# Patient Record
Sex: FEMALE | Race: WHITE | Employment: OTHER | URBAN - METROPOLITAN AREA
[De-identification: names, ages, dates, MRNs, and addresses within clinical notes are randomized per-mention and may not be internally consistent; named-entity substitution may affect disease eponyms.]

---

## 2016-06-23 PROCEDURE — 96372 THER/PROPH/DIAG INJ SC/IM: CPT

## 2017-01-10 ENCOUNTER — TELEPHONE (OUTPATIENT)
Dept: FAMILY MEDICINE | Facility: CLINIC | Age: 82
End: 2017-01-10

## 2017-01-12 ENCOUNTER — MEDICAL CORRESPONDENCE (OUTPATIENT)
Dept: HEALTH INFORMATION MANAGEMENT | Facility: CLINIC | Age: 82
End: 2017-01-12

## 2017-01-16 ENCOUNTER — MEDICAL CORRESPONDENCE (OUTPATIENT)
Dept: HEALTH INFORMATION MANAGEMENT | Facility: CLINIC | Age: 82
End: 2017-01-16

## 2017-02-09 ENCOUNTER — OFFICE VISIT (OUTPATIENT)
Dept: FAMILY MEDICINE | Facility: CLINIC | Age: 82
End: 2017-02-09
Payer: COMMERCIAL

## 2017-02-09 VITALS
HEART RATE: 92 BPM | DIASTOLIC BLOOD PRESSURE: 62 MMHG | BODY MASS INDEX: 30.81 KG/M2 | OXYGEN SATURATION: 96 % | WEIGHT: 150 LBS | TEMPERATURE: 98.1 F | SYSTOLIC BLOOD PRESSURE: 108 MMHG

## 2017-02-09 DIAGNOSIS — L85.8 CUTANEOUS HORN: Primary | ICD-10-CM

## 2017-02-09 PROCEDURE — 88305 TISSUE EXAM BY PATHOLOGIST: CPT | Performed by: FAMILY MEDICINE

## 2017-02-09 PROCEDURE — 11400 EXC TR-EXT B9+MARG 0.5 CM<: CPT | Performed by: FAMILY MEDICINE

## 2017-02-09 NOTE — MR AVS SNAPSHOT
After Visit Summary   2/9/2017    Lulu Carlton    MRN: [id number]           Patient Information     Date Of Birth          11/8/1921        Visit Information        Provider Department      2/9/2017 2:20 PM Todd Campos MD [facility]        Care Instructions    1. We removed the left thigh lesion.    2. Leave the current dressing on for two days.    3. After the two days just use a bandaide and change daily until healed.    4. Call if any redness.     5. i call you once get result.        Follow-ups after your visit        Your next 10 appointments already scheduled     Mar 09, 2017  9:00 AM   [facility]    [address] [place] [address] [phone]           Bring a current list of meds and any records pertaining to this visit.  For Physicals, please bring immunization records and any forms needing to be filled out.  Please arrive 10 minutes early to complete paperwork.              Who to contact     If you have questions or need follow up information about today's clinic visit or your schedule please contact [facility] directly at [phone].  Normal or non-critical lab and imaging results will be communicated to you by MyChart, letter or phone within 4 business days after the clinic has received the results. If you do not hear from us within 7 days, please contact the clinic through [employer] or phone. If you have a critical or abnormal lab result, we will notify you by phone as soon as possible.  Submit refill requests through [email or web address] or call your pharmacy and they will forward the refill request to us. Please allow 3 business days for your refill to be completed.          Additional Information About Your Visit        MyChart Information     [email or web address] lets you send messages to your doctor, view your test results, renew your  "prescriptions, schedule appointments and more. To sign up, go to [email or web address] . Click on \"Log in\" on the left side of the screen, which will take you to the Welcome page. Then click on \"Sign up Now\" on the right side of the page.     You will be asked to enter the access code listed below, as well as some personal information. Please follow the directions to create your username and password.     Your access code is: [email or web address]  Expires: 5/10/2017  2:35 PM     Your access code will  in 90 days. If you need help or a new code, please call your [place] clinic or [phone].        Care EveryWhere ID     This is your Care EveryWhere ID. This could be used by other organizations to access your [place] medical records  [id number]        Your Vitals Were     Pulse Temperature Pulse Oximetry             92 98.1  F (36.7  C) (Tympanic) 96%          Blood Pressure from Last 3 Encounters:   17 108/62   16 120/70   11/10/16 108/64    Weight from Last 3 Encounters:   17 150 lb (68.04 kg)   16 150 lb (68.04 kg)   11/10/16 150 lb 9.6 oz (68.312 kg)              Today, you had the following     No orders found for display       Primary Care Provider Office Phone # Fax #    Todd Villeda [phone]       [address] [address]        Thank you!     Thank you for choosing [facility]  for your care. Our goal is always to provide you with excellent care. Hearing back from our patients is one way we can continue to improve our services. Please take a few minutes to complete the written survey that you may receive in the mail after your visit with us. Thank you!             Your Updated Medication List - Protect others around you: Learn how to safely use, store and throw away your medicines at www.disposemymeds.org.          This list is accurate as of: 17  2:35 PM.  Always use your most recent med list.       "             Brand Name Dispense Instructions for use    Acetaminophen 325 MG Caps     100 capsule    Take 650 mg by mouth 3 times daily       B-12 1000 MCG Tbcr     30 tablet    Take 1,000 mcg by mouth daily       calcium-vitamin D 600-400 MG-UNIT per tablet    CALTRATE     Take 1 tablet by mouth every evening       clotrimazole 1 % cream    LOTRIMIN     Apply topically 2 times daily as needed       glipiZIDE 2.5 MG 24 hr tablet    glipiZIDE XL    30 tablet    Take 1 tablet (2.5 mg) by mouth daily (with breakfast)       guaiFENesin 600 MG 12 hr tablet    MUCINEX     Take 1,200 mg by mouth 2 times daily as needed for congestion       lisinopril 5 MG tablet    PRINIVIL/ZESTRIL    30 tablet    Take 1 tablet (5 mg) by mouth daily       metFORMIN 500 MG 24 hr tablet    GLUCOPHAGE-XR    60 tablet    Take 2 tablets (1,000 mg) by mouth daily (with dinner)       multivitamin Tabs tablet     30 each    Take 1 tablet by mouth daily       triamcinolone 0.1 % cream    KENALOG     Apply topically 2 times daily as needed for irritation       White Petrolatum ointment      Apply topically daily as needed for dry skin

## 2017-02-09 NOTE — PROGRESS NOTES
SUBJECTIVE:                                                    Lulu Carlton is a 89 year old female who presents to clinic today for the following health issues: she comes with a long standing lesion on her left thigh.        Sore on left upper thigh     Hypertension Follow-up      Outpatient blood pressures are being checked at home.  Results are good.    Low Salt Diet: low salt       Problem list and histories reviewed & adjusted, as indicated.  Additional history: as documented    Patient Active Problem List   Diagnosis     Sensorineural hearing loss, asymmetrical     Urinary incontinence     Osteoporosis     Vitamin D deficiency     Anemia, iron deficiency     Type 2 diabetes mellitus with hyperglycemia, without long-term current use of insulin (H)     Primary osteoarthritis of left knee     Hypertension goal BP (blood pressure) < 140/90     Past Surgical History   Procedure Laterality Date     Surgical history of -        thymus removal      Surgical history of -        cholecystectomy       Social History   Substance Use Topics     Smoking status: Never Smoker      Smokeless tobacco: Not on file     Alcohol Use: No     History reviewed. No pertinent family history.      Current Outpatient Prescriptions   Medication Sig Dispense Refill     Acetaminophen 325 MG CAPS Take 650 mg by mouth 3 times daily 100 capsule      multivitamin (OCUVITE) TABS tablet Take 1 tablet by mouth daily 30 each 0     metFORMIN (GLUCOPHAGE-XR) 500 MG 24 hr tablet Take 2 tablets (1,000 mg) by mouth daily (with dinner) 60 tablet 11     lisinopril (PRINIVIL,ZESTRIL) 5 MG tablet Take 1 tablet (5 mg) by mouth daily 30 tablet 11     Cyanocobalamin (B-12) 1000 MCG TBCR Take 1,000 mcg by mouth daily 30 tablet 11     glipiZIDE (GLIPIZIDE XL) 2.5 MG 24 hr tablet Take 1 tablet (2.5 mg) by mouth daily (with breakfast) 30 tablet 3     calcium-vitamin D (CALTRATE) 600-400 MG-UNIT per tablet Take 1 tablet by mouth every evening        triamcinolone (KENALOG) 0.1 % cream Apply topically 2 times daily as needed for irritation       clotrimazole (LOTRIMIN) 1 % cream Apply topically 2 times daily as needed       guaiFENesin (MUCINEX) 600 MG 12 hr tablet Take 1,200 mg by mouth 2 times daily as needed for congestion       White Petrolatum ointment Apply topically daily as needed for dry skin         ROS:  C: NEGATIVE for fever, chills, change in weight  CV: NEGATIVE for chest pain, palpitations or peripheral edema    OBJECTIVE:                                                    /62 mmHg  Pulse 92  Temp(Src) 98.1  F (36.7  C) (Tympanic)  Wt 150 lb (68.04 kg)  SpO2 96%  Body mass index is 30.81 kg/(m^2).  GENERAL: healthy, alert and no distress  MS: no gross musculoskeletal defects noted, no edema  SKIN WITH CUTANEOUS HORN ON LEFT THIGH    PROCEDURE  1 % ZYLOCAINE WITH EPI  THEN ROUTINE PREP AND THEN PUNCH BIOPSY TO REMOVE THE LESION   BLEEDING IS ABSENT.        ASSESSMENT/PLAN:                                                          ASSESSMENT/PLAN:      ICD-10-CM    1. Cutaneous horn L85.8        Patient Instructions   1. We removed the left thigh lesion.    2. Leave the current dressing on for two days.    3. After the two days just use a bandaide and change daily until healed.    4. Call if any redness.     5. i call you once get result.          There are no diagnoses linked to this encounter.        Todd Campos MD  [facility]

## 2017-02-09 NOTE — NURSING NOTE
"Chief Complaint   Patient presents with     Derm Problem     sore on upper left thigh       Initial /62 mmHg  Pulse 92  Temp(Src) 98.1  F (36.7  C) (Tympanic)  Wt 150 lb (68.04 kg)  SpO2 96% Estimated body mass index is 30.81 kg/(m^2) as calculated from the following:    Height as of 12/8/16: 4' 10.5\" (1.486 m).    Weight as of this encounter: 150 lb (68.04 kg).  Medication Reconciliation: complete    Health Maintenance that is potentially due pending provider review:  NONE    n/a      "

## 2017-02-09 NOTE — PATIENT INSTRUCTIONS
1. We removed the left thigh lesion.    2. Leave the current dressing on for two days.    3. After the two days just use a bandaide and change daily until healed.    4. Call if any redness.     5. i call you once get result.

## 2017-02-14 LAB — COPATH REPORT: NORMAL

## 2017-02-15 ENCOUNTER — TRANSFERRED RECORDS (OUTPATIENT)
Dept: HEALTH INFORMATION MANAGEMENT | Facility: CLINIC | Age: 82
End: 2017-02-15

## 2017-02-15 ENCOUNTER — ALLIED HEALTH/NURSE VISIT (OUTPATIENT)
Dept: FAMILY MEDICINE | Facility: CLINIC | Age: 82
End: 2017-02-15
Payer: COMMERCIAL

## 2017-02-15 DIAGNOSIS — R32 URINARY INCONTINENCE: Primary | ICD-10-CM

## 2017-02-15 PROCEDURE — 99207 ZZC NO CHARGE NURSE ONLY: CPT

## 2017-02-15 NOTE — NURSING NOTE
Rosa Maria from [facility] called and they will get a clean catch and fax Dr. Villeda the results as soon as she gets them

## 2017-02-15 NOTE — NURSING NOTE
I left another message for now at 1:09 pm Rosa Maria BASURTO at [facility] to return our call as Dr Villeda would like to get either a clean catch (if even possible) or straight cath UA specimen on patient before he would order any medication for her. Lisa Olmos RN

## 2017-02-15 NOTE — NURSING NOTE
Message left for Rosa Maria Granda LPN at [facility] to return call. I would like to ask what symptoms she may be observing and how patient is doing so I can consult Dr Villeda. Lisa Olmos RN

## 2017-02-15 NOTE — MR AVS SNAPSHOT
"              After Visit Summary   2/15/2017    Lulu Carlton    MRN: [id number]           Patient Information     Date Of Birth          11/8/1921        Visit Information        Provider Department      2/15/2017 9:45 AM FL NB RN [facility]        Today's Diagnoses     Urinary incontinence    -  1       Follow-ups after your visit        Your next 10 appointments already scheduled     Mar 09, 2017  9:00 AM CST   Office Visit with Todd Villeda MD   [facility] ([facility]    [address] [place] [id number]   [phone]           Bring a current list of meds and any records pertaining to this visit.  For Physicals, please bring immunization records and any forms needing to be filled out.  Please arrive 10 minutes early to complete paperwork.              Who to contact     If you have questions or need follow up information about today's clinic visit or your schedule please contact [facility] directly at [phone].  Normal or non-critical lab and imaging results will be communicated to you by MyChart, letter or phone within 4 business days after the clinic has received the results. If you do not hear from us within 7 days, please contact the clinic through [employer] or phone. If you have a critical or abnormal lab result, we will notify you by phone as soon as possible.  Submit refill requests through [employer] or call your pharmacy and they will forward the refill request to us. Please allow 3 business days for your refill to be completed.          Additional Information About Your Visit        Syn[employer] Information     [employer] lets you send messages to your doctor, view your test results, renew your prescriptions, schedule appointments and more. To sign up, go to [email or web address] . Click on \"Log in\" on the left side of the screen, which will take you to the Welcome page. Then click on \"Sign up Now\" on the " right side of the page.     You will be asked to enter the access code listed below, as well as some personal information. Please follow the directions to create your username and password.     Your access code is: [email or web address]  Expires: 5/10/2017  2:35 PM     Your access code will  in 90 days. If you need help or a new code, please call your [facility] or [phone].        Care EveryWhere ID     This is your Care EveryWhere ID. This could be used by other organizations to access your [place] medical records  [id number]         Blood Pressure from Last 3 Encounters:   17 108/62   16 120/70   11/10/16 108/64    Weight from Last 3 Encounters:   17 150 lb (68 kg)   16 150 lb (68 kg)   11/10/16 150 lb 9.6 oz (68.3 kg)              Today, you had the following     No orders found for display       Primary Care Provider Office Phone # Fax #    Todd iVlleda [phone]       [facility] [address] [address]        Thank you!     Thank you for choosing [facility]  for your care. Our goal is always to provide you with excellent care. Hearing back from our patients is one way we can continue to improve our services. Please take a few minutes to complete the written survey that you may receive in the mail after your visit with us. Thank you!             Your Updated Medication List - Protect others around you: Learn how to safely use, store and throw away your medicines at www.disposemymeds.org.          This list is accurate as of: 2/15/17  4:23 PM.  Always use your most recent med list.                   Brand Name Dispense Instructions for use    Acetaminophen 325 MG Caps     100 capsule    Take 650 mg by mouth 3 times daily       B-12 1000 MCG Tbcr     30 tablet    Take 1,000 mcg by mouth daily       calcium-vitamin D 600-400 MG-UNIT per tablet    CALTRATE     Take 1 tablet by mouth every evening       clotrimazole  1 % cream    LOTRIMIN     Apply topically 2 times daily as needed       glipiZIDE 2.5 MG 24 hr tablet    glipiZIDE XL    30 tablet    Take 1 tablet (2.5 mg) by mouth daily (with breakfast)       guaiFENesin 600 MG 12 hr tablet    MUCINEX     Take 1,200 mg by mouth 2 times daily as needed for congestion       lisinopril 5 MG tablet    PRINIVIL/ZESTRIL    30 tablet    Take 1 tablet (5 mg) by mouth daily       metFORMIN 500 MG 24 hr tablet    GLUCOPHAGE-XR    60 tablet    Take 2 tablets (1,000 mg) by mouth daily (with dinner)       multivitamin Tabs tablet     30 each    Take 1 tablet by mouth daily       triamcinolone 0.1 % cream    KENALOG     Apply topically 2 times daily as needed for irritation       White Petrolatum ointment      Apply topically daily as needed for dry skin

## 2017-03-02 ENCOUNTER — TELEPHONE (OUTPATIENT)
Dept: FAMILY MEDICINE | Facility: CLINIC | Age: 82
End: 2017-03-02

## 2017-03-03 ENCOUNTER — MEDICAL CORRESPONDENCE (OUTPATIENT)
Dept: HEALTH INFORMATION MANAGEMENT | Facility: CLINIC | Age: 82
End: 2017-03-03

## 2017-03-09 ENCOUNTER — OFFICE VISIT (OUTPATIENT)
Dept: FAMILY MEDICINE | Facility: CLINIC | Age: 82
End: 2017-03-09
Payer: COMMERCIAL

## 2017-03-09 VITALS
HEART RATE: 80 BPM | WEIGHT: 149.2 LBS | SYSTOLIC BLOOD PRESSURE: 100 MMHG | DIASTOLIC BLOOD PRESSURE: 60 MMHG | BODY MASS INDEX: 30.08 KG/M2 | TEMPERATURE: 98 F | HEIGHT: 59 IN

## 2017-03-09 DIAGNOSIS — E11.65 TYPE 2 DIABETES MELLITUS WITH HYPERGLYCEMIA, WITHOUT LONG-TERM CURRENT USE OF INSULIN (H): Primary | ICD-10-CM

## 2017-03-09 DIAGNOSIS — R11.0 NAUSEA: ICD-10-CM

## 2017-03-09 DIAGNOSIS — I10 HYPERTENSION GOAL BP (BLOOD PRESSURE) < 140/90: ICD-10-CM

## 2017-03-09 LAB — HBA1C MFR BLD: 6.1 % (ref 4.3–6)

## 2017-03-09 PROCEDURE — 36415 COLL VENOUS BLD VENIPUNCTURE: CPT | Performed by: FAMILY MEDICINE

## 2017-03-09 PROCEDURE — 99214 OFFICE O/P EST MOD 30 MIN: CPT | Performed by: FAMILY MEDICINE

## 2017-03-09 PROCEDURE — 99207 C FOOT EXAM  NO CHARGE: CPT | Performed by: FAMILY MEDICINE

## 2017-03-09 PROCEDURE — 83036 HEMOGLOBIN GLYCOSYLATED A1C: CPT | Performed by: FAMILY MEDICINE

## 2017-03-09 RX ORDER — GLIPIZIDE 5 MG/1
5 TABLET, FILM COATED, EXTENDED RELEASE ORAL DAILY
Qty: 30 TABLET | Refills: 11 | Status: SHIPPED | OUTPATIENT
Start: 2017-03-09 | End: 2017-06-15

## 2017-03-09 NOTE — PROGRESS NOTES
Please call daughter with result.  diabetes mellitus very well controlled.  It has improved a lot with the metformin.      PLAN: No new changes in treatment recommended.  We will see how she does off the metformin and with the increased dose of glipizide.

## 2017-03-09 NOTE — NURSING NOTE
"Chief Complaint   Patient presents with     Diabetes     Hypertension       Initial /60 (BP Location: Right arm, Patient Position: Chair, Cuff Size: Adult Large)  Pulse 80  Temp 98  F (36.7  C) (Tympanic)  Ht 4' 10.5\" (1.486 m)  Wt 149 lb 3.2 oz (67.7 kg)  BMI 30.65 kg/m2 Estimated body mass index is 30.65 kg/(m^2) as calculated from the following:    Height as of this encounter: 4' 10.5\" (1.486 m).    Weight as of this encounter: 149 lb 3.2 oz (67.7 kg).  Medication Reconciliation: complete        "

## 2017-03-09 NOTE — MR AVS SNAPSHOT
After Visit Summary   3/9/2017    Lulu Carlotn    MRN: [id number]           Patient Information     Date Of Birth          11/8/1921        Visit Information        Provider Department      3/9/2017 9:00 AM Todd Villeda MD [facility]        Today's Diagnoses     Type 2 diabetes mellitus with hyperglycemia, without long-term current use of insulin (H)    -  1    Nausea        Hypertension goal BP (blood pressure) < 140/90          Care Instructions    ASSESSMENT/PLAN:                                                        (E11.65) Type 2 diabetes mellitus with hyperglycemia, without long-term current use of insulin (H)  (primary encounter diagnosis)  Comment: diarrhea may be from metformin.   Plan: FOOT EXAM, glipiZIDE (GLIPIZIDE XL) 5 MG 24 hr         tablet, Hemoglobin A1c        Stop the metformin.   Take higher dose of glipizide instead.  Change to glipizide 5mg once daily.  Hgb A1C in another 3 months.    (R11.0) Nausea  Comment: seems worse since stopping the omeprazole   Plan: omeprazole (PRILOSEC) 20 MG CR capsule        REstart the omeprazole at 20mg daily.     (I10) Hypertension goal BP (blood pressure) < 140/90  Comment: doing OK  Plan: No change in current treatment plan.         Follow-ups after your visit        Who to contact     If you have questions or need follow up information about today's clinic visit or your schedule please contact [facility] directly at [phone].  Normal or non-critical lab and imaging results will be communicated to you by MyChart, letter or phone within 4 business days after the clinic has received the results. If you do not hear from us within 7 days, please contact the clinic through MyChart or phone. If you have a critical or abnormal lab result, we will notify you by phone as soon as possible.  Submit refill requests through [employer] or call your pharmacy and they will forward the refill request to us.  "Please allow 3 business days for your refill to be completed.          Additional Information About Your Visit        MyChart Information     [employer] interactive [employer]hart lets you send messages to your doctor, view your test results, renew your prescriptions, schedule appointments and more. To sign up, go to [email or web address] . Click on \"Log in\" on the left side of the screen, which will take you to the Welcome page. Then click on \"Sign up Now\" on the right side of the page.     You will be asked to enter the access code listed below, as well as some personal information. Please follow the directions to create your username and password.     Your access code is: [email or web address]  Expires: 5/10/2017  2:35 PM     Your access code will  in 90 days. If you need help or a new code, please call your [place] clinic or [phone].        Care EveryWhere ID     This is your [facility] EveryWhere ID. This could be used by other organizations to access your [place] medical records  [id number]        Your Vitals Were     Pulse Temperature Height BMI (Body Mass Index)          80 98  F (36.7  C) (Tympanic) 4' 10.5\" (1.486 m) 30.65 kg/m2         Blood Pressure from Last 3 Encounters:   17 100/60   17 108/62   16 120/70    Weight from Last 3 Encounters:   17 149 lb 3.2 oz (67.7 kg)   17 150 lb (68 kg)   16 150 lb (68 kg)              We Performed the Following     FOOT EXAM     Hemoglobin A1c          Today's Medication Changes          These changes are accurate as of: 3/9/17 10:05 AM.  If you have any questions, ask your nurse or doctor.               Start taking these medicines.        Dose/Directions    omeprazole 20 MG CR capsule   Commonly known as:  priLOSEC   Used for:  Nausea   Started by:  Todd Villeda MD        Dose:  20 mg   Take 1 capsule (20 mg) by mouth daily   Quantity:  30 capsule   Refills:  11         These medicines have changed or have updated prescriptions.        Dose/Directions "    glipiZIDE 5 MG 24 hr tablet   Commonly known as:  glipiZIDE XL   This may have changed:    - medication strength  - how much to take  - when to take this   Used for:  Type 2 diabetes mellitus with hyperglycemia, without long-term current use of insulin (H)   Changed by:  Todd Villeda MD        Dose:  5 mg   Take 1 tablet (5 mg) by mouth daily   Quantity:  30 tablet   Refills:  11         Stop taking these medicines if you haven't already. Please contact your care team if you have questions.     metFORMIN 500 MG 24 hr tablet   Commonly known as:  GLUCOPHAGE-XR   Stopped by:  Todd Villeda MD                Where to get your medicines      Some of these will need a paper prescription and others can be bought over the counter.  Ask your nurse if you have questions.     Bring a paper prescription for each of these medications     glipiZIDE 5 MG 24 hr tablet    omeprazole 20 MG CR capsule                Primary Care Provider Office Phone # Fax #    Todd Villeda [phone]       [address] [address]        Thank you!     Thank you for choosing [facility]  for your care. Our goal is always to provide you with excellent care. Hearing back from our patients is one way we can continue to improve our services. Please take a few minutes to complete the written survey that you may receive in the mail after your visit with us. Thank you!             Your Updated Medication List - Protect others around you: Learn how to safely use, store and throw away your medicines at www.disposemymeds.org.          This list is accurate as of: 3/9/17 10:05 AM.  Always use your most recent med list.                   Brand Name Dispense Instructions for use    Acetaminophen 325 MG Caps     100 capsule    Take 650 mg by mouth 3 times daily       B-12 1000 MCG Tbcr     30 tablet    Take 1,000 mcg by mouth daily       calcium-vitamin D 600-400 MG-UNIT  per tablet    CALTRATE     Take 1 tablet by mouth every evening       clotrimazole 1 % cream    LOTRIMIN     Apply topically 2 times daily as needed       glipiZIDE 5 MG 24 hr tablet    glipiZIDE XL    30 tablet    Take 1 tablet (5 mg) by mouth daily       guaiFENesin 600 MG 12 hr tablet    MUCINEX     Take 1,200 mg by mouth 2 times daily as needed for congestion       lisinopril 5 MG tablet    PRINIVIL/ZESTRIL    30 tablet    Take 1 tablet (5 mg) by mouth daily       multivitamin Tabs tablet     30 each    Take 1 tablet by mouth daily       omeprazole 20 MG CR capsule    priLOSEC    30 capsule    Take 1 capsule (20 mg) by mouth daily       triamcinolone 0.1 % cream    KENALOG     Apply topically 2 times daily as needed for irritation       White Petrolatum ointment      Apply topically daily as needed for dry skin

## 2017-03-09 NOTE — PROGRESS NOTES
"  SUBJECTIVE:                                                    Lulu Carlton is a 89 year old female who presents to clinic today for the following health issues:  Chief Complaint   Patient presents with     Diabetes     Hypertension      He has diarrhea about once a week.  Will have a couple watery stools.  Triggering factors:?  Stools \"never hard\".  Some diarrhea for a long time.  Might be worse since starting metformin.   She has a prn medication.  Staff will not give it to her unless they see the stool so she never gets it.    Can get nausea in the AM.   May be worse since stopping omeprazole.     Diabetes Follow-up      Patient is checking blood sugars: rarely.  Results range from 120s to 261    Taking metformin 500mg daily and glipizide 2.5mg daily.     Diabetic concerns: nausea in mornings, foggy today     Symptoms of hypoglycemia (low blood sugar): not sure     Paresthesias (numbness or burning in feet) or sores: No     Date of last diabetic eye exam: up to date     Lab Results   Component Value Date    A1C 8.5 10/31/2016        Hypertension Follow-up      Outpatient blood pressures are being checked at home.  Results are varies.normal to high range with anxiety    Low Salt Diet: not monitoring salt   BP usually in the 110-120/ 50-60      Amount of exercise or physical activity: None    Problems taking medications regularly: No    Medication side effects: possible, loose stools, nausea in am  Diet: regular (no restrictions)    Last clinic visit:11/10/2016 for diabetes mellitus   Medication or other changes at last visit:no  Weight since last visit?  Down 1#  Wt Readings from Last 5 Encounters:   03/09/17 149 lb 3.2 oz (67.7 kg)   02/09/17 150 lb (68 kg)   12/08/16 150 lb (68 kg)   11/10/16 150 lb 9.6 oz (68.3 kg)   10/31/16 152 lb 9.6 oz (69.2 kg)      History   Smoking Status     Never Smoker   Smokeless Tobacco     Not on file     Past medical history reviewed and updated    Patient Active Problem " List    Diagnosis Date Noted     Hypertension goal BP (blood pressure) < 140/90 12/12/2016     Priority: Medium     Urinary incontinence 10/31/2016     Priority: Medium     Osteoporosis 10/31/2016     Priority: Medium     Vitamin D deficiency 10/31/2016     Priority: Medium     Anemia, iron deficiency 10/31/2016     Priority: Medium     Type 2 diabetes mellitus with hyperglycemia, without long-term current use of insulin (H) 10/31/2016     Priority: Medium     Primary osteoarthritis of left knee 10/31/2016     Priority: Medium     Sensorineural hearing loss, asymmetrical 10/17/2007     Priority: Medium     Current Outpatient Prescriptions   Medication Sig Dispense Refill     Acetaminophen 325 MG CAPS Take 650 mg by mouth 3 times daily 100 capsule      multivitamin (OCUVITE) TABS tablet Take 1 tablet by mouth daily 30 each 0     metFORMIN (GLUCOPHAGE-XR) 500 MG 24 hr tablet Take 2 tablets (1,000 mg) by mouth daily (with dinner) 60 tablet 11     lisinopril (PRINIVIL,ZESTRIL) 5 MG tablet Take 1 tablet (5 mg) by mouth daily 30 tablet 11     Cyanocobalamin (B-12) 1000 MCG TBCR Take 1,000 mcg by mouth daily 30 tablet 11     glipiZIDE (GLIPIZIDE XL) 2.5 MG 24 hr tablet Take 1 tablet (2.5 mg) by mouth daily (with breakfast) 30 tablet 3     calcium-vitamin D (CALTRATE) 600-400 MG-UNIT per tablet Take 1 tablet by mouth every evening       triamcinolone (KENALOG) 0.1 % cream Apply topically 2 times daily as needed for irritation       clotrimazole (LOTRIMIN) 1 % cream Apply topically 2 times daily as needed       guaiFENesin (MUCINEX) 600 MG 12 hr tablet Take 1,200 mg by mouth 2 times daily as needed for congestion       White Petrolatum ointment Apply topically daily as needed for dry skin       ROS:General:  Resp: No coughing, wheezing or shortness of breath  CV: No chest pains or palpitations  GI: POSITIVE for:, diarrhea, see above   : POSITIVE for:, incontinence  Musculoskeletal: POSITIVE  for:, pain-aches in knees.  "  Psychiatric: No problems with anxiety, depression or mental health  Does activities at [employer].  In memory care.   NO orthostatic dizziness.    Has not been falling.     OBJECTIVE:Blood pressure 100/60, pulse 80, temperature 98  F (36.7  C), temperature source Tympanic, height 4' 10.5\" (1.486 m), weight 149 lb 3.2 oz (67.7 kg). BMI=Body mass index is 30.65 kg/(m^2).  GENERAL APPEARANCE ADULT: Alert, no acute distress  NECK: No adenopathy,masses or thyromegaly  MS: extremities normal, no peripheral edema   Foot exam:not done    ASSESSMENT/PLAN:                                                        (E11.65) Type 2 diabetes mellitus with hyperglycemia, without long-term current use of insulin (H)  (primary encounter diagnosis)  Comment: diarrhea may be from metformin.     Lab Results   Component Value Date    A1C 6.1 03/09/2017        Plan: FOOT EXAM, glipiZIDE (GLIPIZIDE XL) 5 MG 24 hr         tablet, Hemoglobin A1c        Stop the metformin.   Take higher dose of glipizide instead.  Change to glipizide 5mg once daily.  Hgb A1C in another 3 months.    (R11.0) Nausea  Comment: seems worse since stopping the omeprazole   Plan: omeprazole (PRILOSEC) 20 MG CR capsule        REstart the omeprazole at 20mg daily.     (I10) Hypertension goal BP (blood pressure) < 140/90  Comment: doing OK  Plan: No change in current treatment plan.        Diabetes Type II, A1c Controlled, non-insulin dependent   Associated with the following complications:none  "

## 2017-05-01 ENCOUNTER — HOSPITAL ENCOUNTER (EMERGENCY)
Facility: CLINIC | Age: 82
Discharge: HOME | End: 2017-05-01
Attending: EMERGENCY MEDICINE | Admitting: EMERGENCY MEDICINE
Payer: MEDICARE

## 2017-05-01 ENCOUNTER — APPOINTMENT (OUTPATIENT)
Dept: MRI IMAGING | Facility: CLINIC | Age: 82
End: 2017-05-01
Attending: EMERGENCY MEDICINE
Payer: MEDICARE

## 2017-05-01 ENCOUNTER — APPOINTMENT (OUTPATIENT)
Dept: GENERAL RADIOLOGY | Facility: CLINIC | Age: 82
End: 2017-05-01
Attending: EMERGENCY MEDICINE
Payer: MEDICARE

## 2017-05-01 VITALS
DIASTOLIC BLOOD PRESSURE: 75 MMHG | RESPIRATION RATE: 16 BRPM | HEART RATE: 75 BPM | OXYGEN SATURATION: 92 % | SYSTOLIC BLOOD PRESSURE: 153 MMHG | TEMPERATURE: 97.4 F

## 2017-05-01 DIAGNOSIS — G44.219 EPISODIC TENSION-TYPE HEADACHE, NOT INTRACTABLE: ICD-10-CM

## 2017-05-01 DIAGNOSIS — M62.838 TRAPEZIUS MUSCLE SPASM: ICD-10-CM

## 2017-05-01 DIAGNOSIS — K52.9 CHRONIC DIARRHEA: ICD-10-CM

## 2017-05-01 LAB
ALBUMIN SERPL-MCNC: 3.5 G/DL (ref 3.4–5)
ALBUMIN UR-MCNC: NEGATIVE MG/DL
ALP SERPL-CCNC: 82 U/L (ref 40–150)
ALT SERPL W P-5'-P-CCNC: 20 U/L (ref 0–50)
ANION GAP SERPL CALCULATED.3IONS-SCNC: 11 MMOL/L (ref 3–14)
APPEARANCE UR: CLEAR
AST SERPL W P-5'-P-CCNC: 12 U/L (ref 0–45)
BASOPHILS # BLD AUTO: 0.1 10E9/L (ref 0–0.2)
BASOPHILS NFR BLD AUTO: 1 %
BILIRUB SERPL-MCNC: 0.4 MG/DL (ref 0.2–1.3)
BILIRUB UR QL STRIP: NEGATIVE
BUN SERPL-MCNC: 18 MG/DL (ref 7–30)
CALCIUM SERPL-MCNC: 9.2 MG/DL (ref 8.5–10.1)
CHLORIDE SERPL-SCNC: 108 MMOL/L (ref 94–109)
CO2 SERPL-SCNC: 25 MMOL/L (ref 20–32)
COLOR UR AUTO: YELLOW
CREAT SERPL-MCNC: 0.69 MG/DL (ref 0.52–1.04)
CRP SERPL-MCNC: <2.9 MG/L (ref 0–8)
DIFFERENTIAL METHOD BLD: NORMAL
EOSINOPHIL # BLD AUTO: 0.1 10E9/L (ref 0–0.7)
EOSINOPHIL NFR BLD AUTO: 2.7 %
ERYTHROCYTE [DISTWIDTH] IN BLOOD BY AUTOMATED COUNT: 12.6 % (ref 10–15)
ERYTHROCYTE [SEDIMENTATION RATE] IN BLOOD BY WESTERGREN METHOD: 9 MM/H (ref 0–30)
GFR SERPL CREATININE-BSD FRML MDRD: 79 ML/MIN/1.7M2
GLUCOSE SERPL-MCNC: 123 MG/DL (ref 70–99)
GLUCOSE UR STRIP-MCNC: NEGATIVE MG/DL
HCT VFR BLD AUTO: 41 % (ref 35–47)
HGB BLD-MCNC: 13.3 G/DL (ref 11.7–15.7)
HGB UR QL STRIP: NEGATIVE
HYALINE CASTS #/AREA URNS LPF: 1 /LPF (ref 0–2)
IMM GRANULOCYTES # BLD: 0 10E9/L (ref 0–0.4)
IMM GRANULOCYTES NFR BLD: 0.2 %
INR PPP: 1.02 (ref 0.86–1.14)
KETONES UR STRIP-MCNC: NEGATIVE MG/DL
LEUKOCYTE ESTERASE UR QL STRIP: ABNORMAL
LYMPHOCYTES # BLD AUTO: 1.2 10E9/L (ref 0.8–5.3)
LYMPHOCYTES NFR BLD AUTO: 23.1 %
MCH RBC QN AUTO: 29 PG (ref 26.5–33)
MCHC RBC AUTO-ENTMCNC: 32.4 G/DL (ref 31.5–36.5)
MCV RBC AUTO: 90 FL (ref 78–100)
MONOCYTES # BLD AUTO: 0.3 10E9/L (ref 0–1.3)
MONOCYTES NFR BLD AUTO: 6.1 %
MUCOUS THREADS #/AREA URNS LPF: PRESENT /LPF
NEUTROPHILS # BLD AUTO: 3.5 10E9/L (ref 1.6–8.3)
NEUTROPHILS NFR BLD AUTO: 66.9 %
NITRATE UR QL: NEGATIVE
PH UR STRIP: 5.5 PH (ref 5–7)
PLATELET # BLD AUTO: 183 10E9/L (ref 150–450)
POTASSIUM SERPL-SCNC: 4.2 MMOL/L (ref 3.4–5.3)
PROT SERPL-MCNC: 6.7 G/DL (ref 6.8–8.8)
RBC # BLD AUTO: 4.58 10E12/L (ref 3.8–5.2)
RBC #/AREA URNS AUTO: 1 /HPF (ref 0–2)
SODIUM SERPL-SCNC: 144 MMOL/L (ref 133–144)
SP GR UR STRIP: 1.01 (ref 1–1.03)
SQUAMOUS #/AREA URNS AUTO: <1 /HPF (ref 0–1)
TROPONIN I SERPL-MCNC: NORMAL UG/L (ref 0–0.04)
TSH SERPL DL<=0.005 MIU/L-ACNC: 1.13 MU/L (ref 0.4–4)
URN SPEC COLLECT METH UR: ABNORMAL
UROBILINOGEN UR STRIP-MCNC: NORMAL MG/DL (ref 0–2)
WBC # BLD AUTO: 5.2 10E9/L (ref 4–11)
WBC #/AREA URNS AUTO: 6 /HPF (ref 0–2)

## 2017-05-01 PROCEDURE — 86140 C-REACTIVE PROTEIN: CPT | Performed by: EMERGENCY MEDICINE

## 2017-05-01 PROCEDURE — 87086 URINE CULTURE/COLONY COUNT: CPT | Performed by: EMERGENCY MEDICINE

## 2017-05-01 PROCEDURE — 85025 COMPLETE CBC W/AUTO DIFF WBC: CPT | Performed by: EMERGENCY MEDICINE

## 2017-05-01 PROCEDURE — A9585 GADOBUTROL INJECTION: HCPCS | Performed by: EMERGENCY MEDICINE

## 2017-05-01 PROCEDURE — 71020 XR CHEST 2 VW: CPT

## 2017-05-01 PROCEDURE — 93010 ELECTROCARDIOGRAM REPORT: CPT | Performed by: EMERGENCY MEDICINE

## 2017-05-01 PROCEDURE — 85610 PROTHROMBIN TIME: CPT | Performed by: EMERGENCY MEDICINE

## 2017-05-01 PROCEDURE — 80053 COMPREHEN METABOLIC PANEL: CPT | Performed by: EMERGENCY MEDICINE

## 2017-05-01 PROCEDURE — 81001 URINALYSIS AUTO W/SCOPE: CPT | Performed by: EMERGENCY MEDICINE

## 2017-05-01 PROCEDURE — 99285 EMERGENCY DEPT VISIT HI MDM: CPT | Mod: 25 | Performed by: EMERGENCY MEDICINE

## 2017-05-01 PROCEDURE — 96361 HYDRATE IV INFUSION ADD-ON: CPT

## 2017-05-01 PROCEDURE — 25000128 H RX IP 250 OP 636: Performed by: EMERGENCY MEDICINE

## 2017-05-01 PROCEDURE — 93005 ELECTROCARDIOGRAM TRACING: CPT

## 2017-05-01 PROCEDURE — 70553 MRI BRAIN STEM W/O & W/DYE: CPT

## 2017-05-01 PROCEDURE — 84484 ASSAY OF TROPONIN QUANT: CPT | Performed by: EMERGENCY MEDICINE

## 2017-05-01 PROCEDURE — 72141 MRI NECK SPINE W/O DYE: CPT

## 2017-05-01 PROCEDURE — 99285 EMERGENCY DEPT VISIT HI MDM: CPT | Mod: 25

## 2017-05-01 PROCEDURE — 25500064 ZZH RX 255 OP 636: Performed by: EMERGENCY MEDICINE

## 2017-05-01 PROCEDURE — 85652 RBC SED RATE AUTOMATED: CPT | Performed by: EMERGENCY MEDICINE

## 2017-05-01 PROCEDURE — 96374 THER/PROPH/DIAG INJ IV PUSH: CPT | Mod: 59

## 2017-05-01 PROCEDURE — 84443 ASSAY THYROID STIM HORMONE: CPT | Performed by: EMERGENCY MEDICINE

## 2017-05-01 RX ORDER — CYCLOBENZAPRINE HCL 10 MG
5 TABLET ORAL 3 TIMES DAILY PRN
Qty: 20 TABLET | Refills: 0 | Status: SHIPPED | OUTPATIENT
Start: 2017-05-01 | End: 2017-05-03 | Stop reason: ALTCHOICE

## 2017-05-01 RX ORDER — SODIUM CHLORIDE 9 MG/ML
1000 INJECTION, SOLUTION INTRAVENOUS CONTINUOUS
Status: DISCONTINUED | OUTPATIENT
Start: 2017-05-01 | End: 2017-05-01 | Stop reason: HOSPADM

## 2017-05-01 RX ORDER — LORAZEPAM 2 MG/ML
0.5 INJECTION INTRAMUSCULAR ONCE
Status: COMPLETED | OUTPATIENT
Start: 2017-05-01 | End: 2017-05-01

## 2017-05-01 RX ORDER — GADOBUTROL 604.72 MG/ML
10 INJECTION INTRAVENOUS ONCE
Status: COMPLETED | OUTPATIENT
Start: 2017-05-01 | End: 2017-05-01

## 2017-05-01 RX ADMIN — LORAZEPAM: 2 INJECTION, SOLUTION INTRAMUSCULAR; INTRAVENOUS at 11:00

## 2017-05-01 RX ADMIN — GADOBUTROL 7 ML: 604.72 INJECTION INTRAVENOUS at 12:21

## 2017-05-01 RX ADMIN — SODIUM CHLORIDE 1000 ML: 9 INJECTION, SOLUTION INTRAVENOUS at 11:00

## 2017-05-01 NOTE — ED AVS SNAPSHOT
[facility] Emergency Department    [address] [address]    Phone:  [phone]    Fax:  [phone]                                       Lulu Carlton   MRN: [id number]    Department:  [facility] Emergency Department   Date of Visit:  5/1/2017           Patient Information     Date Of Birth          11/8/1921        Your diagnoses for this visit were:     Episodic tension-type headache, not intractable     Trapezius muscle spasm     Chronic diarrhea        You were seen by Bello Patel MD.      Follow-up Information     Follow up with Todd Villeda MD.    Specialty:  Family Practice    Why:  As needed    Contact information:    [facility]  [address] [facility] [address]  [phone]        Discharge References/Attachments     HEADACHES, TENSION (ENGLISH)      24 Hour Appointment Hotline       To make an appointment at any [facility] clinic, call [phone] ([phone]). If you don't have a family doctor or clinic, we will help you find one. [facility] clinics are conveniently located to serve the needs of you and your family.             Review of your medicines      START taking        Dose / Directions Last dose taken    cyclobenzaprine 10 MG tablet   Commonly known as:  FLEXERIL   Dose:  5 mg   Quantity:  20 tablet        Take 0.5 tablets (5 mg) by mouth 3 times daily as needed for muscle spasms   Refills:  0          Our records show that you are taking the medicines listed below. If these are incorrect, please call your family doctor or clinic.        Dose / Directions Last dose taken    * TYLENOL PO   Dose:  650 mg        Take 650 mg by mouth every 4 hours as needed for mild pain or fever Do not exceed 4,000 mg of Acetaminophen from all sources in 24 hours   Refills:  0        * Acetaminophen 325 MG Caps   Dose:  650 mg   Quantity:  100 capsule        Take 650 mg by mouth 3 times daily AM, 1630, hs   Refills:  0        B-12 1000 MCG Tbcr    Dose:  1000 mcg   Quantity:  30 tablet        Take 1,000 mcg by mouth daily   Refills:  11        calcium-vitamin D 600-400 MG-UNIT per tablet   Commonly known as:  CALTRATE   Dose:  1 tablet        Take 1 tablet by mouth every evening   Refills:  0        glipiZIDE 5 MG 24 hr tablet   Commonly known as:  glipiZIDE XL   Dose:  5 mg   Quantity:  30 tablet        Take 1 tablet (5 mg) by mouth daily   Refills:  11        lisinopril 5 MG tablet   Commonly known as:  PRINIVIL/ZESTRIL   Dose:  5 mg   Quantity:  30 tablet        Take 1 tablet (5 mg) by mouth daily   Refills:  11        LOPERAMIDE HCL PO   Dose:  2 mg        Take 2 mg by mouth daily as needed   Refills:  0        MECLIZINE HCL PO   Dose:  12.5 mg        Take 12.5 mg by mouth 2 times daily as needed for dizziness   Refills:  0        multivitamin Tabs tablet   Dose:  1 tablet   Quantity:  30 each        Take 1 tablet by mouth daily   Refills:  0        omeprazole 20 MG CR capsule   Commonly known as:  priLOSEC   Dose:  20 mg   Quantity:  30 capsule        Take 1 capsule (20 mg) by mouth daily   Refills:  11        triamcinolone 0.1 % cream   Commonly known as:  KENALOG        Apply topically 2 times daily as needed for irritation   Refills:  0        * Notice:  This list has 2 medication(s) that are the same as other medications prescribed for you. Read the directions carefully, and ask your doctor or other care provider to review them with you.            Prescriptions were sent or printed at these locations (1 Prescription)                   Other Prescriptions                Printed at Department/Unit printer (1 of 1)         cyclobenzaprine (FLEXERIL) 10 MG tablet                Procedures and tests performed during your visit     CBC with platelets differential    CRP inflammation    Cervical spine MRI w/o contrast    Comprehensive metabolic panel    EKG 12-lead, tracing only    Erythrocyte sedimentation rate auto    INR    MR Brain w/o & w Contrast     TSH with free T4 reflex    Troponin I    UA reflex to Microscopic    XR Chest 2 Views      Orders Needing Specimen Collection     Ordered          05/01/17 1007  Clostridium difficile toxin B PCR - ROUTINE, [facility]: Routine, Needs to be Collected     Scheduled Task Status   05/01/17 1006 Collect Clostridium difficile toxin B PCR Open   Order Class:  PCU Collect                  Pending Results     No orders found from 4/29/2017 to 5/2/2017.            Pending Culture Results     No orders found from 4/29/2017 to 5/2/2017.            Test Results From Your Hospital Stay        5/1/2017 10:36 AM      Component Results     Component Value Ref Range & Units Status    WBC 5.2 4.0 - 11.0 10e9/L Final    RBC Count 4.58 3.8 - 5.2 10e12/L Final    Hemoglobin 13.3 11.7 - 15.7 g/dL Final    Hematocrit 41.0 35.0 - 47.0 % Final    MCV 90 78 - 100 fl Final    MCH 29.0 26.5 - 33.0 pg Final    MCHC 32.4 31.5 - 36.5 g/dL Final    RDW 12.6 10.0 - 15.0 % Final    Platelet Count 183 150 - 450 10e9/L Final    Diff Method Automated Method  Final    % Neutrophils 66.9 % Final    % Lymphocytes 23.1 % Final    % Monocytes 6.1 % Final    % Eosinophils 2.7 % Final    % Basophils 1.0 % Final    % Immature Granulocytes 0.2 % Final    Absolute Neutrophil 3.5 1.6 - 8.3 10e9/L Final    Absolute Lymphocytes 1.2 0.8 - 5.3 10e9/L Final    Absolute Monocytes 0.3 0.0 - 1.3 10e9/L Final    Absolute Eosinophils 0.1 0.0 - 0.7 10e9/L Final    Absolute Basophils 0.1 0.0 - 0.2 10e9/L Final    Abs Immature Granulocytes 0.0 0 - 0.4 10e9/L Final         5/1/2017 10:47 AM      Component Results     Component Value Ref Range & Units Status    INR 1.02 0.86 - 1.14 Final         5/1/2017 11:02 AM      Component Results     Component Value Ref Range & Units Status    Sodium 144 133 - 144 mmol/L Final    Potassium 4.2 3.4 - 5.3 mmol/L Final    Chloride 108 94 - 109 mmol/L Final    Carbon Dioxide 25 20 - 32 mmol/L Final    Anion Gap 11 3 - 14 mmol/L Final    Glucose  123 (H) 70 - 99 mg/dL Final    Urea Nitrogen 18 7 - 30 mg/dL Final    Creatinine 0.69 0.52 - 1.04 mg/dL Final    GFR Estimate 79 >60 mL/min/1.7m2 Final    Non  GFR Calc    GFR Estimate If Black >90   GFR Calc   >60 mL/min/1.7m2 Final    Calcium 9.2 8.5 - 10.1 mg/dL Final    Bilirubin Total 0.4 0.2 - 1.3 mg/dL Final    Albumin 3.5 3.4 - 5.0 g/dL Final    Protein Total 6.7 (L) 6.8 - 8.8 g/dL Final    Alkaline Phosphatase 82 40 - 150 U/L Final    ALT 20 0 - 50 U/L Final    AST 12 0 - 45 U/L Final         5/1/2017 11:01 AM      Component Results     Component Value Ref Range & Units Status    Troponin I ES  0.000 - 0.045 ug/L Final    <0.015  The 99th percentile for upper reference range is 0.045 ug/L.  Troponin values in   the range of 0.045 - 0.120 ug/L may be associated with risks of adverse   clinical events.           5/1/2017 11:07 AM      Component Results     Component Value Ref Range & Units Status    TSH 1.13 0.40 - 4.00 mU/L Final         5/1/2017 11:01 AM      Component Results     Component Value Ref Range & Units Status    CRP Inflammation <2.9 0.0 - 8.0 mg/L Final         5/1/2017 12:48 PM      Component Results     Component Value Ref Range & Units Status    Sed Rate 9 0 - 30 mm/h Final         5/1/2017  1:11 PM      Component Results     Component Value Ref Range & Units Status    Color Urine Yellow  Final    Appearance Urine Clear  Final    Glucose Urine Negative NEG mg/dL Final    Bilirubin Urine Negative NEG Final    Ketones Urine Negative NEG mg/dL Final    Specific Gravity Urine 1.013 1.003 - 1.035 Final    Blood Urine Negative NEG Final    pH Urine 5.5 5.0 - 7.0 pH Final    Protein Albumin Urine Negative NEG mg/dL Final    Urobilinogen mg/dL Normal 0.0 - 2.0 mg/dL Final    Nitrite Urine Negative NEG Final    Leukocyte Esterase Urine Moderate (A) NEG Final    Source Midstream Urine  Final    RBC Urine 1 0 - 2 /HPF Final    WBC Urine 6 (H) 0 - 2 /HPF Final    Squamous  Epithelial /HPF Urine <1 0 - 1 /HPF Final    Mucous Urine Present (A) NEG /LPF Final    Hyaline Casts 1 0 - 2 /LPF Final         5/1/2017 12:56 PM      Narrative     MR BRAIN W/O & W CONTRAST 5/1/2017 12:22 PM     HISTORY: Worsening daily headaches    TECHNIQUE: Multiplanar, multisequence MRI of the brain without and  with 7 mL Gadavist  IV contrast material.    COMPARISON: None.    FINDINGS:  There is generalized atrophy of the brain.  White matter  changes are present in the cerebral hemispheres that are consistent  with small vessel ischemic disease in this age patient. There is no  evidence of hemorrhage, mass, demyelination, acute infarct, or  anomaly. There are no gadolinium enhancing lesions. The facial  structures appear normal. The arteries at the base of the brain and  the dural venous sinuses appear patent.        Impression     IMPRESSION:   1. No acute pathology. No bleed, mass, or infarcts are seen.  2. Generalized atrophy of the brain.  White matter changes in the  cerebral hemispheres consistent with small vessel ischemic disease in  this age patient.    MARILYNN ALCANTARA MD         5/1/2017 12:56 PM      Narrative     MR CERVICAL SPINE W/O CONTRAST   5/1/2017 12:16 PM     HISTORY: Daily headaches/increased pain with rotational neck movement    TECHNIQUE:  Multiplanar multisequence MRI of the cervical spine  without gadolinium contrast.     COMPARISON:  None.    FINDINGS:  The spinal cord appears normal. The paraspinal soft tissues  appear normal.  The bone marrow has normal signal intensity.    C2-C3:  Minimal annular disc bulge. Central canal normal.    C3-C4:  Degeneration of the disc. Mild bulge. Central canal normal.  Moderate-severe degenerative change left facet joint. Mild left  foraminal stenosis due to an uncinate spur.    C4-C5:  Degeneration of the disc. Mild loss of disc space height. Mild  annular bulge. Central canal and neural foramen are patent. Moderate  degenerative change in the facet  "joints.    C5-C6:  Degeneration of the disks. Loss of disc space height. Small  central disc protrusion with an associated osteophyte. Central canal  still adequate. Neural foramen are adequate.    C6-C7:  Degeneration of the disc. Loss of disc space height. Mild  annular disc bulge. Central canal adequate. Mild degenerative change  in the facet joints.    C7-T1: Central canal and neural foramen are normal.        Impression     IMPRESSION:    1. Multilevel degenerative change including degenerative changes in  the facet joints. There is also a small central disc protrusion with  an associated osteophyte at C5-6 causing mild mass effect on the dural  sac but the central canal is still normal.  2. Cervical cord appears normal.    MARILYNN ALCANTARA MD         5/1/2017 12:02 PM      Narrative     XR CHEST 2 VW 5/1/2017 11:15 AM    HISTORY: Pain. Basilar rhonchi.    COMPARISON: 12/8/2016.        Impression     IMPRESSION: 2 views of the chest show no acute cardiopulmonary  disease. Underlying interstitial scarring is again seen as well as  some basilar atelectasis.     GERHARD SCHWAB MD                Thank you for choosing [facility]       Thank you for choosing [facility] for your care. Our goal is always to provide you with excellent care. Hearing back from our patients is one way we can continue to improve our services. Please take a few minutes to complete the written survey that you may receive in the mail after you visit with us. Thank you!        [employer] Information     [employer] lets you send messages to your doctor, view your test results, renew your prescriptions, schedule appointments and more. To sign up, go to [email or web address] . Click on \"Log in\" on the left side of the screen, which will take you to the Welcome page. Then click on \"Sign up Now\" on the right side of the page.     You will be asked to enter the access code listed below, as well as some personal information. Please follow the directions to create " your username and password.     Your access code is: [email or web address]  Expires: 5/10/2017  3:35 PM     Your access code will  in 90 days. If you need help or a new code, please call your [facility] or [phone].        Care EveryWhere ID     This is your Care EveryWhere ID. This could be used by other organizations to access your [facility] medical records  [id number]        After Visit Summary       This is your record. Keep this with you and show to your community pharmacist(s) and doctor(s) at your next visit.

## 2017-05-01 NOTE — ED NOTES
Pt d/c instructions reviewed and received. There are no unanswered questions at the time of discharge.

## 2017-05-01 NOTE — ED PROVIDER NOTES
History     Chief Complaint   Patient presents with     Headache     ha off and on x 2 mos - emesis this am     HPI  Lulu Carlton is a 89 year old female who presents with complaints of mild to moderate diffuse frontal headache that she's had intermittently for at least 2 months and probably longer according to her daughter.  Patient is currently in a memory care unit and when her daughter talked for today she described a frontal headache and told her that she had vomited earlier in the day.  Patient stated was bilious emesis without blood.  She's had chronic diarrhea with workup through [facility].  Daughter thinks she had C. diff previously.  Daughter states she had previous imaging and colonoscopy.  They did treat her with chemotherapy but she is unsure what they were treating.  Records show that she had a diffuse large B-cell lymphoma.  She also had symptoms consistent with myasthenia gravis but those resolved after she had her thymus removed.  She is currently on no meds or chemotherapy.  Patient did have a abdominal CT with IV contrast in 2011 showing no abnormal masses, signs of inflammation, or lymphadenopathy.  Patient has not been on antibiotics recently.  No exposure to infectious illness.  No recent travel.  She currently denies any change in her hearing, vision, or difficulty with speech/swallowing.  She does have some nasal congestion but is unsure if she has seasonal allergies.  She's had no bloody or colored discharge.  Patient does have hypertension and urinary incontinence.  She does state that she's had problems with urination for long-standing.  Denies history of kidney stone or pyelonephritis.  She's never been diagnosed with temporal arteritis.  She denies any focal motor or sensory problems.  She is able ambulate 2 blocks to [place] without difficulty.  She is not taking anything for the headache.  She did have an episode of dizziness in 2015 and had a head CT which showed atrophy of  the brain and white matter changes consistent with small vessel ischemic disease otherwise no acute findings.  Patient denies any recent falls or head injury.    I have reviewed the Medications, Allergies, Past Medical and Surgical History, and Social History in the Epic system.    Review of Systems all other systems reviewed and are negative.                          Past Medical History:   Diagnosis Date     Anemia, unspecified hosp. 4/3/07 [facility]      Central nervous system complication hosp. 4/3/07 [facility]     Depressive disorder, not elsewhere classified      Myasthenia gravis      Other malaise and fatigue hosp. 4/3/07 [facility]     intermittent episodes of slurred speech, headaches, some confusion w/general weakness.      Other urinary incontinence      Thoracic or lumbosacral neuritis or radiculitis, unspecified     lumbar radiculopathy involving right leg.      Unspecified essential hypertension      Patient Active Problem List   Diagnosis     Sensorineural hearing loss, asymmetrical     Urinary incontinence     Osteoporosis     Vitamin D deficiency     Anemia, iron deficiency     Type 2 diabetes mellitus with hyperglycemia, without long-term current use of insulin (H)     Primary osteoarthritis of left knee     Hypertension goal BP (blood pressure) < 140/90     Current Facility-Administered Medications   Medication     0.9% sodium chloride infusion     Current Outpatient Prescriptions   Medication     cyclobenzaprine (FLEXERIL) 10 MG tablet     omeprazole (PRILOSEC) 20 MG CR capsule     glipiZIDE (GLIPIZIDE XL) 5 MG 24 hr tablet     Acetaminophen 325 MG CAPS     multivitamin (OCUVITE) TABS tablet     lisinopril (PRINIVIL,ZESTRIL) 5 MG tablet     Cyanocobalamin (B-12) 1000 MCG TBCR     calcium-vitamin D (CALTRATE) 600-400 MG-UNIT per tablet     Acetaminophen (TYLENOL PO)     LOPERAMIDE HCL PO     MECLIZINE HCL PO     triamcinolone (KENALOG) 0.1 % cream        Allergies   Allergen Reactions      "Hydrocodone-Acetaminophen Other (See Comments)     Trazodone Other (See Comments)     \"She gets crazy dreams and then can't fall back asleep.\"     Metformin Rash     Social History     Social History     Marital status:      Spouse name: N/A     Number of children: N/A     Years of education: N/A     Occupational History     Not on file.     Social History Main Topics     Smoking status: Never Smoker     Smokeless tobacco: Not on file     Alcohol use No     Drug use: No     Sexual activity: Not on file     Other Topics Concern     Not on file     Social History Narrative     No family history on file.      Physical Exam   BP: (!) 141/104  Heart Rate: 82  Temp: 97.4  F (36.3  C)  Resp: 16  SpO2: 95 %  Physical Exam Gen. alert cooperative female in mild to moderate distress.  HEENT reveals the ears to be clear bilaterally.  Eyes show equal and reactive pupils.  Extraocular motions are intact.  Cannot visualize the pupils to to constriction.  No facial asymmetry.  Nasal passages are boggy but patent.  Orally mucosa is moist.  Uvula is midline.  Gait is intact.  Speech is clear.  On palpation over her right temporal region she has tenderness but no palpable lesion.  She has no TMJ tenderness.  Neck reveals tenderness of the trapezius and strap muscles.  With manipulation of the neck she has increased discomfort and headache but no radicular arm pain or paresthesias.  Lungs reveal basilar rhonchi.  Cardiac irregular but controlled rate.  No murmur.  Abdomen was obese, soft and benign.  Extremities reveal no pitting edema, calf or thigh tenderness, and Homans is negative.  Neurologically patient had intact cranial nerves except smoked was checked.  No focal motor or sensory weakness.  Patient is able ambulate without assistance.    ED Course     ED Course     Procedures             EKG Interpretation:      Interpreted by Bello Patel  Time reviewed: 11:15  Symptoms at time of EKG: Headache   Rhythm: normal sinus "   Rate: Normal  Axis: Normal  Ectopy: none  Conduction: 1st degree AV block  ST Segments/ T Waves: No ST-T wave changes  Q Waves: none  Comparison to prior: Unchanged from 9/21/15    Clinical Impression: normal EKG      Results for orders placed or performed during the hospital encounter of 05/01/17   MR Brain w/o & w Contrast    Narrative    MR BRAIN W/O & W CONTRAST 5/1/2017 12:22 PM     HISTORY: Worsening daily headaches    TECHNIQUE: Multiplanar, multisequence MRI of the brain without and  with 7 mL Gadavist  IV contrast material.    COMPARISON: None.    FINDINGS:  There is generalized atrophy of the brain.  White matter  changes are present in the cerebral hemispheres that are consistent  with small vessel ischemic disease in this age patient. There is no  evidence of hemorrhage, mass, demyelination, acute infarct, or  anomaly. There are no gadolinium enhancing lesions. The facial  structures appear normal. The arteries at the base of the brain and  the dural venous sinuses appear patent.      Impression    IMPRESSION:   1. No acute pathology. No bleed, mass, or infarcts are seen.  2. Generalized atrophy of the brain.  White matter changes in the  cerebral hemispheres consistent with small vessel ischemic disease in  this age patient.   Cervical spine MRI w/o contrast    Narrative    MR CERVICAL SPINE W/O CONTRAST   5/1/2017 12:16 PM     HISTORY: Daily headaches/increased pain with rotational neck movement    TECHNIQUE:  Multiplanar multisequence MRI of the cervical spine  without gadolinium contrast.     COMPARISON:  None.    FINDINGS:  The spinal cord appears normal. The paraspinal soft tissues  appear normal.  The bone marrow has normal signal intensity.    C2-C3:  Minimal annular disc bulge. Central canal normal.    C3-C4:  Degeneration of the disc. Mild bulge. Central canal normal.  Moderate-severe degenerative change left facet joint. Mild left  foraminal stenosis due to an uncinate spur.    C4-C5:   Degeneration of the disc. Mild loss of disc space height. Mild  annular bulge. Central canal and neural foramen are patent. Moderate  degenerative change in the facet joints.    C5-C6:  Degeneration of the disks. Loss of disc space height. Small  central disc protrusion with an associated osteophyte. Central canal  still adequate. Neural foramen are adequate.    C6-C7:  Degeneration of the disc. Loss of disc space height. Mild  annular disc bulge. Central canal adequate. Mild degenerative change  in the facet joints.    C7-T1: Central canal and neural foramen are normal.      Impression    IMPRESSION:    1. Multilevel degenerative change including degenerative changes in  the facet joints. There is also a small central disc protrusion with  an associated osteophyte at C5-6 causing mild mass effect on the dural  sac but the central canal is still normal.  2. Cervical cord appears normal.   XR Chest 2 Views    Narrative    XR CHEST 2 VW 5/1/2017 11:15 AM    HISTORY: Pain. Basilar rhonchi.    COMPARISON: 12/8/2016.      Impression    IMPRESSION: 2 views of the chest show no acute cardiopulmonary  disease. Underlying interstitial scarring is again seen as well as  some basilar atelectasis.     GERHARD SCHWAB MD               Critical Care time:  none               Labs Ordered and Resulted from Time of ED Arrival Up to the Time of Departure from the ED   COMPREHENSIVE METABOLIC PANEL - Abnormal; Notable for the following:        Result Value    Glucose 123 (*)     Protein Total 6.7 (*)     All other components within normal limits   URINE MACROSCOPIC WITH REFLEX TO MICRO - Abnormal; Notable for the following:     Leukocyte Esterase Urine Moderate (*)     WBC Urine 6 (*)     Mucous Urine Present (*)     All other components within normal limits   CBC WITH PLATELETS DIFFERENTIAL   INR   TROPONIN I   TSH WITH FREE T4 REFLEX   CRP INFLAMMATION   ERYTHROCYTE SEDIMENTATION RATE AUTO   CLOSTRIDIUM DIFFICILE TOXIN B   URINE  CULTURE AEROBIC BACTERIAL     IV was established and blood work was obtained.  Urinalysis is ordered.  Patient was given 0.5 mg of Ativan for muscle relaxation and anxiolytic prior to MRI.  Chest x-ray did show sternotomy wires but this is being since 1991 so she is okay to go to MRI.  Results for MRI showed no acute intracranial abnormality.  She did have some degenerative changes of her cervical spine described above.  Patient did have diminished headache with the Ativan.  Discussed results remaining blood work showing normal CRP and sed rate.  Doubt temporal arteritis.  Assessments & Plan (with Medical Decision Making)   Patient is a 89 year female presents with intermittent headache for 2+ months.  Associated nausea and bilious emesis today.  No bloody emesis.  Chronic diarrhea which is unchanged.  No fever or chills.  No injury or fall.  Denies hearing, vision, or changes in her speech.  No difficulty with swallowing.  Does have some seasonal allergies with nasal congestion.  No neck pain.  No focal neurologic symptoms.  Previous workup for dizziness including the head CT showing atrophy but no acute other changes.  She does have a history of lymphoma and was treated with chemotherapy.  Her headache is across her forehead and is moderate in intensity.  No history of migraines.  On initial presentation patient was cooperative and pleasant.  Her diastolic blood pressure was slightly elevated but without treatment that normalized.  HEENT was atraumatic and revealed no asymmetry.  She has tenderness over the right temporal region.  Her CRP and ESR were negative so doubt temporal arteritis.  Cranial nerve II through XII are intact except smell which was not checked.  She did have tenderness of the trapezius and strap muscles which did reproduce her frontal headache.  There is no stridor or bruits.  There is no radicular pain with manipulation of the neck.  She had normal cardiac and respiratory auscultation except  basilar crackles.  Chest x-rays obtained to assess and showed no acute abnormality.  With the chronicity of her headaches and neck pain and a MRI of her head and neck were obtained and are described above.  The head itself showed no acute abnormality.  She does have degenerative changes and neck which are described above.  Patient had improvement of her headache and neck pain with Ativan suspect there is a muscular component to her symptoms.  Should've benign abdominal exam and normal comprehensive metabolic panel, CBC and INR so doubt acute abdominal process causing her symptoms and vomiting.  She was unable to provide a stool sample despite history of chronic diarrhea.  She did have urinary frequency and her urine showed only 6 white cells and some leukocyte esterase.  Culture is pending.  In addition we did check a thyroid screen and her TSH was normal.  Troponin was normal.  EKG was unchanged from previous.  Doubt cardiac as a source of her nausea.  With a nonfocal neurologic exam doubt TIA or stroke.  At this point and feel her headaches are more tension related.  Doubt migraine.  We'll try low-dose Flexeril to see if that will benefit.  She can use ice or heat if helpful.  Follow up if persistent or new concerning symptoms such as arm weakness, paresthesias, persistent vomiting or fever.  I have reviewed the nursing notes.    I have reviewed the findings, diagnosis, plan and need for follow up with the patient.    New Prescriptions    CYCLOBENZAPRINE (FLEXERIL) 10 MG TABLET    Take 0.5 tablets (5 mg) by mouth 3 times daily as needed for muscle spasms       Final diagnoses:   Episodic tension-type headache, not intractable   Trapezius muscle spasm   Chronic diarrhea       5/1/2017   [facility] EMERGENCY DEPARTMENT     Bello Patel MD  05/01/17 [id number]       Bello Patel MD  05/01/17 [id number]

## 2017-05-03 ENCOUNTER — TELEPHONE (OUTPATIENT)
Dept: FAMILY MEDICINE | Facility: CLINIC | Age: 82
End: 2017-05-03

## 2017-05-03 LAB
BACTERIA SPEC CULT: NORMAL
MICRO REPORT STATUS: NORMAL
SPECIMEN SOURCE: NORMAL

## 2017-05-03 NOTE — TELEPHONE ENCOUNTER
Cyclobenzaprine is not covered (high risk).  Alternatives are: baclofen and zanaflex.  Please fax new rx if OK to change.

## 2017-05-03 NOTE — TELEPHONE ENCOUNTER
Please call.  There are potentisl side effects with medications for muscle relaxers.    Has she tried acetaminophen?  This might be a good place to start like 650mg q 4 hours prn.    I could add muscle relaxer like tizanidine if needed.  It can cause lower blood pressure, dizziness, weakness, dry mouth and drowsiness in up to 40% of people taking this medication.   VALERIE BENITES MD

## 2017-05-09 ENCOUNTER — APPOINTMENT (OUTPATIENT)
Dept: CARDIOLOGY | Facility: CLINIC | Age: 82
DRG: 469 | End: 2017-05-09
Attending: FAMILY MEDICINE
Payer: MEDICARE

## 2017-05-09 ENCOUNTER — APPOINTMENT (OUTPATIENT)
Dept: GENERAL RADIOLOGY | Facility: CLINIC | Age: 82
DRG: 469 | End: 2017-05-09
Attending: FAMILY MEDICINE
Payer: MEDICARE

## 2017-05-09 ENCOUNTER — MEDICAL CORRESPONDENCE (OUTPATIENT)
Dept: HEALTH INFORMATION MANAGEMENT | Facility: CLINIC | Age: 82
End: 2017-05-09

## 2017-05-09 ENCOUNTER — HOSPITAL ENCOUNTER (INPATIENT)
Facility: CLINIC | Age: 82
LOS: 4 days | Discharge: NURSING, REHAB OR LONG-TERM CARE | DRG: 469 | End: 2017-05-13
Attending: FAMILY MEDICINE | Admitting: FAMILY MEDICINE
Payer: MEDICARE

## 2017-05-09 ENCOUNTER — ANESTHESIA EVENT (OUTPATIENT)
Dept: SURGERY | Facility: CLINIC | Age: 82
DRG: 469 | End: 2017-05-09
Payer: MEDICARE
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
## 2017-05-09 DIAGNOSIS — S72.091A: ICD-10-CM

## 2017-05-09 DIAGNOSIS — I50.31 ACUTE DIASTOLIC CONGESTIVE HEART FAILURE (H): ICD-10-CM

## 2017-05-09 DIAGNOSIS — S72.001A HIP FRACTURE, RIGHT, CLOSED, INITIAL ENCOUNTER (H): ICD-10-CM

## 2017-05-09 DIAGNOSIS — S72.001A: Primary | ICD-10-CM

## 2017-05-09 PROBLEM — S72.009A HIP FRACTURE (H): Status: ACTIVE | Noted: 2017-05-09

## 2017-05-09 LAB
ALBUMIN SERPL-MCNC: 3.3 G/DL (ref 3.4–5)
ALP SERPL-CCNC: 93 U/L (ref 40–150)
ALT SERPL W P-5'-P-CCNC: 22 U/L (ref 0–50)
ANION GAP SERPL CALCULATED.3IONS-SCNC: 9 MMOL/L (ref 3–14)
AST SERPL W P-5'-P-CCNC: 18 U/L (ref 0–45)
BASOPHILS # BLD AUTO: 0 10E9/L (ref 0–0.2)
BASOPHILS NFR BLD AUTO: 0.2 %
BILIRUB SERPL-MCNC: 0.5 MG/DL (ref 0.2–1.3)
BUN SERPL-MCNC: 16 MG/DL (ref 7–30)
CALCIUM SERPL-MCNC: 9 MG/DL (ref 8.5–10.1)
CHLORIDE SERPL-SCNC: 104 MMOL/L (ref 94–109)
CO2 SERPL-SCNC: 26 MMOL/L (ref 20–32)
CREAT SERPL-MCNC: 0.75 MG/DL (ref 0.52–1.04)
DIFFERENTIAL METHOD BLD: ABNORMAL
EOSINOPHIL # BLD AUTO: 0.2 10E9/L (ref 0–0.7)
EOSINOPHIL NFR BLD AUTO: 1 %
ERYTHROCYTE [DISTWIDTH] IN BLOOD BY AUTOMATED COUNT: 12.5 % (ref 10–15)
GFR SERPL CREATININE-BSD FRML MDRD: 72 ML/MIN/1.7M2
GLUCOSE BLDC GLUCOMTR-MCNC: 129 MG/DL (ref 70–99)
GLUCOSE BLDC GLUCOMTR-MCNC: 145 MG/DL (ref 70–99)
GLUCOSE SERPL-MCNC: 247 MG/DL (ref 70–99)
HCT VFR BLD AUTO: 39.5 % (ref 35–47)
HGB BLD-MCNC: 12.8 G/DL (ref 11.7–15.7)
IMM GRANULOCYTES # BLD: 0 10E9/L (ref 0–0.4)
IMM GRANULOCYTES NFR BLD: 0.2 %
LYMPHOCYTES # BLD AUTO: 0.8 10E9/L (ref 0.8–5.3)
LYMPHOCYTES NFR BLD AUTO: 5.5 %
MCH RBC QN AUTO: 28.9 PG (ref 26.5–33)
MCHC RBC AUTO-ENTMCNC: 32.4 G/DL (ref 31.5–36.5)
MCV RBC AUTO: 89 FL (ref 78–100)
MONOCYTES # BLD AUTO: 0.8 10E9/L (ref 0–1.3)
MONOCYTES NFR BLD AUTO: 5.4 %
NEUTROPHILS # BLD AUTO: 13.1 10E9/L (ref 1.6–8.3)
NEUTROPHILS NFR BLD AUTO: 87.7 %
PLATELET # BLD AUTO: 167 10E9/L (ref 150–450)
POTASSIUM SERPL-SCNC: 4.2 MMOL/L (ref 3.4–5.3)
PROT SERPL-MCNC: 6.5 G/DL (ref 6.8–8.8)
RBC # BLD AUTO: 4.43 10E12/L (ref 3.8–5.2)
SODIUM SERPL-SCNC: 139 MMOL/L (ref 133–144)
WBC # BLD AUTO: 15 10E9/L (ref 4–11)

## 2017-05-09 PROCEDURE — 12000007 ZZH R&B INTERMEDIATE

## 2017-05-09 PROCEDURE — 99221 1ST HOSP IP/OBS SF/LOW 40: CPT | Mod: AI | Performed by: FAMILY MEDICINE

## 2017-05-09 PROCEDURE — 99285 EMERGENCY DEPT VISIT HI MDM: CPT | Mod: 25

## 2017-05-09 PROCEDURE — 93010 ELECTROCARDIOGRAM REPORT: CPT | Mod: Z6 | Performed by: FAMILY MEDICINE

## 2017-05-09 PROCEDURE — A9270 NON-COVERED ITEM OR SERVICE: HCPCS | Mod: GY | Performed by: FAMILY MEDICINE

## 2017-05-09 PROCEDURE — 73560 X-RAY EXAM OF KNEE 1 OR 2: CPT | Mod: RT

## 2017-05-09 PROCEDURE — 25000128 H RX IP 250 OP 636: Performed by: FAMILY MEDICINE

## 2017-05-09 PROCEDURE — 25000132 ZZH RX MED GY IP 250 OP 250 PS 637: Mod: GY | Performed by: FAMILY MEDICINE

## 2017-05-09 PROCEDURE — 96361 HYDRATE IV INFUSION ADD-ON: CPT

## 2017-05-09 PROCEDURE — 86900 BLOOD TYPING SEROLOGIC ABO: CPT | Performed by: ORTHOPAEDIC SURGERY

## 2017-05-09 PROCEDURE — 96374 THER/PROPH/DIAG INJ IV PUSH: CPT

## 2017-05-09 PROCEDURE — 93005 ELECTROCARDIOGRAM TRACING: CPT

## 2017-05-09 PROCEDURE — 93306 TTE W/DOPPLER COMPLETE: CPT | Mod: 26 | Performed by: INTERNAL MEDICINE

## 2017-05-09 PROCEDURE — 72170 X-RAY EXAM OF PELVIS: CPT

## 2017-05-09 PROCEDURE — 86850 RBC ANTIBODY SCREEN: CPT | Performed by: ORTHOPAEDIC SURGERY

## 2017-05-09 PROCEDURE — 99285 EMERGENCY DEPT VISIT HI MDM: CPT | Mod: 25 | Performed by: FAMILY MEDICINE

## 2017-05-09 PROCEDURE — 00000146 ZZHCL STATISTIC GLUCOSE BY METER IP

## 2017-05-09 PROCEDURE — 86901 BLOOD TYPING SEROLOGIC RH(D): CPT | Performed by: ORTHOPAEDIC SURGERY

## 2017-05-09 PROCEDURE — 71010 XR CHEST 1 VW: CPT

## 2017-05-09 PROCEDURE — 25000131 ZZH RX MED GY IP 250 OP 636 PS 637: Mod: GY | Performed by: FAMILY MEDICINE

## 2017-05-09 PROCEDURE — 93306 TTE W/DOPPLER COMPLETE: CPT

## 2017-05-09 PROCEDURE — 25000125 ZZHC RX 250: Performed by: FAMILY MEDICINE

## 2017-05-09 PROCEDURE — 73552 X-RAY EXAM OF FEMUR 2/>: CPT | Mod: RT

## 2017-05-09 PROCEDURE — 85025 COMPLETE CBC W/AUTO DIFF WBC: CPT | Performed by: FAMILY MEDICINE

## 2017-05-09 PROCEDURE — 80053 COMPREHEN METABOLIC PANEL: CPT | Performed by: FAMILY MEDICINE

## 2017-05-09 RX ORDER — ACETAMINOPHEN 325 MG/1
650 TABLET ORAL EVERY 4 HOURS PRN
Status: DISCONTINUED | OUTPATIENT
Start: 2017-05-09 | End: 2017-05-09

## 2017-05-09 RX ORDER — OXYCODONE AND ACETAMINOPHEN 5; 325 MG/1; MG/1
1 TABLET ORAL ONCE
Status: COMPLETED | OUTPATIENT
Start: 2017-05-09 | End: 2017-05-09

## 2017-05-09 RX ORDER — DEXTROSE MONOHYDRATE 25 G/50ML
25-50 INJECTION, SOLUTION INTRAVENOUS
Status: DISCONTINUED | OUTPATIENT
Start: 2017-05-09 | End: 2017-05-13 | Stop reason: HOSPADM

## 2017-05-09 RX ORDER — NICOTINE POLACRILEX 4 MG
15-30 LOZENGE BUCCAL
Status: DISCONTINUED | OUTPATIENT
Start: 2017-05-09 | End: 2017-05-13 | Stop reason: HOSPADM

## 2017-05-09 RX ORDER — ONDANSETRON 2 MG/ML
4 INJECTION INTRAMUSCULAR; INTRAVENOUS EVERY 6 HOURS PRN
Status: DISCONTINUED | OUTPATIENT
Start: 2017-05-09 | End: 2017-05-10

## 2017-05-09 RX ORDER — FUROSEMIDE 10 MG/ML
20 INJECTION INTRAMUSCULAR; INTRAVENOUS ONCE
Status: COMPLETED | OUTPATIENT
Start: 2017-05-09 | End: 2017-05-09

## 2017-05-09 RX ORDER — NALOXONE HYDROCHLORIDE 0.4 MG/ML
.1-.4 INJECTION, SOLUTION INTRAMUSCULAR; INTRAVENOUS; SUBCUTANEOUS
Status: DISCONTINUED | OUTPATIENT
Start: 2017-05-09 | End: 2017-05-09

## 2017-05-09 RX ORDER — NALOXONE HYDROCHLORIDE 0.4 MG/ML
.1-.4 INJECTION, SOLUTION INTRAMUSCULAR; INTRAVENOUS; SUBCUTANEOUS
Status: DISCONTINUED | OUTPATIENT
Start: 2017-05-09 | End: 2017-05-10

## 2017-05-09 RX ORDER — GLUCOSAMINE HCL/CHONDROITIN SU 500-400 MG
CAPSULE ORAL PRN
COMMUNITY
End: 2017-10-11

## 2017-05-09 RX ORDER — HYDROMORPHONE HYDROCHLORIDE 1 MG/ML
.3-.5 INJECTION, SOLUTION INTRAMUSCULAR; INTRAVENOUS; SUBCUTANEOUS
Status: DISCONTINUED | OUTPATIENT
Start: 2017-05-09 | End: 2017-05-09 | Stop reason: DRUGHIGH

## 2017-05-09 RX ORDER — SODIUM CHLORIDE 9 MG/ML
1000 INJECTION, SOLUTION INTRAVENOUS CONTINUOUS
Status: DISCONTINUED | OUTPATIENT
Start: 2017-05-09 | End: 2017-05-09

## 2017-05-09 RX ORDER — ONDANSETRON 2 MG/ML
4 INJECTION INTRAMUSCULAR; INTRAVENOUS EVERY 6 HOURS PRN
Status: DISCONTINUED | OUTPATIENT
Start: 2017-05-09 | End: 2017-05-09

## 2017-05-09 RX ORDER — ACETAMINOPHEN 325 MG/1
650 TABLET ORAL EVERY 4 HOURS PRN
Status: DISCONTINUED | OUTPATIENT
Start: 2017-05-09 | End: 2017-05-10

## 2017-05-09 RX ORDER — ONDANSETRON 4 MG/1
4 TABLET, ORALLY DISINTEGRATING ORAL EVERY 6 HOURS PRN
Status: DISCONTINUED | OUTPATIENT
Start: 2017-05-09 | End: 2017-05-10

## 2017-05-09 RX ORDER — ONDANSETRON 4 MG/1
4 TABLET, ORALLY DISINTEGRATING ORAL EVERY 6 HOURS PRN
Status: DISCONTINUED | OUTPATIENT
Start: 2017-05-09 | End: 2017-05-09

## 2017-05-09 RX ORDER — HYDROMORPHONE HCL/0.9% NACL/PF 0.2MG/0.2
0.2 SYRINGE (ML) INTRAVENOUS
Status: DISCONTINUED | OUTPATIENT
Start: 2017-05-09 | End: 2017-05-13 | Stop reason: HOSPADM

## 2017-05-09 RX ADMIN — HYDROMORPHONE HYDROCHLORIDE 0.2 MG: 1 INJECTION, SOLUTION INTRAMUSCULAR; INTRAVENOUS; SUBCUTANEOUS at 21:04

## 2017-05-09 RX ADMIN — INSULIN ASPART 1 UNITS: 100 INJECTION, SOLUTION INTRAVENOUS; SUBCUTANEOUS at 23:11

## 2017-05-09 RX ADMIN — OXYCODONE HYDROCHLORIDE AND ACETAMINOPHEN 1 TABLET: 5; 325 TABLET ORAL at 11:41

## 2017-05-09 RX ADMIN — PANTOPRAZOLE SODIUM 40 MG: 40 INJECTION, POWDER, FOR SOLUTION INTRAVENOUS at 17:42

## 2017-05-09 RX ADMIN — SODIUM CHLORIDE 1000 ML: 9 INJECTION, SOLUTION INTRAVENOUS at 14:53

## 2017-05-09 RX ADMIN — SODIUM CHLORIDE 1000 ML: 9 INJECTION, SOLUTION INTRAVENOUS at 13:06

## 2017-05-09 RX ADMIN — FUROSEMIDE 20 MG: 10 INJECTION, SOLUTION INTRAVENOUS at 14:53

## 2017-05-09 ASSESSMENT — ACTIVITIES OF DAILY LIVING (ADL)
RETIRED_EATING: 0-->INDEPENDENT
FALL_HISTORY_WITHIN_LAST_SIX_MONTHS: YES
AMBULATION: 1-->ASSISTIVE EQUIPMENT
COGNITION: 1 - ATTENTION OR MEMORY DEFICITS
WHICH_OF_THE_ABOVE_FUNCTIONAL_RISKS_HAD_A_RECENT_ONSET_OR_CHANGE?: AMBULATION;TRANSFERRING;TOILETING;BATHING;DRESSING
BATHING: 2-->ASSISTIVE PERSON
TOILETING: 0-->INDEPENDENT
TRANSFERRING: 1-->ASSISTIVE EQUIPMENT
SWALLOWING: 0-->SWALLOWS FOODS/LIQUIDS WITHOUT DIFFICULTY
RETIRED_COMMUNICATION: 2-->DIFFICULTY UNDERSTANDING (NOT RELATED TO LANGUAGE BARRIER)
DRESS: 0-->INDEPENDENT
NUMBER_OF_TIMES_PATIENT_HAS_FALLEN_WITHIN_LAST_SIX_MONTHS: 2

## 2017-05-09 NOTE — CONSULTS
[facility] Consultation    Consultation - [facility]  Level of consult: Consult, follow and place orders    Lulu Carlton,  1921, MRN [id number]     Admitting Dx: Right hip fracture, femoral neck     PCP: Todd Villeda, [phone]     Code status:  No Order     Extended Emergency Contact Information  Primary Emergency Contact: Benigno Shane  Address: [address] [place] [address]  Home Phone: [phone]  Mobile Phone: [phone]  Relation: Son  Secondary Emergency Contact: Keya Kong   [facility]  Home Phone: [phone]  Mobile Phone: [phone]  Relation: Daughter     Assessment:  Right femoral neck fracture.    Plan:  Right bipolar hip replacement. To be performed 5/10/17 at [facility].    Mild hypoxia, CXR suggests mild pulmonary edema, to be diuresed tonight. Mild pyuria, culture pending. DM type 2     Chief Complaint  Right hip pain after fall early this AM.     HPI  We have been requested by Dr Martin to evaluate Lulu Carlton who is a 89 year old year old female for right hip fracture.     History is obtained from the patient and the patient's daughter.     Past Medical History  Past Medical History:   Diagnosis Date     Anemia, unspecified hosp. 4/3/07 [facility]      Central nervous system complication hosp. 4/3/07 [facility]     Depressive disorder, not elsewhere classified      Myasthenia gravis      Other malaise and fatigue hosp. 4/3/07 [facility]     intermittent episodes of slurred speech, headaches, some confusion w/general weakness.      Other urinary incontinence      Thoracic or lumbosacral neuritis or radiculitis, unspecified     lumbar radiculopathy involving right leg.      Unspecified essential hypertension        Surgical History  Past Surgical History:   Procedure Laterality Date     SURGICAL HISTORY OF -       thymus removal      SURGICAL HISTORY OF -        cholecystectomy        Social History  Social History     Social History     Marital status:      Spouse name: N/A     Number of children: N/A     Years of education: N/A     Occupational History     Not on file.     Social History Main Topics     Smoking status: Never Smoker     Smokeless tobacco: Not on file     Alcohol use No     Drug use: No     Sexual activity: Not on file     Other Topics Concern     Not on file     Social History Narrative       Family History  No family history on file.     Allergies:  Hydrocodone-acetaminophen; Trazodone; and Metformin      Current Medications:  Current Facility-Administered Medications   Medication     0.9% sodium chloride infusion     Current Outpatient Prescriptions   Medication Sig     Acetaminophen (TYLENOL PO) Take 650 mg by mouth every 4 hours as needed for mild pain or fever Do not exceed 4,000 mg of Acetaminophen from all sources in 24 hours     omeprazole (PRILOSEC) 20 MG CR capsule Take 1 capsule (20 mg) by mouth daily     glipiZIDE (GLIPIZIDE XL) 5 MG 24 hr tablet Take 1 tablet (5 mg) by mouth daily     multivitamin (OCUVITE) TABS tablet Take 1 tablet by mouth daily     lisinopril (PRINIVIL,ZESTRIL) 5 MG tablet Take 1 tablet (5 mg) by mouth daily     Cyanocobalamin (B-12) 1000 MCG TBCR Take 1,000 mcg by mouth daily     calcium-vitamin D (CALTRATE) 600-400 MG-UNIT per tablet Take 1 tablet by mouth every evening     Glucose Blood (BLOOD GLUCOSE TEST STRIPS) STRP by In Vitro route as needed     CYCLOBENZAPRINE HCL PO Take 5 mg by mouth 3 times daily as needed for muscle spasms     LOPERAMIDE HCL PO Take 2 mg by mouth daily as needed     MECLIZINE HCL PO Take 12.5 mg by mouth 2 times daily as needed for dizziness     triamcinolone (KENALOG) 0.1 % cream Apply topically 2 times daily as needed for irritation       Review of Systems:  The Review of Systems is negative other than noted in the HPI    Physical Exam:Patient laying comfortably in bed. Moderate pain  to movements of right hip and leg. Neurovascularly intact to bilateral lower extremities. Mild pleasant dementia. Removes pulse ox sensor as she fidgets. Tender to palpation about right hip. Pain with attempted movements.  Temp:  [97.7  F (36.5  C)] 97.7  F (36.5  C)  Heart Rate:  [91] 91  Resp:  [18] 18  BP: (130-138)/(62-76) 138/69  SpO2:  [92 %-94 %] 92 %         Pertinent Labs  Lab Results: personally reviewed.  Lab Results   Component Value Date    WBC 15.0 (H) 05/09/2017    HGB 12.8 05/09/2017    HCT 39.5 05/09/2017    MCV 89 05/09/2017     05/09/2017     No results for input(s): INR in the last 168 hours.    Pertinent Radiology  Radiology Results: images and radiology report reviewed  Recent Results (from the past 24 hour(s))   XR Femur Right 2 Views    Narrative    XR PELVIS 1/2 VW, XR FEMUR RT 2 VW  5/9/2017 12:13 PM    HISTORY:  pain    COMPARISON:  None.      Impression    IMPRESSION:  Diffuse osteopenia. Right femoral neck shaft with  cephalad displacement of the shaft relative to the head. Degenerative  changes of the left hip with cephalad joint space narrowing.  Degenerative changes of the lumbar spine.     MOSES CUELLAR MD   Knee XR, 2 view, right    Narrative    XR KNEE RT 1 /2 VW  5/9/2017 12:13 PM    HISTORY:  Fall, pain    COMPARISON:  None.      Impression    IMPRESSION:  Frontal view somewhat limited by positioning. The tibial  plateau is not optimally assessed but appears grossly normal.  Otherwise negative. No fractures identified.     MOSES CUELLAR MD   XR Pelvis 1/2 Views    Narrative    XR PELVIS 1/2 VW, XR FEMUR RT 2 VW  5/9/2017 12:13 PM    HISTORY:  pain    COMPARISON:  None.      Impression    IMPRESSION:  Diffuse osteopenia. Right femoral neck shaft with  cephalad displacement of the shaft relative to the head. Degenerative  changes of the left hip with cephalad joint space narrowing.  Degenerative changes of the lumbar spine.     MOSES CUELLAR MD   XR Chest 1 View     Narrative    XR CHEST 1 VW 5/9/2017 1:30 PM    COMPARISON: 5/1/2017    HISTORY: Preoperative evaluation      Impression    IMPRESSION: Mixed interstitial and airspace opacities over both lungs,  most likely representing mild pulmonary edema. No pneumothorax on  either side.    FRANCE THOMAS       Attestation:  I have reviewed today's vital signs, notes, medications, labs and imaging.  Amount of time performed on this consult: 30 minutes.  Amount of time spent providing critical care service today: 15 minutes.  Care coordination / counseling time: 15 minutes  Face-to-face time: 15 minutes  Total time: 45 minutes     Lucas Bonilla PA-C

## 2017-05-09 NOTE — ED NOTES
Per dtr, she was getting out of bed this am at 0100 to go for breakfast and fell, per NH they are not sure how long she was really on the floor. It sounds like she went to breakfast by w/c and she continued to c/o of pain in her rt leg. Pt is very [place] and NO hearing aids are with her.

## 2017-05-09 NOTE — ED PROVIDER NOTES
History     Chief Complaint   Patient presents with     Hip Pain     rt hip pain, fell yesterday,      HPI  Lulu Carlton is a 89 year old female, past medical history significant for sensory neural hearing loss, osteoporosis, vitamin D deficiency, anemia, type II diabetes, osteoarthritis, hypertension, presents to the emergency department from [facility] in [place] with concerns of a fall yesterday which was apparently unwitnessed and complains of pain in her right thigh area.  History is obtained from the patient who has very little recall of what happened yesterday she thinks she was walking and fell.  She does not appreciate pain or injury elsewhere but noticed both yesterday and today pain and difficulties with weightbearing on the right side.  She is able to ambulate by her account.  He specifically denies head pain, neck pain, chest pain, abdominal pain, or back pain.  She denied trauma or injury to the upper extremities.    Active Ambulatory Problems     Diagnosis Date Noted     Sensorineural hearing loss, asymmetrical 10/17/2007     Urinary incontinence 10/31/2016     Osteoporosis 10/31/2016     Vitamin D deficiency 10/31/2016     Anemia, iron deficiency 10/31/2016     Type 2 diabetes mellitus with hyperglycemia, without long-term current use of insulin (H) 10/31/2016     Primary osteoarthritis of left knee 10/31/2016     Hypertension goal BP (blood pressure) < 140/90 12/12/2016     Resolved Ambulatory Problems     Diagnosis Date Noted     No Resolved Ambulatory Problems     Past Medical History:   Diagnosis Date     Anemia, unspecified hosp. 4/3/07 [place]      Central nervous system complication hosp. 4/3/07 [place]     Depressive disorder, not elsewhere classified      Myasthenia gravis      Other malaise and fatigue hosp. 4/3/07 [place]      Other urinary incontinence      Thoracic or lumbosacral neuritis or radiculitis, unspecified      Unspecified essential hypertension   "    Past Surgical History:   Procedure Laterality Date     SURGICAL HISTORY OF -       thymus removal      SURGICAL HISTORY OF -       cholecystectomy     Social History     Social History     Marital status:      Spouse name: N/A     Number of children: N/A     Years of education: N/A     Occupational History     Not on file.     Social History Main Topics     Smoking status: Never Smoker     Smokeless tobacco: Not on file     Alcohol use No     Drug use: No     Sexual activity: Not on file     Other Topics Concern     Not on file     Social History Narrative     No family history on file.  Current Facility-Administered Medications   Medication     0.9% sodium chloride BOLUS    Followed by     0.9% sodium chloride infusion     Current Outpatient Prescriptions   Medication     Acetaminophen (TYLENOL PO)     omeprazole (PRILOSEC) 20 MG CR capsule     glipiZIDE (GLIPIZIDE XL) 5 MG 24 hr tablet     multivitamin (OCUVITE) TABS tablet     lisinopril (PRINIVIL,ZESTRIL) 5 MG tablet     Cyanocobalamin (B-12) 1000 MCG TBCR     calcium-vitamin D (CALTRATE) 600-400 MG-UNIT per tablet     Glucose Blood (BLOOD GLUCOSE TEST STRIPS) STRP     CYCLOBENZAPRINE HCL PO     LOPERAMIDE HCL PO     MECLIZINE HCL PO     triamcinolone (KENALOG) 0.1 % cream        Allergies   Allergen Reactions     Hydrocodone-Acetaminophen Other (See Comments)     Trazodone Other (See Comments)     \"She gets crazy dreams and then can't fall back asleep.\"     Metformin Rash       I have reviewed the Medications, Allergies, Past Medical and Surgical History, and Social History in the Epic system.    Review of Systems   All other systems reviewed and are negative.      Physical Exam   BP: 130/69  Heart Rate: 91  Temp: 97.7  F (36.5  C)  Resp: 18  Weight: 68 kg (150 lb)  SpO2: 94 %  Physical Exam   Constitutional: She appears well-developed and well-nourished.   Pleasantly demented   HENT:   Head: Normocephalic and atraumatic.   Right Ear: External ear " normal.   Left Ear: External ear normal.   Nose: Nose normal.   Mouth/Throat: Oropharynx is clear and moist.   Eyes: Conjunctivae and EOM are normal. Pupils are equal, round, and reactive to light.   Neck: Normal range of motion. Neck supple.   Cardiovascular: Normal rate, regular rhythm, normal heart sounds and intact distal pulses.    Pulmonary/Chest: Effort normal and breath sounds normal.   Abdominal: Soft. Bowel sounds are normal.   Musculoskeletal:        Legs:  Minimal discomfort with internal/external rotation at the right hip.  There is no limb length discrepancy.  Neurovascular is intact in the bilateral lower extremities.   Nursing note and vitals reviewed.      ED Course     ED Course     Procedures           Results for orders placed or performed during the hospital encounter of 05/09/17   XR Femur Right 2 Views    Narrative    XR PELVIS 1/2 VW, XR FEMUR RT 2 VW  5/9/2017 12:13 PM    HISTORY:  pain    COMPARISON:  None.      Impression    IMPRESSION:  Diffuse osteopenia. Right femoral neck shaft with  cephalad displacement of the shaft relative to the head. Degenerative  changes of the left hip with cephalad joint space narrowing.  Degenerative changes of the lumbar spine.     MOSES CUELLAR MD   Knee XR, 2 view, right    Narrative    XR KNEE RT 1 /2 VW  5/9/2017 12:13 PM    HISTORY:  Fall, pain    COMPARISON:  None.      Impression    IMPRESSION:  Frontal view somewhat limited by positioning. The tibial  plateau is not optimally assessed but appears grossly normal.  Otherwise negative. No fractures identified.     MOSES CUELLAR MD   XR Pelvis 1/2 Views    Narrative    XR PELVIS 1/2 VW, XR FEMUR RT 2 VW  5/9/2017 12:13 PM    HISTORY:  pain    COMPARISON:  None.      Impression    IMPRESSION:  Diffuse osteopenia. Right femoral neck shaft with  cephalad displacement of the shaft relative to the head. Degenerative  changes of the left hip with cephalad joint space narrowing.  Degenerative changes of the  lumbar spine.     MOSES CUELLAR MD     12:52 PM  X-ray findings reviewed with the patient and her daughter and I recommended admission to the hospitalist service with consultation for operative management with orthopedics.  12:52 PM  Patient was discussed with Dr. Riky Greenfield for the orthopedic service who agreed to see the patient in hospital.  A consult was placed in the emergency Department orders.  Consult was also placed in the inpatient orders.  The patient was subsequently discussed with Dr. Mo Martin for the hospitalist service and transitional orders will be placed by myself in the emergency department.         EKG Interpretation:      Interpreted by Shaji Sandhu  Time reviewed:12:57   Symptoms at time of EKG: none   Rhythm: normal sinus   Rate: normal  Axis: NORMAL  Ectopy: none  Conduction: First-degree AV block  ST Segments/ T Waves: No ST-T wave changes  Q Waves: none  Comparison to prior: Unchanged from 9/21/15    Clinical Impression: Normal sinus rhythm with first-degree AV block unchanged from previous EKG.        Critical Care time:  none               Labs Ordered and Resulted from Time of ED Arrival Up to the Time of Departure from the ED   CBC WITH PLATELETS DIFFERENTIAL - Abnormal; Notable for the following:        Result Value    WBC 15.0 (*)     Absolute Neutrophil 13.1 (*)     All other components within normal limits   COMPREHENSIVE METABOLIC PANEL - Abnormal; Notable for the following:     Glucose 247 (*)     Albumin 3.3 (*)     Protein Total 6.5 (*)     All other components within normal limits       Assessments & Plan (with Medical Decision Making)   89-year-old female past medical history reviewed above, unwitnessed fall yesterday at her nursing home with injury to the right leg and thigh area by her description.  No other traumatized areas on HPI, review of systems or on physical exam.  The patient's physical exam finds tenderness and discomfort in the mid to distal one  third femur area and for that reason imaging was obtained of the knee femur hip and pelvis.  There is a impacted femoral neck fracture present and no other acute bony abnormalities are identified.  The patient was reviewed with the hospitalist and the orthopedic service on call and transitional orders placed by myself in the emergency department for this admission.  I met with the patient as well as her daughter and discussed all of the diagnostic studies performed and answered the questions to satisfaction.      Disclaimer: This note consists of symbols derived from keyboarding, dictation and/or voice recognition software. As a result, there may be errors in the script that have gone undetected. Please consider this when interpreting information found in this chart.      I have reviewed the nursing notes.    I have reviewed the findings, diagnosis, plan and need for follow up with the patient.    New Prescriptions    No medications on file       Final diagnoses:   Hip fracture, right, closed, initial encounter (H)       5/9/2017   [facility] EMERGENCY DEPARTMENT     Shaji Sandhu MD  05/09/17 [id number]

## 2017-05-09 NOTE — ED NOTES
Pt is pleasantly confused, is aware she fell yesterday and is having rt hip pain with no deformity with CMS in tact, she can't tell me when, where or time she fell. She states she was up and walked to breakfast this am but is still having pain in the rt hip following the fall. She denies any other injury, CP,SOB. Pt states she is eating and drinking normal and usually ambulates with walker well. LS:  diminished

## 2017-05-09 NOTE — ED NOTES
Pt remains, slightly confused, her baseline per family. She denies CP/SOB and states hip pain is tolerable. Is requesting to us BSC, have to remind her she has a chahal cath in place. Family at bedside. Monitor

## 2017-05-09 NOTE — ED NOTES
Pt remains presently confused, dtr [place] here now at bedside. Did apply 02 at 1L/NC due to ox dropping 88-89%, this could be due to being flat or pain meds given. Monitor

## 2017-05-09 NOTE — IP AVS SNAPSHOT
` `     [facility] SURGICAL: [phone]            Medication Administration Report for Lulu Carlton as of 05/13/17 1224   Legend:    Given Hold Not Given Due Canceled Entry Other Actions    Time Time (Time) Time  Time-Action       Inactive    Active    Linked        Medications 05/07/17 05/08/17 05/09/17 05/10/17 05/11/17 05/12/17 05/13/17    acetaminophen (TYLENOL) tablet 650 mg  Dose: 650 mg Freq: EVERY 4 HOURS PRN Route: PO  PRN Reason: mild pain  Start: 05/10/17 1836   Admin Instructions: Maximum acetaminophen dose from all sources = 75 mg/kg/day not to exceed 4 grams/day.        1843 (650 mg)-Given        0227 (650 mg)-Given       0837 (650 mg)-Given       1310 (650 mg)-Given       1840 (650 mg)-Given        0627 (650 mg)-Given       1149 (650 mg)-Given        0456 (650 mg)-Given       1129 (650 mg)-Given          Or  acetaminophen (TYLENOL) Suppository 650 mg  Dose: 650 mg Freq: EVERY 4 HOURS PRN Route: RE  PRN Reason: mild pain  Start: 05/10/17 1836   Admin Instructions: Maximum acetaminophen dose from all sources = 75 mg/kg/day not to exceed 4 grams/day.                                                                              diphenhydrAMINE (BENADRYL) solution 12.5 mg  Dose: 12.5 mg Freq: EVERY 6 HOURS PRN Route: PO  PRN Reason: itching  Start: 05/10/17 1441   Admin Instructions: Caution to be used when administering multiple CNS depressing meds within a short time frame.              Or  diphenhydrAMINE (BENADRYL) injection 12.5 mg  Dose: 12.5 mg Freq: EVERY 6 HOURS PRN Route: IV  PRN Reason: itching  PRN Comment: Only give if patient unable to take PO.  Start: 05/10/17 1441   Admin Instructions: Caution to be used when administering multiple CNS depressing meds within a short time frame.               enoxaparin (LOVENOX) injection 40 mg  Dose: 40 mg Freq: EVERY 24 HOURS Route: SC  Start: 05/11/17 1130   Admin Instructions: Check to make sure start date/time is 12-24 hours post  op unless documented complication, AND no sooner than 22 hours post op if spinal anesthesia used.   Continue until discharge to home. HOLD if platelet count falls below 50% of baseline or less than 100,000/ L and notify provider.         1154 (40 mg)-Given        1152 (40 mg)-Given        1128 (40 mg)-Given           glucose 40 % gel 15-30 g  Dose: 15-30 g Freq: EVERY 15 MIN PRN Route: PO  PRN Reason: low blood sugar  Start: 05/09/17 1633   Admin Instructions: Give 15 g for BG 51 to 69 mg/dL IF patient is conscious and able to swallow. Give 30 g for BG less than or equal to 50 mg/dL IF patient is conscious and able to swallow. Do NOT give glucose gel via enteral tube.  IF patient has enteral tube: give apple juice 120 mL (4 oz or 15 g of CHO) via enteral tube for BG 51 to 69 mg/dL.  Give apple juice 240 mL (8 oz or 30 g of CHO) via enteral tube for BG less than or equal to 50 mg/dL.        1042-Auto Hold       1436-Unhold             Or  dextrose 50 % injection 25-50 mL  Dose: 25-50 mL Freq: EVERY 15 MIN PRN Route: IV  PRN Reason: low blood sugar  Start: 05/09/17 1633   Admin Instructions: Use if have IV access, BG less than 70 mg/dL and meet dose criteria below:  Dose if conscious and alert (or disorientated) and NPO = 25 mL  Dose if unconscious / not alert = 50 mL  Vesicant.        1042-Auto Hold       1436-Unhold             Or  glucagon injection 1 mg  Dose: 1 mg Freq: EVERY 15 MIN PRN Route: SC  PRN Reason: low blood sugar  PRN Comment: May repeat x 1 only  Start: 05/09/17 1633   Admin Instructions: May give SQ or IM. IF BG less than or equal to 50 mg/dL and no IV access.  ONLY use glucagon IF patient has NO IV access AND is UNABLE to swallow.        1042-Auto Hold       1436-Unhold              HYDROmorphone (DILAUDID) injection 0.2 mg  Dose: 0.2 mg Freq: EVERY 2 HOURS PRN Route: IV  PRN Reason: severe pain  Start: 05/09/17 1633   Admin Instructions: Hold while on PCA.       2104 (0.2 mg)-Given        0222  (0.2 mg)-Given       1042-Auto Hold       1436-Unhold              insulin aspart (NovoLOG) inj (RAPID ACTING)  Dose: 1-5 Units Freq: AT BEDTIME Route: SC  Start: 05/10/17 2245   Admin Instructions: MEDIUM INSULIN RESISTANCE DOSING    Do Not give Bedtime Correction Insulin if BG less than  200.   For  - 249 give 1 units.   For  - 299 give 2 units.   For  - 349 give 3 units.   For  -399 give 4 units.   For BG greater than or equal to 400 give 5 units.  Notify provider if glucose greater than or equal to 350 mg/dL after administration of correction dose.  If given at mealtime, must be administered 5 min before meal or immediately after.                (2142)-Not Given [C]        (2219)-Not Given        [ ] 2200           insulin aspart (NovoLOG) inj (RAPID ACTING)  Dose: 1-7 Units Freq: 3 TIMES DAILY BEFORE MEALS Route: SC  Start: 05/11/17 0730   Admin Instructions: Correction Scale - MEDIUM INSULIN RESISTANCE DOSING     Do Not give Correction Insulin if Pre-Meal BG less than 140.   For Pre-Meal  - 189 give 1 unit.   For Pre-Meal  - 239 give 2 units.   For Pre-Meal  - 289 give 3 units.   For Pre-Meal  - 339 give 4 units.   For Pre-Meal - 399 give 5 units.   For Pre-Meal -449 give 6 units  For Pre-Meal BG greater than or equal to 450 give 7 units.   To be given with prandial insulin, and based on pre-meal blood glucose.    Notify provider if glucose greater than or equal to 350 mg/dL after administration of correction dose.  If given at mealtime, must be administered 5 min before meal or immediately after.         0837 (1 Units)-Given       1154 (2 Units)-Given       1639 (1 Units)-Given        (0805)-Not Given [C]       1151 (2 Units)-Given [C]       1702 (1 Units)-Given        0841 (1 Units)-Given [C]       1148 (2 Units)-Given [C]       [ ] 1630           lidocaine (LMX4) kit  Freq: EVERY 1 HOUR PRN Route: Top  PRN Reason: pain  PRN Comment: with VAD  "insertion or accessing implanted port.  Start: 05/10/17 1441   Admin Instructions: Do NOT give if patient has a history of allergy to any local anesthetic or any \"mike\" product.   Apply 30 minutes prior to VAD insertion or port access.  MAX Dose:  2.5 g (  of 5 g tube)               lidocaine 1 % 1 mL  Dose: 1 mL Freq: EVERY 1 HOUR PRN Route: OTHER  PRN Comment: mild pain with VAD insertion or accessing implanted port  Start: 05/10/17 1441   Admin Instructions: Do NOT give if patient has a history of allergy to any local anesthetic or any \"mike\" product. MAX dose 1 mL subcutaneous OR intradermal in divided doses.               lisinopril (PRINIVIL/ZESTRIL) tablet 5 mg  Dose: 5 mg Freq: DAILY Route: PO  Start: 05/11/17 1245        1310 (5 mg)-Given        0826 (5 mg)-Given        0841 (5 mg)-Given           metoclopramide (REGLAN) tablet 5 mg  Dose: 5 mg Freq: EVERY 6 HOURS PRN Route: PO  PRN Reasons: nausea,vomiting  Start: 05/10/17 1441   Admin Instructions: This is Step 3 of nausea and vomiting management.  Give if nausea not resolved 15 minutes after giving prochlorperazine (COMPAZINE).  If nausea not resolved in 15-30 minutes, Notify provider.              Or  metoclopramide (REGLAN) injection 5 mg  Dose: 5 mg Freq: EVERY 6 HOURS PRN Route: IV  PRN Reasons: nausea,vomiting  Start: 05/10/17 1441   Admin Instructions: This is Step 3 of nausea and vomiting management.  Give if nausea not resolved 15 minutes after giving prochlorperazine (COMPAZINE).  If nausea not resolved in 15-30 minutes, Notify provider.  Irritant.               naloxone (NARCAN) injection 0.1-0.4 mg  Dose: 0.1-0.4 mg Freq: EVERY 2 MIN PRN Route: IV  PRN Reason: opioid reversal  Start: 05/10/17 1441   Admin Instructions: For respiratory rate LESS than or EQUAL to 8.  Partial reversal dose:  0.1 mg titrated q 2 minutes for Analgesia Side Effects Monitoring Sedation Level of 3 (frequently drowsy, arousable, drifts to sleep during " conversation).Full reversal dose:  0.4 mg bolus for Analgesia Side Effects Monitoring Sedation Level of 4 (somnolent, minimal or no response to stimulation).               omeprazole (priLOSEC) CR capsule 20 mg  Dose: 20 mg Freq: DAILY Route: PO  Start: 05/11/17 1245        1310 (20 mg)-Given        0826 (20 mg)-Given        0841 (20 mg)-Given           ondansetron (ZOFRAN-ODT) ODT tab 4 mg  Dose: 4 mg Freq: EVERY 6 HOURS PRN Route: PO  PRN Reason: nausea  Start: 05/10/17 1441   Admin Instructions: This is Step 1 of nausea and vomiting management.  If nausea not resolved in 15 minutes, go to Step 2 prochlorperazine (COMPAZINE). Do not push through foil backing. Peel back foil and gently remove. Place on tongue immediately. Administration with liquid unnecessary              Or  ondansetron (ZOFRAN) injection 4 mg  Dose: 4 mg Freq: EVERY 6 HOURS PRN Route: IV  PRN Reasons: nausea,vomiting  Start: 05/10/17 1441   Admin Instructions: This is Step 1 of nausea and vomiting management.  If nausea not resolved in 15 minutes, go to Step 2 prochlorperazine (COMPAZINE).  Irritant.               oxyCODONE (ROXICODONE) IR tablet 5 mg  Dose: 5 mg Freq: EVERY 3 HOURS PRN Route: PO  PRN Reason: moderate to severe pain  Start: 05/10/17 1441   Admin Instructions: IF CrCl is UNKNOWN start at lowest end of dosing range.  Hold while on PCA or with regular IV opioid dosing.         2140 (5 mg)-Given        0627 (5 mg)-Given       1149 (5 mg)-Given        0456 (5 mg)-Given       1129 (5 mg)-Given           prochlorperazine (COMPAZINE) injection 5 mg  Dose: 5 mg Freq: EVERY 6 HOURS PRN Route: IV  PRN Reasons: nausea,vomiting  Start: 05/10/17 1441   Admin Instructions: This is Step 2 of nausea and vomiting management.   If nausea not resolved in 15 minutes, give metoclopramide (REGLAN) if ordered (step 3 of nausea and vomiting management)              Or  prochlorperazine (COMPAZINE) tablet 5 mg  Dose: 5 mg Freq: EVERY 6 HOURS PRN Route:  PO  PRN Reasons: nausea,vomiting  Start: 05/10/17 1441   Admin Instructions: This is Step 2 of nausea and vomiting management.   If nausea not resolved in 15 minutes, give metoclopramide (REGLAN) if ordered (step 3 of nausea and vomiting management)               senna-docusate (SENOKOT-S;PERICOLACE) 8.6-50 MG per tablet 1-2 tablet  Dose: 1-2 tablet Freq: 2 TIMES DAILY Route: PO  Start: 05/10/17 2000   Admin Instructions: Start with 1 tablet PO BID, If no bowel movement in 24 hours, increase to 2 tablets PO BID.  Hold for loose stools.         0227 (2 tablet)-Given              1155 (2 tablet)-Given       2138 (2 tablet)-Given        0827 (1 tablet)-Given       2002 (2 tablet)-Given        0841 (1 tablet)-Given       [ ] 2000           sodium chloride (PF) 0.9% PF flush 3 mL  Dose: 3 mL Freq: EVERY 1 HOUR PRN Route: IK  PRN Reason: line flush  PRN Comment: for peripheral IV flush post IV meds  Start: 05/10/17 1441              sodium chloride (PF) 0.9% PF flush 3 mL  Dose: 3 mL Freq: EVERY 8 HOURS Route: IK  Start: 05/10/17 0230       0223 (3 mL)-Given       0604 (3 mL)-Given       1042-Auto Hold       1400-Automatically Held       1436-Unhold       (2114)-Not Given               (1327)-Not Given       (2154)-Not Given        0827 (3 mL)-Given       (1659)-Not Given       (2219)-Not Given        (0722)-Not Given       [ ] 1400       [ ] 2200          Completed Medications  Medications 05/07/17 05/08/17 05/09/17 05/10/17 05/11/17 05/12/17 05/13/17         Dose: 1 g Freq: EVERY 8 HOURS Route: IV  Indications of Use: SURGICAL PROPHYLAXIS  Start: 05/10/17 1900   End: 05/11/17 0353   Admin Instructions: First post-op dose due 8 hours after intra-op dose, see eMAR.        1847 (1 g)-New Bag        0323 (1 g)-New Bag               Dose: 1 g Freq: ONCE Route: IV  Indications Comment: Temp 100.6  Start: 05/11/17 0600   End: 05/11/17 0626       1042-Auto Hold       1436-Unhold        0556 (1 g)-New Bag               Dose: 20  mg Freq: ONCE Route: PO  Start: 05/13/17 1200   End: 05/13/17 1207          1207 (20 mg)-Given          Discontinued Medications  Medications 05/07/17 05/08/17 05/09/17 05/10/17 05/11/17 05/12/17 05/13/17         Dose: 650 mg Freq: EVERY 4 HOURS PRN Route: RE  PRN Reason: mild pain  Start: 05/10/17 0318   End: 05/10/17 1442   Admin Instructions: Maximum acetaminophen dose from all sources = 75 mg/kg/day not to exceed 4 grams/day.        1042-Auto Hold       1436-Unhold       1442-Med Discontinued            Dose: 650 mg Freq: EVERY 4 HOURS PRN Route: PO  PRN Reason: other  PRN Comment: surgical pain  Start: 05/13/17 0000   End: 05/10/17 1838   Admin Instructions: May give first dose 4 hours after last scheduled dose of acetaminophen.  Maximum acetaminophen dose from all sources = 75 mg/kg/day not to exceed 4 grams/day.        1838-Med Discontinued            Dose: 650 mg Freq: EVERY 4 HOURS PRN Route: PO  PRN Reasons: mild pain,fever  Start: 05/09/17 1633   End: 05/10/17 1442   Admin Instructions: Maximum acetaminophen dose from all sources = 75 mg/kg/day not to exceed 4 grams/day.        1042-Auto Hold       1436-Unhold       1442-Med Discontinued            Dose: 1 g Freq: SEE ADMIN INSTRUCTIONS Route: IV  Indications of Use: SURGICAL PROPHYLAXIS  Start: 05/10/17 0749   End: 05/10/17 1237   Admin Instructions: Intra-Op Dose.  Give every 2 hours while patient in surgery, starting 2 hours after pre-op dose.  DO NOT GIVE intra-op dose if CrCl less than 10 mL/min (on dialysis).  If CrCL less than 50 mL/min, double the time interval between doses.        1237-Med Discontinued            Dose: 1 g Freq: EVERY 24 HOURS Route: IV  Indications Comment: Temp 100.6  Start: 05/12/17 0600   End: 05/12/17 1011         1011-Med Discontinued                Dose: 25-50 mcg Freq: EVERY 2 MIN PRN Route: IV  PRN Reason: other  PRN Comment: acute pain  Start: 05/10/17 1244   End: 05/10/17 1436   Admin Instructions: MAX cumulative dose  = 250 mcg.    Use Fentanyl initially, as a short acting agent for acute pain control.  If insufficient, or a longer acting agent is needed, begin Morphine or Hydromorphone if ordered.        1436-Med Discontinued            Dose: 0.2 mg Freq: EVERY 2 HOURS PRN Route: IV  PRN Reason: severe pain  PRN Comment: or if patient unable to take PO  Start: 05/10/17 1441   End: 05/11/17 1049   Admin Instructions: Hold while on PCA.         1049-Med Discontinued           Dose: 0.3-0.5 mg Freq: EVERY 5 MIN PRN Route: IV  PRN Reason: other  PRN Comment: acute pain.  May administer if Respiratory Rate is greater than 10  Start: 05/10/17 1244   End: 05/10/17 1436   Admin Instructions: If fentanyl is also ordered, use HYDROmorphone if pain control insufficient with fentanyl or a longer acting agent is needed.   Max cumulative dose = 2 mg        1436-Med Discontinued            Dose: 1-6 Units Freq: EVERY 4 HOURS Route: SC  Start: 05/09/17 1645   End: 05/10/17 2233   Admin Instructions: Correction Scale - MEDIUM INSULIN RESISTANCE DOSING     Do Not give Correction Insulin if BG less than 140.  For  - 189 give 1 unit.  For  - 239 give 2 units.  For  - 289 give 3 units.  For  - 339 give 4 units.  For  - 399 give 5 units.  For BG greater than or equal to 400 give 6 units.  Check blood glucose Q4H and administer based on blood glucose.  Notify provider if glucose greater than or equal to 350 mg/dL after administration of correction dose.  If given at mealtime, must be administered 5 min before meal or immediately after.       (1705)-Not Given [C]       2311 (1 Units)-Given        0528 (1 Units)-Given       (0939)-Not Given       1042-Auto Hold       1200-Automatically Held       1436-Unhold       1708 (1 Units)-Given       (2219)-Not Given [C]       2233-Med Discontinued            Rate: 100 mL/hr Freq: CONTINUOUS Route: IV  Last Dose: Stopped (05/10/17 1449)  Start: 05/10/17 1315   End: 05/10/17 1436        1303 ( )-New Bag       1436-Med Discontinued  1449-Stopped                Rate: 50 mL/hr Freq: CONTINUOUS Route: IV  Start: 05/10/17 1100   End: 05/10/17 1237       1057 ( )-New Bag       1214 ( )-Anesthesia Volume Adjustment       1237-Med Discontinued            Dose: 0.1-0.4 mg Freq: EVERY 2 MIN PRN Route: IV  PRN Reason: opioid reversal  Start: 05/09/17 1633   End: 05/10/17 2225   Admin Instructions: For respiratory rate LESS than or EQUAL to 8.  Partial reversal dose:  0.1 mg titrated q 2 minutes for Analgesia Side Effects Monitoring Sedation Level of 3 (frequently drowsy, arousable, drifts to sleep during conversation).Full reversal dose:  0.4 mg bolus for Analgesia Side Effects Monitoring Sedation Level of 4 (somnolent, minimal or no response to stimulation).        1042-Auto Hold       1436-Unhold       2225-Med Discontinued            Dose: 4 mg Freq: EVERY 30 MIN PRN Route: PO  PRN Reason: nausea  Start: 05/10/17 1244   End: 05/10/17 1436   Admin Instructions: MAX total dose = 8 mg, including OR dosing. If not resolved in 15 minutes, then go to step 2 (Prochlorperazine if ordered).        1436-Med Discontinued         Or    Dose: 4 mg Freq: EVERY 30 MIN PRN Route: IV  PRN Reason: nausea  Start: 05/10/17 1244   End: 05/10/17 1436   Admin Instructions: MAX total dose = 8 mg, including OR dosing. If not resolved in 15 minutes, then go to step 2 (Prochlorperazine if ordered).  Irritant.        1436-Med Discontinued            Dose: 4 mg Freq: EVERY 6 HOURS PRN Route: PO  PRN Reason: nausea  Start: 05/09/17 1633   End: 05/10/17 2225   Admin Instructions: This is Step 1 of nausea and vomiting management.  If nausea not resolved in 15 minutes, go to Step 2 prochlorperazine (COMPAZINE). Do not push through foil backing. Peel back foil and gently remove. Place on tongue immediately. Administration with liquid unnecessary        1042-Auto Hold              1436-Unhold       2225-Med Discontinued         Or    Dose:  4 mg Freq: EVERY 6 HOURS PRN Route: IV  PRN Reasons: nausea,vomiting  Start: 05/09/17 1633   End: 05/10/17 2225   Admin Instructions: This is Step 1 of nausea and vomiting management.  If nausea not resolved in 15 minutes, go to Step 2 prochlorperazine (COMPAZINE).  Irritant.        1042-Auto Hold       1410 (4 mg)-Given       1436-Unhold       2225-Med Discontinued            Dose: 40 mg Freq: EVERY 24 HOURS Route: IV  Start: 05/09/17 1645   End: 05/11/17 1240   Admin Instructions: Reconstitute vial with 10mLs Saline and administer IV Push  Irritant.       1742 (40 mg)-Given        1042-Auto Hold       1436-Unhold       1704 (40 mg)-Given        1240-Med Discontinued           Dose: 100 mcg Freq: EVERY 10 MIN PRN Route: IV  PRN Comment: SBP<90  Start: 05/10/17 1309   End: 05/10/17 1436   Admin Instructions: Vesicant.        1302 (100 mcg)-Given       1436-Med Discontinued            Dose: 5 mg Freq: EVERY 6 HOURS PRN Route: IV  PRN Reasons: nausea,vomiting  Start: 05/10/17 1244   End: 05/10/17 1436   Admin Instructions: This is Step 2 of the nausea and vomiting protocol.   If nausea not resolved in 15 minutes, give Metoclopramide if ordered (step 3 of nausea and vomiting protocol)          1436-Med Discontinued            Dose: 3 mL Freq: EVERY 8 HOURS Route: IK  Start: 05/10/17 1445   End: 05/11/17 1450   Admin Instructions: And Q1H PRN, to lock peripheral IV dormant line.        (1652)-Not Given       (2227)-Not Given                      1450-Med Discontinued      Medications 05/07/17 05/08/17 05/09/17 05/10/17 05/11/17 05/12/17 05/13/17

## 2017-05-09 NOTE — IP AVS SNAPSHOT
` ` Patient Information     Patient Name Sex     Lulu Carlton ([id number]) Female 1921       Room Bed    2401 2401-      Patient Demographics     Address Phone    [address] [facility] [address] [phone] (Home)  [phone] (Mobile)      Patient Ethnicity & Race     Ethnic Group Patient Race     White      Emergency Contact(s)     Name Relation Home Work Mobile    Benigno Shane [phone]    Keya Kong [phone]      Documents on File        Status Date Received Description       Documents for the Patient    Privacy Notice - [facility]  03     Insurance Card  10/17/07 BCBS & MEDICARE    Face Sheet  10/17/07     Affiliate Privacy placeholder   phase3    Consent for Services - Hospital/Clinic Received () 06/03/15     Consent for EHR Access Received 06/03/15     External Medication Information Consent Accepted 06/03/15     Patient ID Received 17 [place] ID [email or web address]    [facility] Specified Other       Privacy Notice - [facility] Received 06/03/15     Insurance Card Received () 06/03/15 [email or web address]    Insurance Card Received ()  back of cards    Consent for Services/Privacy Notice - Hospital/Clinic Received 10/31/16     Consent to Communicate Received 10/31/16     Consent to Communicate  16 AUTHORIZATION TO DISCUSS PHI    Business/Insurance/Care Coordination/Health Form - Patient  16 AUTHORIZATION FOR ADMINISTRATION OF MEDICATION 2016    Insurance Card Received 17 FRONT ONLY - BCBS PLATINUM    Insurance Card  (Deleted)         Documents for the Encounter    CMS IM for Patient Signature Received 05/10/17     ECG   ECG Report      Admission Information     Attending Provider Admitting Provider Admission Type Admission Date/Time    Danisha Reaves MD Hartleben, Paul Dean, MD Emergency 17  1103    Discharge Date Hospital Service Auth/Cert Status Service Area     Hospitalist  Incomplete [facility] SERVICES    Unit Room/Bed Admission Status       [facility] 2401/2401-01 Admission (Confirmed)       Admission     Complaint    Displaced fracture of right femoral neck (H), Hip fracture (H), Fracture of femoral neck, right (H)      Hospital Account     Name Acct ID Class Status Primary Coverage    Lulu Carlton [id number] Inpatient Open MEDICARE - MEDICARE FOR HB SUPPLEMENT            Guarantor Account (for Hospital Account #[id number])     Name Relation to Pt Service Area Active? Acct Type    Lulu Carlton  FCS Yes Personal/Family    Address Phone          [address] [address] [phone](H)              Coverage Information (for Hospital Account #[id number])     1. MEDICARE/MEDICARE FOR HB SUPPLEMENT     F/O Payor/Plan Precert #    MEDICARE/MEDICARE FOR HB SUPPLEMENT     Subscriber Subscriber #    Lulu Carlton [id number]    Address Phone    [employer]  [address]  [place] [address] [phone]          2. BCBS/BCBS PLATINUM BLUE     F/O Payor/Plan Precert #    BCBS/BCBS PLATINUM BLUE     Subscriber Subscriber Lulu Fry [id number]    Address Phone    [address] [address]  [place] [address] [phone]

## 2017-05-09 NOTE — IP AVS SNAPSHOT
"          [facility]: [phone]                                              INTERAGENCY TRANSFER FORM - LAB / IMAGING / EKG / EMG RESULTS   2017                    Hospital Admission Date: 2017  EL BARRIENTOS   : 1921  Sex: Female        Attending Provider: Danisha Reaves MD     Allergies:  Hydrocodone-acetaminophen, Trazodone, Metformin    Infection:  None   Service:  HOSPITALIST    Ht:  1.486 m (4' 10.5\")   Wt:  70.2 kg (154 lb 12.2 oz)   Admission Wt:  68 kg (150 lb)    BMI:  31.79 kg/m 2   BSA:  1.7 m 2            Patient PCP Information     Provider PCP Type    Todd Villeda MD General         Lab Results - 3 Days      Glucose by meter [[id number]] (Abnormal)  Resulted: 17 1100, Result status: Final result    Ordering provider: Riky Greenfield MD  17 1051 Resulting lab: POINT OF CARE TEST, GLUCOSE    Specimen Information    Type Source Collected On     17 1051          Components       Value Reference Range Flag Lab   Glucose 236 70 - 99 mg/dL H 170            Basic metabolic panel [[id number]] (Abnormal)  Resulted: 17 0809, Result status: Final result    Ordering provider: Mo Martin MD  17 0000 Resulting lab: [facility]    Specimen Information    Type Source Collected On   Blood  17 0635          Components       Value Reference Range Flag Lab   Sodium 138 133 - 144 mmol/L  59   Potassium 4.3 3.4 - 5.3 mmol/L  59   Chloride 100 94 - 109 mmol/L  59   Carbon Dioxide 30 20 - 32 mmol/L  59   Anion Gap 8 3 - 14 mmol/L  59   Glucose 163 70 - 99 mg/dL H 59   Urea Nitrogen 24 7 - 30 mg/dL  59   Creatinine 0.76 0.52 - 1.04 mg/dL  59   GFR Estimate 71 >60 mL/min/1.7m2  59   Comment:  Non  GFR Calc   GFR Estimate If Black 86 >60 mL/min/1.7m2  59   Comment:  African American GFR Calc   Calcium 8.6 8.5 - 10.1 mg/dL  59            CBC with platelets [[id number]] (Abnormal)  " Resulted: 05/13/17 0733, Result status: Final result    Ordering provider: Mo Martin MD  05/13/17 0000 Resulting lab: [facility]    Specimen Information    Type Source Collected On   Blood  05/13/17 0635          Components       Value Reference Range Flag Lab   WBC 8.1 4.0 - 11.0 10e9/L  59   RBC Count 3.52 3.8 - 5.2 10e12/L L 59   Hemoglobin 10.1 11.7 - 15.7 g/dL L 59   Hematocrit 31.5 35.0 - 47.0 % L 59   MCV 90 78 - 100 fl  59   MCH 28.7 26.5 - 33.0 pg  59   MCHC 32.1 31.5 - 36.5 g/dL  59   RDW 11.9 10.0 - 15.0 %  59   Platelet Count 160 150 - 450 10e9/L  59            Glucose by meter [[id number]] (Abnormal)  Resulted: 05/13/17 0646, Result status: Final result    Ordering provider: Riky Greenfield MD  05/13/17 0639 Resulting lab: POINT OF CARE TEST, GLUCOSE    Specimen Information    Type Source Collected On     05/13/17 0639          Components       Value Reference Range Flag Lab   Glucose 174 70 - 99 mg/dL H 170            Glucose by meter [[id number]] (Abnormal)  Resulted: 05/13/17 0150, Result status: Final result    Ordering provider: Riky Greenfield MD  05/13/17 0136 Resulting lab: POINT OF CARE TEST, GLUCOSE    Specimen Information    Type Source Collected On     05/13/17 0136          Components       Value Reference Range Flag Lab   Glucose 152 70 - 99 mg/dL H 170            Glucose by meter [[id number]] (Abnormal)  Resulted: 05/12/17 2216, Result status: Final result    Ordering provider: Riky Greenfield MD  05/12/17 2209 Resulting lab: POINT OF CARE TEST, GLUCOSE    Specimen Information    Type Source Collected On     05/12/17 2209          Components       Value Reference Range Flag Lab   Glucose 198 70 - 99 mg/dL H 170            Glucose by meter [[id number]] (Abnormal)  Resulted: 05/12/17 1701, Result status: Final result    Ordering provider: Riky Greenfield MD  05/12/17 1658 Resulting lab: POINT OF CARE TEST, GLUCOSE    Specimen Information     Type Source Collected On     05/12/17 1654          Components       Value Reference Range Flag Lab   Glucose 151 70 - 99 mg/dL H 170            Glucose by meter [[id number]] (Abnormal)  Resulted: 05/12/17 1626, Result status: Final result    Ordering provider: Riky Greenfield MD  05/12/17 1544 Resulting lab: POINT OF CARE TEST, GLUCOSE    Specimen Information    Type Source Collected On     05/12/17 1544          Components       Value Reference Range Flag Lab   Glucose 185 70 - 99 mg/dL H 170            Glucose by meter [[id number]] (Abnormal)  Resulted: 05/12/17 1146, Result status: Final result    Ordering provider: Riky Greenfield MD  05/12/17 1054 Resulting lab: POINT OF CARE TEST, GLUCOSE    Specimen Information    Type Source Collected On     05/12/17 1054          Components       Value Reference Range Flag Lab   Glucose 195 70 - 99 mg/dL H 170            Urine Culture Aerobic Bacterial [[id number]] (Abnormal)  Resulted: 05/12/17 0910, Result status: Preliminary result    Ordering provider: Mo Martin MD  05/10/17 0515 Resulting lab: [facility]    Specimen Information    Type Source Collected On     05/10/17 0515          Components       Value Reference Range Flag Lab   Specimen Description Catheterized Urine   59   Culture Micro --  A 59   Result:         <10,000 colonies/mL Staphylococcus species  Reincubate     Micro Report Status Pending   59   Result:              Blood culture [[id number]]  Resulted: 05/12/17 0757, Result status: Preliminary result    Ordering provider: Bi Cohn MD  05/10/17 0321 Resulting lab: [facility]    Specimen Information    Type Source Collected On   Blood  05/10/17 0410          Components       Value Reference Range Flag Lab   Specimen Description Blood   59   Special Requests Left Arm   59   Culture Micro No growth after 2 days   59   Micro Report Status Pending   59            Blood culture [[id number]]   Resulted: 05/12/17 0757, Result status: Preliminary result    Ordering provider: Bi Cohn MD  05/10/17 0321 Resulting lab: [facility]    Specimen Information    Type Source Collected On   Blood  05/10/17 0405          Components       Value Reference Range Flag Lab   Specimen Description Blood   59   Special Requests Left Arm   59   Culture Micro No growth after 2 days   59   Micro Report Status Pending   59            Basic metabolic panel [[id number]] (Abnormal)  Resulted: 05/12/17 0711, Result status: Final result    Ordering provider: Mo Martin MD  05/12/17 0000 Resulting lab: [facility]    Specimen Information    Type Source Collected On   Blood  05/12/17 0633          Components       Value Reference Range Flag Lab   Sodium 136 133 - 144 mmol/L  59   Potassium 4.1 3.4 - 5.3 mmol/L  59   Chloride 101 94 - 109 mmol/L  59   Carbon Dioxide 27 20 - 32 mmol/L  59   Anion Gap 8 3 - 14 mmol/L  59   Glucose 135 70 - 99 mg/dL H 59   Urea Nitrogen 21 7 - 30 mg/dL  59   Creatinine 0.79 0.52 - 1.04 mg/dL  59   GFR Estimate 68 >60 mL/min/1.7m2  59   Comment:  Non  GFR Calc   GFR Estimate If Black 82 >60 mL/min/1.7m2  59   Comment:  African American GFR Calc   Calcium 8.5 8.5 - 10.1 mg/dL  59            CBC with platelets [[id number]] (Abnormal)  Resulted: 05/12/17 0651, Result status: Final result    Ordering provider: Mo Martin MD  05/12/17 0000 Resulting lab: [facility]    Specimen Information    Type Source Collected On   Blood  05/12/17 0633          Components       Value Reference Range Flag Lab   WBC 10.5 4.0 - 11.0 10e9/L  59   RBC Count 3.84 3.8 - 5.2 10e12/L  59   Hemoglobin 11.0 11.7 - 15.7 g/dL L 59   Hematocrit 34.4 35.0 - 47.0 % L 59   MCV 90 78 - 100 fl  59   MCH 28.6 26.5 - 33.0 pg  59   MCHC 32.0 31.5 - 36.5 g/dL  59   RDW 12.0 10.0 - 15.0 %  59   Platelet Count 136 150 - 450 10e9/L L 59             Glucose by meter [[id number]] (Abnormal)  Resulted: 05/12/17 0641, Result status: Final result    Ordering provider: Riky Greenfield MD  05/12/17 0626 Resulting lab: POINT OF CARE TEST, GLUCOSE    Specimen Information    Type Source Collected On     05/12/17 0626          Components       Value Reference Range Flag Lab   Glucose 135 70 - 99 mg/dL H 170   Comment:  /RN Notified            Glucose by meter [[id number]] (Abnormal)  Resulted: 05/12/17 0226, Result status: Final result    Ordering provider: Riky Greenfield MD  05/12/17 0218 Resulting lab: POINT OF CARE TEST, GLUCOSE    Specimen Information    Type Source Collected On     05/12/17 0218          Components       Value Reference Range Flag Lab   Glucose 164 70 - 99 mg/dL H 170   Comment:  /RN Notified            Glucose by meter [[id number]] (Abnormal)  Resulted: 05/11/17 2140, Result status: Final result    Ordering provider: Riky Greenfield MD  05/11/17 2132 Resulting lab: POINT OF CARE TEST, GLUCOSE    Specimen Information    Type Source Collected On     05/11/17 2132          Components       Value Reference Range Flag Lab   Glucose 192 70 - 99 mg/dL H 170            Glucose by meter [[id number]] (Abnormal)  Resulted: 05/11/17 1636, Result status: Final result    Ordering provider: Riky Greenfield MD  05/11/17 1631 Resulting lab: POINT OF CARE TEST, GLUCOSE    Specimen Information    Type Source Collected On     05/11/17 1631          Components       Value Reference Range Flag Lab   Glucose 155 70 - 99 mg/dL H 170            Glucose by meter [[id number]] (Abnormal)  Resulted: 05/11/17 1146, Result status: Final result    Ordering provider: Riky Greenfield MD  05/11/17 1136 Resulting lab: POINT OF CARE TEST, GLUCOSE    Specimen Information    Type Source Collected On     05/11/17 1136          Components       Value Reference Range Flag Lab   Glucose 197 70 - 99 mg/dL H 170            Glucose by meter [[id number]]  (Abnormal)  Resulted: 05/11/17 0817, Result status: Final result    Ordering provider: Riky Greenfield MD  05/11/17 0754 Resulting lab: POINT OF CARE TEST, GLUCOSE    Specimen Information    Type Source Collected On     05/11/17 0754          Components       Value Reference Range Flag Lab   Glucose 148 70 - 99 mg/dL H 170            Basic metabolic panel [[id number]] (Abnormal)  Resulted: 05/11/17 0652, Result status: Final result    Ordering provider: Mo Martin MD  05/11/17 0000 Resulting lab: [facility]    Specimen Information    Type Source Collected On   Blood  05/11/17 0630          Components       Value Reference Range Flag Lab   Sodium 140 133 - 144 mmol/L  59   Potassium 3.7 3.4 - 5.3 mmol/L  59   Chloride 105 94 - 109 mmol/L  59   Carbon Dioxide 28 20 - 32 mmol/L  59   Anion Gap 7 3 - 14 mmol/L  59   Glucose 160 70 - 99 mg/dL H 59   Urea Nitrogen 19 7 - 30 mg/dL  59   Creatinine 0.76 0.52 - 1.04 mg/dL  59   GFR Estimate 71 >60 mL/min/1.7m2  59   Comment:  Non  GFR Calc   GFR Estimate If Black 86 >60 mL/min/1.7m2  59   Comment:  African American GFR Calc   Calcium 8.4 8.5 - 10.1 mg/dL L 59            CBC with platelets [[id number]] (Abnormal)  Resulted: 05/11/17 0637, Result status: Final result    Ordering provider: Mo Martin MD  05/11/17 0000 Resulting lab: [facility]    Specimen Information    Type Source Collected On   Blood  05/11/17 0630          Components       Value Reference Range Flag Lab   WBC 10.4 4.0 - 11.0 10e9/L  59   RBC Count 3.67 3.8 - 5.2 10e12/L L 59   Hemoglobin 10.5 11.7 - 15.7 g/dL L 59   Hematocrit 33.3 35.0 - 47.0 % L 59   MCV 91 78 - 100 fl  59   MCH 28.6 26.5 - 33.0 pg  59   MCHC 31.5 31.5 - 36.5 g/dL  59   RDW 12.3 10.0 - 15.0 %  59   Platelet Count 113 150 - 450 10e9/L L 59            Glucose by meter [[id number]] (Abnormal)  Resulted: 05/11/17 0216, Result status: Final result    Ordering  provider: Riky Greenfield MD  05/11/17 0206 Resulting lab: POINT OF CARE TEST, GLUCOSE    Specimen Information    Type Source Collected On     05/11/17 0206          Components       Value Reference Range Flag Lab   Glucose 151 70 - 99 mg/dL H 170            Glucose by meter [[id number]] (Abnormal)  Resulted: 05/10/17 2146, Result status: Final result    Ordering provider: Riky Greenfield MD  05/10/17 2121 Resulting lab: POINT OF CARE TEST, GLUCOSE    Specimen Information    Type Source Collected On     05/10/17 2121          Components       Value Reference Range Flag Lab   Glucose 228 70 - 99 mg/dL H 170            Glucose by meter [[id number]] (Abnormal)  Resulted: 05/10/17 1611, Result status: Final result    Ordering provider: Riky Greenfield MD  05/10/17 1607 Resulting lab: POINT OF CARE TEST, GLUCOSE    Specimen Information    Type Source Collected On     05/10/17 1607          Components       Value Reference Range Flag Lab   Glucose 149 70 - 99 mg/dL H 170            Glucose by meter [[id number]] (Abnormal)  Resulted: 05/10/17 1411, Result status: Final result    Ordering provider: Mo Martin MD  05/10/17 1406 Resulting lab: POINT OF CARE TEST, GLUCOSE    Specimen Information    Type Source Collected On     05/10/17 1406          Components       Value Reference Range Flag Lab   Glucose 154 70 - 99 mg/dL H 170            Procalcitonin [[id number]]  Resulted: 05/10/17 1226, Result status: Final result    Ordering provider: Mo Martin MD  05/10/17 0807 Resulting lab: [facility]    Specimen Information    Type Source Collected On   Blood  05/10/17 0830          Components       Value Reference Range Flag Lab   Procalcitonin 0.12 ng/ml  51   Comment:         0.05-0.24 ng/ml Low risk of systemic bacterial infection. Local bacterial   infection possible.  Recommendation: Assess other clinical features of   infection. Discourage antibiotics  unless strong clinical suspicion for   serious   infection.              Glucose by meter [[id number]] (Abnormal)  Resulted: 05/10/17 0836, Result status: Final result    Ordering provider: Mo Martin MD  05/10/17 0832 Resulting lab: POINT OF CARE TEST, GLUCOSE    Specimen Information    Type Source Collected On     05/10/17 0832          Components       Value Reference Range Flag Lab   Glucose 133 70 - 99 mg/dL H 170            UA with Microscopic reflex to Culture [[id number]] (Abnormal)  Resulted: 05/10/17 0828, Result status: Final result    Ordering provider: Bi Cohn MD  05/10/17 0321 Resulting lab: [facility]    Specimen Information    Type Source Collected On   Urine Urine catheter 05/10/17 0515          Components       Value Reference Range Flag Lab   Color Urine Yellow   59   Appearance Urine Clear   59   Glucose Urine Negative NEG mg/dL  59   Bilirubin Urine Negative NEG  59   Ketones Urine 10 NEG mg/dL A 59   Specific Gravity Urine 1.020 1.003 - 1.035  59   Blood Urine Negative NEG  59   pH Urine 5.5 5.0 - 7.0 pH  59   Protein Albumin Urine 30 NEG mg/dL A 59   Urobilinogen mg/dL Normal 0.0 - 2.0 mg/dL  59   Nitrite Urine Negative NEG  59   Leukocyte Esterase Urine Moderate NEG A 59   Source Catheterized Urine   59   WBC Urine 11 0 - 2 /HPF H 59   RBC Urine 12 0 - 2 /HPF H 59   Mucous Urine Present NEG /LPF A 59            Blood culture [[id number]]  Resulted: 05/10/17 0752, Result status: Final result    Ordering provider: Bi Cohn MD  05/10/17 0321 Resulting lab: [facility]    Specimen Information    Type Source Collected On   Blood  05/10/17 0330          Components       Value Reference Range Flag Lab   Specimen Description Blood   59   Culture Micro Canceled, Test credited Duplicate request   59   Micro Report Status FINAL 05/10/2017   59            Hemoglobin A1c [[id number]] (Abnormal)  Resulted: 05/10/17 0627, Result status: Final  result    Ordering provider: Mo Martin MD  05/10/17 0001 Resulting lab: [facility]    Specimen Information    Type Source Collected On   Blood  05/10/17 0405          Components       Value Reference Range Flag Lab   Hemoglobin A1C 6.3 4.3 - 6.0 % H 59            Basic metabolic panel [[id number]] (Abnormal)  Resulted: 05/10/17 0510, Result status: Final result    Ordering provider: Mo Martin MD  05/10/17 0001 Resulting lab: [facility]    Specimen Information    Type Source Collected On   Blood  05/10/17 0405          Components       Value Reference Range Flag Lab   Sodium 141 133 - 144 mmol/L  59   Potassium 3.7 3.4 - 5.3 mmol/L  59   Chloride 105 94 - 109 mmol/L  59   Carbon Dioxide 28 20 - 32 mmol/L  59   Anion Gap 8 3 - 14 mmol/L  59   Glucose 152 70 - 99 mg/dL H 59   Urea Nitrogen 14 7 - 30 mg/dL  59   Creatinine 0.73 0.52 - 1.04 mg/dL  59   GFR Estimate 74 >60 mL/min/1.7m2  59   Comment:  Non  GFR Calc   GFR Estimate If Black 90 >60 mL/min/1.7m2  59   Comment:  African American GFR Calc   Calcium 8.3 8.5 - 10.1 mg/dL L 59            Glucose by meter [[id number]] (Abnormal)  Resulted: 05/10/17 0431, Result status: Final result    Ordering provider: Mo Martin MD  05/10/17 0401 Resulting lab: POINT OF CARE TEST, GLUCOSE    Specimen Information    Type Source Collected On     05/10/17 0401          Components       Value Reference Range Flag Lab   Glucose 146 70 - 99 mg/dL H 170            CBC with platelets [[id number]] (Abnormal)  Resulted: 05/10/17 0418, Result status: Final result    Ordering provider: Mo Martin MD  05/10/17 0001 Resulting lab: [facility]    Specimen Information    Type Source Collected On   Blood  05/10/17 0405          Components       Value Reference Range Flag Lab   WBC 10.6 4.0 - 11.0 10e9/L  59   RBC Count 4.36 3.8 - 5.2 10e12/L  59   Hemoglobin 12.5 11.7 - 15.7 g/dL  59    Hematocrit 38.9 35.0 - 47.0 %  59   MCV 89 78 - 100 fl  59   MCH 28.7 26.5 - 33.0 pg  59   MCHC 32.1 31.5 - 36.5 g/dL  59   RDW 12.5 10.0 - 15.0 %  59   Platelet Count 142 150 - 450 10e9/L L 59            Testing Performed By     Lab - Abbreviation Name Director Address Valid Date Range    51 - Unknown [facility] [place] Unknown [address] [facility] [address] 12/31/14 1010 - Present    59 - Unknown [facility] Unknown [address] [facility] [address] 12/31/14 1006 - Present    170 - Unknown POINT OF CARE TEST, GLUCOSE Unknown Unknown 10/31/11 1114 - Present            Unresulted Labs (24h ago through future)    Start       Ordered    05/13/17 0600  Platelet count  (enoxaparin (LOVENOX) (Weight  kg with CrCl greater than 30 mL/min is prechecked))  EVERY THREE DAYS,   Routine     Comments:  Repeat every 3 days while on VTE prophylaxis. If no result is listed, this lab has not been done the past 365 days. LATEST LAB RESULT: Platelet Count (10e9/L)       Date                     Value                 05/10/2017               142 (L)          ----------      05/10/17 1442    05/12/17 0600  CBC with platelets  DAILY,   Routine     Comments:  Last Lab Result: Hemoglobin (g/dL)       Date                     Value                 05/11/2017               10.5 (L)         ----------    05/11/17 1248    05/12/17 0600  Basic metabolic panel  DAILY,   Routine      05/11/17 1248    05/10/17 0600  Basic metabolic panel  DAILY,   Routine      05/09/17 1633    05/10/17 0600  CBC with platelets  DAILY,   Routine     Comments:  Last Lab Result: Hemoglobin (g/dL)       Date                     Value                 05/09/2017               12.8             ----------    05/09/17 1633    Unscheduled  Hemoglobin  CONDITIONAL X 2,   Routine     Comments:  Release on POD 1 and POD 2 if the morning Hgb is less than 8.0    05/10/17 1442         Imaging Results - 3 Days      XR  Pelvis Port 1/2 Views [[id number]]  Resulted: 17 1523, Result status: Final result    Ordering provider: Riky Greenfield MD  17 0656 Resulted by: Logan Stearns MD    Performed: 17 0804 - 17 0830 Resulting lab: RADIOLOGY RESULTS    Narrative:       XR PELVIS PORT 1/2 VW 2017 8:30 AM    HISTORY: Postop hip surgery.    COMPARISON: 2017    FINDINGS: Right hip arthroplasty hardware appears well seated without  radiographic evidence of complication. Moderate degenerative change at  the contralateral left hip.      Impression:       IMPRESSION: Intact right hip arthroplasty.    LOGAN STEARNS MD      Testing Performed By     Lab - Abbreviation Name Director Address Valid Date Range    104 - Rad Rslts RADIOLOGY RESULTS Unknown Unknown 05 1553 - Present               ECG/EMG Results      Echocardiogram Complete [[id number]]  Resulted: 17 1414, Result status: Edited Result - FINAL    Ordering provider: Osvaldo Grant MD  17 1346 Resulted by: Ada Stephenson MD    Performed: 17 1408 - 17 1434 Resulting lab: RADIOLOGY RESULTS    Narrative:       [id number]  [facility]  [id number]  [id number]^[email or web address]^[email or web address]           [facility]  Echocardiography Laboratory  [address] [place] [address]        Name: EL BARRIENTOS  MRN: [id number]  : 1921  Study Date: 2017 02:14 PM  Age: 89 yrs  Gender: Female  Patient Location: [facility]  Reason For Study: Dyspnea  Ordering Physician: OSVALDO GRANT  Referring Physician: Todd Villeda  Performed By: Sandie Ochoa RDCS     BSA: 1.6 m2  Height: 59 in  Weight: 150 lb  HR: 76  BP: 138/69 mmHg  _____________________________________________________________________________  __        Procedure  Complete Portable Echo Adult.  _____________________________________________________________________________  __        Interpretation Summary     The left ventricle is normal in size. There is  mild concentric left  ventricular hypertrophy. Left ventricular systolic function is normal. The  visual ejection fraction is estimated at 55-60%. Grade I or early diastolic  dysfunction. Paradoxical septum motion is noted.  The right ventricle is normal size. The right ventricular systolic function is  normal.  There is mild to moderate (1-2+) tricuspid regurgitation. The right  ventricular systolic pressure is approximated at 48.3 mmHg plus the right  atrial pressure. Right ventricular systolic pressure is elevated, consistent  with moderate to severe pulmonary hypertension.  There is moderate trileaflet aortic sclerosis. No aortic regurgitation is  present. Mild valvular aortic stenosis. The peak AoV pressure gradient is 26.5  mmHg. The mean AoV pressure gradient is 14.2 mmHg.  No pericardial effusion.  No previous study for comparison.  _____________________________________________________________________________  __        Left Ventricle  The left ventricle is normal in size. There is mild concentric left  ventricular hypertrophy. Left ventricular systolic function is normal. The  visual ejection fraction is estimated at 55-60%. Grade I or early diastolic  dysfunction. Paradoxical septum motion is noted.     Right Ventricle  The right ventricle is normal size. The right ventricular systolic function is  normal.     Atria  Normal left atrial size. Right atrial size is normal. There is no color  Doppler evidence of an atrial shunt.     Mitral Valve  The mitral valve leaflets are mildly thickened. There is mild mitral annular  calcification. There is trace mitral regurgitation.        Tricuspid Valve  There is mild to moderate (1-2+) tricuspid regurgitation. The right  ventricular systolic pressure is approximated at 48.3 mmHg plus the right  atrial pressure. Right ventricular systolic pressure is elevated, consistent  with moderate to severe pulmonary hypertension.     Aortic Valve  There is moderate trileaflet  aortic sclerosis. No aortic regurgitation is  present. Mild valvular aortic stenosis. The peak AoV pressure gradient is 26.5  mmHg. The mean AoV pressure gradient is 14.2 mmHg. The calculated aortic valve  are is 1.4 cm^2.     Pulmonic Valve  There is trace pulmonic valvular regurgitation. There is no pulmonic valvular  stenosis.     Vessels  The aortic root is normal size. Normal size ascending aorta. The IVC is normal  in size and reactivity with respiration, suggesting normal central venous  pressure.     Pericardium  There is no pericardial effusion.        Rhythm  The rhythm was normal sinus.  _____________________________________________________________________________  __  MMode/2D Measurements & Calculations  IVSd: 1.3 cm     LVIDd: 4.7 cm  LVIDs: 3.1 cm  LVPWd: 1.2 cm  FS: 33.5 %  EDV(Teich): 101.0 ml  ESV(Teich): 38.1 ml  LV mass(C)d: 216.2 grams  LV mass(C)dI: 132.5 grams/m2  Ao root diam: 3.2 cm  LA dimension: 3.6 cm  asc Aorta Diam: 3.1 cm  LA/Ao: 1.1  LVOT diam: 2.2 cm  LVOT area: 3.8 cm2        Doppler Measurements & Calculations  MV E max rogelio: 107.6 cm/sec  MV A max rogelio: 117.9 cm/sec  MV E/A: 0.91  MV dec time: 0.20 sec  Ao V2 max: 257.4 cm/sec  Ao max P.5 mmHg  Ao V2 mean: 179.2 cm/sec  Ao mean P.2 mmHg  Ao V2 VTI: 48.6 cm  PATRICIA(I,D): 1.4 cm2  PATRICIA(V,D): 1.3 cm2  LV V1 max PG: 3.0 mmHg  LV V1 max: 87.0 cm/sec  LV V1 VTI: 18.4 cm  SV(LVOT): 69.8 ml  TR max rogelio: 347.5 cm/sec  TR max P.3 mmHg  PATRICIA Index (cm2/m2): 0.88  Lateral E/e': 13.1     Medial E/e': 18.8           _____________________________________________________________________________  __           Report approved by: Atilio Blankenship 2017 03:02 PM       1    Type Source Collected On     17 [id number]          View Image (below)              Encounter-Level Documents:     There are no encounter-level documents.      Order-Level Documents:     There are no order-level documents.

## 2017-05-09 NOTE — ED NOTES
Pt remains presently confuses, family at bedside. Denies CP/SOB, states pain is tolerable. Warm blanket given

## 2017-05-09 NOTE — IP AVS SNAPSHOT
` `     [facility] SURGICAL: [phone]                 INTERAGENCY TRANSFER FORM - NOTES (H&P, Discharge Summary, Consults, Procedures, Therapies)   2017                    Hospital Admission Date: 2017  EL BARRIENTOS   : 1921  Sex: Female        Patient PCP Information     Provider PCP Type    Todd Villeda MD General         History & Physicals      H&P signed by Mo Martin MD at 2017  3:05 PM      Author:  Mo Martin MD Service:  Hospitalist Author Type:  Physician    Filed:  2017  3:05 PM Date of Service:  2017  1:47 PM Note Created:  2017  3:03 PM    Status:  Signed :  Mo Martin MD (Physician)         HISTORY AND PHYSICAL      CHIEF COMPLAINT:  Right leg pain since a fall at 1:30 a.m. today.      HISTORY OF PRESENT ILLNESS:  This patient apparently was getting up at 1:30 a.m. today thinking that she was going to go for breakfast.  She cannot give us details, but she fell.  Her daughter who feels pretty certain that she essentially tripped.  She was on the floor and was unable to get up.  It is unclear how long she laid there, but eventually staff from her memory unit got her up and actually took her to breakfast this morning, but she continued to have pain in the hip and the right upper leg, so she was brought to the emergency room.      There is no history of any head or chest injury.  She offers no complaints of this.  There is no definitive history of syncope.  She has severe dementia.      She also has hearing loss, osteoporosis, vitamin D deficiency, anemia, type 2 diabetes, osteoarthritis and hypertension.  She used to carry a diagnosis of myasthenia gravis, but she is not on any medication for this and her daughter questions whether that diagnosis was accurate.      I did talk to the daughter extensively about resuscitation and she is DNR/DNI and the daughter also would not want her intubated after surgery.       PAST MEDICAL HISTORY:   1.  Hearing loss.   2.  Urinary incontinence.   3.  Osteoporosis.   4.  Vitamin D deficiency.   5.  Diabetes mellitus type 2.   6.  Hypertension.   7.  Previous diagnosis of myasthenia gravis; however, this is questioned.   8.  Depression.   9.  Fatty liver.   10.  Peripheral neuropathy.   11.  Thrombocytopenia.   12.  Elevated liver function tests.   13.  Diffuse large B-cell lymphoma as listed in her [facility] records.       PAST SURGICAL HISTORY:  Includes cholecystectomy and thymus gland removal.      FAMILY HISTORY:  Father  at age 80, he had a stroke.  Mother  of breast cancer.  Sister  of old age.  Other sister  of breast cancer.      HABITS:  She never smoked.  No alcohol use.      SOCIAL HISTORY:  Her daughter is with her today.  She lives in memory care at [facility].      ALLERGIES:  Hydrocodone, reaction not listed.  Trazodone, she gets crazy dreams and then cannot fall back asleep.  Metformin, rash.      MEDICATIONS:   1.  Flexeril 5 mg t.i.d. p.r.n.   2.  Tylenol 650 q.4 h. p.r.n.   3.  Loperamide 2 mg p.r.n.   4.  Meclizine 12.5 mg 2 times daily as needed for dizziness.   5.  Omeprazole 20 mg daily.   6.  Glipizide XL 5 mg daily.   7.  Ocuvite 1 daily.   8.  Lisinopril 5 mg.   9.  Cyanocobalamin 1000 mcg daily.   10.  Caltrate 1 daily.   11.  Kenalog cream b.i.d. p.r.n.      REVIEW OF SYSTEMS:  She denies fever, chills, systemic symptoms, visual symptoms, shortness of breath, chest pain, abdominal pain, change in bowel habits, urinary tract symptoms, skin symptoms, new edema.  She has pain in right hip and right upper leg.      PHYSICAL EXAMINATION:   GENERAL:  She is alert.     VITAL SIGNS:  Satting at 87-88% when I am in the room.  She was not distressed.  She has 2 liters of oxygen on.  Temperature is 97.7, heart rate 91, blood pressure 130/69, respiratory rate 18, sat 92%.   HEENT:  Ears negative.  Oral negative.   NECK:  Supple.   LUNGS:  A few bibasilar  dependent crackles.   COR:  S1, S2 is distant, without click, rub or murmur.   ABDOMEN:  Obese, soft, benign, nontender, without guarding, rebound, rigidity or mass.   EXTREMITIES:  CMS is normal in the feet.  There is no external rotation of the right leg.  She has some pain with manipulation of the right hip.   NEUROLOGIC:  She is disoriented to time and place and current events.  Cranial nerves, motor, reflexes grossly normal.      LABORATORY DATA:  Chest x-ray suggests some mild pulmonary edema.  EKG shows an inferior scar, first degree AV block, otherwise is in normal sinus rhythm.  Comprehensive metabolic panel showed a glucose of 247, albumin 3.3, total protein 6.5.  White count was 15,000, hemoglobin 12.8, platelet count 267,000.  UA 6 white cells per high-power field.  Urine culture pending.      ASSESSMENT:   1.  Acute right femoral neck fracture.   2.  Mild hypoxia with chest x-ray that suggests some pulmonary edema in a patient with no history of heart failure.   3.  Mild pyuria -- urine culture pending.   4.  History of hearing loss.   5.  Diabetes mellitus type 2.   6.  History of hypertension.      PLAN:  I think we will give her fairly minimal low dose IV fluids.  Probably will give her some IV Lasix.  We will obtain an echo and I am going to need to talk to ortho about the timing of surgery.  I am going to keep her n.p.o. for now.  She is on insulin order set.  We will hold her glyburide and hold her lisinopril.  I will give her some Protonix to take the place of her PPI agent.  She will be on sequential compression devices for prophylaxis.[JE1.1]     3:04 PM[JE1.2] case discussed with Dr Greenfield. Will try to improve the patients resp status with some diuresis.  Surgery would be tentatively tomorrow at about noon.        OSVALDO GRANT MD             D: 05/09/2017 13:47   T: 05/09/2017 15:02   MT: [id number]      Name:     EL BARRIENTOS   MRN:      [id number]        Account:      [id number]    :      1921           Admitted:     [id number]      Document: [id number][JE1.1]         Revision History        User Key Date/Time User Provider Type Action    > JE1.1 2017  3:05 PM Mo Martin MD Physician Sign     JE1.2 2017  3:04 PM Mo Martin MD Physician      [N/A] 2017  3:03 PM Mo Martin MD Physician Edit                     Discharge Summaries      Discharge Summaries by Lucas Reddy MD at 2017  9:19 AM     Author:  Lucas Reddy MD Service:  Orthopedics Author Type:  Physician    Filed:  2017  9:21 AM Date of Service:  2017  9:19 AM Note Created:  2017  9:18 AM    Status:  Signed :  Lucas Reddy MD (Physician)         [facility] Discharge Summary    EL BARRIENTOS [id number]   Age: 89 year old   1921       Date of Admission:  2017  Date of Discharge::[TP1.1]  2017[TP1.2]  Admitting Physician:  Riky Greenfield   Discharge Physician:  Lucas Reddy            Admission Diagnoses:   Hip fracture, right, closed, initial encounter (H) [S72.001A]          Discharge Diagnosis:   Hip fracture, right, closed, initial encounter (H) [S72.001A]          Procedures:   Procedure(s): Total hip arthoplasty (Right)                Medications Prior to Admission:     Prescriptions Prior to Admission   Medication Sig Dispense Refill Last Dose     Acetaminophen (TYLENOL PO) Take 650 mg by mouth every 4 hours as needed for mild pain or fever Do not exceed 4,000 mg of Acetaminophen from all sources in 24 hours   2017 at 0130     omeprazole (PRILOSEC) 20 MG CR capsule Take 1 capsule (20 mg) by mouth daily 30 capsule 11 2017 at 0600     glipiZIDE (GLIPIZIDE XL) 5 MG 24 hr tablet Take 1 tablet (5 mg) by mouth daily 30 tablet 11 2017 at 0730     multivitamin (OCUVITE) TABS tablet Take 1 tablet by mouth daily 30 each 0 2017 at am     lisinopril (PRINIVIL,ZESTRIL) 5 MG tablet Take 1 tablet (5 mg) by  mouth daily 30 tablet 11 5/9/2017 at am     Cyanocobalamin (B-12) 1000 MCG TBCR Take 1,000 mcg by mouth daily 30 tablet 11 5/9/2017 at s,     calcium-vitamin D (CALTRATE) 600-400 MG-UNIT per tablet Take 1 tablet by mouth every evening   5/8/2017 at hs     Glucose Blood (BLOOD GLUCOSE TEST STRIPS) STRP by In Vitro route as needed   Unknown at Unknown time     CYCLOBENZAPRINE HCL PO Take 5 mg by mouth 3 times daily as needed for muscle spasms   none yet at Unknown time     LOPERAMIDE HCL PO Take 2 mg by mouth daily as needed   Unknown at Unknown time     MECLIZINE HCL PO Take 12.5 mg by mouth 2 times daily as needed for dizziness   Unknown at Unknown time     triamcinolone (KENALOG) 0.1 % cream Apply topically 2 times daily as needed for irritation   Unknown at Unknown time             Discharge Medications:     Current Discharge Medication List      CONTINUE these medications which have NOT CHANGED    Details   Acetaminophen (TYLENOL PO) Take 650 mg by mouth every 4 hours as needed for mild pain or fever Do not exceed 4,000 mg of Acetaminophen from all sources in 24 hours      omeprazole (PRILOSEC) 20 MG CR capsule Take 1 capsule (20 mg) by mouth daily  Qty: 30 capsule, Refills: 11    Associated Diagnoses: Nausea      glipiZIDE (GLIPIZIDE XL) 5 MG 24 hr tablet Take 1 tablet (5 mg) by mouth daily  Qty: 30 tablet, Refills: 11    Associated Diagnoses: Type 2 diabetes mellitus with hyperglycemia, without long-term current use of insulin (H)      multivitamin (OCUVITE) TABS tablet Take 1 tablet by mouth daily  Qty: 30 each, Refills: 0      lisinopril (PRINIVIL,ZESTRIL) 5 MG tablet Take 1 tablet (5 mg) by mouth daily  Qty: 30 tablet, Refills: 11    Associated Diagnoses: Essential hypertension with goal blood pressure less than 140/90      Cyanocobalamin (B-12) 1000 MCG TBCR Take 1,000 mcg by mouth daily  Qty: 30 tablet, Refills: 11    Associated Diagnoses: Vitamin B 12 deficiency      calcium-vitamin D (CALTRATE) 600-400  MG-UNIT per tablet Take 1 tablet by mouth every evening      Glucose Blood (BLOOD GLUCOSE TEST STRIPS) STRP by In Vitro route as needed      CYCLOBENZAPRINE HCL PO Take 5 mg by mouth 3 times daily as needed for muscle spasms      LOPERAMIDE HCL PO Take 2 mg by mouth daily as needed      MECLIZINE HCL PO Take 12.5 mg by mouth 2 times daily as needed for dizziness      triamcinolone (KENALOG) 0.1 % cream Apply topically 2 times daily as needed for irritation                    Hospital Course:   The patient tolerated the procedure well and was taken to postop recovery in stable condition.  Please refer to the full operative note for complete details.  Post-operative films show components in excellent position.  Patient had adequate pain control and was prescribed physical therapy.  The patient was given 24 hrs of perioperative antibiotics.  The was followed by the hospitalist during this hospital visit to manage his medical problems.   The patient was discharged on home medications as outlined in medication reconciliation list outlined below.  The patient has instructions that if he has increased pain, fever, erythema, swelling or drainage to immediately call.          Discharge Instructions and Follow-Up:   Discharge to: TCU  Activity: as tolerated. No driving until cleared by ortho  Weight bearing status: weight bearing as tolerated  Wound care: Daily dressing change. Keep clean and dry. May get incision wet in shower if no drainage is present. No soaking or scrubbing incision.  Follow up with Orthopedic Surgeon/Physician Assistant in 2 weeks. Call [phone] for appointment.  Bilateral APRIL hose for 2 weeks. May take off at night. Discuss with surgeon at first visit.  You have been started on the anticoagulation medication Lovenox in hospital but will change to  mg PO QDay at discharge.  Your length of therapy is for 6 weeks.           Discharge Disposition:   Discharged to rehabilitation facility       Attestation:  I have reviewed today's vital signs, notes, medications, labs and imaging.      Lucas Reddy[TP1.1]        Revision History        User Key Date/Time User Provider Type Action    > TP1.2 5/13/2017  9:21 AM Lucas Reddy MD Physician Sign     TP1.1 5/13/2017  9:18 AM Lucas Reddy MD Physician                      Consult Notes      Consults by Tania Marin LICSW at 5/10/2017  1:27 PM     Author:  Tania Marin LICSW Service:  (none) Author Type:      Filed:  5/10/2017  1:27 PM Date of Service:  5/10/2017  1:27 PM Note Created:  5/10/2017  1:26 PM    Status:  Signed :  Tania Marin LICSW ()     Consult Orders:    1. Care Transition RN/SW IP Consult [[id number]] ordered by Mo Martin MD at 05/10/17 0924                CARE TRANSITION SOCIAL WORK INITIAL ASSESSMENT:  Reason For Consult: discharge planning   Met with: Patient and Family.    DATA  Active Problems:    Hip fracture (H)    Displaced fracture of right femoral neck (H)       Primary Care Clinic Name:  ([facility])  Primary Care MD Name:  ([facility])  Contact information and PCP information verified: Yes      ASSESSMENT  Cognitive Status: alert.       Resources List: Transitional Care     Lives With: facility resident                    Insurance Concerns: No Insurance issues identified          This writer met with pt and pts two dtrs, introduced self and role. Pt currently lives at [facility] (phone: [phone] Fax: [phone]). Discussed with dtrs TCU on dc, they are in agreement. Patient was provided with Medicare certified nursing home list. Pts choices are as follows [facility] (Phone: [phone] Fax: [phone]) and [facility] (Phone: [phone]) Fax: ([phone]). First choice is [place] d/t location of family. Referrals pending, CTS to follow.     PLAN    TCU referrals pending      Tania Marin MSW,  [facility], [facility] [phone][AK1.1]       Revision History        User Key Date/Time User Provider Type Action    > AK1.1 5/10/2017  1:27 PM Tania Marin LICSW  Sign            Consults by Riky Greenfield MD at 2017  4:09 PM     Author:  Riky Greenfield MD Service:  Orthopedics Author Type:  Physician    Filed:  5/10/2017  6:44 AM Date of Service:  2017  4:09 PM Note Created:  2017  4:09 PM    Status:  Signed :  Riky Greenfield MD (Physician)     Consult Orders:    1. Orthopedics IP Consult: Patient to be seen: Routine - within 24 hours; Hip fracture; Consultant may enter orders: Yes [[id number]] ordered by Shaji Sandhu MD at 17 1343                [facility] Orthopaedics Consultation    Consultation - [facility] Orthopaedics  Level of consult: Consult, follow and place orders    Lulu Carlton,  1921, MRN [id number]     Admitting Dx: Right hip fracture, femoral neck     PCP: Todd Villeda, [phone]     Code status:  No Order     Extended Emergency Contact Information  Primary Emergency Contact: Benigno Shane  Address: [address] [place] [address]  Home Phone: [phone]  Mobile Phone: [phone]  Relation: Son  [facility]  Home Phone: [phone]  Mobile Phone: [phone]  Relation: Daughter     Assessment:  Right femoral neck fracture.    Plan:  Right bipolar hip replacement. To be performed [facility]/10/[facility] at [facility].    Mild hypoxia, CXR suggests mild pulmonary edema, to be diuresed tonight. Mild pyuria, culture pending. DM type 2     Chief Complaint  Right hip pain after fall early this AM.     HPI  We have been requested by [TP1.1] Veronica[TP1.2] to evaluate Lulu Carlton who is a 89 year old year old female for right hip fracture.     History is obtained from the patient and the patient's daughter.     Past  Medical History  Past Medical History:   Diagnosis Date     Anemia, unspecified hosp. 4/3/07 [place]      Central nervous system complication hosp. 4/3/07 [place]     Depressive disorder, not elsewhere classified      Myasthenia gravis      Other malaise and fatigue hosp. 4/3/07 [place]     intermittent episodes of slurred speech, headaches, some confusion w/general weakness.      Other urinary incontinence      Thoracic or lumbosacral neuritis or radiculitis, unspecified     lumbar radiculopathy involving right leg.      Unspecified essential hypertension        Surgical History  Past Surgical History:   Procedure Laterality Date     SURGICAL HISTORY OF -       thymus removal      SURGICAL HISTORY OF -       cholecystectomy        Social History  Social History     Social History     Marital status:      Spouse name: N/A     Number of children: N/A     Years of education: N/A     Occupational History     Not on file.     Social History Main Topics     Smoking status: Never Smoker     Smokeless tobacco: Not on file     Alcohol use No     Drug use: No     Sexual activity: Not on file     Other Topics Concern     Not on file     Social History Narrative       Family History  No family history on file.     Allergies:  Hydrocodone-acetaminophen; Trazodone; and Metformin      Current Medications:  Current Facility-Administered Medications   Medication     0.9% sodium chloride infusion     Current Outpatient Prescriptions   Medication Sig     Acetaminophen (TYLENOL PO) Take 650 mg by mouth every 4 hours as needed for mild pain or fever Do not exceed 4,000 mg of Acetaminophen from all sources in 24 hours     omeprazole (PRILOSEC) 20 MG CR capsule Take 1 capsule (20 mg) by mouth daily     glipiZIDE (GLIPIZIDE XL) 5 MG 24 hr tablet Take 1 tablet (5 mg) by mouth daily     multivitamin (OCUVITE) TABS tablet Take 1 tablet by mouth daily     lisinopril (PRINIVIL,ZESTRIL) 5 MG tablet Take 1 tablet (5 mg) by mouth  daily     Cyanocobalamin (B-12) 1000 MCG TBCR Take 1,000 mcg by mouth daily     calcium-vitamin D (CALTRATE) 600-400 MG-UNIT per tablet Take 1 tablet by mouth every evening     Glucose Blood (BLOOD GLUCOSE TEST STRIPS) STRP by In Vitro route as needed     CYCLOBENZAPRINE HCL PO Take 5 mg by mouth 3 times daily as needed for muscle spasms     LOPERAMIDE HCL PO Take 2 mg by mouth daily as needed     MECLIZINE HCL PO Take 12.5 mg by mouth 2 times daily as needed for dizziness     triamcinolone (KENALOG) 0.1 % cream Apply topically 2 times daily as needed for irritation       Review of Systems:  The Review of Systems is negative other than noted in the HPI    Physical Exam:Patient laying comfortably in bed. Moderate pain to movements of right hip and leg. Neurovascularly intact to bilateral lower extremities. Mild pleasant dementia. Removes pulse ox sensor as she fidgets. Tender to palpation about right hip. Pain with attempted movements.  Temp:  [97.7  F (36.5  C)] 97.7  F (36.5  C)  Heart Rate:  [91] 91  Resp:  [18] 18  BP: (130-138)/(62-76) 138/69  SpO2:  [92 %-94 %] 92 %         Pertinent Labs  Lab Results: personally reviewed.  Lab Results   Component Value Date    WBC 15.0 (H) 05/09/2017    HGB 12.8 05/09/2017    HCT 39.5 05/09/2017    MCV 89 05/09/2017     05/09/2017     No results for input(s): INR in the last 168 hours.    Pertinent Radiology  Radiology Results: images and radiology report reviewed  Recent Results (from the past 24 hour(s))   XR Femur Right 2 Views    Narrative    XR PELVIS 1/2 VW, XR FEMUR RT 2 VW  5/9/2017 12:13 PM    HISTORY:  pain    COMPARISON:  None.      Impression    IMPRESSION:  Diffuse osteopenia. Right femoral neck shaft with  cephalad displacement of the shaft relative to the head. Degenerative  changes of the left hip with cephalad joint space narrowing.  Degenerative changes of the lumbar spine.     MOSES CUELLAR MD   Knee XR, 2 view, right    Narrative    XR KNEE RT 1 /2  VW  5/9/2017 12:13 PM    HISTORY:  Fall, pain    COMPARISON:  None.      Impression    IMPRESSION:  Frontal view somewhat limited by positioning. The tibial  plateau is not optimally assessed but appears grossly normal.  Otherwise negative. No fractures identified.     MOSES CUELLAR MD   XR Pelvis 1/2 Views    Narrative    XR PELVIS 1/2 VW, XR FEMUR RT 2 VW  5/9/2017 12:13 PM    HISTORY:  pain    COMPARISON:  None.      Impression    IMPRESSION:  Diffuse osteopenia. Right femoral neck shaft with  cephalad displacement of the shaft relative to the head. Degenerative  changes of the left hip with cephalad joint space narrowing.  Degenerative changes of the lumbar spine.     MOSES CUELLAR MD   XR Chest 1 View    Narrative    XR CHEST 1 VW 5/9/2017 1:30 PM    COMPARISON: 5/1/2017    HISTORY: Preoperative evaluation      Impression    IMPRESSION: Mixed interstitial and airspace opacities over both lungs,  most likely representing mild pulmonary edema. No pneumothorax on  either side.    FRANCE THOMAS       Attestation:  I have reviewed today's vital signs, notes, medications, labs and imaging.  Amount of time performed on this consult: 30 minutes.  Amount of time spent providing critical care service today: 15 minutes.  Care coordination / counseling time: 15 minutes  Face-to-face time: 15 minutes  Total time: 45 minutes     Lucas Bonilla PA-C[TP1.1]     Revision History        User Key Date/Time User Provider Type Action    > [N/A] 5/10/2017  6:44 AM Riky Greenfield MD Physician Sign     TP1.2 5/9/2017  4:30 PM Lucas Bonilla PA-C Physician Assistant - C Sign     TP1.1 5/9/2017  4:24 PM Lucas Bonilla PA-C Physician Assistant - C Sign                     Progress Notes - Physician (Notes from 05/10/17 through 05/13/17)      Progress Notes by Danisha Reaves MD at 5/13/2017 11:37 AM     Author:  Danisha Reaves MD Service:  (none) Author Type:  Physician     Filed:  5/13/2017 12:21 PM Date of Service:  5/13/2017 11:37 AM Note Created:  5/13/2017 11:37 AM    Status:  Addendum :  Danisha Reaves MD (Physician)         [facility] Progress Note           Assessment and Plan:[OA1.1]   Acute right femoral neck fracture.  May 12, 2017 day 2 surgical repair[OA1.2]   May 13 ,2017 post op day 3[OA1.3]      Mild hypoxia with chest x-ray that suggests some pulmonary edema in a patient with no history of heart failure/echo with diastolic dysfunction and pulmonary htn-suspect acute exacerbation of diastolic heart failure.  May 10, 2017 diuresed 1405 cc urine, oxygen sat 97% two liters, looks comfortable  May 11, 2017 sat 99% two liters.  May 12, 2017 sat 93% one liter, 300 cc uo overnight-creat stable at 0.79, will observe the urine output and not hydrate due to the previous pulmonary edema  May 13,2017[OA1.2] . Patient seen at rounds, comfortable sitting on chair , color was good[OA1.3].[OA1.4]  Oxygen saturation on room air hovering around 90- 93 %, dips with activity .will need home oxygen[OA1.3].[OA1.4] Asked that she be given one dose of furosemide 20 mg oral .Lungs still a bit wet.[OA1.3]       Fever to 100.6-source unclear, ddx includes trauma, uti or pulmonary, blood cultures taken  May 11, 2017 fever to 100.7 overnight, urine culture neg at 20 hours (pyuria present)--continue rocephin for now  May 12, 2017 afebrile, UC with less than 10 k staph (neg), will stop rocephin  May 13, 2017[OA1.2] - No fevers for the last 24 hours , had a 99 temp at 0131 this morning . Wbc remain within normal limits. Hgb low but patient has chronic anemia[OA1.3]      History of hearing loss.       Diabetes mellitus type 2.   May 12, 2017 am glucose 135, holding glipizide, on ss[OA1.2]  May 13,2017 . Blood glucose this morning was 236, will resume home medication .[OA1.3]        History of hypertension.  May 11, 2017 restart lisinopril[OA1.2]   May 13, 2017 -  Stable Blood pressure readings.[OA1.3]       gerd  On iv protonix  May 11, 2017 convert to oral      Mild thrombocytopenia  May 12, 2017 platelets 142-113-136      Prophylaxis-SCD      Continued cares  Need to watch in hospital another day to watch general status , renal function , urine output and respiratory status[OA1.2]  May 13 ,2017 - General status improved, renal function stable Cr today is 0.76 . Urine output 360/2050 in the last 24 hours.  Patient discharged to TCU with oxygen and a couple of days of furosemide. Recommend rechecking BMP on Monday .[OA1.4]   [OA1.2]           Interval History:[OA1.1]   Continues to improve.  Vital signs generally better.  Eating and voiding well.  Tolerating medications without significant side effects.  No new concerns today.  Patient dong better today though oxygen still dips with activity. Will be discharged to TCU today on oxygen . Also gave patient a dose of furosemide today as lungs still a bit wet.[OA1.4]             Significant Problems:[OA1.1]   Active Problems:    Hip fracture (H)    Displaced fracture of right femoral neck (H)    Fracture of femoral neck, right (H)[OA1.5]             Review of Systems:[OA1.1]   The Review of Systems is negative other than noted in the HPI[OA1.4]            Medications:[OA1.1]       lisinopril  5 mg Oral Daily     omeprazole  20 mg Oral Daily     sodium chloride (PF)  3 mL Intracatheter Q8H     enoxaparin  40 mg Subcutaneous Q24H     senna-docusate  1-2 tablet Oral BID     insulin aspart  1-7 Units Subcutaneous TID AC     insulin aspart  1-5 Units Subcutaneous At Bedtime[OA1.5]             Physical Exam:   Vitals were reviewed[OA1.4]  Temp: 97.6  F (36.4  C) Temp src: Oral BP: 151/57 Pulse: 88 Heart Rate: 110 Resp: 18 SpO2: 93 % O2 Device: None (Room air) Oxygen Delivery: 2 LPM[OA1.5]  Blood pressure range:[OA1.4] Systolic (24hrs), Av , Min:140 , Max:175[OA1.5]   Blood pressure range:[OA1.4] Diastolic (24hrs), Av, Min:56,  Max:70  I/O this shift:  In: -   Out: 1050 [Urine:1050]    Intake/Output Summary (Last 24 hours) at 05/13/17 1208  Last data filed at 05/13/17 1109   Gross per 24 hour   Intake              360 ml   Output             2050 ml   Net            -1690 ml[OA1.5]     Constitutional:   awake, alert, cooperative, no apparent distress, and appears stated age     Lungs:   no increased work of breathing, mild air exchange, no retractions and crackles right base, right middle lobe and left base     Cardiovascular:   Normal apical impulse, regular rate and rhythm, normal S1 and S2, no S3 or S4, and[OA1.4] systolic[OA1.6] murmur noted     Musculoskeletal:   no lower extremity pitting edema present  RIGHT HIP:  redness absent  warmth absent  swelling absent  tenderness absent  incision site clean, dry and intact     Neurologic:   Awake, alert, oriented to name, place and time.          Skin:   no bruising or bleeding[OA1.4]             Data:[OA1.1]     Lab Results   Component Value Date    NTBNPI 159 09/21/2015     Lab Results   Component Value Date    WBC 8.1 05/13/2017    WBC 10.5 05/12/2017    WBC 10.4 05/11/2017    HGB 10.1 (L) 05/13/2017    HGB 11.0 (L) 05/12/2017    HGB 10.5 (L) 05/11/2017    HCT 31.5 (L) 05/13/2017    HCT 34.4 (L) 05/12/2017    HCT 33.3 (L) 05/11/2017    MCV 90 05/13/2017    MCV 90 05/12/2017    MCV 91 05/11/2017     05/13/2017     (L) 05/12/2017     (L) 05/11/2017     Lab Results   Component Value Date    CR 0.76 05/13/2017    CR 0.79 05/12/2017    CR 0.76 05/11/2017     Lab Results   Component Value Date     05/13/2017     05/12/2017     05/11/2017    POTASSIUM 4.3 05/13/2017    POTASSIUM 4.1 05/12/2017    POTASSIUM 3.7 05/11/2017    CHLORIDE 100 05/13/2017    CHLORIDE 101 05/12/2017    CHLORIDE 105 05/11/2017    CO2 30 05/13/2017    CO2 27 05/12/2017    CO2 28 05/11/2017     (H) 05/13/2017     (H) 05/12/2017     (H) 05/11/2017     Lab Results    Component Value Date    WBC 8.1 05/13/2017    WBC 10.5 05/12/2017    WBC 10.4 05/11/2017[OA1.7]          Attestation:[OA1.1]  I have reviewed today's vital signs, notes, medications, labs and imaging.  Amount of time performed on this daily note: 20 minutes.[OA1.4]    Danisha Reaves MD[OA1.1]     Revision History        User Key Date/Time User Provider Type Action    > OA1.6 5/13/2017 12:21 PM Danisha Reaves MD Physician Addend     OA1.7 5/13/2017 12:13 PM Danisha Reaves MD Physician Sign     OA1.5 5/13/2017 12:08 PM Danisha Reaves MD Physician      OA1.4 5/13/2017 12:07 PM Danisha Reaves MD Physician      OA1.3 5/13/2017 12:00 PM Danisha Reaves MD Physician      OA1.2 5/13/2017 11:44 AM Danisha Reaves MD Physician      OA1.1 5/13/2017 11:37 AM Danisha Reaves MD Physician             Progress Notes by Kelsey Cyr PT at 5/13/2017 11:58 AM     Author:  Kelsey Cyr PT Service:  (none) Author Type:  Physical Therapist    Filed:  5/13/2017 11:59 AM Date of Service:  5/13/2017 11:58 AM Note Created:  5/13/2017 11:58 AM    Status:  Signed :  Kelsey Cyr PT (Physical Therapist)         Physical Therapy Discharge Summary    Reason for therapy discharge:    Discharged to transitional care facility.  All goals and outcomes met, no further needs identified.    Progress towards therapy goal(s). See goals on Care Plan in [facility] electronic health record for goal details.  Goals met    Therapy recommendation(s):    Continued therapy is recommended.  Rationale/Recommendations:  PT to follow at TCU.[WM1.1]         Revision History        User Key Date/Time User Provider Type Action    > WM1.1 5/13/2017 11:59 AM Kelsey Cyr PT Physical Therapist Sign            Progress Notes by Tania Marin LICSW at 5/13/2017 10:10 AM     Author:  Tania Marin LICSW Service:  (none) Author Type:       Filed:  5/13/2017 10:10 AM Date of Service:  5/13/2017 10:10 AM Note Created:  5/13/2017 10:10 AM    Status:  Signed :  Tania Marin LICSW ()         Name: Lulu Carlton    MRN#: [id number]    Reason for Hospitalization: Hip fracture, right, closed, initial encounter (H) [S72.001A]    Discharge Date: 5/13/2017    Patient / Family response to discharge plan: Pt will dc today to [place] on [facility] (Phone: [phone] Fax: [phone]) at 1230.     Follow-Up Appt: Future Appointments  Date Time Provider Department Center   4/19/2017 2:00 PM Marcelle Galeas PT [facility]       Other Providers (Care Coordinator, County Services, PCA services etc): No    Discharge Disposition: transitional care unit    Tania Marin MSOANH, [facility] [phone][AK1.1]         Revision History        User Key Date/Time User Provider Type Action    > AK1.1 5/13/2017 10:10 AM Tania Marin LICSW  Sign            Progress Notes by Lucas Reddy MD at 5/13/2017  9:17 AM     Author:  Lucas Reddy MD Service:  Orthopedics Author Type:  Physician    Filed:  5/13/2017  9:18 AM Date of Service:  5/13/2017  9:17 AM Note Created:  5/13/2017  9:17 AM    Status:  Signed :  Lucas Reddy MD (Physician)         [facility] Orthopaedics Progress Note      Post-operative Day: 3 Days Post-Op    Procedure(s):  Open reduction internal fixation right hip bipolar gregg arthroplasty. - Wound Class: I-Clean      Subjective:    Pain: minimal  Chest pain, SOB:  No      Objective:  Blood pressure 151/57, pulse 88, temperature 97.6  F (36.4  C), temperature source Oral, resp. rate 18, weight 70.2 kg (154 lb 12.2 oz), SpO2 95 %.    Patient Vitals for the past 24 hrs:   BP Temp Temp src Pulse Heart Rate Resp SpO2 Weight   05/13/17 0733 151/57 97.6  F (36.4  C) Oral 88 - 18 95 % -   05/13/17 0457 - - - - - - - 70.2 kg (154 lb 12.2 oz)   05/13/17 0132 160/66 - - - - - 94 % -   05/13/17  0131 175/65 99  F (37.2  C) Oral - 110 18 (!) 85 % -   05/12/17 2014 144/56 100.6  F (38.1  C) Oral 88 - 18 99 % -   05/12/17 1550 140/70 99.1  F (37.3  C) Oral 89 - 18 93 % -   05/12/17 1120 115/48 97.6  F (36.4  C) Oral 79 - 18 92 % -       Wt Readings from Last 4 Encounters:   05/13/17 70.2 kg (154 lb 12.2 oz)   03/09/17 67.7 kg (149 lb 3.2 oz)   02/09/17 68 kg (150 lb)   12/08/16 68 kg (150 lb)         Motor function, sensation, and circulation intact   Yes  Wound status: incisions are clean dry and intact. Yes  Calf tenderness: Bilateral  No    Pertinent Labs   Lab Results: personally reviewed.     Recent Labs   Lab Test  05/13/17   0635  05/12/17   0633  05/11/17   0630   05/01/17   1015   09/21/15   1340   INR   --    --    --    --   1.02   --   1.07   HGB  10.1*  11.0*  10.5*   < >  13.3   < >  13.8   HCT  31.5*  34.4*  33.3*   < >  41.0   < >  42.3   MCV  90  90  91   < >  90   < >  86   PLT  160  136*  113*   < >  183   < >  185   NA  138  136  140   < >  144   < >  139   CRP   --    --    --    --   <2.9   --   <2.9    < > = values in this interval not displayed.       Plan: Anticoagulation protocol: Lovenox inpatient and then  mg daily at discharge  x 42  days            Pain medications:  oxycodone            Weight bearing status:  WBAT            Disposition:  TCU today             Continue cares and rehabilitation     Report completed by:  Lucas Reddy MD  Date: 5/13/2017  Time: 9:17 AM[TP1.1]     Revision History        User Key Date/Time User Provider Type Action    > TP1.1 5/13/2017  9:18 AM Lucas Reddy MD Physician Sign            Progress Notes by Wanda Minor at 5/12/2017  2:36 PM     Author:  Wanda Minor Service:  (none) Author Type:      Filed:  5/12/2017  2:38 PM Date of Service:  5/12/2017  2:36 PM Note Created:  5/12/2017  2:36 PM    Status:  Signed :  Wanda Minor ()         Patient accepted at [place] on [facility] (Phone:  [phone] Fax: [phone]).  Discussed transportation with patient's daughter, Keya.  She is aware of the private cost.  Transport arranged with [employer] for 1230 on Saturday.  Nel, at [facility], notified of discharge time.    PAS-RR    D: Per DHS regulation, SW completed and submitted PAS-RR to [place] Board on Aging Direct Connect via the Senior LinkAge Line.  PAS-RR confirmation # is : [id number]    P: Further questions may be directed to [facility] LinkAge Line at #[phone], option #4 for PAS-RR staff.    Wanda Minor [facility] [phone]/ [facility] [phone][JK1.1]         Revision History        User Key Date/Time User Provider Type Action    > JK1.1 5/12/2017  2:38 PM Wanda Minor  Sign            Progress Notes by Mo Martin MD at 5/12/2017 10:04 AM     Author:  Mo Martin MD Service:  Hospitalist Author Type:  Physician    Filed:  5/12/2017 10:22 AM Date of Service:  5/12/2017 10:04 AM Note Created:  5/12/2017 10:04 AM    Status:  Signed :  Mo Martin MD (Physician)         [facility] Hospitalist Service      Subjective:[JE1.1]  No difficulty breathing  No complaints[JE1.2]    Review of Systems:[JE1.1]  C: NEGATIVE for fever, chills, change in weight  I: NEGATIVE for worrisome rashes, moles or lesions  E: NEGATIVE for vision changes or irritation  E/M: NEGATIVE for ear, mouth and throat problems  R: NEGATIVE for significant cough or SOB  B: NEGATIVE for masses, tenderness or discharge  CV: NEGATIVE for chest pain, palpitations or peripheral edema  GI: NEGATIVE for nausea, abdominal pain, heartburn, or change in bowel habits  : NEGATIVE for frequency, dysuria, or hematuria  MUSCULOSKELETAL:some hip pain  N: NEGATIVE for weakness, dizziness or paresthesias  E: NEGATIVE for temperature intolerance, skin/hair changes  H: NEGATIVE for bleeding problems  P: NEGATIVE for changes in mood or affect    Physical Ex[JE1.2]am:  Vitals Were  Reviewed    Patient Vitals for the past 16 hrs:   BP Temp Temp src Pulse Heart Rate Resp SpO2 Weight   05/12/17 0741 124/50 98.8  F (37.1  C) Oral 79 - 18 93 % -   05/12/17 0605 - - - - - - - 70.8 kg (156 lb 1.4 oz)   05/12/17 0316 125/52 99.8  F (37.7  C) Oral - 96 18 95 % -   05/11/17 2354 - - - - - - 93 % -   05/11/17 2330 127/56 99.7  F (37.6  C) Oral - 90 18 (!) 79 % -   05/11/17 1854 115/51 98.9  F (37.2  C) Oral - 85 18 92 % -         Intake/Output Summary (Last 24 hours) at 05/12/17 1004  Last data filed at 05/12/17 0918   Gross per 24 hour   Intake             1455 ml   Output              525 ml   Net              930 ml[JE1.1]       GENERAL APPEARANCE: pleasant and alert  EYES: conjunctiva clear, eyes grossly normal  RESP: bibasilar crackles  CV: regular rate and rhythm, normal S1 S2, no S3 or S4 and no murmur, click or rub   ABDOMEN: soft, nontender, no HSM or masses and bowel sounds normal  MS: no clubbing, cyanosis; tr edema-incision is clean  SKIN: clear without significant rashes or lesions  NEURO: mildly confused, nonfocal    Lab:[JE1.2]  Recent Labs   Lab Test  05/12/17   0633  05/11/17   0630   NA  136  140   POTASSIUM  4.1  3.7   CHLORIDE  101  105   CO2  27  28   ANIONGAP  8  7   GLC  135*  160*   BUN  21  19   CR  0.79  0.76   SLIM  8.5  8.4*     CBC RESULTS:   Recent Labs   Lab Test  05/12/17   0633  05/11/17   0630   WBC  10.5  10.4   RBC  3.84  3.67*   HGB  11.0*  10.5*   HCT  34.4*  33.3*   PLT  136*  113*       Results for orders placed or performed during the hospital encounter of 05/09/17 (from the past 24 hour(s))   Glucose by meter   Result Value Ref Range    Glucose 197 (H) 70 - 99 mg/dL   Glucose by meter   Result Value Ref Range    Glucose 155 (H) 70 - 99 mg/dL   Glucose by meter   Result Value Ref Range    Glucose 192 (H) 70 - 99 mg/dL   Glucose by meter   Result Value Ref Range    Glucose 164 (H) 70 - 99 mg/dL   Glucose by meter   Result Value Ref Range    Glucose 135 (H) 70 - 99  mg/dL   Basic metabolic panel   Result Value Ref Range    Sodium 136 133 - 144 mmol/L    Potassium 4.1 3.4 - 5.3 mmol/L    Chloride 101 94 - 109 mmol/L    Carbon Dioxide 27 20 - 32 mmol/L    Anion Gap 8 3 - 14 mmol/L    Glucose 135 (H) 70 - 99 mg/dL    Urea Nitrogen 21 7 - 30 mg/dL    Creatinine 0.79 0.52 - 1.04 mg/dL    GFR Estimate 68 >60 mL/min/1.7m2    GFR Estimate If Black 82 >60 mL/min/1.7m2    Calcium 8.5 8.5 - 10.1 mg/dL   CBC with platelets   Result Value Ref Range    WBC 10.5 4.0 - 11.0 10e9/L    RBC Count 3.84 3.8 - 5.2 10e12/L    Hemoglobin 11.0 (L) 11.7 - 15.7 g/dL    Hematocrit 34.4 (L) 35.0 - 47.0 %    MCV 90 78 - 100 fl    MCH 28.6 26.5 - 33.0 pg    MCHC 32.0 31.5 - 36.5 g/dL    RDW 12.0 10.0 - 15.0 %    Platelet Count 136 (L) 150 - 450 10e9/L       Assessment and Plan:    Acute right femoral neck fracture.  May 12, 2017 day 2 surgical repair       Mild hypoxia with chest x-ray that suggests some pulmonary edema in a patient with no history of heart failure/echo with diastolic dysfunction and pulmonary htn-suspect acute exacerbation of diastolic heart failure.  May 10, 2017 diuresed 1405 cc urine, oxygen sat 97% two liters, looks comfortable  May 11, 2017 sat 99% two liters.[JE1.1]  May 12, 2017 sat 93% one liter[JE1.3], 300 cc uo overnight-creat stable at 0.79, will observe the urine output and not hydrate due to the previous pulmonary edema[JE1.4]      Fever to 100.6-source unclear, ddx includes trauma, uti or pulmonary, blood cultures taken  May 11, 2017 fever to 100.7 overnight, urine culture neg at 20 hours (pyuria present)--continue rocephin for now[JE1.1]  May 12, 2017[JE1.5] afebrile, UC with less than 10 k staph (neg), will stop rocephin[JE1.3]      History of hearing loss.       Diabetes mellitus type 2.   May 1[JE1.1]2[JE1.3], 2017 am glucose 1[JE1.1]35[JE1.3], holding glipizide, on ss      History of hypertension.  May 11, 2017 restart lisinopril        gerd  On iv protonix  May 11, 2017  convert to oral     Mild thrombocytopenia  May 1[JE1.1]2[JE1.3], 2017 platelets 142-113[JE1.1]-136[JE1.3]      Prophylaxis-SCD     Continued cares[JE1.1]  Need to watch in hospital another day to watch general status , renal function , urine output and respiratory status  Might be ok to go the the tcu tomorrow  Stop rocephin  Iv is out[JE1.4]  Watch for any need for further diuresis.[JE1.6]      [JE1.1]     Revision History        User Key Date/Time User Provider Type Action    > JE1.6 5/12/2017 10:22 AM Mo Martin MD Physician Sign     JE1.2 5/12/2017 10:20 AM Mo Martin MD Physician      JE1.4 5/12/2017 10:08 AM Mo Martin MD Physician      JE1.5 5/12/2017 10:06 AM Mo Martin MD Physician      JE1.3 5/12/2017 10:05 AM Mo Martin MD Physician      JE1.1 5/12/2017 10:04 AM Mo Martin MD Physician             Progress Notes by Riky Greenfield MD at 5/12/2017  7:38 AM     Author:  Riky Greenfield MD Service:  Orthopedics Author Type:  Physician    Filed:  5/12/2017  7:41 AM Date of Service:  5/12/2017  7:38 AM Note Created:  5/12/2017  7:38 AM    Status:  Signed :  Riky Greenfield MD (Physician)         [facility] Orthopaedics Progress Note      Post-operative Day: 2 Days Post-Op    S/p right hip bipolar hemiarthroplasty for displaced femoral neck fracture    Subjective:   Dementia status quo.  No reports of pain until attempting to move.  OOB yesterday, WBAT with SBA*2.  Expect TCU when medically ok.  Return to long term dementia care facility is desired by family.    Pain: minimal at rest, moderate with activity.  Chest pain, SOB:  No      Objective:  Blood pressure 125/52, pulse 76, temperature 99.8  F (37.7  C), temperature source Oral, resp. rate 18, weight 70.8 kg (156 lb 1.4 oz), SpO2 95 %.    Patient Vitals for the past 24 hrs:   BP Temp Temp src Pulse Heart Rate Resp SpO2 Weight   05/12/17 0605 - - - - - - - 70.8 kg  (156 lb 1.4 oz)   05/12/17 0316 125/52 99.8  F (37.7  C) Oral - 96 18 95 % -   05/11/17 2354 - - - - - - 93 % -   05/11/17 2330 127/56 99.7  F (37.6  C) Oral - 90 18 (!) 79 % -   05/11/17 1854 115/51 98.9  F (37.2  C) Oral - 85 18 92 % -   05/11/17 1532 118/56 97.9  F (36.6  C) Oral 76 - 18 94 % -   05/11/17 1305 120/45 - - - 87 - 96 % -       Wt Readings from Last 4 Encounters:   05/12/17 70.8 kg (156 lb 1.4 oz)   03/09/17 67.7 kg (149 lb 3.2 oz)   02/09/17 68 kg (150 lb)   12/08/16 68 kg (150 lb)         Motor function, sensation, and circulation intact   Yes  Wound status: incisions are clean dry and intact. Yes  Calf tenderness: Bilateral  No    Pertinent Labs   Lab Results: personally reviewed.     Recent Labs   Lab Test  05/12/17   0633  05/11/17   0630  05/10/17   0405   05/01/17   1015   09/21/15   1340   INR   --    --    --    --   1.02   --   1.07   HGB  11.0*  10.5*  12.5   < >  13.3   < >  13.8   HCT  34.4*  33.3*  38.9   < >  41.0   < >  42.3   MCV  90  91  89   < >  90   < >  86   PLT  136*  113*  142*   < >  183   < >  185   NA  136  140  141   < >  144   < >  139   CRP   --    --    --    --   <2.9   --   <2.9    < > = values in this interval not displayed.       Plan: Anticoagulation protocol: Lovenox inpatient and then  mg daily at discharge  x 42  days            Pain medications:  oxycodone            Weight bearing status:  WBAT            Disposition:  TCU when medically approved             Continue cares and rehabilitation     Report completed by:  Riky Greenfield MD  Date: 5/12/2017  Time: 7:38 AM[PH1.1]       Revision History        User Key Date/Time User Provider Type Action    > PH1.1 5/12/2017  7:41 AM Riky Greenfield MD Physician Sign            Progress Notes by Nel Mccall OT at 5/11/2017  3:40 PM     Author:  Nel Mccall OT Service:  (none) Author Type:  Occupational Therapist    Filed:  5/11/2017  3:41 PM Date of Service:  5/11/2017  3:40 PM Note  Created:  5/11/2017  3:40 PM    Status:  Addendum :  Nel Mccall, TODD (Occupational Therapist)            05/11/17 1500   Quick Adds   Type of Visit Initial Occupational Therapy Evaluation   Living Environment   Lives With facility resident  (MCU)   Home Accessibility no concerns   Living Environment Comment Family not present during OT evaluation. PLOF gathered from discussion with Physical therapy.    Functional Level Prior   Ambulation 1-->assistive equipment  (4WW)   Transferring 1-->assistive equipment   Toileting 0-->independent   Bathing 3-->assistive equipment and person   Dressing 0-->independent   Eating 0-->independent   Communication 0-->understands/communicates without difficulty   Swallowing 0-->swallows foods/liquids without difficulty   Cognition 1 - attention or memory deficits   Fall history within last six months yes   Number of times patient has fallen within last six months 2   General Information   Onset of Illness/Injury or Date of Surgery - Date 05/09/17   Referring Physician Lucas Bonilla PA-C   Patient/Family Goals Statement None stated   Additional Occupational Profile Info/Pertinent History of Current Problem Open reduction internal fixation right hip bipolar gregg arthroplasty. s/p fall   Precautions/Limitations fall precautions   Weight-Bearing Status - RLE weight-bearing as tolerated   Cognitive Status Examination   Orientation person  (unable to state place, knows she is here because she fell)   Level of Consciousness alert   Pain Assessment   Patient Currently in Pain Yes, see Vital Sign flowsheet  (Unable to rate)   Range of Motion (ROM)   ROM Comment B UE ROM: WNL   Strength   Strength Comments Not formally assessed, however observed to have generalized weakness.    Transfer Skills   Transfer Comments Min-Mod Ax2 for supine to sit and Min-Mod Ax2 for sit to stand. Min Ax2 using FWW to ambulate 3 steps to bedside chair.    Upper Body Dressing   Level of  "[place]: Dress Upper Body stand-by assist   Lower Body Dressing   Level of [place]: Dress Lower Body maximum assist (25% patients effort)   Physical Assist/Nonphysical Assist: Dress Lower Body 1 person assist   Instrumental Activities of Daily Living (IADL)   IADL Comments Memory care completes IADL tasks.    Activities of Daily Living Analysis   Impairments Contributing to Impaired Activities of Daily Living cognition impaired;pain;post surgical precautions;strength decreased   General Therapy Interventions   Planned Therapy Interventions ADL retraining   Clinical Impression   Criteria for Skilled Therapeutic Interventions Met yes, treatment indicated   OT Diagnosis decreased independence with ADLs and functional mobility   Influenced by the following impairments s/p ORIF R hip, increased pain   Assessment of Occupational Performance 1-3 Performance Deficits   Identified Performance Deficits dressing, toileting, showering   Clinical Decision Making (Complexity) Low complexity   Therapy Frequency daily   Predicted Duration of Therapy Intervention (days/wks) 2-3 days   Anticipated Equipment Needs at Discharge (TBD at TCU)   Anticipated Discharge Disposition Transitional Care Facility   Risks and Benefits of Treatment have been explained. Yes   Patient, Family & other staff in agreement with plan of care Yes   [facility] AM-PAC  \"6 Clicks\" Daily Activity Inpatient Short Form   1. Putting on and taking off regular lower body clothing? 2 - A Lot   2. Bathing (including washing, rinsing, drying)? 2 - A Lot   3. Toileting, which includes using toilet, bedpan or urinal? 2 - A Lot   4. Putting on and taking off regular upper body clothing? 3 - A Little   5. Taking care of personal grooming such as brushing teeth? 3 - A Little   6. Eating meals? 4 - None   Daily Activity Raw Score (Score out of 24.Lower scores equate to lower levels of function) 16   Total Evaluation Time   Total Evaluation Time " (Minutes)[LC1.1] 6[LC1.2]     Nel Mccall OTR/L[LC1.1]       Revision History        User Key Date/Time User Provider Type Action    > [N/A] 5/11/2017  3:41 PM Nel Mccall OT Occupational Therapist Addend     LC1.2 5/11/2017  3:41 PM Nel Mccall OT Occupational Therapist Sign     LC1.1 5/11/2017  3:40 PM Nel Mccall OT Occupational Therapist             Progress Notes by Mo Martin MD at 5/11/2017 12:34 PM     Author:  Mo Martin MD Service:  Hospitalist Author Type:  Physician    Filed:  5/11/2017 12:47 PM Date of Service:  5/11/2017 12:34 PM Note Created:  5/11/2017 12:34 PM    Status:  Signed :  Mo Martin MD (Physician)         [facility]      Subjective:[JE1.1]  No complaints[JE1.2]    Review of Systems:[JE1.1]  C: NEGATIVE for fever, chills, change in weight  I: NEGATIVE for worrisome rashes, moles or lesions  E: NEGATIVE for vision changes or irritation  E/M: NEGATIVE for ear, mouth and throat problems  R: NEGATIVE for significant cough or SOB  B: NEGATIVE for masses, tenderness or discharge  CV: NEGATIVE for chest pain, palpitations or peripheral edema  GI: NEGATIVE for nausea, abdominal pain, heartburn, or change in bowel habits  : NEGATIVE for frequency, dysuria, or hematuria  M: NEGATIVE for significant arthralgias or myalgia  N: NEGATIVE for weakness, dizziness or paresthesias  E: NEGATIVE for temperature intolerance, skin/hair changes  H: NEGATIVE for bleeding problems  P: NEGATIVE for changes in mood or affect    Physical Ex[JE1.2]am:  Vitals Were Reviewed    Patient Vitals for the past 16 hrs:   BP Temp Temp src Pulse Heart Rate Resp SpO2   05/11/17 0704 131/43 97.6  F (36.4  C) Oral - 79 20 99 %   05/11/17 0225 163/57 99.8  F (37.7  C) Oral 83 - 18 98 %   05/11/17 0145 - - - - - - 97 %   05/10/17 2231 132/51 99.3  F (37.4  C) Axillary - 80 23 97 %   05/10/17 2217 - - - - - - 97 %         Intake/Output Summary  (Last 24 hours) at 05/11/17 1234  Last data filed at 05/11/17 0705   Gross per 24 hour   Intake              900 ml   Output              575 ml   Net              325 ml[JE1.1]       GENERAL APPEARANCE: suprisingly alert, nad, up in chair, bright  EYES: conjunctiva clear, eyes grossly normal  RESP: lungs clear to auscultation - no rales, rhonchi or wheezes  CV: regular rate and rhythm, normal S1 S2, no S3 or S4 and no murmur, click or rub   ABDOMEN: soft, nontender, no HSM or masses and bowel sounds normal  MS: no clubbing, cyanosis; no edema  SKIN: clear without significant rashes or lesions  NEURO: Normal strength and tone, sensory exam grossly normal, mentation intact and speech normal    Lab:[JE1.2]  Recent Labs   Lab Test  05/11/17   0630  05/10/17   0405   NA  140  141   POTASSIUM  3.7  3.7   CHLORIDE  105  105   CO2  28  28   ANIONGAP  7  8   GLC  160*  152*   BUN  19  14   CR  0.76  0.73   SLIM  8.4*  8.3*     CBC RESULTS:   Recent Labs   Lab Test  05/11/17   0630  05/10/17   0405   WBC  10.4  10.6   RBC  3.67*  4.36   HGB  10.5*  12.5   HCT  33.3*  38.9   PLT  113*  142*       Results for orders placed or performed during the hospital encounter of 05/09/17 (from the past 24 hour(s))   Glucose by meter   Result Value Ref Range    Glucose 154 (H) 70 - 99 mg/dL   Glucose by meter   Result Value Ref Range    Glucose 149 (H) 70 - 99 mg/dL   Glucose by meter   Result Value Ref Range    Glucose 228 (H) 70 - 99 mg/dL   Glucose by meter   Result Value Ref Range    Glucose 151 (H) 70 - 99 mg/dL   Basic metabolic panel   Result Value Ref Range    Sodium 140 133 - 144 mmol/L    Potassium 3.7 3.4 - 5.3 mmol/L    Chloride 105 94 - 109 mmol/L    Carbon Dioxide 28 20 - 32 mmol/L    Anion Gap 7 3 - 14 mmol/L    Glucose 160 (H) 70 - 99 mg/dL    Urea Nitrogen 19 7 - 30 mg/dL    Creatinine 0.76 0.52 - 1.04 mg/dL    GFR Estimate 71 >60 mL/min/1.7m2    GFR Estimate If Black 86 >60 mL/min/1.7m2    Calcium 8.4 (L) 8.5 - 10.1 mg/dL    CBC with platelets   Result Value Ref Range    WBC 10.4 4.0 - 11.0 10e9/L    RBC Count 3.67 (L) 3.8 - 5.2 10e12/L    Hemoglobin 10.5 (L) 11.7 - 15.7 g/dL    Hematocrit 33.3 (L) 35.0 - 47.0 %    MCV 91 78 - 100 fl    MCH 28.6 26.5 - 33.0 pg    MCHC 31.5 31.5 - 36.5 g/dL    RDW 12.3 10.0 - 15.0 %    Platelet Count 113 (L) 150 - 450 10e9/L   Glucose by meter   Result Value Ref Range    Glucose 148 (H) 70 - 99 mg/dL   Glucose by meter   Result Value Ref Range    Glucose 197 (H) 70 - 99 mg/dL       Assessment and Plan:    Acute right femoral neck fracture.  May 11, 2017 day 1 surgical repair      Mild hypoxia with chest x-ray that suggests some pulmonary edema in a patient with no history of heart failure/echo with diastolic dysfunction and pulmonary htn-suspect acute exacerbation of diastolic heart failure.  May 10, 2017 diuresed 1405 cc urine, oxygen sat 97% two liters, looks comfortable[JE1.1]  May 11, 2017[JE1.3] sat 99% two liters.     Fever to 100.6-source unclear, ddx includes trauma, uti or pulmonary, blood cultures taken[JE1.1]  May 11, 2017[JE1.4] fever[JE1.3] to 100.7 overnight[JE1.4], urine culture neg at 20 hours (pyuria present)--continue rocephin for now[JE1.5]     History of hearing loss.      Diabetes mellitus type 2.[JE1.1]   May 11, 2017 am glucose 160[JE1.5], holding glipizide, on ss[JE1.6]     History of hypertension.[JE1.1]  May 11, 2017 restart lisinopril[JE1.6]       gerd  On iv protonix[JE1.1]  May 11, 2017 convert to oral[JE1.6]    Mild thrombocytopenia[JE1.5]  May 11, 2017[JE1.7] platelets 142-113[JE1.5]     Prophylaxis-SCD[JE1.1]    Continued cares  No more lasix  [place] out tomorrow  Restart lisinopril and ppi.[JE1.2]    [JE1.1]     Revision History        User Key Date/Time User Provider Type Action    > JE1.2 5/11/2017 12:47 PM Mo Martin MD Physician Sign     JE1.6 5/11/2017 12:39 PM Mo Martin MD Physician      JE1.7 5/11/2017 12:38 PM Mo Martin MD  Physician      JE1.5 5/11/2017 12:37 PM Mo Martin MD Physician      JE1.4 5/11/2017 12:36 PM Mo Martin MD Physician      JE1.3 5/11/2017 12:35 PM Mo Martin MD Physician      JE1.1 5/11/2017 12:34 PM Mo Martin MD Physician             Progress Notes by Katy Norwood PT at 5/11/2017 12:09 PM     Author:  Katy Norwood PT Service:  (none) Author Type:  Physical Therapist    Filed:  5/11/2017 12:15 PM Date of Service:  5/11/2017 12:09 PM Note Created:  5/11/2017 12:09 PM    Status:  Addendum :  Katy Norwood PT (Physical Therapist)            05/11/17 1000   Quick Adds   Type of Visit Initial PT Evaluation   Living Environment   Lives With facility resident   Functional Level Prior   Ambulation 1-->assistive equipment   Transferring 1-->assistive equipment   Toileting 0-->independent   Bathing 2-->assistive person   Dressing 0-->independent   Eating 0-->independent   Communication 0-->understands/communicates without difficulty   Swallowing 0-->swallows foods/liquids without difficulty   Cognition 1 - attention or memory deficits   Fall history within last six months yes   Number of times patient has fallen within last six months 2   Prior Functional Level Comment Pt resides at Memory care unit.  Per daughter- Pt ambulatory using a 4ww within  the structured memory care unit.  Unwitness  fall   General Information   Onset of Illness/Injury or Date of Surgery - Date 05/10/17   Referring Physician Jean Pierre   Patient/Family Goals Statement Pt unable to  state goal.    Pertinent History of Current Problem (include personal factors and/or comorbidities that impact the POC) Open reduction internal fixation right hip bipolar gregg arthroplasty. -   Precautions/Limitations fall precautions   General Observations Pt alert, supine in bed.  Pt unaware she  is in the hospital  and had surgery. Minimal pain w/ activity; resolves at rest   DAughter present    General Info Comments  2 LO2   Cognitive Status Examination   Orientation person   Level of Consciousness alert;lethargic/somnolent  (Occassionally closing her eyes)   Follows Commands and Answers Questions able to follow single-step instructions;75% of the time  (Able to follow one step cues and participate)   Personal Safety and Judgment impaired  (Not witnessed.  Deficts at baseline per daughter-)   Pain Assessment   Patient Currently in Pain Yes, see Vital Sign flowsheet   Range of Motion (ROM)   ROM Comment Limited Right hip/ knee AROM due to weakness, unable  to flex knee. track LE in bed due to weakness.  Pt  allowing approx 40 degrees of Right hip/ knee AAROM in supine.  R hip flexion to 90 degrees in sitting    MMT: Hip, Rehab Eval   Hip Flexion - Right Side (3-/5) fair minus, right   Hip ABduction - Right Side (2/5) poor, right   MMT: Knee, Rehab Eval   Knee Extension - Right Side (3-/5) fair minus, right   Bed Mobility   Bed Mobility Comments Pt required moderate assistance  of 2 with  Supine> sitting.    Assistance w/ iniital sitting balance; then able to  maintain static sitting w/ SBA and  UE support- able to scoot to the EOB w/ SBA.     Transfer Skills   Transfer Comments Pt transferred bed> chair w/ walker with minimal to moderate assistance of 2. WBA[CS1.1]T[CS1.2].   Weak, right  LE giving away during stance phase, but  Pt  able to take 4-5 steps w/ the transfer    Gait   Gait Comments Unable   Balance   Balance Comments fair/ good  for static  and dynamci sitting balance- see comments above.   Fair balance  In standing due to weakness   Sensory Examination   Sensory Perception no deficits were identified   Modality Interventions   Planned Modality Interventions Cryotherapy   General Therapy Interventions   Planned Therapy Interventions gait training;bed mobility training;ROM;strengthening;transfer training   Clinical Impression   Criteria for Skilled Therapeutic Intervention yes, treatment indicated   PT Diagnosis RIght  "hip hemiarthroplasty   Influenced by the following impairments Decreased strength Right LE.  Pain   Functional limitations due to impairments Altered mobility- Decreased independence w/ bed mobility, transfers. Unable to ambulate    Clinical Presentation Stable/Uncomplicated   Clinical Presentation Rationale clinical judgement   Clinical Decision Making (Complexity) Low complexity   Therapy Frequency` 2 times/day   Predicted Duration of Therapy Intervention (days/wks) 2 days   Anticipated Equipment Needs at Discharge (Pt owns RW and 4ww for home use.)   Anticipated Discharge Disposition Transitional Care Facility   Risk & Benefits of therapy have been explained Yes   Patient, Family & other staff in agreement with plan of care Yes   Clinical Impression Comments Hx of dementia but following cues and particiapting.    Indep. at baseline w/in a structured environment     [facility] AM-PAC  \"6 Clicks\" V.2 Basic Mobility Inpatient Short Form   1. Turning from your back to your side while in a flat bed without using bedrails? 2 - A Lot   2. Moving from lying on your back to sitting on the side of a flat bed without using bedrails? 2 - A Lot   3. Moving to and from a bed to a chair (including a wheelchair)? 2 - A Lot   4. Standing up from a chair using your arms (e.g., wheelchair, or bedside chair)? 2 - A Lot   5. To walk in hospital room? 1 - Total   6. Climbing 3-5 steps with a railing? 1 - Total   Basic Mobility Raw Score (Score out of 24.Lower scores equate to lower levels of function) 10[CS1.1]        Revision History        User Key Date/Time User Provider Type Action    > CS1.2 5/11/2017 12:15 PM Katy Norwood, PT Physical Therapist Addend     CS1.1 5/11/2017 12:13 PM Katy Norwood, PT Physical Therapist Sign            Progress Notes by Kirit Jean Baptiste at 5/11/2017 11:39 AM     Author:  Kirit Jean Baptiste Service:  Spiritual Health Author Type:      Filed:  5/11/2017 11:43 AM Date of Service:  5/11/2017 " "11:39 AM Note Created:  5/11/2017 11:39 AM    Status:  Signed :  Kirit Jean Baptiste ()         SPIRITUAL HEALTH SERVICES  SPIRITUAL ASSESSMENT Progress Note  [facility] - Med/Surg    Pt, Virginia, was awake for part of the visit.  Primary conversation was with daughter, Keya, who stated surgery had gone well yesterday.  She stated that they were hoping to discharge to [place] since it would be much closer for Keya to visit but that if Virginia had to return to where she had been, [facility], that would be fine also.  I provided prayer for a \"good day of healing and progress\" for pt.    Kirit Jean Baptiste M.A., [facility]  Staff   [facility]  Office: [phone]  Cell: [phone]  Pager [phone][MC1.1]       Revision History        User Key Date/Time User Provider Type Action    > MC1.1 5/11/2017 11:43 AM Kirit Jean Baptiste Sign            Progress Notes by Lucas Bonilla PA-C at 5/11/2017  9:06 AM     Author:  Lucas Bonilla PA-C Service:  Orthopedics Author Type:  Physician Assistant - C    Filed:  5/11/2017  9:17 AM Date of Service:  5/11/2017  9:06 AM Note Created:  5/11/2017  9:06 AM    Status:  Signed :  Lucas Bonilla PA-C (Physician Assistant - C)         [facility] Orthopaedics Progress Note      Post-operative Day: 1 Day Post-Op    Procedure(s):  Open reduction internal fixation right hip bipolar gregg arthroplasty. - Wound Class: I-Clean      Subjective: Patient comfortable, eating breakfast. States that she is feeling pretty good. Minimal pain while sedentary. Has not been out of bed yet.     Pain: minimal  Chest pain, SOB:  No      Objective: Patient comfortable siting up in bed. Neurovascularly intact to bilateral lower extremities. Abductor pillow in place. Dressing C,D,I. Minimally tender to palpation around surgical area. Hip movements elicit pain. A/P pelvis x ray obtained this AM demonstrates good positioning " of right Bipolar hemiarthroplasty.  Blood pressure 131/43, pulse 83, temperature 97.6  F (36.4  C), temperature source Oral, resp. rate 20, weight 68 kg (150 lb), SpO2 99 %.    Patient Vitals for the past 24 hrs:   BP Temp Temp src Pulse Heart Rate Resp SpO2   05/11/17 0704 131/43 97.6  F (36.4  C) Oral - 79 20 99 %   05/11/17 0225 163/57 99.8  F (37.7  C) Oral 83 - 18 98 %   05/11/17 0145 - - - - - - 97 %   05/10/17 2231 132/51 99.3  F (37.4  C) Axillary - 80 23 97 %   05/10/17 2217 - - - - - - 97 %   05/10/17 1900 117/53 100  F (37.8  C) Oral 107 - 18 94 %   05/10/17 1806 166/54 101.1  F (38.4  C) Oral 97 - 20 94 %   05/10/17 1711 168/73 100.7  F (38.2  C) Oral 86 - 20 94 %   05/10/17 1542 190/58 100.2  F (37.9  C) Oral - 81 24 98 %   05/10/17 1532 - - - - - - 98 %   05/10/17 1530 151/67 - - - 79 - 97 %   05/10/17 1500 131/50 - - - 70 - 94 %   05/10/17 1448 130/49 - - - 67 - 97 %   05/10/17 1442 134/41 98.1  F (36.7  C) Oral - 69 20 96 %   05/10/17 1425 119/69 98.9  F (37.2  C) Oral - 67 20 97 %   05/10/17 1400 (!) 111/97 - - - 69 20 96 %   05/10/17 1345 142/74 - - - 85 20 96 %   05/10/17 1330 137/77 98.2  F (36.8  C) Oral - 70 16 97 %   05/10/17 1325 (!) 133/112 - - - 38 - -   05/10/17 1320 (!) 179/96 - - - 84 16 99 %   05/10/17 1315 (!) 130/116 - - - 84 16 -   05/10/17 1310 96/71 - - - 76 16 100 %   05/10/17 1305 111/87 - - - 73 16 100 %   05/10/17 1300 (!) 68/50 - - - 78 16 -   05/10/17 1255 (!) 82/72 - - - 75 16 100 %   05/10/17 1250 96/52 - - - 68 14 100 %   05/10/17 1245 108/65 - - - 60 15 100 %   05/10/17 1240 (!) 67/47 - - - 54 18 100 %   05/10/17 1237 (!) 62/39 - - - 75 12 94 %   05/10/17 1236 (!) 66/39 98  F (36.7  C) Axillary - 60 (!) 4 90 %       Wt Readings from Last 4 Encounters:   05/09/17 68 kg (150 lb)   03/09/17 67.7 kg (149 lb 3.2 oz)   02/09/17 68 kg (150 lb)   12/08/16 68 kg (150 lb)         Motor function, sensation, and circulation intact   Yes  Wound status: incisions are clean dry and  intact. Yes  Calf tenderness: Bilateral  No    Pertinent Labs   Lab Results: personally reviewed.     Recent Labs   Lab Test  05/11/17   0630  05/10/17   0405  05/09/17   1300  05/01/17   1015   09/21/15   1340   INR   --    --    --   1.02   --   1.07   HGB  10.5*  12.5  12.8  13.3   < >  13.8   HCT  33.3*  38.9  39.5  41.0   < >  42.3   MCV  91  89  89  90   < >  86   PLT  113*  142*  167  183   < >  185   NA  140  141  139  144   < >  139   CRP   --    --    --   <2.9   --   <2.9    < > = values in this interval not displayed.       Plan: Anticoagulation protocol: Lovenox inpatient and then  mg daily at discharge  x 42  days            Pain medications:  Tylenol, with Percocet available.            Weight bearing status:  WBAT            Disposition:  Inpatient as UTI treated and until passes PT, which will start today. Then probable TCU until safe to return to assisted living.             Continue cares and rehabilitation     Report completed by:  Lucas Bonilla PA-C  Date: 5/11/2017  Time: 9:06 AM[TP1.1]     Revision History        User Key Date/Time User Provider Type Action    > TP1.1 5/11/2017  9:17 AM Lucas Bonilla PA-C Physician Assistant - C Sign            Progress Notes by Janki Ovalle PA-C at 5/10/2017 10:43 PM     Author:  Janki Ovalle PA-C Service:  Internal Medicine Author Type:  Physician Assistant - MANUEL    Filed:  5/10/2017 10:44 PM Date of Service:  5/10/2017 10:43 PM Note Created:  5/10/2017 10:43 PM    Status:  Signed :  Janki Ovalle PA-C (Physician Assistant - C)         [facility] Medicine   Care Update  Date of Service: 5/10/2017     I was called due to insulin order set.    Previously on medium SSI NPO.  Now eating and drinking.    Plan: transition to medium SSI insulin order set for those who are eating/drinking PO, blood glucose checks before meals, at bedtime, and 0200      Janki Ovalle  SHANTI[CH1.1]       Revision History        User Key Date/Time User Provider Type Action    > CH1.1 5/10/2017 10:44 PM Janki Ovalle PA-C Physician Assistant Callie JACKSON Sign            Progress Notes by Janki Ovalle PA-C at 5/10/2017  6:47 PM     Author:  Janki Ovalle PA-C Service:  Internal Medicine Author Type:  Physician Assistant - MANUEL    Filed:  5/10/2017  6:49 PM Date of Service:  5/10/2017  6:47 PM Note Created:  5/10/2017  6:47 PM    Status:  Signed :  Janki Ovalle PA-C (Physician Assistant - C)         [facility] Medicine   Care Update;  Date of Service: 5/10/2017     I was called due to fever.    Patient with known UTI and post-op from right hip fracture surgery.  Fever of 101.6.  Continuing to require 2L NC oxygen. Pleasantly demented.  VS are otherwise stable.    Suspect ongoing fever is multifactorial; UTI and post-operative body response given stress.  Patient is currently receiving both rocephin IV Q24 hours and was started on IV ancef Q8 hours.  Patient has yet to receive acetaminophen products of any kind.    Assessment: fever, likely multifactorial    Plan: continue to monitor closely  Administer acetaminophen, 650mg PO    Janki Ovalle PA-C[CH1.1]     Revision History        User Key Date/Time User Provider Type Action    > CH1.1 5/10/2017  6:49 PM Janki Ovalle PA-C Physician Assistant - MANUEL Sign            Progress Notes by Kirit Jean Baptiste at 5/10/2017 10:35 AM     Author:  Kirit Jean Baptiste Service:  Spiritual Health Author Type:      Filed:  5/10/2017 10:43 AM Date of Service:  5/10/2017 10:35 AM Note Created:  5/10/2017 10:35 AM    Status:  Signed :  Kirit Jean Baptiste ()         SPIRITUAL HEALTH SERVICES  SPIRITUAL ASSESSMENT Progress Note  [facility] [place] - Med/Surg    PRIMARY FOCUS:     Emotional/spiritual/[employer]ist distress    Support for coping    ILLNESS CIRCUMSTANCES:   Reviewed documentation. Reflective  "conversation shared with [place]'s daughter and MAGDI which integrated elements of illness and family narratives.     Context of Serious Illness/Symptom(s) - Hip fracture requiring surgery; pt going to surgery this morning    Resources for Support - Family; pt is also in Memory Care A/L in [facility]    DISTRESS:     Emotional/Existential/Relational Distress - Pt was resting comfortably; daughter stated \"she has her days and nights switched\"    Spiritual/Scientologist Distress - None    Social/Cultural/Economic Distress - None indicated    SPIRITUAL/Confucianism COPING:     Sikh/Tammi - DaughterKeya, stated \"Mom has been Presbyterian all her life and knows Joss as her Savior\"    Spiritual Practice(s) - History of being active in her Voodoo    Emotional/Existential/Relational Connections - Discussed with MAGDI her recent bout with cancer and how she is doing much better; she remembered visit from infusion clinic.  Connection with Laura was discussed and the importance of the parade at [place] time to family.    GOALS OF CARE:    Goals of Care - Surgery today with some concerns, per I/D rounds, related to pt's breathing and intubation needs    Meaning/Sense-Making - Family and tammi were lifted up as significant for pt.    PLAN: Daughter didn't want to wake pt and welcomed a follow up visit tomorrow after surgery.  I will provide.    Kirit Jean Baptiste M.A., [facility]  Staff   [facility]  Office: [phone]  Cell: [phone]  Pager [phone][MC1.1]       Revision History        User Key Date/Time User Provider Type Action    > MC1.1 5/10/2017 10:43 AM Kirit Jean Baptiste Sign            Progress Notes by Mo Martin MD at 5/10/2017  8:12 AM     Author:  Mo Martin MD Service:  Hospitalist Author Type:  Physician    Filed:  5/10/2017  9:04 AM Date of Service:  5/10/2017  8:12 AM Note Created:  5/10/2017  8:12 AM    Status:  Signed :  Mo Martin, " MD (Physician)         [facility]      Subjective:[JE1.1]  She doesn't offer specific complaints  Had temp overnight  Got rocephin overnight  UA still pending-recent UC showed contaminant[JE1.2]    Review of Systems:[JE1.1]  C: NEGATIVE for fever, chills, change in weight  I: NEGATIVE for worrisome rashes, moles or lesions  E: NEGATIVE for vision changes or irritation  E/M: NEGATIVE for ear, mouth and throat problems  R: NEGATIVE for significant cough or SOB  B: NEGATIVE for masses, tenderness or discharge  CV: NEGATIVE for chest pain, palpitations or peripheral edema  GI: NEGATIVE for nausea, abdominal pain, heartburn, or change in bowel habits  : NEGATIVE for frequency, dysuria, or hematuria  M: NEGATIVE for significant arthralgias or myalgia  N: NEGATIVE for weakness, dizziness or paresthesias  E: NEGATIVE for temperature intolerance, skin/hair changes  H: NEGATIVE for bleeding problems  P: NEGATIVE for changes in mood or affect    Physical Ex[JE1.2]am:  Vitals Were Reviewed    Patient Vitals for the past 16 hrs:   BP Temp Temp src Pulse Heart Rate Resp SpO2   05/10/17 0736 155/66 100.3  F (37.9  C) Oral 125 - 18 97 %   05/10/17 0350 149/57 100.1  F (37.8  C) Oral 81 - 18 95 %   05/10/17 0214 167/69 100.6  F (38.1  C) Oral 83 - 20 92 %   05/09/17 1910 161/69 98.9  F (37.2  C) Oral 88 - 18 95 %   05/09/17 1638 148/72 97.7  F (36.5  C) Oral - 88 18 96 %   05/09/17 1615 140/77 - - - - - 98 %         Intake/Output Summary (Last 24 hours) at 05/10/17 0812  Last data filed at 05/10/17 0600   Gross per 24 hour   Intake              110 ml   Output             1405 ml   Net            -1295 ml[JE1.1]       GENERAL APPEARANCE: healthy, alert and no distress--quite confused  EYES: conjunctiva clear, eyes grossly normal  RESP: some dependent crackles  CV: regular rate and rhythm, normal S1 S2, no S3 or S4 and no murmur, click or rub   ABDOMEN: soft, nontender, no HSM or masses and bowel sounds  normal  MS: no clubbing, cyanosis; tr edema  SKIN: clear without significant rashes or lesions    Lab:[JE1.2]  Recent Labs   Lab Test  05/10/17   0405  05/09/17   1300   NA  141  139   POTASSIUM  3.7  4.2   CHLORIDE  105  104   CO2  28  26   ANIONGAP  8  9   GLC  152*  247*   BUN  14  16   CR  0.73  0.75   SLIM  8.3*  9.0     CBC RESULTS:   Recent Labs   Lab Test  05/10/17   0405  05/09/17   1300   WBC  10.6  15.0*   RBC  4.36  4.43   HGB  12.5  12.8   HCT  38.9  39.5   PLT  142*  167       Results for orders placed or performed during the hospital encounter of 05/09/17 (from the past 24 hour(s))   XR Femur Right 2 Views    Narrative    XR PELVIS 1/2 VW, XR FEMUR RT 2 VW  5/9/2017 12:13 PM    HISTORY:  pain    COMPARISON:  None.      Impression    IMPRESSION:  Diffuse osteopenia. Right femoral neck shaft with  cephalad displacement of the shaft relative to the head. Degenerative  changes of the left hip with cephalad joint space narrowing.  Degenerative changes of the lumbar spine.     MOSES CUELLAR MD   Knee XR, 2 view, right    Narrative    XR KNEE RT 1 /2 VW  5/9/2017 12:13 PM    HISTORY:  Fall, pain    COMPARISON:  None.      Impression    IMPRESSION:  Frontal view somewhat limited by positioning. The tibial  plateau is not optimally assessed but appears grossly normal.  Otherwise negative. No fractures identified.     MOSES CUELLAR MD   XR Pelvis 1/2 Views    Narrative    XR PELVIS 1/2 VW, XR FEMUR RT 2 VW  5/9/2017 12:13 PM    HISTORY:  pain    COMPARISON:  None.      Impression    IMPRESSION:  Diffuse osteopenia. Right femoral neck shaft with  cephalad displacement of the shaft relative to the head. Degenerative  changes of the left hip with cephalad joint space narrowing.  Degenerative changes of the lumbar spine.     MOSES CUELLAR MD   CBC with platelets differential   Result Value Ref Range    WBC 15.0 (H) 4.0 - 11.0 10e9/L    RBC Count 4.43 3.8 - 5.2 10e12/L    Hemoglobin 12.8 11.7 - 15.7 g/dL     Hematocrit 39.5 35.0 - 47.0 %    MCV 89 78 - 100 fl    MCH 28.9 26.5 - 33.0 pg    MCHC 32.4 31.5 - 36.5 g/dL    RDW 12.5 10.0 - 15.0 %    Platelet Count 167 150 - 450 10e9/L    Diff Method Automated Method     % Neutrophils 87.7 %    % Lymphocytes 5.5 %    % Monocytes 5.4 %    % Eosinophils 1.0 %    % Basophils 0.2 %    % Immature Granulocytes 0.2 %    Absolute Neutrophil 13.1 (H) 1.6 - 8.3 10e9/L    Absolute Lymphocytes 0.8 0.8 - 5.3 10e9/L    Absolute Monocytes 0.8 0.0 - 1.3 10e9/L    Absolute Eosinophils 0.2 0.0 - 0.7 10e9/L    Absolute Basophils 0.0 0.0 - 0.2 10e9/L    Abs Immature Granulocytes 0.0 0 - 0.4 10e9/L   Comprehensive metabolic panel   Result Value Ref Range    Sodium 139 133 - 144 mmol/L    Potassium 4.2 3.4 - 5.3 mmol/L    Chloride 104 94 - 109 mmol/L    Carbon Dioxide 26 20 - 32 mmol/L    Anion Gap 9 3 - 14 mmol/L    Glucose 247 (H) 70 - 99 mg/dL    Urea Nitrogen 16 7 - 30 mg/dL    Creatinine 0.75 0.52 - 1.04 mg/dL    GFR Estimate 72 >60 mL/min/1.7m2    GFR Estimate If Black 87 >60 mL/min/1.7m2    Calcium 9.0 8.5 - 10.1 mg/dL    Bilirubin Total 0.5 0.2 - 1.3 mg/dL    Albumin 3.3 (L) 3.4 - 5.0 g/dL    Protein Total 6.5 (L) 6.8 - 8.8 g/dL    Alkaline Phosphatase 93 40 - 150 U/L    ALT 22 0 - 50 U/L    AST 18 0 - 45 U/L   ABO/Rh type and screen   Result Value Ref Range    ABO Pending     RH(D) Pending     Antibody Screen Pending     Test Valid Only At Pending     Specimen Expires Pending    XR Chest 1 View    Narrative    XR CHEST 1 VW 5/9/2017 1:30 PM    COMPARISON: 5/1/2017    HISTORY: Preoperative evaluation      Impression    IMPRESSION: Mixed interstitial and airspace opacities over both lungs,  most likely representing mild pulmonary edema. No pneumothorax on  either side.    FRANCE THOMAS   Orthopedics IP Consult: Patient to be seen: Routine - within 24 hours; Hip fracture; Consultant may enter orders: Yes    Narrative    Riky Greenfield MD     5/10/2017  6:44 AM  [facility]  Consultation    Consultation - [facility]  Level of consult: Consult, follow and place orders    Lulu Carlton,  1921, MRN [id number]     Admitting Dx: Right hip fracture, femoral neck     PCP: Todd Villeda, [phone]     Code status:  No Order     Extended Emergency Contact Information  Primary Emergency Contact: Benigno Shane  Address: [address] [place] [address]  Home Phone: [phone]  Mobile Phone: [phone]  Relation: Son  Secondary Emergency Contact: Keya Kong   [facility]  Home Phone: [phone]  Mobile Phone: [phone]  Relation: Daughter     Assessment:  Right femoral neck fracture.    Plan:  Right bipolar hip replacement. To be performed 5/10/17 at   [facility].    Mild hypoxia, CXR suggests mild pulmonary edema, to be diuresed   tonight. Mild pyuria, culture pending. DM type 2     Chief Complaint  Right hip pain after fall early this AM.     HPI  We have been requested by Dr Martin to evaluate Lulu Carlton who is a 89 year old year old female for right hip   fracture.     History is obtained from the patient and the patient's daughter.     Past Medical History  Past Medical History:   Diagnosis Date     Anemia, unspecified hosp. 4/3/07 [place]      Central nervous system complication hosp. 4/3/07 [place]     Depressive disorder, not elsewhere classified      Myasthenia gravis      Other malaise and fatigue hosp. 4/3/07 [place]     intermittent episodes of slurred speech, headaches, some   confusion w/general weakness.      Other urinary incontinence      Thoracic or lumbosacral neuritis or radiculitis, unspecified     lumbar radiculopathy involving right leg.      Unspecified essential hypertension        Surgical History  Past Surgical History:   Procedure Laterality Date     SURGICAL HISTORY OF -       thymus removal      SURGICAL HISTORY OF -       cholecystectomy         Social History  Social History     Social History     Marital status:      Spouse name: N/A     Number of children: N/A     Years of education: N/A     Occupational History     Not on file.     Social History Main Topics     Smoking status: Never Smoker     Smokeless tobacco: Not on file     Alcohol use No     Drug use: No     Sexual activity: Not on file     Other Topics Concern     Not on file     Social History Narrative       Family History  No family history on file.     Allergies:  Hydrocodone-acetaminophen; Trazodone; and Metformin      Current Medications:  Current Facility-Administered Medications   Medication     0.9% sodium chloride infusion     Current Outpatient Prescriptions   Medication Sig     Acetaminophen (TYLENOL PO) Take 650 mg by mouth every 4 hours   as needed for mild pain or fever Do not exceed 4,000 mg of   Acetaminophen from all sources in 24 hours     omeprazole (PRILOSEC) 20 MG CR capsule Take 1 capsule (20 mg)   by mouth daily     glipiZIDE (GLIPIZIDE XL) 5 MG 24 hr tablet Take 1 tablet (5 mg)   by mouth daily     multivitamin (OCUVITE) TABS tablet Take 1 tablet by mouth daily       lisinopril (PRINIVIL,ZESTRIL) 5 MG tablet Take 1 tablet (5 mg)   by mouth daily     Cyanocobalamin (B-12) 1000 MCG TBCR Take 1,000 mcg by mouth   daily     calcium-vitamin D (CALTRATE) 600-400 MG-UNIT per tablet Take 1   tablet by mouth every evening     Glucose Blood (BLOOD GLUCOSE TEST STRIPS) STRP by In Vitro   route as needed     CYCLOBENZAPRINE HCL PO Take 5 mg by mouth 3 times daily as   needed for muscle spasms     LOPERAMIDE HCL PO Take 2 mg by mouth daily as needed     MECLIZINE HCL PO Take 12.5 mg by mouth 2 times daily as needed   for dizziness     triamcinolone (KENALOG) 0.1 % cream Apply topically 2 times   daily as needed for irritation       Review of Systems:  The Review of Systems is negative other than noted in the HPI    Physical Exam:Patient laying comfortably in bed. Moderate  pain to   movements of right hip and leg. Neurovascularly intact to   bilateral lower extremities. Mild pleasant dementia. Removes   pulse ox sensor as she fidgets. Tender to palpation about right   hip. Pain with attempted movements.  Temp:  [97.7  F (36.5  C)] 97.7  F (36.5  C)  Heart Rate:  [91] 91  Resp:  [18] 18  BP: (130-138)/(62-76) 138/69  SpO2:  [92 %-94 %] 92 %         Pertinent Labs  Lab Results: personally reviewed.  Lab Results   Component Value Date    WBC 15.0 (H) 05/09/2017    HGB 12.8 05/09/2017    HCT 39.5 05/09/2017    MCV 89 05/09/2017     05/09/2017     No results for input(s): INR in the last 168 hours.    Pertinent Radiology  Radiology Results: images and radiology report reviewed  Recent Results (from the past 24 hour(s))   XR Femur Right 2 Views    Narrative    XR PELVIS 1/2 VW, XR FEMUR RT 2 VW  5/9/2017 12:13 PM    HISTORY:  pain    COMPARISON:  None.      Impression    IMPRESSION:  Diffuse osteopenia. Right femoral neck shaft with  cephalad displacement of the shaft relative to the head.   Degenerative  changes of the left hip with cephalad joint space narrowing.  Degenerative changes of the lumbar spine.     MOSES CUELLAR MD   Knee XR, 2 view, right    Narrative    XR KNEE RT 1 /2 VW  5/9/2017 12:13 PM    HISTORY:  Fall, pain    COMPARISON:  None.      Impression    IMPRESSION:  Frontal view somewhat limited by positioning. The   tibial  plateau is not optimally assessed but appears grossly normal.  Otherwise negative. No fractures identified.     MOSES CUELLAR MD   XR Pelvis 1/2 Views    Narrative    XR PELVIS 1/2 VW, XR FEMUR RT 2 VW  5/9/2017 12:13 PM    HISTORY:  pain    COMPARISON:  None.      Impression    IMPRESSION:  Diffuse osteopenia. Right femoral neck shaft with  cephalad displacement of the shaft relative to the head.   Degenerative  changes of the left hip with cephalad joint space narrowing.  Degenerative changes of the lumbar spine.     MOSES CUELLAR MD   XR  Chest 1 View    Narrative    XR CHEST 1 VW 2017 1:30 PM    COMPARISON: 2017    HISTORY: Preoperative evaluation      Impression    IMPRESSION: Mixed interstitial and airspace opacities over both   lungs,  most likely representing mild pulmonary edema. No pneumothorax on  either side.    FRANCE THOMAS       Attestation:  I have reviewed today's vital signs, notes, medications, labs and   imaging.  Amount of time performed on this consult: 30 minutes.  Amount of time spent providing critical care service today: 15   minutes.  Care coordination / counseling time: 15 minutes  Face-to-face time: 15 minutes  Total time: 45 minutes     Lucas Bonilla PA-C   Echocardiogram Complete    Narrative    [id number]  [id number]  [id number]  [id number]^[email or web address]^[email or web address]           [facility]  Echocardiography Laboratory  [address] [place] [address]        Name: EL BARRIENTOS  MRN: [id number]  : 1921  Study Date: 2017 02:14 PM  Age: 89 yrs  Gender: Female  Patient Location: [facility]  Reason For Study: Dyspnea  Ordering Physician: OSVALDO GRANT  Referring Physician: Todd Villeda  Performed By: Sandie Ochoa RDCS     BSA: 1.6 m2  Height: 59 in  Weight: 150 lb  HR: 76  BP: 138/69 mmHg  _____________________________________________________________________________  __        Procedure  Complete Portable Echo Adult.  _____________________________________________________________________________  __        Interpretation Summary     The left ventricle is normal in size. There is mild concentric left  ventricular hypertrophy. Left ventricular systolic function is normal. The  visual ejection fraction is estimated at 55-60%. Grade I or early diastolic  dysfunction. Paradoxical septum motion is noted.  The right ventricle is normal size. The right ventricular systolic function is  normal.  There is mild to moderate (1-2+) tricuspid regurgitation. The right  ventricular systolic pressure is  approximated at 48.3 mmHg plus the right  atrial pressure. Right ventricular systolic pressure is elevated, consistent  with moderate to severe pulmonary hypertension.  There is moderate trileaflet aortic sclerosis. No aortic regurgitation is  present. Mild valvular aortic stenosis. The peak AoV pressure gradient is 26.5  mmHg. The mean AoV pressure gradient is 14.2 mmHg.  No pericardial effusion.  No previous study for comparison.  _____________________________________________________________________________  __        Left Ventricle  The left ventricle is normal in size. There is mild concentric left  ventricular hypertrophy. Left ventricular systolic function is normal. The  visual ejection fraction is estimated at 55-60%. Grade I or early diastolic  dysfunction. Paradoxical septum motion is noted.     Right Ventricle  The right ventricle is normal size. The right ventricular systolic function is  normal.     Atria  Normal left atrial size. Right atrial size is normal. There is no color  Doppler evidence of an atrial shunt.     Mitral Valve  The mitral valve leaflets are mildly thickened. There is mild mitral annular  calcification. There is trace mitral regurgitation.        Tricuspid Valve  There is mild to moderate (1-2+) tricuspid regurgitation. The right  ventricular systolic pressure is approximated at 48.3 mmHg plus the right  atrial pressure. Right ventricular systolic pressure is elevated, consistent  with moderate to severe pulmonary hypertension.     Aortic Valve  There is moderate trileaflet aortic sclerosis. No aortic regurgitation is  present. Mild valvular aortic stenosis. The peak AoV pressure gradient is 26.5  mmHg. The mean AoV pressure gradient is 14.2 mmHg. The calculated aortic valve  are is 1.4 cm^2.     Pulmonic Valve  There is trace pulmonic valvular regurgitation. There is no pulmonic valvular  stenosis.     Vessels  The aortic root is normal size. Normal size ascending aorta. The IVC is  normal  in size and reactivity with respiration, suggesting normal central venous  pressure.     Pericardium  There is no pericardial effusion.        Rhythm  The rhythm was normal sinus.  _____________________________________________________________________________  __  MMode/2D Measurements & Calculations  IVSd: 1.3 cm     LVIDd: 4.7 cm  LVIDs: 3.1 cm  LVPWd: 1.2 cm  FS: 33.5 %  EDV(Teich): 101.0 ml  ESV(Teich): 38.1 ml  LV mass(C)d: 216.2 grams  LV mass(C)dI: 132.5 grams/m2  Ao root diam: 3.2 cm  LA dimension: 3.6 cm  asc Aorta Diam: 3.1 cm  LA/Ao: 1.1  LVOT diam: 2.2 cm  LVOT area: 3.8 cm2        Doppler Measurements & Calculations  MV E max rogelio: 107.6 cm/sec  MV A max rogelio: 117.9 cm/sec  MV E/A: 0.91  MV dec time: 0.20 sec  Ao V2 max: 257.4 cm/sec  Ao max P.5 mmHg  Ao V2 mean: 179.2 cm/sec  Ao mean P.2 mmHg  Ao V2 VTI: 48.6 cm  PATRICIA(I,D): 1.4 cm2  PATRICIA(V,D): 1.3 cm2  LV V1 max PG: 3.0 mmHg  LV V1 max: 87.0 cm/sec  LV V1 VTI: 18.4 cm  SV(LVOT): 69.8 ml  TR max rogelio: 347.5 cm/sec  TR max P.3 mmHg  PATRICIA Index (cm2/m2): 0.88  Lateral E/e': 13.1     Medial E/e': 18.8           _____________________________________________________________________________  __           Report approved by: Atilio Blankenship 2017 03:02 PM      Glucose by meter   Result Value Ref Range    Glucose 129 (H) 70 - 99 mg/dL   Glucose by meter   Result Value Ref Range    Glucose 145 (H) 70 - 99 mg/dL   Blood culture   Result Value Ref Range    Specimen Description Blood     Culture Micro Canceled, Test credited Duplicate request     Micro Report Status FINAL 05/10/2017    Glucose by meter   Result Value Ref Range    Glucose 146 (H) 70 - 99 mg/dL   Basic metabolic panel   Result Value Ref Range    Sodium 141 133 - 144 mmol/L    Potassium 3.7 3.4 - 5.3 mmol/L    Chloride 105 94 - 109 mmol/L    Carbon Dioxide 28 20 - 32 mmol/L    Anion Gap 8 3 - 14 mmol/L    Glucose 152 (H) 70 - 99 mg/dL    Urea Nitrogen 14 7 - 30 mg/dL    Creatinine  0.73 0.52 - 1.04 mg/dL    GFR Estimate 74 >60 mL/min/1.7m2    GFR Estimate If Black 90 >60 mL/min/1.7m2    Calcium 8.3 (L) 8.5 - 10.1 mg/dL   CBC with platelets   Result Value Ref Range    WBC 10.6 4.0 - 11.0 10e9/L    RBC Count 4.36 3.8 - 5.2 10e12/L    Hemoglobin 12.5 11.7 - 15.7 g/dL    Hematocrit 38.9 35.0 - 47.0 %    MCV 89 78 - 100 fl    MCH 28.7 26.5 - 33.0 pg    MCHC 32.1 31.5 - 36.5 g/dL    RDW 12.5 10.0 - 15.0 %    Platelet Count 142 (L) 150 - 450 10e9/L   Hemoglobin A1c   Result Value Ref Range    Hemoglobin A1C 6.3 (H) 4.3 - 6.0 %   UA with Microscopic reflex to Culture   Result Value Ref Range    Color Urine Yellow     Appearance Urine Clear     Glucose Urine Negative NEG mg/dL    Bilirubin Urine Negative NEG    Ketones Urine 10 (A) NEG mg/dL    Specific Gravity Urine 1.020 1.003 - 1.035    Blood Urine Negative NEG    pH Urine 5.5 5.0 - 7.0 pH    Protein Albumin Urine 30 (A) NEG mg/dL    Urobilinogen mg/dL Normal 0.0 - 2.0 mg/dL    Nitrite Urine Negative NEG    Leukocyte Esterase Urine Moderate (A) NEG    Source Catheterized Urine     WBC Urine Pending 0 - 2 /HPF    RBC Urine Pending 0 - 2 /HPF       Assessment and Plan:    Acute right femoral neck fracture.     Mild hypoxia with chest x-ray that suggests some pulmonary edema in a patient with no history of heart failure[JE1.1]/echo with diastolic dysfunction and pulmonary htn-suspect acute exacerbation of diastolic heart failure[JE1.3].[JE1.1]  May 10, 2017 diuresed 1405 cc urine, oxygen sat[JE1.4] 97% two liters[JE1.5], looks comfortable[JE1.2]    Fever to 100.6-source unclear, ddx includes trauma, uti or pulmonary, blood cultures taken[JE1.5]    History of hearing loss.     Diabetes mellitus type 2.     History of hypertension.[JE1.1]     gerd  On iv protonix    Prophylaxis-SCD[JE1.3]    [JE1.1]  At this point I don't think that waiting is going to improve her odds of surgery.  Daughter understands she is at high risk  Avoid a lot of fluids.  Do not  "intubate after procedure as per family.  If problems with oxygenation/resp status after surgery--use oxygen, bipap if necessary, but don't intubate[JE1.2]   [JE1.1]    9:03 AM  Mild pyuria  Will continue rocephin until urine culture is back[JE1.6]     [JE1.1]     Revision History        User Key Date/Time User Provider Type Action    > JE1.6 5/10/2017  9:04 AM Mo Martin MD Physician Sign     JE1.2 5/10/2017  8:27 AM Mo Martin MD Physician      JE1.3 5/10/2017  8:16 AM Mo Martin MD Physician      JE1.5 5/10/2017  8:14 AM Mo Martin MD Physician      JE1.4 5/10/2017  8:13 AM Mo Martin MD Physician      JE1.1 5/10/2017  8:12 AM Mo Martin MD Physician             Progress Notes by Dominga Gonzalez RN at 5/10/2017  5:30 AM     Author:  Dominga Gonzalez RN Service:  Skilled Nursing Author Type:  Registered Nurse    Filed:  5/10/2017  5:37 AM Date of Service:  5/10/2017  5:30 AM Note Created:  5/10/2017  5:30 AM    Status:  Signed :  Dominga Gonzalez RN (Registered Nurse)         Recheck of temp 100.1. Rocephin started after clamping [place] to get a clean sample, which was sent to lab.   Was medicated with Dilaudid 0.2mg overnight for rubbing of leg and reporting her leg was \"sore\".  Did not medicate with Tylenol overnight due to pt showing no signs of discomfort post Dilaudid and was being careful to not fully wake patient up over night as pt was able to return to sleep and sleep and intervals. Attempt to complete cares when pt was awake to not to disturb sleep.   Pt slept on and off. 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   When awake, pt is very busy. Found fussing with IV and IV tubing. Noted to be fussing to rearrange covers and gown several times. Attempted to get legs out of bed, explaining she had to get up. And speaking of her apartment.  Able to redirect behaviors but " due to forgetfulness had to be redirected often[SB1.1]     Revision History        User Key Date/Time User Provider Type Action    > SB1.1 5/10/2017  5:37 AM Dominga Gonzalez RN Registered Nurse Sign                  Procedure Notes     No notes of this type exist for this encounter.         Progress Notes - Therapies (Notes from 05/10/17 through 05/13/17)      Progress Notes by Kelsey Cyr, PT at 5/13/2017 11:58 AM     Author:  Kelsey Cyr PT Service:  (none) Author Type:  Physical Therapist    Filed:  5/13/2017 11:59 AM Date of Service:  5/13/2017 11:58 AM Note Created:  5/13/2017 11:58 AM    Status:  Signed :  Kelsey Cyr PT (Physical Therapist)         Physical Therapy Discharge Summary    Reason for therapy discharge:    Discharged to transitional care facility.  All goals and outcomes met, no further needs identified.    Progress towards therapy goal(s). See goals on Care Plan in Epic electronic health record for goal details.  Goals met    Therapy recommendation(s):    Continued therapy is recommended.  Rationale/Recommendations:  PT to follow at TCU.[WM1.1]         Revision History        User Key Date/Time User Provider Type Action    > WM1.1 5/13/2017 11:59 AM Kelsey Cyr, PT Physical Therapist Sign            Progress Notes by Nel Mccall OT at 5/11/2017  3:40 PM     Author:  Nel Mccall OT Service:  (none) Author Type:  Occupational Therapist    Filed:  5/11/2017  3:41 PM Date of Service:  5/11/2017  3:40 PM Note Created:  5/11/2017  3:40 PM    Status:  Addendum :  Nel Mccall OT (Occupational Therapist)            05/11/17 1500   Quick Adds   Type of Visit Initial Occupational Therapy Evaluation   Living Environment   Lives With facility resident  (MCU)   Home Accessibility no concerns   Living Environment Comment Family not present during OT evaluation. PLOF gathered from discussion with Physical therapy.    Functional Level Prior   Ambulation  1-->assistive equipment  (4WW)   Transferring 1-->assistive equipment   Toileting 0-->independent   Bathing 3-->assistive equipment and person   Dressing 0-->independent   Eating 0-->independent   Communication 0-->understands/communicates without difficulty   Swallowing 0-->swallows foods/liquids without difficulty   Cognition 1 - attention or memory deficits   Fall history within last six months yes   Number of times patient has fallen within last six months 2   General Information   Onset of Illness/Injury or Date of Surgery - Date 05/09/17   Referring Physician Lucas Bonilla PA-C   Patient/Family Goals Statement None stated   Additional Occupational Profile Info/Pertinent History of Current Problem Open reduction internal fixation right hip bipolar gregg arthroplasty. s/p fall   Precautions/Limitations fall precautions   Weight-Bearing Status - RLE weight-bearing as tolerated   Cognitive Status Examination   Orientation person  (unable to state place, knows she is here because she fell)   Level of Consciousness alert   Pain Assessment   Patient Currently in Pain Yes, see Vital Sign flowsheet  (Unable to rate)   Range of Motion (ROM)   ROM Comment B UE ROM: WNL   Strength   Strength Comments Not formally assessed, however observed to have generalized weakness.    Transfer Skills   Transfer Comments Min-Mod Ax2 for supine to sit and Min-Mod Ax2 for sit to stand. Min Ax2 using FWW to ambulate 3 steps to bedside chair.    Upper Body Dressing   Level of [place]: Dress Upper Body stand-by assist   Lower Body Dressing   Level of [place]: Dress Lower Body maximum assist (25% patients effort)   Physical Assist/Nonphysical Assist: Dress Lower Body 1 person assist   Instrumental Activities of Daily Living (IADL)   IADL Comments Memory care completes IADL tasks.    Activities of Daily Living Analysis   Impairments Contributing to Impaired Activities of Daily Living cognition impaired;pain;post surgical  "precautions;strength decreased   General Therapy Interventions   Planned Therapy Interventions ADL retraining   Clinical Impression   Criteria for Skilled Therapeutic Interventions Met yes, treatment indicated   OT Diagnosis decreased independence with ADLs and functional mobility   Influenced by the following impairments s/p ORIF R hip, increased pain   Assessment of Occupational Performance 1-3 Performance Deficits   Identified Performance Deficits dressing, toileting, showering   Clinical Decision Making (Complexity) Low complexity   Therapy Frequency daily   Predicted Duration of Therapy Intervention (days/wks) 2-3 days   Anticipated Equipment Needs at Discharge (TBD at TCU)   Anticipated Discharge Disposition Transitional Care Facility   Risks and Benefits of Treatment have been explained. Yes   Patient, Family & other staff in agreement with plan of care Yes   [facility] AM-PAC  \"6 Clicks\" Daily Activity Inpatient Short Form   1. Putting on and taking off regular lower body clothing? 2 - A Lot   2. Bathing (including washing, rinsing, drying)? 2 - A Lot   3. Toileting, which includes using toilet, bedpan or urinal? 2 - A Lot   4. Putting on and taking off regular upper body clothing? 3 - A Little   5. Taking care of personal grooming such as brushing teeth? 3 - A Little   6. Eating meals? 4 - None   Daily Activity Raw Score (Score out of 24.Lower scores equate to lower levels of function) 16   Total Evaluation Time   Total Evaluation Time (Minutes)[LC1.1] 6[LC1.2]     Nel Mccall OTR/L[LC1.1]       Revision History        User Key Date/Time User Provider Type Action    > [N/A] 5/11/2017  3:41 PM Nel Mccall OT Occupational Therapist Addend     LC1.2 5/11/2017  3:41 PM Nel Mccall OT Occupational Therapist Sign     LC1.1 5/11/2017  3:40 PM Nel Mccall, OT Occupational Therapist             Progress Notes by Katy oNrwood, PT at 5/11/2017 12:09 PM     Author:  Katy Norwood, PT " Service:  (none) Author Type:  Physical Therapist    Filed:  5/11/2017 12:15 PM Date of Service:  5/11/2017 12:09 PM Note Created:  5/11/2017 12:09 PM    Status:  Addendum :  Katy Norwood, PT (Physical Therapist)            05/11/17 1000   Quick Adds   Type of Visit Initial PT Evaluation   Living Environment   Lives With facility resident   Functional Level Prior   Ambulation 1-->assistive equipment   Transferring 1-->assistive equipment   Toileting 0-->independent   Bathing 2-->assistive person   Dressing 0-->independent   Eating 0-->independent   Communication 0-->understands/communicates without difficulty   Swallowing 0-->swallows foods/liquids without difficulty   Cognition 1 - attention or memory deficits   Fall history within last six months yes   Number of times patient has fallen within last six months 2   Prior Functional Level Comment Pt resides at Memory care unit.  Per daughter- Pt ambulatory using a 4ww within  the structured memory care unit.  Unwitness  fall   General Information   Onset of Illness/Injury or Date of Surgery - Date 05/10/17   Referring Physician Jean Pierre   Patient/Family Goals Statement Pt unable to  state goal.    Pertinent History of Current Problem (include personal factors and/or comorbidities that impact the POC) Open reduction internal fixation right hip bipolar gregg arthroplasty. -   Precautions/Limitations fall precautions   General Observations Pt alert, supine in bed.  Pt unaware she  is in the hospital  and had surgery. Minimal pain w/ activity; resolves at rest   DAughter present    General Info Comments 2 LO2   Cognitive Status Examination   Orientation person   Level of Consciousness alert;lethargic/somnolent  (Occassionally closing her eyes)   Follows Commands and Answers Questions able to follow single-step instructions;75% of the time  (Able to follow one step cues and participate)   Personal Safety and Judgment impaired  (Not witnessed.  Deficts at baseline  per daughter-)   Pain Assessment   Patient Currently in Pain Yes, see Vital Sign flowsheet   Range of Motion (ROM)   ROM Comment Limited Right hip/ knee AROM due to weakness, unable  to flex knee. track LE in bed due to weakness.  Pt  allowing approx 40 degrees of Right hip/ knee AAROM in supine.  R hip flexion to 90 degrees in sitting    MMT: Hip, Rehab Eval   Hip Flexion - Right Side (3-/5) fair minus, right   Hip ABduction - Right Side (2/5) poor, right   MMT: Knee, Rehab Eval   Knee Extension - Right Side (3-/5) fair minus, right   Bed Mobility   Bed Mobility Comments Pt required moderate assistance  of 2 with  Supine> sitting.    Assistance w/ iniital sitting balance; then able to  maintain static sitting w/ SBA and  UE support- able to scoot to the EOB w/ SBA.     Transfer Skills   Transfer Comments Pt transferred bed> chair w/ walker with minimal to moderate assistance of 2. WBA[CS1.1]T[CS1.2].   Weak, right  LE giving away during stance phase, but  Pt  able to take 4-5 steps w/ the transfer    Gait   Gait Comments Unable   Balance   Balance Comments fair/ good  for static  and dynamci sitting balance- see comments above.   Fair balance  In standing due to weakness   Sensory Examination   Sensory Perception no deficits were identified   Modality Interventions   Planned Modality Interventions Cryotherapy   General Therapy Interventions   Planned Therapy Interventions gait training;bed mobility training;ROM;strengthening;transfer training   Clinical Impression   Criteria for Skilled Therapeutic Intervention yes, treatment indicated   PT Diagnosis RIght hip hemiarthroplasty   Influenced by the following impairments Decreased strength Right LE.  Pain   Functional limitations due to impairments Altered mobility- Decreased independence w/ bed mobility, transfers. Unable to ambulate    Clinical Presentation Stable/Uncomplicated   Clinical Presentation Rationale clinical judgement   Clinical Decision Making  "(Complexity) Low complexity   Therapy Frequency` 2 times/day   Predicted Duration of Therapy Intervention (days/wks) 2 days   Anticipated Equipment Needs at Discharge (Pt owns RW and 4ww for home use.)   Anticipated Discharge Disposition Transitional Care Facility   Risk & Benefits of therapy have been explained Yes   Patient, Family & other staff in agreement with plan of care Yes   Clinical Impression Comments Hx of dementia but following cues and particiapting.    Indep. at baseline w/in a structured environment     [facility] AM-PAC  \"6 Clicks\" V.2 Basic Mobility Inpatient Short Form   1. Turning from your back to your side while in a flat bed without using bedrails? 2 - A Lot   2. Moving from lying on your back to sitting on the side of a flat bed without using bedrails? 2 - A Lot   3. Moving to and from a bed to a chair (including a wheelchair)? 2 - A Lot   4. Standing up from a chair using your arms (e.g., wheelchair, or bedside chair)? 2 - A Lot   5. To walk in hospital room? 1 - Total   6. Climbing 3-5 steps with a railing? 1 - Total   Basic Mobility Raw Score (Score out of 24.Lower scores equate to lower levels of function) 10[CS1.1]        Revision History        User Key Date/Time User Provider Type Action    > CS1.2 5/11/2017 12:15 PM Katy Norwood, PT Physical Therapist Addend     CS1.1 5/11/2017 12:13 PM Katy Norwood, PT Physical Therapist Sign            "

## 2017-05-09 NOTE — LETTER
Pt is discharging to [place] on [facility] (Phone: [phone] Fax: [phone])  Today. Tania VIVAR, [facility], [facility] [phone]

## 2017-05-09 NOTE — PROGRESS NOTES
Orders for NPO and every 4 hour BG.  Daughter concerned that patient is hungry/thirsty.  IV SL.  Page sent to Dr. Cohn to see if we can feed her until midnight, surgery tomorrow late morning.

## 2017-05-09 NOTE — PROGRESS NOTES
[facility] [place]    Patient admitted to room 2401 at approximately 1635 via cart from emergency room. Patient was accompanied by daughter.     Verbal SBAR report received from Loco prior to patient arrival.     Patient trasferred to bed via air waqas. Patient alert and oriented X 1. The patient is not having any pain-pain present with movememnt. 0-10 Pain Scale: 0. Admission vital signs: Blood pressure 140/77, temperature 97.7  F (36.5  C), temperature source Oral, resp. rate 18, weight 68 kg (150 lb), SpO2 98 %. Patient was oriented to plan of care, call light, bed controls, tv, telephone, bathroom and visiting hours.     The following safety risks were identified during admission: fall. Yellow risk band applied: YES.     [place]

## 2017-05-09 NOTE — IP AVS SNAPSHOT
[facility]    [address] [address]    Phone:  [phone]    Fax:  [phone]                                       After Visit Summary   5/9/2017    Lulu Carlton    MRN: [id number]           After Visit Summary Signature Page     I have received my discharge instructions, and my questions have been answered. I have discussed any challenges I see with this plan with the nurse or doctor.    ..........................................................................................................................................  Patient/Patient Representative Signature      ..........................................................................................................................................  Patient Representative Print Name and Relationship to Patient    ..................................................               ................................................  Date                                            Time    ..........................................................................................................................................  Reviewed by Signature/Title    ...................................................              ..............................................  Date                                                            Time

## 2017-05-09 NOTE — IP AVS SNAPSHOT
"    [facility] SURGICAL: [phone]                                              INTERAGENCY TRANSFER FORM - PHYSICIAN ORDERS   2017                    Hospital Admission Date: 2017  EL BARRIENTOS   : 1921  Sex: Female        Attending Provider: Danisha Reaves MD     Allergies:  Hydrocodone-acetaminophen, Trazodone, Metformin    Infection:  None   Service:  HOSPITALIST    Ht:  1.486 m (4' 10.5\")   Wt:  70.2 kg (154 lb 12.2 oz)   Admission Wt:  68 kg (150 lb)    BMI:  31.79 kg/m 2   BSA:  1.7 m 2            Patient PCP Information     Provider PCP Type    Todd Villeda MD General      ED Clinical Impression     Diagnosis Description Comment Added By Time Added    Hip fracture, right, closed, initial encounter (H) [S72.001A] Hip fracture, right, closed, initial encounter (H) [S72.001A]  Shaji Sandhu MD 2017 12:41 PM      Hospital Problems as of 2017              Priority Class Noted POA    Hip fracture (H) Medium  2017 Yes    Displaced fracture of right femoral neck (H) Medium  2017 Yes    Fracture of femoral neck, right (H) Medium  5/10/2017 Yes      Non-Hospital Problems as of 2017              Priority Class Noted    Sensorineural hearing loss, asymmetrical   10/17/2007    Urinary incontinence Medium  10/31/2016    Osteoporosis Medium  10/31/2016    Vitamin D deficiency Medium  10/31/2016    Anemia, iron deficiency Medium  10/31/2016    Type 2 diabetes mellitus with hyperglycemia, without long-term current use of insulin (H) Medium  10/31/2016    Primary osteoarthritis of left knee Medium  10/31/2016    Hypertension goal BP (blood pressure) < 140/90 Medium  2016      Code Status History     Date Active Date Inactive Code Status Order ID Comments User Context    2017 12:19 PM  DNR/DNI [id number]  Danisha Reaves MD Outpatient    2017  9:29 AM 2017 12:19 PM DNR/DNI [id number]  Lucas Reddy MD " Outpatient    5/9/2017  4:33 PM 5/13/2017  9:29 AM DNR/DNI [id number]  Mo Martin MD Inpatient         Medication Review      START taking        Dose / Directions Comments    aspirin  MG EC tablet        Dose:  325 mg   Take 1 tablet (325 mg) by mouth every 6 hours as needed for moderate pain   Quantity:  40 tablet   Refills:  0        furosemide 20 MG tablet   Commonly known as:  LASIX   Used for:  Acute diastolic congestive heart failure (H)        Dose:  20 mg   Take 1 tablet (20 mg) by mouth daily   Quantity:  5 tablet   Refills:  0        oxyCODONE 5 MG IR tablet   Commonly known as:  ROXICODONE        Dose:  5 mg   Take 1 tablet (5 mg) by mouth every 3 hours as needed for moderate to severe pain   Quantity:  40 tablet   Refills:  0          CONTINUE these medications which have NOT CHANGED        Dose / Directions Comments    B-12 1000 MCG Tbcr   Used for:  Vitamin B 12 deficiency        Dose:  1000 mcg   Take 1,000 mcg by mouth daily   Quantity:  30 tablet   Refills:  11        BLOOD GLUCOSE TEST STRIPS Strp        by In Vitro route as needed   Refills:  0        calcium-vitamin D 600-400 MG-UNIT per tablet   Commonly known as:  CALTRATE        Dose:  1 tablet   Take 1 tablet by mouth every evening   Refills:  0        CYCLOBENZAPRINE HCL PO        Dose:  5 mg   Take 5 mg by mouth 3 times daily as needed for muscle spasms   Refills:  0        glipiZIDE 5 MG 24 hr tablet   Commonly known as:  glipiZIDE XL   Used for:  Type 2 diabetes mellitus with hyperglycemia, without long-term current use of insulin (H)        Dose:  5 mg   Take 1 tablet (5 mg) by mouth daily   Quantity:  30 tablet   Refills:  11        lisinopril 5 MG tablet   Commonly known as:  PRINIVIL/ZESTRIL   Used for:  Essential hypertension with goal blood pressure less than 140/90        Dose:  5 mg   Take 1 tablet (5 mg) by mouth daily   Quantity:  30 tablet   Refills:  11        LOPERAMIDE HCL PO        Dose:  2 mg   Take 2 mg  by mouth daily as needed   Refills:  0        MECLIZINE HCL PO        Dose:  12.5 mg   Take 12.5 mg by mouth 2 times daily as needed for dizziness   Refills:  0        multivitamin Tabs tablet        Dose:  1 tablet   Take 1 tablet by mouth daily   Quantity:  30 each   Refills:  0        omeprazole 20 MG CR capsule   Commonly known as:  priLOSEC   Used for:  Nausea        Dose:  20 mg   Take 1 capsule (20 mg) by mouth daily   Quantity:  30 capsule   Refills:  11        triamcinolone 0.1 % cream   Commonly known as:  KENALOG        Apply topically 2 times daily as needed for irritation   Refills:  0        TYLENOL PO        Dose:  650 mg   Take 650 mg by mouth every 4 hours as needed for mild pain or fever Do not exceed 4,000 mg of Acetaminophen from all sources in 24 hours   Refills:  0                Summary of Visit     Reason for your hospital stay       Right femoral neck fracture and subsequent hip replacement       Reason for your hospital stay       Right femoral neck fracture             After Care     Activity - Up ad rosenda           Activity - Up with assistive device           Advance Diet as Tolerated       Follow this diet upon discharge: Regular       Advance Diet as Tolerated       Follow this diet upon discharge: Orders Placed This Encounter      Moderate Consistent CHO Diet      Advance Diet as Tolerated       Daily weights       Call Provider for weight gain of more than 2 pounds per day or 5 pounds per week.       Fall precautions           Fall precautions           General info for SNF       Length of Stay Estimate: Short Term Care: Estimated # of Days <30  Condition at Discharge: Improving  Level of care:skilled   Rehabilitation Potential: Excellent  Admission H&P remains valid and up-to-date: Yes  Recent Chemotherapy: N/A  Use Nursing Home Standing Orders: Yes       General info for SNF       Length of Stay Estimate: Short Term Care: Estimated # of Days <30  Condition at Discharge:  Improving  Level of care:skilled   Rehabilitation Potential: Excellent  Admission H&P remains valid and up-to-date: Yes  Recent Chemotherapy: N/A  Use Nursing Home Standing Orders: Yes       Glucose monitor nursing POCT       Before meals and at bedtime       Glucose monitor nursing POCT       Before meals and at bedtime       Hip precautions           Intake and output       Every shift       Mantoux instructions       Give two-step Mantoux (PPD) Per Facility Policy Yes       Mantoux instructions       Give two-step Mantoux (PPD) Per Facility Policy Yes       NO CPM           Weight bearing status       WBAT       Wound care (specify)       Site:   Right Hip  Instructions:  Daily dressing changes             Procedures     Oxygen - Nasal cannula       1-2 Lpm by nasal cannula prn to keep O2 sats 92% or greater.             Referrals     Occupational Therapy Adult Consult       Evaluate and treat as clinically indicated.    Reason:  ADL assistance       Occupational Therapy Adult Consult       Evaluate and treat as clinically indicated.    Reason:  Right femoral neck fracture       Physical Therapy Adult Consult       Evaluate and treat as clinically indicated.    Reason:  Gait assistance and hip strengthening and encourage posterior hip precautions       Physical Therapy Adult Consult       Evaluate and treat as clinically indicated.    Reason:  Right femoral neck fracture             Supplies     AntiEmbolism Stockings       Bilateral below knee length.On in the morning, off at night             Follow-Up Appointment Instructions     Future Labs/Procedures    Follow Up and recommended labs and tests     Comments:    Follow up with Dr. Greenfield in 2 weeks.  No follow up labs or test are needed.      Follow-Up Appointment Instructions     Follow Up and recommended labs and tests       Follow up with Dr. Greenfield in 2 weeks.  No follow up labs or test are needed.             Statement of Approval     Ordered           05/13/17 1221  I have reviewed and agree with all the recommendations and orders detailed in this document.  EFFECTIVE NOW     Approved and electronically signed by:  Danisha Reaves MD

## 2017-05-09 NOTE — H&P
HISTORY AND PHYSICAL      CHIEF COMPLAINT:  Right leg pain since a fall at 1:30 a.m. today.      HISTORY OF PRESENT ILLNESS:  This patient apparently was getting up at 1:30 a.m. today thinking that she was going to go for breakfast.  She cannot give us details, but she fell.  Her daughter who feels pretty certain that she essentially tripped.  She was on the floor and was unable to get up.  It is unclear how long she laid there, but eventually staff from her memory unit got her up and actually took her to breakfast this morning, but she continued to have pain in the hip and the right upper leg, so she was brought to the emergency room.      There is no history of any head or chest injury.  She offers no complaints of this.  There is no definitive history of syncope.  She has severe dementia.      She also has hearing loss, osteoporosis, vitamin D deficiency, anemia, type 2 diabetes, osteoarthritis and hypertension.  She used to carry a diagnosis of myasthenia gravis, but she is not on any medication for this and her daughter questions whether that diagnosis was accurate.      I did talk to the daughter extensively about resuscitation and she is DNR/DNI and the daughter also would not want her intubated after surgery.      PAST MEDICAL HISTORY:   1.  Hearing loss.   2.  Urinary incontinence.   3.  Osteoporosis.   4.  Vitamin D deficiency.   5.  Diabetes mellitus type 2.   6.  Hypertension.   7.  Previous diagnosis of myasthenia gravis; however, this is questioned.   8.  Depression.   9.  Fatty liver.   10.  Peripheral neuropathy.   11.  Thrombocytopenia.   12.  Elevated liver function tests.   13.  Diffuse large B-cell lymphoma as listed in her [facility] records.       PAST SURGICAL HISTORY:  Includes cholecystectomy and thymus gland removal.      FAMILY HISTORY:  Father  at age 80, he had a stroke.  Mother  of breast cancer.  Sister  of old age.  Other sister  of breast cancer.      HABITS:  She never  smoked.  No alcohol use.      SOCIAL HISTORY:  Her daughter is with her today.  She lives in memory care at [facility].      ALLERGIES:  Hydrocodone, reaction not listed.  Trazodone, she gets crazy dreams and then cannot fall back asleep.  Metformin, rash.      MEDICATIONS:   1.  Flexeril 5 mg t.i.d. p.r.n.   2.  Tylenol 650 q.4 h. p.r.n.   3.  Loperamide 2 mg p.r.n.   4.  Meclizine 12.5 mg 2 times daily as needed for dizziness.   5.  Omeprazole 20 mg daily.   6.  Glipizide XL 5 mg daily.   7.  Ocuvite 1 daily.   8.  Lisinopril 5 mg.   9.  Cyanocobalamin 1000 mcg daily.   10.  Caltrate 1 daily.   11.  Kenalog cream b.i.d. p.r.n.      REVIEW OF SYSTEMS:  She denies fever, chills, systemic symptoms, visual symptoms, shortness of breath, chest pain, abdominal pain, change in bowel habits, urinary tract symptoms, skin symptoms, new edema.  She has pain in right hip and right upper leg.      PHYSICAL EXAMINATION:   GENERAL:  She is alert.     VITAL SIGNS:  Satting at 87-88% when I am in the room.  She was not distressed.  She has 2 liters of oxygen on.  Temperature is 97.7, heart rate 91, blood pressure 130/69, respiratory rate 18, sat 92%.   HEENT:  Ears negative.  Oral negative.   NECK:  Supple.   LUNGS:  A few bibasilar dependent crackles.   COR:  S1, S2 is distant, without click, rub or murmur.   ABDOMEN:  Obese, soft, benign, nontender, without guarding, rebound, rigidity or mass.   EXTREMITIES:  CMS is normal in the feet.  There is no external rotation of the right leg.  She has some pain with manipulation of the right hip.   NEUROLOGIC:  She is disoriented to time and place and current events.  Cranial nerves, motor, reflexes grossly normal.      LABORATORY DATA:  Chest x-ray suggests some mild pulmonary edema.  EKG shows an inferior scar, first degree AV block, otherwise is in normal sinus rhythm.  Comprehensive metabolic panel showed a glucose of 247, albumin 3.3, total protein 6.5.  White count was 15,000,  hemoglobin 12.8, platelet count 267,000.  UA 6 white cells per high-power field.  Urine culture pending.      ASSESSMENT:   1.  Acute right femoral neck fracture.   2.  Mild hypoxia with chest x-ray that suggests some pulmonary edema in a patient with no history of heart failure.   3.  Mild pyuria -- urine culture pending.   4.  History of hearing loss.   5.  Diabetes mellitus type 2.   6.  History of hypertension.      PLAN:  I think we will give her fairly minimal low dose IV fluids.  Probably will give her some IV Lasix.  We will obtain an echo and I am going to need to talk to ortho about the timing of surgery.  I am going to keep her n.p.o. for now.  She is on insulin order set.  We will hold her glyburide and hold her lisinopril.  I will give her some Protonix to take the place of her PPI agent.  She will be on sequential compression devices for prophylaxis.     3:04 PM case discussed with Dr Greenfield. Will try to improve the patients resp status with some diuresis.  Surgery would be tentatively tomorrow at about noon.        OSVALDO GRANT MD             D: 2017 13:47   T: 2017 15:02   MT: [id number]      Name:     EL BARRIENTOS   MRN:      [id number]        Account:      [id number]   :      1921           Admitted:     [id number]      Document: [id number]

## 2017-05-09 NOTE — IP AVS SNAPSHOT
"` `           [facility] SURGICAL: [phone]                                              INTERAGENCY TRANSFER FORM - NURSING   2017                    Hospital Admission Date: 2017  EL BARRIENTOS   : 1921  Sex: Female        Attending Provider: Danisha Reaves MD     Allergies:  Hydrocodone-acetaminophen, Trazodone, Metformin    Infection:  None   Service:  HOSPITALIST    Ht:  1.486 m (4' 10.5\")   Wt:  70.2 kg (154 lb 12.2 oz)   Admission Wt:  68 kg (150 lb)    BMI:  31.79 kg/m 2   BSA:  1.7 m 2            Patient PCP Information     Provider PCP Type    Todd Villeda MD General      Current Code Status     Date Active Code Status Order ID Comments User Context       Prior      Code Status History     Date Active Date Inactive Code Status Order ID Comments User Context    2017 12:19 PM  DNR/DNI [id number]  Danisha Reaves MD Outpatient    2017  9:29 AM 2017 12:19 PM DNR/DNI [id number]  Lucas Reddy MD Outpatient    2017  4:33 PM 2017  9:29 AM DNR/DNI [id number]  Mo Martin MD Inpatient      Advance Directives        Does patient have a scanned Advance Directive/ACP document in EPIC?           No        Hospital Problems as of 2017              Priority Class Noted POA    Hip fracture (H) Medium  2017 Yes    Displaced fracture of right femoral neck (H) Medium  2017 Yes    Fracture of femoral neck, right (H) Medium  5/10/2017 Yes      Non-Hospital Problems as of 2017              Priority Class Noted    Sensorineural hearing loss, asymmetrical   10/17/2007    Urinary incontinence Medium  10/31/2016    Osteoporosis Medium  10/31/2016    Vitamin D deficiency Medium  10/31/2016    Anemia, iron deficiency Medium  10/31/2016    Type 2 diabetes mellitus with hyperglycemia, without long-term current use of insulin (H) Medium  10/31/2016    Primary osteoarthritis of left knee Medium  10/31/2016    " "Hypertension goal BP (blood pressure) < 140/90 Medium  12/12/2016      Immunizations     Name Date      Influenza (High Dose) 3 valent vaccine 09/05/14     Influenza (intradermal) 09/06/13     Influenza (intradermal) 09/14/12     Influenza (intradermal) 10/21/11     Influenza (intradermal) 09/29/10     Influenza (intradermal) 10/16/09     Influenza (intradermal) 11/02/07     Influenza Vaccine, 3 YRS +, IM (QUADRIVALENT W/PRESERVATIVES) 11/03/16     Pneumococcal (PCV 13) 10/31/16     Pneumococcal 23 valent 10/21/11     TDAP Vaccine (Adacel) 09/06/11          END      ASSESSMENT     Discharge Profile Flowsheet     EXPECTED DISCHARGE     SKIN      Expected Discharge Date  05/13/17 05/10/17 1326   Inspection  Full 05/13/17 0950    GASTROINTESTINAL (ADULT,PEDIATRIC,OB)     Procedural focused assessment (identify areas inspected)   -- (Right Hip and leg) 05/10/17 1141    GI WDL  WDL 05/13/17 0950   Skin WDL  ex 05/13/17 0950    Passing flatus  yes 05/13/17 0320   Skin Temperature  warm 05/13/17 0320    COMMUNICATION ASSESSMENT     Skin Moisture  dry 05/13/17 0320    Patient's communication style  spoken language (English or Bilingual) 05/09/17 1109   Skin Integrity  bruise(s);incision(s) 05/13/17 0950    FINAL RESOURCES     SAFETY      Resources List  Transitional Care 05/10/17 1326   Safety WDL  WDL 05/13/17 0950    PAS Number  [id number] 05/12/17 1436   Safety Factors  bed in low position;wheels locked;call light in reach;ID band on 05/12/17 1604                 Assessment WDL (Within Defined Limits) Definitions           Safety WDL     Effective: 09/28/15    Row Information: <b>WDL Definition:</b> Bed in low position, wheels locked; call light in reach; upper side rails up x 2; ID band on<br> <font color=\"gray\"><i>Item=AS safety wdl>>List=AS safety wdl>>Version=F14</i></font>      Skin WDL     Effective: 09/28/15    Row Information: <b>WDL Definition:</b> Warm; dry; intact; elastic; without discoloration; pressure " "points without redness<br> <font color=\"gray\"><i>Item=AS skin wdl>>List=AS skin wdl>>Version=F14</i></font>      Vitals     Vital Signs Flowsheet     VITAL SIGNS     Nonverbal Indicators Of Pain  rubbing 05/10/17 0216    Temp  97.6  F (36.4  C) 05/13/17 0734   Pain Management Interventions  cold applied 05/12/17 0002    Temp src  Oral 05/13/17 0734   Response to Interventions  Absence of nonverbal indicators of pain 05/10/17 0544    Resp  18 05/13/17 0734   CLINICALLY ALIGNED PAIN ASSESSMENT (CAPA) ([facility] ADULTS ONLY)      Pulse  88 05/13/17 0734   Comfort  comfortably manageable 05/13/17 1211    Heart Rate  110 05/13/17 0132   Change in Pain  getting better 05/13/17 1211    Pulse/Heart Rate Source  Monitor 05/13/17 0734   Pain Control  partially effective 05/13/17 1211    BP  151/57 05/13/17 0734   Functioning  can do most things, but pain gets in the way of some 05/13/17 1211    BP Location  Left arm 05/13/17 0734   Sleep  normal sleep 05/13/17 1211    Patient Position  Lying 05/10/17 1241   HEIGHT AND WEIGHT      OXYGEN THERAPY     Height Method  Stated 05/09/17 1112    SpO2  93 % 05/13/17 0944   Weight  70.2 kg (154 lb 12.2 oz) 05/13/17 0457    O2 Device  None (Room air) 05/13/17 0944   POSITIONING      Oxygen Delivery  2 LPM 05/13/17 0132   Body Position  independently positioning;supine 05/13/17 0734    RESPIRATORY MONITORING     Head of Bed (HOB)  HOB at 20 degrees 05/13/17 0734    Respiratory Monitoring (EtCO2)  35 mmHg 05/10/17 2234   Chair  Recline and up in chair 05/12/17 1707    Integrated Pulmonary Index (IPI)  10 05/10/17 2234   Positioning/Transfer Devices  pillows;in use 05/12/17 0346    PAIN/COMFORT     DAILY CARE      Patient Currently in Pain  yes 05/13/17 1134   Activity Type  ambulated in room;up to commode;up in chair 05/13/17 0950    Preferred Pain Scale  CAPA (Clinically Aligned Pain Assessment) ([facility] Adults Only) 05/13/17 1134   Activity Level of " Assistance  assistance, 1 person 05/13/17 0950    Patient's Stated Pain Goal  No pain 05/09/17 1112   Activity Assistive Device  gait belt;walker 05/13/17 0950    0-10 Pain Scale  5 05/09/17 1142   Additional Documentation  Activity Device Assistance (Row) 05/12/17 0002    rFLACC Pain Rating: Face  0-->no particular expression or smile 05/10/17 2317   POINT OF CARE TESTING      rFLACC Pain Rating - Legs  0-->normal position or relaxed 05/10/17 2317   Puncture Site  fingertip 05/10/17 0438    rFLACC Pain Rating: Activity  0-->lying quietly, normal position, moves easily 05/10/17 2317   Bedside Glucose (mg/dl )   146 mg/dl 05/10/17 0438    rFLACC Pain Rating - Cry  0-->no cry (awake or asleep) 05/10/17 2317   ECG      rFLACC Pain Rating - Consolability  0-->content, relaxed 05/10/17 2317   ECG Rhythm  Sinus rhythm 05/10/17 1349    rFLACC Score: Activity  0 05/10/17 2317   ANALGESIA SIDE EFFECTS MONITORING      Pain Location  Hip 05/10/17 2236   Side Effects Monitoring: Respiratory Quality  R 05/10/17 1637    Pain Orientation  Right 05/10/17 2236   Side Effects Monitoring: Respiratory Depth  N 05/10/17 1637    Pain Descriptors  Sore 05/10/17 2236   Side Effects Monitoring: Sedation Level  S 05/10/17 1637            Patient Lines/Drains/Airways Status    Active LINES/DRAINS/AIRWAYS     Name: Placement date: Placement time: Site: Days: Last dressing change:    Incision/Surgical Site 05/10/17 Right Hip 05/10/17   1245    2             Patient Lines/Drains/Airways Status    Active PICC/CVC     None            Intake/Output Detail Report     Date Intake     Output     Net    Shift P.O. I.V. IV Piggyback Total Urine Emesis/NG output Blood Total       Noc 05/11/17 2300 - 05/12/17 0659 300 495 -- 795 0 -- -- -- 795    Day 05/12/17 0700 - 05/12/17 1459 300 -- -- 300 475 -- -- 475 -175    Amna 05/12/17 1500 - 05/12/17 2259 360 -- -- 360 350 -- -- 350 10    Noc 05/12/17 2300 - 05/13/17 0659 -- -- -- -- 650 -- -- 650 -492     Day 05/13/17 0700 - 05/13/17 1459 -- -- -- -- 1050 -- -- 1050 -1050      Case Management/Discharge Planning     Case Management/Discharge Planning Flowsheet     REFERRAL INFORMATION     Expected Discharge Date  05/13/17 05/10/17 1326    Did the Initial Social Work Assessment result in a Social Work Case?  Yes 05/10/17 1326   FINAL NOTE      Admission Type  inpatient 05/10/17 1326   Final Note  D/C to [facility] via [facility] 05/12/17 1436    Arrived From  long-term care;nursing facility 05/10/17 1326   FINAL RESOURCES      Referral Source  nursing 05/10/17 1326   Resources List  Transitional Care 05/10/17 1326    Reason For Consult  discharge planning 05/10/17 1326   PAS Number  [id number] 05/12/17 1436    Primary Care Clinic Name  -- ([facility]) 05/10/17 1326   ABUSE RISK SCREEN      Primary Care MD Name  -- ([facility]) 05/10/17 1326   QUESTION TO PATIENT:  Has a member of your family or a partner(now or in the past) intimidated, hurt, manipulated, or controlled you in any way?  no 05/09/17 1113    LIVING ENVIRONMENT     QUESTION TO PATIENT: Do you feel safe going back to the place where you are living?  yes 05/09/17 1113    Lives With  facility resident ([facility]) 05/11/17 1508   OBSERVATION: Is there reason to believe there has been maltreatment of a vulnerable adult (ie. Physical/Sexual/Emotional abuse, self neglect, lack of adequate food, shelter, medical care, or financial exploitation)?  no 05/09/17 1113    Provides Primary Care For  no one, unable/limited ability to care for self 05/10/17 1326   (R) MENTAL HEALTH SUICIDE RISK      Primary Care Provided By  other (see comments) ([facility] staff) 05/10/17 1326   Are you depressed or being treated for depression?  No (pt. w/ dementia) 05/09/17 1902    COPING/STRESS     HOMICIDE RISK      Major Change/Loss/Stressor  denies 05/09/17 1903   Homicidal Ideation  no 05/09/17 1113    EXPECTED DISCHARGE

## 2017-05-09 NOTE — IP AVS SNAPSHOT
MRN:[id number]                      After Visit Summary   5/9/2017    Lulu Carlton    MRN: [id number]           Thank you!     Thank you for choosing [facility] for your care. Our goal is always to provide you with excellent care. Hearing back from our patients is one way we can continue to improve our services. Please take a few minutes to complete the written survey that you may receive in the mail after you visit with us. Thank you!        Patient Information     Date Of Birth          11/8/1921        Designated Caregiver       Most Recent Value    Caregiver    Will someone help with your care after discharge? no [from [facility]]      About your hospital stay     You were admitted on:  May 9, 2017 You last received care in the:  [facility] Surgical    You were discharged on:  May 13, 2017        Reason for your hospital stay       Right femoral neck fracture and subsequent hip replacement            Reason for your hospital stay       Right femoral neck fracture                  Who to Call     For medical emergencies, please call 911.  For non-urgent questions about your medical care, please call your primary care provider or clinic, [phone]  For questions related to your surgery, please call your surgery clinic        Attending Provider     Provider Specialty    Shaji Sandhu MD Emergency Medicine    [facility], Bi POTTER MD [facility], Riky Monsalve MD Orthopedics    [facility], Mo Beasley MD [facility], Danisha Vicente MD [facility]       Primary Care Provider Office Phone # Fax #    Todd Villeda [phone]       [place] [id number] [place] [address]        After Care Instructions     Activity - Up ad rosenda           Activity - Up with assistive device           Advance Diet as Tolerated       Follow this diet upon discharge: Regular            Advance Diet as Tolerated        Follow this diet upon discharge: Orders Placed This Encounter      Moderate Consistent CHO Diet      Advance Diet as Tolerated            Daily weights       Call Provider for weight gain of more than 2 pounds per day or 5 pounds per week.            Fall precautions           Fall precautions           General info for SNF       Length of Stay Estimate: Short Term Care: Estimated # of Days <30  Condition at Discharge: Improving  Level of care:skilled   Rehabilitation Potential: Excellent  Admission H&P remains valid and up-to-date: Yes  Recent Chemotherapy: N/A  Use Nursing Home Standing Orders: Yes            General info for SNF       Length of Stay Estimate: Short Term Care: Estimated # of Days <30  Condition at Discharge: Improving  Level of care:skilled   Rehabilitation Potential: Excellent  Admission H&P remains valid and up-to-date: Yes  Recent Chemotherapy: N/A  Use Nursing Home Standing Orders: Yes            Glucose monitor nursing POCT       Before meals and at bedtime            Glucose monitor nursing POCT       Before meals and at bedtime            Hip precautions           Intake and output       Every shift            Mantoux instructions       Give two-step Mantoux (PPD) Per Facility Policy Yes            Mantoux instructions       Give two-step Mantoux (PPD) Per Facility Policy Yes            NO CPM           Oxygen - Nasal cannula       1-2 Lpm by nasal cannula prn to keep O2 sats 92% or greater.            Weight bearing status       WBAT            Wound care (specify)       Site:   Right Hip  Instructions:  Daily dressing changes                  Follow-up Appointments     Follow Up and recommended labs and tests       Follow up with Dr. Greenfield in 2 weeks.  No follow up labs or test are needed.                  Additional Services     Occupational Therapy Adult Consult       Evaluate and treat as clinically indicated.    Reason:  ADL assistance            Occupational Therapy Adult  "Consult       Evaluate and treat as clinically indicated.    Reason:  Right femoral neck fracture            Physical Therapy Adult Consult       Evaluate and treat as clinically indicated.    Reason:  Gait assistance and hip strengthening and encourage posterior hip precautions            Physical Therapy Adult Consult       Evaluate and treat as clinically indicated.    Reason:  Right femoral neck fracture                  Future tests that were ordered for you     AntiEmbolism Stockings       Bilateral below knee length.On in the morning, off at night                  Pending Results     Date and Time Order Name Status Description    5/10/2017 0515 Urine Culture Aerobic Bacterial Preliminary     5/10/2017 0321 Blood culture Preliminary     5/10/2017 0321 Blood culture Preliminary             Statement of Approval     Ordered          05/13/17 1221  I have reviewed and agree with all the recommendations and orders detailed in this document.  EFFECTIVE NOW     Approved and electronically signed by:  Danisha Reaves MD             Admission Information     Date & Time Department Dept. Phone    5/9/2017 [facility] Surgical [phone]      Your Vitals Were     Blood Pressure Pulse Temperature Respirations Weight Pulse Oximetry    151/57 (BP Location: Left arm) 88 97.6  F (36.4  C) (Oral) 18 70.2 kg (154 lb 12.2 oz) 93%    BMI (Body Mass Index)                   31.79 kg/m2           MyChart Information     [email or web address] lets you send messages to your doctor, view your test results, renew your prescriptions, schedule appointments and more. To sign up, go to [email or web address] . Click on \"Log in\" on the left side of the screen, which will take you to the Welcome page. Then click on \"Sign up Now\" on the right side of the page.     You will be asked to enter the access code listed below, as well as some personal information. Please follow the directions to create your username and password.   "   Your access code is: [email or web address]  Expires: 8/10/2017  2:04 PM     Your access code will  in 90 days. If you need help or a new code, please call your [place] clinic or [phone].        Care EveryWhere ID     This is your Care EveryWhere ID. This could be used by other organizations to access your [place] medical records  [id number]           Review of your medicines      START taking        Dose / Directions    aspirin  MG EC tablet   Used for:  Hip fracture, right, closed, initial encounter (H)        Dose:  325 mg   Take 1 tablet (325 mg) by mouth daily   Quantity:  40 tablet   Refills:  0       furosemide 20 MG tablet   Commonly known as:  LASIX   Used for:  Acute diastolic congestive heart failure (H)        Dose:  20 mg   Take 1 tablet (20 mg) by mouth daily   Quantity:  5 tablet   Refills:  0       oxyCODONE 5 MG IR tablet   Commonly known as:  ROXICODONE   Used for:  Displaced fracture of right femoral neck, closed, initial encounter (H)        Dose:  5 mg   Take 1 tablet (5 mg) by mouth every 3 hours as needed for moderate to severe pain   Quantity:  40 tablet   Refills:  0         CONTINUE these medicines which have NOT CHANGED        Dose / Directions    B-12 1000 MCG Tbcr   Used for:  Vitamin B 12 deficiency        Dose:  1000 mcg   Take 1,000 mcg by mouth daily   Quantity:  30 tablet   Refills:  11       BLOOD GLUCOSE TEST STRIPS Strp        by In Vitro route as needed   Refills:  0       calcium-vitamin D 600-400 MG-UNIT per tablet   Commonly known as:  CALTRATE        Dose:  1 tablet   Take 1 tablet by mouth every evening   Refills:  0       CYCLOBENZAPRINE HCL PO        Dose:  5 mg   Take 5 mg by mouth 3 times daily as needed for muscle spasms   Refills:  0       glipiZIDE 5 MG 24 hr tablet   Commonly known as:  glipiZIDE XL   Used for:  Type 2 diabetes mellitus with hyperglycemia, without long-term current use of insulin (H)        Dose:  5 mg   Take 1 tablet (5 mg) by mouth  daily   Quantity:  30 tablet   Refills:  11       lisinopril 5 MG tablet   Commonly known as:  PRINIVIL/ZESTRIL   Used for:  Essential hypertension with goal blood pressure less than 140/90        Dose:  5 mg   Take 1 tablet (5 mg) by mouth daily   Quantity:  30 tablet   Refills:  11       LOPERAMIDE HCL PO        Dose:  2 mg   Take 2 mg by mouth daily as needed   Refills:  0       MECLIZINE HCL PO        Dose:  12.5 mg   Take 12.5 mg by mouth 2 times daily as needed for dizziness   Refills:  0       multivitamin Tabs tablet        Dose:  1 tablet   Take 1 tablet by mouth daily   Quantity:  30 each   Refills:  0       omeprazole 20 MG CR capsule   Commonly known as:  priLOSEC   Used for:  Nausea        Dose:  20 mg   Take 1 capsule (20 mg) by mouth daily   Quantity:  30 capsule   Refills:  11       triamcinolone 0.1 % cream   Commonly known as:  KENALOG        Apply topically 2 times daily as needed for irritation   Refills:  0       TYLENOL PO        Dose:  650 mg   Take 650 mg by mouth every 4 hours as needed for mild pain or fever Do not exceed 4,000 mg of Acetaminophen from all sources in 24 hours   Refills:  0            Where to get your medicines      These medications were sent to [facility] [place], [place] - [address]  [address] [address]     Phone:  [phone]     furosemide 20 MG tablet         Some of these will need a paper prescription and others can be bought over the counter. Ask your nurse if you have questions.     Bring a paper prescription for each of these medications     oxyCODONE 5 MG IR tablet       You don't need a prescription for these medications     aspirin  MG EC tablet                Protect others around you: Learn how to safely use, store and throw away your medicines at www.disposemymeds.org.             Medication List: This is a list of all your medications and when to take them. Check marks below indicate your daily home schedule.  Keep this list as a reference.      Medications           Morning Afternoon Evening Bedtime As Needed    aspirin  MG EC tablet   Take 1 tablet (325 mg) by mouth daily                                B-12 1000 MCG Tbcr   Take 1,000 mcg by mouth daily                                BLOOD GLUCOSE TEST STRIPS Strp   by In Vitro route as needed                                calcium-vitamin D 600-400 MG-UNIT per tablet   Commonly known as:  CALTRATE   Take 1 tablet by mouth every evening                                CYCLOBENZAPRINE HCL PO   Take 5 mg by mouth 3 times daily as needed for muscle spasms                                furosemide 20 MG tablet   Commonly known as:  LASIX   Take 1 tablet (20 mg) by mouth daily   Last time this was given:  20 mg on 5/13/2017 12:07 PM                                glipiZIDE 5 MG 24 hr tablet   Commonly known as:  glipiZIDE XL   Take 1 tablet (5 mg) by mouth daily                                lisinopril 5 MG tablet   Commonly known as:  PRINIVIL/ZESTRIL   Take 1 tablet (5 mg) by mouth daily   Last time this was given:  5 mg on 5/13/2017  8:41 AM                                LOPERAMIDE HCL PO   Take 2 mg by mouth daily as needed                                MECLIZINE HCL PO   Take 12.5 mg by mouth 2 times daily as needed for dizziness                                multivitamin Tabs tablet   Take 1 tablet by mouth daily                                omeprazole 20 MG CR capsule   Commonly known as:  priLOSEC   Take 1 capsule (20 mg) by mouth daily   Last time this was given:  20 mg on 5/13/2017  8:41 AM                                oxyCODONE 5 MG IR tablet   Commonly known as:  ROXICODONE   Take 1 tablet (5 mg) by mouth every 3 hours as needed for moderate to severe pain   Last time this was given:  5 mg on 5/13/2017 11:29 AM                                triamcinolone 0.1 % cream   Commonly known as:  KENALOG   Apply topically 2 times daily as needed for  irritation                                TYLENOL PO   Take 650 mg by mouth every 4 hours as needed for mild pain or fever Do not exceed 4,000 mg of Acetaminophen from all sources in 24 hours   Last time this was given:  650 mg on 5/13/2017 11:29 AM

## 2017-05-10 ENCOUNTER — ANESTHESIA (OUTPATIENT)
Dept: SURGERY | Facility: CLINIC | Age: 82
DRG: 469 | End: 2017-05-10
Payer: MEDICARE

## 2017-05-10 PROBLEM — S72.001A FRACTURE OF FEMORAL NECK, RIGHT (H): Status: ACTIVE | Noted: 2017-05-10

## 2017-05-10 LAB
ABO + RH BLD: NORMAL
ABO + RH BLD: NORMAL
ALBUMIN UR-MCNC: 30 MG/DL
ANION GAP SERPL CALCULATED.3IONS-SCNC: 8 MMOL/L (ref 3–14)
APPEARANCE UR: CLEAR
BACTERIA SPEC CULT: NORMAL
BILIRUB UR QL STRIP: NEGATIVE
BLD GP AB SCN SERPL QL: NORMAL
BLOOD BANK CMNT PATIENT-IMP: NORMAL
BUN SERPL-MCNC: 14 MG/DL (ref 7–30)
CALCIUM SERPL-MCNC: 8.3 MG/DL (ref 8.5–10.1)
CHLORIDE SERPL-SCNC: 105 MMOL/L (ref 94–109)
CO2 SERPL-SCNC: 28 MMOL/L (ref 20–32)
COLOR UR AUTO: YELLOW
CREAT SERPL-MCNC: 0.73 MG/DL (ref 0.52–1.04)
ERYTHROCYTE [DISTWIDTH] IN BLOOD BY AUTOMATED COUNT: 12.5 % (ref 10–15)
GFR SERPL CREATININE-BSD FRML MDRD: 74 ML/MIN/1.7M2
GLUCOSE BLDC GLUCOMTR-MCNC: 133 MG/DL (ref 70–99)
GLUCOSE BLDC GLUCOMTR-MCNC: 146 MG/DL (ref 70–99)
GLUCOSE BLDC GLUCOMTR-MCNC: 149 MG/DL (ref 70–99)
GLUCOSE BLDC GLUCOMTR-MCNC: 154 MG/DL (ref 70–99)
GLUCOSE BLDC GLUCOMTR-MCNC: 228 MG/DL (ref 70–99)
GLUCOSE SERPL-MCNC: 152 MG/DL (ref 70–99)
GLUCOSE UR STRIP-MCNC: NEGATIVE MG/DL
HBA1C MFR BLD: 6.3 % (ref 4.3–6)
HCT VFR BLD AUTO: 38.9 % (ref 35–47)
HGB BLD-MCNC: 12.5 G/DL (ref 11.7–15.7)
HGB UR QL STRIP: NEGATIVE
KETONES UR STRIP-MCNC: 10 MG/DL
LEUKOCYTE ESTERASE UR QL STRIP: ABNORMAL
MCH RBC QN AUTO: 28.7 PG (ref 26.5–33)
MCHC RBC AUTO-ENTMCNC: 32.1 G/DL (ref 31.5–36.5)
MCV RBC AUTO: 89 FL (ref 78–100)
MICRO REPORT STATUS: NORMAL
MUCOUS THREADS #/AREA URNS LPF: PRESENT /LPF
NITRATE UR QL: NEGATIVE
PH UR STRIP: 5.5 PH (ref 5–7)
PLATELET # BLD AUTO: 142 10E9/L (ref 150–450)
POTASSIUM SERPL-SCNC: 3.7 MMOL/L (ref 3.4–5.3)
PROCALCITONIN SERPL-MCNC: 0.12 NG/ML
RBC # BLD AUTO: 4.36 10E12/L (ref 3.8–5.2)
RBC #/AREA URNS AUTO: 12 /HPF (ref 0–2)
SODIUM SERPL-SCNC: 141 MMOL/L (ref 133–144)
SP GR UR STRIP: 1.02 (ref 1–1.03)
SPECIMEN EXP DATE BLD: NORMAL
SPECIMEN SOURCE: NORMAL
URN SPEC COLLECT METH UR: ABNORMAL
UROBILINOGEN UR STRIP-MCNC: NORMAL MG/DL (ref 0–2)
WBC # BLD AUTO: 10.6 10E9/L (ref 4–11)
WBC #/AREA URNS AUTO: 11 /HPF (ref 0–2)

## 2017-05-10 PROCEDURE — 37000008 ZZH ANESTHESIA TECHNICAL FEE, 1ST 30 MIN: Performed by: ORTHOPAEDIC SURGERY

## 2017-05-10 PROCEDURE — 00000146 ZZHCL STATISTIC GLUCOSE BY METER IP

## 2017-05-10 PROCEDURE — 71000016 ZZH RECOVERY PHASE 1 LEVEL 3 FIRST HR: Performed by: ORTHOPAEDIC SURGERY

## 2017-05-10 PROCEDURE — 36000093 ZZH SURGERY LEVEL 4 1ST 30 MIN: Performed by: ORTHOPAEDIC SURGERY

## 2017-05-10 PROCEDURE — 83036 HEMOGLOBIN GLYCOSYLATED A1C: CPT | Performed by: FAMILY MEDICINE

## 2017-05-10 PROCEDURE — 25000128 H RX IP 250 OP 636: Performed by: PHYSICIAN ASSISTANT

## 2017-05-10 PROCEDURE — 81001 URINALYSIS AUTO W/SCOPE: CPT | Performed by: FAMILY MEDICINE

## 2017-05-10 PROCEDURE — 25800025 ZZH RX 258: Performed by: ORTHOPAEDIC SURGERY

## 2017-05-10 PROCEDURE — 25000125 ZZHC RX 250: Performed by: NURSE ANESTHETIST, CERTIFIED REGISTERED

## 2017-05-10 PROCEDURE — 87086 URINE CULTURE/COLONY COUNT: CPT | Performed by: FAMILY MEDICINE

## 2017-05-10 PROCEDURE — 85027 COMPLETE CBC AUTOMATED: CPT | Performed by: FAMILY MEDICINE

## 2017-05-10 PROCEDURE — 87185 SC STD ENZYME DETCJ PER NZM: CPT | Performed by: FAMILY MEDICINE

## 2017-05-10 PROCEDURE — 36415 COLL VENOUS BLD VENIPUNCTURE: CPT | Performed by: FAMILY MEDICINE

## 2017-05-10 PROCEDURE — 0SR9019 REPLACEMENT OF RIGHT HIP JOINT WITH METAL SYNTHETIC SUBSTITUTE, CEMENTED, OPEN APPROACH: ICD-10-PCS | Performed by: ORTHOPAEDIC SURGERY

## 2017-05-10 PROCEDURE — 99233 SBSQ HOSP IP/OBS HIGH 50: CPT | Performed by: FAMILY MEDICINE

## 2017-05-10 PROCEDURE — 87186 SC STD MICRODIL/AGAR DIL: CPT | Performed by: FAMILY MEDICINE

## 2017-05-10 PROCEDURE — 84145 PROCALCITONIN (PCT): CPT | Performed by: FAMILY MEDICINE

## 2017-05-10 PROCEDURE — 25000128 H RX IP 250 OP 636: Performed by: FAMILY MEDICINE

## 2017-05-10 PROCEDURE — 87040 BLOOD CULTURE FOR BACTERIA: CPT | Performed by: FAMILY MEDICINE

## 2017-05-10 PROCEDURE — 36000063 ZZH SURGERY LEVEL 4 EA 15 ADDTL MIN: Performed by: ORTHOPAEDIC SURGERY

## 2017-05-10 PROCEDURE — 87088 URINE BACTERIA CULTURE: CPT | Performed by: FAMILY MEDICINE

## 2017-05-10 PROCEDURE — 25000132 ZZH RX MED GY IP 250 OP 250 PS 637: Mod: GY | Performed by: PHYSICIAN ASSISTANT

## 2017-05-10 PROCEDURE — 27810169 ZZH OR IMPLANT GENERAL: Performed by: ORTHOPAEDIC SURGERY

## 2017-05-10 PROCEDURE — 40000305 ZZH STATISTIC PRE PROC ASSESS I: Performed by: ORTHOPAEDIC SURGERY

## 2017-05-10 PROCEDURE — 25000125 ZZHC RX 250: Performed by: FAMILY MEDICINE

## 2017-05-10 PROCEDURE — 80048 BASIC METABOLIC PNL TOTAL CA: CPT | Performed by: FAMILY MEDICINE

## 2017-05-10 PROCEDURE — 71000017 ZZH RECOVERY PHASE 1 LEVEL 3 EA ADDTL HR: Performed by: ORTHOPAEDIC SURGERY

## 2017-05-10 PROCEDURE — 27210794 ZZH OR GENERAL SUPPLY STERILE: Performed by: ORTHOPAEDIC SURGERY

## 2017-05-10 PROCEDURE — 37000009 ZZH ANESTHESIA TECHNICAL FEE, EACH ADDTL 15 MIN: Performed by: ORTHOPAEDIC SURGERY

## 2017-05-10 PROCEDURE — C1776 JOINT DEVICE (IMPLANTABLE): HCPCS | Performed by: ORTHOPAEDIC SURGERY

## 2017-05-10 PROCEDURE — 25000128 H RX IP 250 OP 636: Performed by: NURSE ANESTHETIST, CERTIFIED REGISTERED

## 2017-05-10 PROCEDURE — A9270 NON-COVERED ITEM OR SERVICE: HCPCS | Mod: GY | Performed by: PHYSICIAN ASSISTANT

## 2017-05-10 PROCEDURE — 12000000 ZZH R&B MED SURG/OB

## 2017-05-10 DEVICE — IMPLANTABLE DEVICE: Type: IMPLANTABLE DEVICE | Site: HIP | Status: FUNCTIONAL

## 2017-05-10 DEVICE — IMP HEAD FEMORAL DEPUY 28MM +1.5MM 1365-11-000: Type: IMPLANTABLE DEVICE | Site: HIP | Status: FUNCTIONAL

## 2017-05-10 DEVICE — BONE CEMENT SIMPLEX FULL DOSE 6191-1-001: Type: IMPLANTABLE DEVICE | Site: HIP | Status: FUNCTIONAL

## 2017-05-10 RX ORDER — AMOXICILLIN 250 MG
1-2 CAPSULE ORAL 2 TIMES DAILY
Status: DISCONTINUED | OUTPATIENT
Start: 2017-05-10 | End: 2017-05-13 | Stop reason: HOSPADM

## 2017-05-10 RX ORDER — ONDANSETRON 4 MG/1
4 TABLET, ORALLY DISINTEGRATING ORAL EVERY 30 MIN PRN
Status: DISCONTINUED | OUTPATIENT
Start: 2017-05-10 | End: 2017-05-10 | Stop reason: HOSPADM

## 2017-05-10 RX ORDER — CEFTRIAXONE 1 G/1
1 INJECTION, POWDER, FOR SOLUTION INTRAMUSCULAR; INTRAVENOUS ONCE
Status: COMPLETED | OUTPATIENT
Start: 2017-05-11 | End: 2017-05-11

## 2017-05-10 RX ORDER — DIPHENHYDRAMINE HYDROCHLORIDE 50 MG/ML
12.5 INJECTION INTRAMUSCULAR; INTRAVENOUS EVERY 6 HOURS PRN
Status: DISCONTINUED | OUTPATIENT
Start: 2017-05-10 | End: 2017-05-13 | Stop reason: HOSPADM

## 2017-05-10 RX ORDER — FENTANYL CITRATE 50 UG/ML
25-50 INJECTION, SOLUTION INTRAMUSCULAR; INTRAVENOUS
Status: DISCONTINUED | OUTPATIENT
Start: 2017-05-10 | End: 2017-05-10 | Stop reason: HOSPADM

## 2017-05-10 RX ORDER — METOCLOPRAMIDE 5 MG/1
5 TABLET ORAL EVERY 6 HOURS PRN
Status: DISCONTINUED | OUTPATIENT
Start: 2017-05-10 | End: 2017-05-13 | Stop reason: HOSPADM

## 2017-05-10 RX ORDER — ONDANSETRON 4 MG/1
4 TABLET, ORALLY DISINTEGRATING ORAL EVERY 6 HOURS PRN
Status: DISCONTINUED | OUTPATIENT
Start: 2017-05-10 | End: 2017-05-13 | Stop reason: HOSPADM

## 2017-05-10 RX ORDER — PHENYLEPHRINE HCL IN 0.9% NACL 1 MG/10 ML
100 SYRINGE (ML) INTRAVENOUS EVERY 10 MIN PRN
Status: DISCONTINUED | OUTPATIENT
Start: 2017-05-10 | End: 2017-05-10 | Stop reason: HOSPADM

## 2017-05-10 RX ORDER — SODIUM CHLORIDE, SODIUM LACTATE, POTASSIUM CHLORIDE, CALCIUM CHLORIDE 600; 310; 30; 20 MG/100ML; MG/100ML; MG/100ML; MG/100ML
INJECTION, SOLUTION INTRAVENOUS CONTINUOUS
Status: DISCONTINUED | OUTPATIENT
Start: 2017-05-10 | End: 2017-05-10 | Stop reason: HOSPADM

## 2017-05-10 RX ORDER — OXYCODONE HYDROCHLORIDE 5 MG/1
5 TABLET ORAL
Status: DISCONTINUED | OUTPATIENT
Start: 2017-05-10 | End: 2017-05-13 | Stop reason: HOSPADM

## 2017-05-10 RX ORDER — DIPHENHYDRAMINE HCL 12.5MG/5ML
12.5 LIQUID (ML) ORAL EVERY 6 HOURS PRN
Status: DISCONTINUED | OUTPATIENT
Start: 2017-05-10 | End: 2017-05-13 | Stop reason: HOSPADM

## 2017-05-10 RX ORDER — HYDROMORPHONE HYDROCHLORIDE 1 MG/ML
.3-.5 INJECTION, SOLUTION INTRAMUSCULAR; INTRAVENOUS; SUBCUTANEOUS EVERY 5 MIN PRN
Status: DISCONTINUED | OUTPATIENT
Start: 2017-05-10 | End: 2017-05-10 | Stop reason: HOSPADM

## 2017-05-10 RX ORDER — METOCLOPRAMIDE HYDROCHLORIDE 5 MG/ML
5 INJECTION INTRAMUSCULAR; INTRAVENOUS EVERY 6 HOURS PRN
Status: DISCONTINUED | OUTPATIENT
Start: 2017-05-10 | End: 2017-05-13 | Stop reason: HOSPADM

## 2017-05-10 RX ORDER — ACETAMINOPHEN 325 MG/1
650 TABLET ORAL EVERY 4 HOURS PRN
Status: DISCONTINUED | OUTPATIENT
Start: 2017-05-13 | End: 2017-05-10

## 2017-05-10 RX ORDER — BUPIVACAINE HYDROCHLORIDE 7.5 MG/ML
INJECTION, SOLUTION INTRASPINAL PRN
Status: DISCONTINUED | OUTPATIENT
Start: 2017-05-10 | End: 2017-05-10

## 2017-05-10 RX ORDER — ONDANSETRON 2 MG/ML
4 INJECTION INTRAMUSCULAR; INTRAVENOUS EVERY 6 HOURS PRN
Status: DISCONTINUED | OUTPATIENT
Start: 2017-05-10 | End: 2017-05-13 | Stop reason: HOSPADM

## 2017-05-10 RX ORDER — CEFAZOLIN SODIUM 2 G/100ML
2 INJECTION, SOLUTION INTRAVENOUS
Status: COMPLETED | OUTPATIENT
Start: 2017-05-10 | End: 2017-05-10

## 2017-05-10 RX ORDER — NALOXONE HYDROCHLORIDE 0.4 MG/ML
.1-.4 INJECTION, SOLUTION INTRAMUSCULAR; INTRAVENOUS; SUBCUTANEOUS
Status: DISCONTINUED | OUTPATIENT
Start: 2017-05-10 | End: 2017-05-13 | Stop reason: HOSPADM

## 2017-05-10 RX ORDER — ACETAMINOPHEN 650 MG/1
650 SUPPOSITORY RECTAL EVERY 4 HOURS PRN
Status: DISCONTINUED | OUTPATIENT
Start: 2017-05-10 | End: 2017-05-10

## 2017-05-10 RX ORDER — PROPOFOL 10 MG/ML
INJECTION, EMULSION INTRAVENOUS CONTINUOUS PRN
Status: DISCONTINUED | OUTPATIENT
Start: 2017-05-10 | End: 2017-05-10

## 2017-05-10 RX ORDER — PROCHLORPERAZINE MALEATE 5 MG
5 TABLET ORAL EVERY 6 HOURS PRN
Status: DISCONTINUED | OUTPATIENT
Start: 2017-05-10 | End: 2017-05-13 | Stop reason: HOSPADM

## 2017-05-10 RX ORDER — EPHEDRINE SULFATE 50 MG/ML
10 INJECTION, SOLUTION INTRAVENOUS ONCE
Status: CANCELLED | OUTPATIENT
Start: 2017-05-10 | End: 2017-05-10

## 2017-05-10 RX ORDER — LIDOCAINE HYDROCHLORIDE 10 MG/ML
INJECTION, SOLUTION INFILTRATION; PERINEURAL PRN
Status: DISCONTINUED | OUTPATIENT
Start: 2017-05-10 | End: 2017-05-10

## 2017-05-10 RX ORDER — CEFTRIAXONE 1 G/1
1 INJECTION, POWDER, FOR SOLUTION INTRAMUSCULAR; INTRAVENOUS ONCE
Status: COMPLETED | OUTPATIENT
Start: 2017-05-10 | End: 2017-05-10

## 2017-05-10 RX ORDER — ACETAMINOPHEN 325 MG/1
650 TABLET ORAL EVERY 4 HOURS PRN
Status: DISCONTINUED | OUTPATIENT
Start: 2017-05-10 | End: 2017-05-13 | Stop reason: HOSPADM

## 2017-05-10 RX ORDER — LIDOCAINE 40 MG/G
CREAM TOPICAL
Status: DISCONTINUED | OUTPATIENT
Start: 2017-05-10 | End: 2017-05-13 | Stop reason: HOSPADM

## 2017-05-10 RX ORDER — ACETAMINOPHEN 650 MG/1
650 SUPPOSITORY RECTAL EVERY 4 HOURS PRN
Status: DISCONTINUED | OUTPATIENT
Start: 2017-05-10 | End: 2017-05-13 | Stop reason: HOSPADM

## 2017-05-10 RX ORDER — HYDROMORPHONE HCL/0.9% NACL/PF 0.2MG/0.2
0.2 SYRINGE (ML) INTRAVENOUS
Status: DISCONTINUED | OUTPATIENT
Start: 2017-05-10 | End: 2017-05-11

## 2017-05-10 RX ORDER — FENTANYL CITRATE 50 UG/ML
INJECTION, SOLUTION INTRAMUSCULAR; INTRAVENOUS PRN
Status: DISCONTINUED | OUTPATIENT
Start: 2017-05-10 | End: 2017-05-10

## 2017-05-10 RX ORDER — ONDANSETRON 2 MG/ML
4 INJECTION INTRAMUSCULAR; INTRAVENOUS EVERY 30 MIN PRN
Status: DISCONTINUED | OUTPATIENT
Start: 2017-05-10 | End: 2017-05-10 | Stop reason: HOSPADM

## 2017-05-10 RX ADMIN — Medication 100 MCG: at 13:02

## 2017-05-10 RX ADMIN — INSULIN ASPART 1 UNITS: 100 INJECTION, SOLUTION INTRAVENOUS; SUBCUTANEOUS at 17:08

## 2017-05-10 RX ADMIN — ACETAMINOPHEN 650 MG: 325 TABLET, FILM COATED ORAL at 18:43

## 2017-05-10 RX ADMIN — HYDROMORPHONE HYDROCHLORIDE 0.2 MG: 1 INJECTION, SOLUTION INTRAMUSCULAR; INTRAVENOUS; SUBCUTANEOUS at 02:22

## 2017-05-10 RX ADMIN — MIDAZOLAM HYDROCHLORIDE 2 MG: 1 INJECTION, SOLUTION INTRAMUSCULAR; INTRAVENOUS at 11:11

## 2017-05-10 RX ADMIN — PANTOPRAZOLE SODIUM 40 MG: 40 INJECTION, POWDER, FOR SOLUTION INTRAVENOUS at 17:04

## 2017-05-10 RX ADMIN — PHENYLEPHRINE HYDROCHLORIDE 200 MCG: 10 INJECTION, SOLUTION INTRAMUSCULAR; INTRAVENOUS; SUBCUTANEOUS at 11:48

## 2017-05-10 RX ADMIN — BUPIVACAINE HYDROCHLORIDE IN DEXTROSE 1.8 ML: 7.5 INJECTION, SOLUTION SUBARACHNOID at 11:18

## 2017-05-10 RX ADMIN — CEFAZOLIN SODIUM 1 G: 1 INJECTION, SOLUTION INTRAVENOUS at 18:47

## 2017-05-10 RX ADMIN — CEFAZOLIN SODIUM 2 G: 2 INJECTION, SOLUTION INTRAVENOUS at 11:08

## 2017-05-10 RX ADMIN — PHENYLEPHRINE HYDROCHLORIDE 100 MCG: 10 INJECTION, SOLUTION INTRAMUSCULAR; INTRAVENOUS; SUBCUTANEOUS at 11:58

## 2017-05-10 RX ADMIN — MIDAZOLAM HYDROCHLORIDE 1 MG: 1 INJECTION, SOLUTION INTRAMUSCULAR; INTRAVENOUS at 11:25

## 2017-05-10 RX ADMIN — PHENYLEPHRINE HYDROCHLORIDE 200 MCG: 10 INJECTION, SOLUTION INTRAMUSCULAR; INTRAVENOUS; SUBCUTANEOUS at 12:23

## 2017-05-10 RX ADMIN — PROPOFOL 25 MCG/KG/MIN: 10 INJECTION, EMULSION INTRAVENOUS at 11:26

## 2017-05-10 RX ADMIN — ONDANSETRON 4 MG: 2 INJECTION INTRAMUSCULAR; INTRAVENOUS at 14:10

## 2017-05-10 RX ADMIN — MIDAZOLAM HYDROCHLORIDE 2 MG: 1 INJECTION, SOLUTION INTRAMUSCULAR; INTRAVENOUS at 11:16

## 2017-05-10 RX ADMIN — FENTANYL CITRATE 50 MCG: 50 INJECTION, SOLUTION INTRAMUSCULAR; INTRAVENOUS at 11:11

## 2017-05-10 RX ADMIN — INSULIN ASPART 1 UNITS: 100 INJECTION, SOLUTION INTRAVENOUS; SUBCUTANEOUS at 05:28

## 2017-05-10 RX ADMIN — PHENYLEPHRINE HYDROCHLORIDE 200 MCG: 10 INJECTION, SOLUTION INTRAMUSCULAR; INTRAVENOUS; SUBCUTANEOUS at 12:07

## 2017-05-10 RX ADMIN — PHENYLEPHRINE HYDROCHLORIDE 200 MCG: 10 INJECTION, SOLUTION INTRAMUSCULAR; INTRAVENOUS; SUBCUTANEOUS at 12:04

## 2017-05-10 RX ADMIN — EPINEPHRINE 0.2 MG: 1 INJECTION, SOLUTION INTRAMUSCULAR; SUBCUTANEOUS at 11:18

## 2017-05-10 RX ADMIN — CEFTRIAXONE SODIUM 1 G: 1 INJECTION, POWDER, FOR SOLUTION INTRAMUSCULAR; INTRAVENOUS at 05:23

## 2017-05-10 RX ADMIN — PHENYLEPHRINE HYDROCHLORIDE 200 MCG: 10 INJECTION, SOLUTION INTRAMUSCULAR; INTRAVENOUS; SUBCUTANEOUS at 11:37

## 2017-05-10 RX ADMIN — FENTANYL CITRATE 50 MCG: 50 INJECTION, SOLUTION INTRAMUSCULAR; INTRAVENOUS at 11:25

## 2017-05-10 RX ADMIN — SODIUM CHLORIDE, POTASSIUM CHLORIDE, SODIUM LACTATE AND CALCIUM CHLORIDE: 600; 310; 30; 20 INJECTION, SOLUTION INTRAVENOUS at 13:03

## 2017-05-10 RX ADMIN — SODIUM CHLORIDE, POTASSIUM CHLORIDE, SODIUM LACTATE AND CALCIUM CHLORIDE: 600; 310; 30; 20 INJECTION, SOLUTION INTRAVENOUS at 10:57

## 2017-05-10 RX ADMIN — LIDOCAINE HYDROCHLORIDE 5 ML: 10 INJECTION, SOLUTION INFILTRATION; PERINEURAL at 11:18

## 2017-05-10 NOTE — PLAN OF CARE
TCO update  Patient is resting comfortably.   Daughter at bedside. All questions answered and surgical procedure discussed.  Patient has been NPO.  Plan is for surgery at 11:30 today with Dr. Greenfield. Patient should be ready for transport to PACU by 10:30.     Continue bedrest, NPO status.  [place] already in place

## 2017-05-10 NOTE — PLAN OF CARE
Problem: Goal Outcome Summary  Goal: Goal Outcome Summary  Outcome: No Change  [place] NSG TRANSPORT NOTE  Data:   Reason for Transport:  surgery     Lulu Carlton was transported to [facility] via bed at 1045.  Patient was accompanied by Nursing Assistant. Equipment used for transport: Oxygen  Nasal Cannula. Family was aware of reason for transport: yes. Patient has ring on. Unable to remove, taped.     Action:  Report: given to Lucia     Response:  Patient's condition when transferred off unit was stable.     Susie Sawyer

## 2017-05-10 NOTE — OR NURSING
Pt had sm emesis around 2pm, feeling better now after Zofran. No longer shivering. Will transfer back to her room.

## 2017-05-10 NOTE — PLAN OF CARE
Problem: Goal Outcome Summary  Goal: Goal Outcome Summary  Outcome: Improving  [place] NSG TRANSPORT NOTE  Data:   Reason for Transport:  Return from surgery     Lulu Carlton was transported from PACU via bed at 1435.  Patient was accompanied by Registered Nurse. Equipment used for transport: Oxygen  Nasal Cannula. Family was aware of reason for transport: yes     Action:  Report: received from Marilyn     Response:  Patient's condition upon return was stable.     Susie Sawyer

## 2017-05-10 NOTE — ANESTHESIA POSTPROCEDURE EVALUATION
Patient: Lulu Carlton    Procedure(s):  Open reduction internal fixation right hip bipolar gregg arthroplasty. - Wound Class: I-Clean    Diagnosis:Right hip fracture  Diagnosis Additional Information: No value filed.    Anesthesia Type:  Spinal    Note:  Anesthesia Post Evaluation    Patient location during evaluation: Bedside  Patient participation: Able to fully participate in evaluation  Level of consciousness: responsive to light touch  Pain management: adequate  Airway patency: patent  Cardiovascular status: acceptable, hemodynamically stable and blood pressure returned to baseline  Respiratory status: acceptable  Hydration status: acceptable  PONV: none     Anesthetic complications: None          Last vitals:  Vitals:    05/10/17 1345 05/10/17 1400 05/10/17 1425   BP: 142/74 (!) 111/97 119/69   Pulse:      Resp: 20 20 20   Temp:   37.2  C (98.9  F)   SpO2: 96% 96% 97%         Electronically Signed By: Toni Woodruff CRNA, APRN CRNA  May 10, 2017  2:35 PM

## 2017-05-10 NOTE — OR NURSING
Pt tolerating coffee and this seems to be helping her feeling of being cold, decrease in shivering.

## 2017-05-10 NOTE — PROGRESS NOTES
Pt remains confused but not trying to get OOB.  Cms good, chahal patent.  Pt took off decuring device for chahal twice.  It was replaced and covered with coban.  Since IV and stat loc have had coban, pt is not pulling at them.  It could be also due to the fact I removed the PCD's and she quieted down right away.  Pt has slept on and off this evening.  CRISTY Hallman RN

## 2017-05-10 NOTE — PROGRESS NOTES
"Recheck of temp 100.1. Rocephin started after clamping [place] to get a clean sample, which was sent to lab.   Was medicated with Dilaudid 0.2mg overnight for rubbing of leg and reporting her leg was \"sore\".  Did not medicate with Tylenol overnight due to pt showing no signs of discomfort post Dilaudid and was being careful to not fully wake patient up over night as pt was able to return to sleep and sleep and intervals. Attempt to complete cares when pt was awake to not to disturb sleep.   Pt slept on and off. 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   When awake, pt is very busy. Found fussing with IV and IV tubing. Noted to be fussing to rearrange covers and gown several times. Attempted to get legs out of bed, explaining she had to get up. And speaking of her apartment.  Able to redirect behaviors but due to forgetfulness had to be redirected often  "

## 2017-05-10 NOTE — PROGRESS NOTES
[facility] Hospitalist Service      Subjective:  She doesn't offer specific complaints  Had temp overnight  Got rocephin overnight  UA still pending-recent UC showed contaminant    Review of Systems:  C: NEGATIVE for fever, chills, change in weight  I: NEGATIVE for worrisome rashes, moles or lesions  E: NEGATIVE for vision changes or irritation  E/M: NEGATIVE for ear, mouth and throat problems  R: NEGATIVE for significant cough or SOB  B: NEGATIVE for masses, tenderness or discharge  CV: NEGATIVE for chest pain, palpitations or peripheral edema  GI: NEGATIVE for nausea, abdominal pain, heartburn, or change in bowel habits  : NEGATIVE for frequency, dysuria, or hematuria  M: NEGATIVE for significant arthralgias or myalgia  N: NEGATIVE for weakness, dizziness or paresthesias  E: NEGATIVE for temperature intolerance, skin/hair changes  H: NEGATIVE for bleeding problems  P: NEGATIVE for changes in mood or affect    Physical Exam:  Vitals Were Reviewed    Patient Vitals for the past 16 hrs:   BP Temp Temp src Pulse Heart Rate Resp SpO2   05/10/17 0736 155/66 100.3  F (37.9  C) Oral 125 - 18 97 %   05/10/17 0350 149/57 100.1  F (37.8  C) Oral 81 - 18 95 %   05/10/17 0214 167/69 100.6  F (38.1  C) Oral 83 - 20 92 %   05/09/17 1910 161/69 98.9  F (37.2  C) Oral 88 - 18 95 %   05/09/17 1638 148/72 97.7  F (36.5  C) Oral - 88 18 96 %   05/09/17 1615 140/77 - - - - - 98 %         Intake/Output Summary (Last 24 hours) at 05/10/17 0812  Last data filed at 05/10/17 0600   Gross per 24 hour   Intake              110 ml   Output             1405 ml   Net            -1295 ml       GENERAL APPEARANCE: healthy, alert and no distress--quite confused  EYES: conjunctiva clear, eyes grossly normal  RESP: some dependent crackles  CV: regular rate and rhythm, normal S1 S2, no S3 or S4 and no murmur, click or rub   ABDOMEN: soft, nontender, no HSM or masses and bowel sounds normal  MS: no clubbing, cyanosis; tr edema  SKIN: clear  without significant rashes or lesions    Lab:  Recent Labs   Lab Test  05/10/17   0405  05/09/17   1300   NA  141  139   POTASSIUM  3.7  4.2   CHLORIDE  105  104   CO2  28  26   ANIONGAP  8  9   GLC  152*  247*   BUN  14  16   CR  0.73  0.75   SLIM  8.3*  9.0     CBC RESULTS:   Recent Labs   Lab Test  05/10/17   0405  05/09/17   1300   WBC  10.6  15.0*   RBC  4.36  4.43   HGB  12.5  12.8   HCT  38.9  39.5   PLT  142*  167       Results for orders placed or performed during the hospital encounter of 05/09/17 (from the past 24 hour(s))   XR Femur Right 2 Views    Narrative    XR PELVIS 1/2 VW, XR FEMUR RT 2 VW  5/9/2017 12:13 PM    HISTORY:  pain    COMPARISON:  None.      Impression    IMPRESSION:  Diffuse osteopenia. Right femoral neck shaft with  cephalad displacement of the shaft relative to the head. Degenerative  changes of the left hip with cephalad joint space narrowing.  Degenerative changes of the lumbar spine.     MOSES CUELLAR MD   Knee XR, 2 view, right    Narrative    XR KNEE RT 1 /2 VW  5/9/2017 12:13 PM    HISTORY:  Fall, pain    COMPARISON:  None.      Impression    IMPRESSION:  Frontal view somewhat limited by positioning. The tibial  plateau is not optimally assessed but appears grossly normal.  Otherwise negative. No fractures identified.     MOSES CUELLAR MD   XR Pelvis 1/2 Views    Narrative    XR PELVIS 1/2 VW, XR FEMUR RT 2 VW  5/9/2017 12:13 PM    HISTORY:  pain    COMPARISON:  None.      Impression    IMPRESSION:  Diffuse osteopenia. Right femoral neck shaft with  cephalad displacement of the shaft relative to the head. Degenerative  changes of the left hip with cephalad joint space narrowing.  Degenerative changes of the lumbar spine.     MOSES CUELLAR MD   CBC with platelets differential   Result Value Ref Range    WBC 15.0 (H) 4.0 - 11.0 10e9/L    RBC Count 4.43 3.8 - 5.2 10e12/L    Hemoglobin 12.8 11.7 - 15.7 g/dL    Hematocrit 39.5 35.0 - 47.0 %    MCV 89 78 - 100 fl    MCH 28.9 26.5 -  33.0 pg    MCHC 32.4 31.5 - 36.5 g/dL    RDW 12.5 10.0 - 15.0 %    Platelet Count 167 150 - 450 10e9/L    Diff Method Automated Method     % Neutrophils 87.7 %    % Lymphocytes 5.5 %    % Monocytes 5.4 %    % Eosinophils 1.0 %    % Basophils 0.2 %    % Immature Granulocytes 0.2 %    Absolute Neutrophil 13.1 (H) 1.6 - 8.3 10e9/L    Absolute Lymphocytes 0.8 0.8 - 5.3 10e9/L    Absolute Monocytes 0.8 0.0 - 1.3 10e9/L    Absolute Eosinophils 0.2 0.0 - 0.7 10e9/L    Absolute Basophils 0.0 0.0 - 0.2 10e9/L    Abs Immature Granulocytes 0.0 0 - 0.4 10e9/L   Comprehensive metabolic panel   Result Value Ref Range    Sodium 139 133 - 144 mmol/L    Potassium 4.2 3.4 - 5.3 mmol/L    Chloride 104 94 - 109 mmol/L    Carbon Dioxide 26 20 - 32 mmol/L    Anion Gap 9 3 - 14 mmol/L    Glucose 247 (H) 70 - 99 mg/dL    Urea Nitrogen 16 7 - 30 mg/dL    Creatinine 0.75 0.52 - 1.04 mg/dL    GFR Estimate 72 >60 mL/min/1.7m2    GFR Estimate If Black 87 >60 mL/min/1.7m2    Calcium 9.0 8.5 - 10.1 mg/dL    Bilirubin Total 0.5 0.2 - 1.3 mg/dL    Albumin 3.3 (L) 3.4 - 5.0 g/dL    Protein Total 6.5 (L) 6.8 - 8.8 g/dL    Alkaline Phosphatase 93 40 - 150 U/L    ALT 22 0 - 50 U/L    AST 18 0 - 45 U/L   ABO/Rh type and screen   Result Value Ref Range    ABO Pending     RH(D) Pending     Antibody Screen Pending     Test Valid Only At Pending     Specimen Expires Pending    XR Chest 1 View    Narrative    XR CHEST 1 VW 5/9/2017 1:30 PM    COMPARISON: 5/1/2017    HISTORY: Preoperative evaluation      Impression    IMPRESSION: Mixed interstitial and airspace opacities over both lungs,  most likely representing mild pulmonary edema. No pneumothorax on  either side.    FRANCE THOMAS   Orthopedics IP Consult: Patient to be seen: Routine - within 24 hours; Hip fracture; Consultant may enter orders: Yes    Narrative    Riky Greenfield MD     5/10/2017  6:44 AM  [facility] Consultation    Consultation - [facility]  Level of  consult: Consult, follow and place orders    Lulu Carlton,  1921, MRN [id number]     Admitting Dx: Right hip fracture, femoral neck     PCP: Todd Villeda, [phone]     Code status:  No Order     Extended Emergency Contact Information  Primary Emergency Contact: Benigno Shane  Address: [address] [address] [facility]  Home Phone: [phone]  Mobile Phone: [phone]  Relation: Son  Secondary Emergency Contact: Keya Kong   [facility]  Home Phone: [phone]  Mobile Phone: [phone]  Relation: Daughter     Assessment:  Right femoral neck fracture.    Plan:  Right bipolar hip replacement. To be performed 5/10/17 at   [facility].    Mild hypoxia, CXR suggests mild pulmonary edema, to be diuresed   tonight. Mild pyuria, culture pending. DM type 2     Chief Complaint  Right hip pain after fall early this AM.     HPI  We have been requested by Dr Martin to evaluate Lulu Carlton who is a 89 year old year old female for right hip   fracture.     History is obtained from the patient and the patient's daughter.     Past Medical History  Past Medical History:   Diagnosis Date     Anemia, unspecified hosp. 4/3/07 [place]      Central nervous system complication hosp. 4/3/07 [place]     Depressive disorder, not elsewhere classified      Myasthenia gravis      Other malaise and fatigue hosp. 4/3/07 [place]     intermittent episodes of slurred speech, headaches, some   confusion w/general weakness.      Other urinary incontinence      Thoracic or lumbosacral neuritis or radiculitis, unspecified     lumbar radiculopathy involving right leg.      Unspecified essential hypertension        Surgical History  Past Surgical History:   Procedure Laterality Date     SURGICAL HISTORY OF -       thymus removal      SURGICAL HISTORY OF -       cholecystectomy        Social History  Social History     Social History     Marital status:       Spouse name: N/A     Number of children: N/A     Years of education: N/A     Occupational History     Not on file.     Social History Main Topics     Smoking status: Never Smoker     Smokeless tobacco: Not on file     Alcohol use No     Drug use: No     Sexual activity: Not on file     Other Topics Concern     Not on file     Social History Narrative       Family History  No family history on file.     Allergies:  Hydrocodone-acetaminophen; Trazodone; and Metformin      Current Medications:  Current Facility-Administered Medications   Medication     0.9% sodium chloride infusion     Current Outpatient Prescriptions   Medication Sig     Acetaminophen (TYLENOL PO) Take 650 mg by mouth every 4 hours   as needed for mild pain or fever Do not exceed 4,000 mg of   Acetaminophen from all sources in 24 hours     omeprazole (PRILOSEC) 20 MG CR capsule Take 1 capsule (20 mg)   by mouth daily     glipiZIDE (GLIPIZIDE XL) 5 MG 24 hr tablet Take 1 tablet (5 mg)   by mouth daily     multivitamin (OCUVITE) TABS tablet Take 1 tablet by mouth daily       lisinopril (PRINIVIL,ZESTRIL) 5 MG tablet Take 1 tablet (5 mg)   by mouth daily     Cyanocobalamin (B-12) 1000 MCG TBCR Take 1,000 mcg by mouth   daily     calcium-vitamin D (CALTRATE) 600-400 MG-UNIT per tablet Take 1   tablet by mouth every evening     Glucose Blood (BLOOD GLUCOSE TEST STRIPS) STRP by In Vitro   route as needed     CYCLOBENZAPRINE HCL PO Take 5 mg by mouth 3 times daily as   needed for muscle spasms     LOPERAMIDE HCL PO Take 2 mg by mouth daily as needed     MECLIZINE HCL PO Take 12.5 mg by mouth 2 times daily as needed   for dizziness     triamcinolone (KENALOG) 0.1 % cream Apply topically 2 times   daily as needed for irritation       Review of Systems:  The Review of Systems is negative other than noted in the HPI    Physical Exam:Patient laying comfortably in bed. Moderate pain to   movements of right hip and leg. Neurovascularly intact to    bilateral lower extremities. Mild pleasant dementia. Removes   pulse ox sensor as she fidgets. Tender to palpation about right   hip. Pain with attempted movements.  Temp:  [97.7  F (36.5  C)] 97.7  F (36.5  C)  Heart Rate:  [91] 91  Resp:  [18] 18  BP: (130-138)/(62-76) 138/69  SpO2:  [92 %-94 %] 92 %         Pertinent Labs  Lab Results: personally reviewed.  Lab Results   Component Value Date    WBC 15.0 (H) 05/09/2017    HGB 12.8 05/09/2017    HCT 39.5 05/09/2017    MCV 89 05/09/2017     05/09/2017     No results for input(s): INR in the last 168 hours.    Pertinent Radiology  Radiology Results: images and radiology report reviewed  Recent Results (from the past 24 hour(s))   XR Femur Right 2 Views    Narrative    XR PELVIS 1/2 VW, XR FEMUR RT 2 VW  5/9/2017 12:13 PM    HISTORY:  pain    COMPARISON:  None.      Impression    IMPRESSION:  Diffuse osteopenia. Right femoral neck shaft with  cephalad displacement of the shaft relative to the head.   Degenerative  changes of the left hip with cephalad joint space narrowing.  Degenerative changes of the lumbar spine.     MOSES CUELLAR MD   Knee XR, 2 view, right    Narrative    XR KNEE RT 1 /2 VW  5/9/2017 12:13 PM    HISTORY:  Fall, pain    COMPARISON:  None.      Impression    IMPRESSION:  Frontal view somewhat limited by positioning. The   tibial  plateau is not optimally assessed but appears grossly normal.  Otherwise negative. No fractures identified.     MOSES CUELLAR MD   XR Pelvis 1/2 Views    Narrative    XR PELVIS 1/2 VW, XR FEMUR RT 2 VW  5/9/2017 12:13 PM    HISTORY:  pain    COMPARISON:  None.      Impression    IMPRESSION:  Diffuse osteopenia. Right femoral neck shaft with  cephalad displacement of the shaft relative to the head.   Degenerative  changes of the left hip with cephalad joint space narrowing.  Degenerative changes of the lumbar spine.     MOSES CUELLAR MD   XR Chest 1 View    Narrative    XR CHEST 1 VW 5/9/2017 1:30  PM    COMPARISON: 2017    HISTORY: Preoperative evaluation      Impression    IMPRESSION: Mixed interstitial and airspace opacities over both   lungs,  most likely representing mild pulmonary edema. No pneumothorax on  either side.    FRANCE THOMAS       Attestation:  I have reviewed today's vital signs, notes, medications, labs and   imaging.  Amount of time performed on this consult: 30 minutes.  Amount of time spent providing critical care service today: 15   minutes.  Care coordination / counseling time: 15 minutes  Face-to-face time: 15 minutes  Total time: 45 minutes     Lucas Bonilla PA-C   Echocardiogram Complete    Narrative    [id number]  [id number]  [id number]  [id number]^[email or web address]           [facility]  Echocardiography Laboratory  [address] [place] [address]        Name: EL BARRIENTOS  MRN: [id number]  : 1921  Study Date: 2017 02:14 PM  Age: 89 yrs  Gender: Female  Patient Location: [facility]  Reason For Study: Dyspnea  Ordering Physician: OSVALDO GRANT  Referring Physician: Todd Villeda  Performed By: Sandie Ochoa RDCS     BSA: 1.6 m2  Height: 59 in  Weight: 150 lb  HR: 76  BP: 138/69 mmHg  _____________________________________________________________________________  __        Procedure  Complete Portable Echo Adult.  _____________________________________________________________________________  __        Interpretation Summary     The left ventricle is normal in size. There is mild concentric left  ventricular hypertrophy. Left ventricular systolic function is normal. The  visual ejection fraction is estimated at 55-60%. Grade I or early diastolic  dysfunction. Paradoxical septum motion is noted.  The right ventricle is normal size. The right ventricular systolic function is  normal.  There is mild to moderate (1-2+) tricuspid regurgitation. The right  ventricular systolic pressure is approximated at 48.3 mmHg plus the right  atrial pressure.  Right ventricular systolic pressure is elevated, consistent  with moderate to severe pulmonary hypertension.  There is moderate trileaflet aortic sclerosis. No aortic regurgitation is  present. Mild valvular aortic stenosis. The peak AoV pressure gradient is 26.5  mmHg. The mean AoV pressure gradient is 14.2 mmHg.  No pericardial effusion.  No previous study for comparison.  _____________________________________________________________________________  __        Left Ventricle  The left ventricle is normal in size. There is mild concentric left  ventricular hypertrophy. Left ventricular systolic function is normal. The  visual ejection fraction is estimated at 55-60%. Grade I or early diastolic  dysfunction. Paradoxical septum motion is noted.     Right Ventricle  The right ventricle is normal size. The right ventricular systolic function is  normal.     Atria  Normal left atrial size. Right atrial size is normal. There is no color  Doppler evidence of an atrial shunt.     Mitral Valve  The mitral valve leaflets are mildly thickened. There is mild mitral annular  calcification. There is trace mitral regurgitation.        Tricuspid Valve  There is mild to moderate (1-2+) tricuspid regurgitation. The right  ventricular systolic pressure is approximated at 48.3 mmHg plus the right  atrial pressure. Right ventricular systolic pressure is elevated, consistent  with moderate to severe pulmonary hypertension.     Aortic Valve  There is moderate trileaflet aortic sclerosis. No aortic regurgitation is  present. Mild valvular aortic stenosis. The peak AoV pressure gradient is 26.5  mmHg. The mean AoV pressure gradient is 14.2 mmHg. The calculated aortic valve  are is 1.4 cm^2.     Pulmonic Valve  There is trace pulmonic valvular regurgitation. There is no pulmonic valvular  stenosis.     Vessels  The aortic root is normal size. Normal size ascending aorta. The IVC is normal  in size and reactivity with respiration, suggesting  normal central venous  pressure.     Pericardium  There is no pericardial effusion.        Rhythm  The rhythm was normal sinus.  _____________________________________________________________________________  __  MMode/2D Measurements & Calculations  IVSd: 1.3 cm     LVIDd: 4.7 cm  LVIDs: 3.1 cm  LVPWd: 1.2 cm  FS: 33.5 %  EDV(Teich): 101.0 ml  ESV(Teich): 38.1 ml  LV mass(C)d: 216.2 grams  LV mass(C)dI: 132.5 grams/m2  Ao root diam: 3.2 cm  LA dimension: 3.6 cm  asc Aorta Diam: 3.1 cm  LA/Ao: 1.1  LVOT diam: 2.2 cm  LVOT area: 3.8 cm2        Doppler Measurements & Calculations  MV E max rogelio: 107.6 cm/sec  MV A max rogelio: 117.9 cm/sec  MV E/A: 0.91  MV dec time: 0.20 sec  Ao V2 max: 257.4 cm/sec  Ao max P.5 mmHg  Ao V2 mean: 179.2 cm/sec  Ao mean P.2 mmHg  Ao V2 VTI: 48.6 cm  PATRICIA(I,D): 1.4 cm2  PATRICIA(V,D): 1.3 cm2  LV V1 max PG: 3.0 mmHg  LV V1 max: 87.0 cm/sec  LV V1 VTI: 18.4 cm  SV(LVOT): 69.8 ml  TR max rogelio: 347.5 cm/sec  TR max P.3 mmHg  PATRICIA Index (cm2/m2): 0.88  Lateral E/e': 13.1     Medial E/e': 18.8           _____________________________________________________________________________  __           Report approved by: Atilio Blankenship 2017 03:02 PM      Glucose by meter   Result Value Ref Range    Glucose 129 (H) 70 - 99 mg/dL   Glucose by meter   Result Value Ref Range    Glucose 145 (H) 70 - 99 mg/dL   Blood culture   Result Value Ref Range    Specimen Description Blood     Culture Micro Canceled, Test credited Duplicate request     Micro Report Status FINAL 05/10/2017    Glucose by meter   Result Value Ref Range    Glucose 146 (H) 70 - 99 mg/dL   Basic metabolic panel   Result Value Ref Range    Sodium 141 133 - 144 mmol/L    Potassium 3.7 3.4 - 5.3 mmol/L    Chloride 105 94 - 109 mmol/L    Carbon Dioxide 28 20 - 32 mmol/L    Anion Gap 8 3 - 14 mmol/L    Glucose 152 (H) 70 - 99 mg/dL    Urea Nitrogen 14 7 - 30 mg/dL    Creatinine 0.73 0.52 - 1.04 mg/dL    GFR Estimate 74 >60 mL/min/1.7m2     GFR Estimate If Black 90 >60 mL/min/1.7m2    Calcium 8.3 (L) 8.5 - 10.1 mg/dL   CBC with platelets   Result Value Ref Range    WBC 10.6 4.0 - 11.0 10e9/L    RBC Count 4.36 3.8 - 5.2 10e12/L    Hemoglobin 12.5 11.7 - 15.7 g/dL    Hematocrit 38.9 35.0 - 47.0 %    MCV 89 78 - 100 fl    MCH 28.7 26.5 - 33.0 pg    MCHC 32.1 31.5 - 36.5 g/dL    RDW 12.5 10.0 - 15.0 %    Platelet Count 142 (L) 150 - 450 10e9/L   Hemoglobin A1c   Result Value Ref Range    Hemoglobin A1C 6.3 (H) 4.3 - 6.0 %   UA with Microscopic reflex to Culture   Result Value Ref Range    Color Urine Yellow     Appearance Urine Clear     Glucose Urine Negative NEG mg/dL    Bilirubin Urine Negative NEG    Ketones Urine 10 (A) NEG mg/dL    Specific Gravity Urine 1.020 1.003 - 1.035    Blood Urine Negative NEG    pH Urine 5.5 5.0 - 7.0 pH    Protein Albumin Urine 30 (A) NEG mg/dL    Urobilinogen mg/dL Normal 0.0 - 2.0 mg/dL    Nitrite Urine Negative NEG    Leukocyte Esterase Urine Moderate (A) NEG    Source Catheterized Urine     WBC Urine Pending 0 - 2 /HPF    RBC Urine Pending 0 - 2 /HPF       Assessment and Plan:    Acute right femoral neck fracture.     Mild hypoxia with chest x-ray that suggests some pulmonary edema in a patient with no history of heart failure/echo with diastolic dysfunction and pulmonary htn-suspect acute exacerbation of diastolic heart failure.  May 10, 2017 diuresed 1405 cc urine, oxygen sat 97% two liters, looks comfortable    Fever to 100.6-source unclear, ddx includes trauma, uti or pulmonary, blood cultures taken    History of hearing loss.     Diabetes mellitus type 2.     History of hypertension.     gerd  On iv protonix    Prophylaxis-SCD      At this point I don't think that waiting is going to improve her odds of surgery.  Daughter understands she is at high risk  Avoid a lot of fluids.  Do not intubate after procedure as per family.  If problems with oxygenation/resp status after surgery--use oxygen, bipap if necessary,  but don't intubate       9:03 AM  Mild pyuria  Will continue rocephin until urine culture is back

## 2017-05-10 NOTE — PROGRESS NOTES
[facility]    Hospital Medicine   Care Update;  Date of Service: 5/10/2017     I was called due to fever.    Patient with known UTI and post-op from right hip fracture surgery.  Fever of 101.6.  Continuing to require 2L NC oxygen. Pleasantly demented.  VS are otherwise stable.    Suspect ongoing fever is multifactorial; UTI and post-operative body response given stress.  Patient is currently receiving both rocephin IV Q24 hours and was started on IV ancef Q8 hours.  Patient has yet to receive acetaminophen products of any kind.    Assessment: fever, likely multifactorial    Plan: continue to monitor closely  Administer acetaminophen, 650mg PO    Janki Ovalle PA-C

## 2017-05-10 NOTE — CONSULTS
CARE TRANSITION SOCIAL WORK INITIAL ASSESSMENT:  Reason For Consult: discharge planning   Met with: Patient and Family.    DATA  Active Problems:    Hip fracture (H)    Displaced fracture of right femoral neck (H)       Primary Care Clinic Name:  ([facility])  Primary Care MD Name:  (Ronal)  Contact information and PCP information verified: Yes      ASSESSMENT  Cognitive Status: alert.       Resources List: Transitional Care     Lives With: facility resident                    Insurance Concerns: No Insurance issues identified          This writer met with pt and pts two dtrs, introduced self and role. Pt currently lives at [facility] (phone: [phone] Fax: [phone]). Discussed with dtrs TCU on dc, they are in agreement. Patient was provided with Medicare certified nursing home list. Pts choices are as follows [facility] (Phone: [phone] Fax: [phone]) and [facility] (Phone: [phone]) Fax: ([phone]). First choice is [place] d/t location of family. Referrals pending, CTS to follow.     PLAN    TCU referrals pending      Tania Marin MSW, LICSW, [phone]

## 2017-05-10 NOTE — ANESTHESIA PREPROCEDURE EVALUATION
Anesthesia Evaluation     . Pt has had prior anesthetic. Type: General, Regional and MAC    No history of anesthetic complications          ROS/MED HX    ENT/Pulmonary: Comment: Mild pulmonary edema, diuresed, on O2 2LPM, no resp distress, good echo      Neurologic: Comment: Hx myasthenia gravis, [place]      Cardiovascular: Comment: Old MI on EKG, denies CP    Mild AS    EF 55-60%    (+) hypertension--CAD, -past MI,-. : . . . :. . Previous cardiac testing Echodate:2017results:[facility]  Echocardiography Laboratory  [address] [place] [address]        Name: EL BARRIENTOS  MRN: [id number]  : 1921  Study Date: 2017 02:14 PM  Age: 89 yrs  Gender: Female  Patient Location: [facility]  Reason For Study: Dyspnea  Ordering Physician: OSVALDO GRANT  Referring Physician: Todd Villeda  Performed By: Sandie Ochoa RDCS     BSA: 1.6 m2  Height: 59 in  Weight: 150 lb  HR: 76  BP: 138/69 mmHg  _____________________________________________________________________________  __        Procedure  Complete Portable Echo Adult.  _____________________________________________________________________________  __        Interpretation Summary     The left ventricle is normal in size. There is mild concentric left  ventricular hypertrophy. Left ventricular systolic function is normal. The  visual ejection fraction is estimated at 55-60%. Grade I or early diastolic  dysfunction. Paradoxical septum motion is noted.  The right ventricle is normal size. The right ventricular systolic function is  normal.  There is mild to moderate (1-2+) tricuspid regurgitation. The right  ventricular systolic pressure is approximated at 48.3 mmHg plus the right  atrial pressure. Right ventricular systolic pressure is elevated, consistent  with moderate to severe pulmonary hypertension.  There is moderate trileaflet aortic sclerosis. No aortic regurgitation is  present. Mild valvular aortic stenosis. The peak AoV  pressure gradient is 26.5  mmHg. The mean AoV pressure gradient is 14.2 mmHg.  No pericardial effusion.  No previous study for comparison.  _____________________________________________________________________________  __        Left Ventricle  The left ventricle is normal in size. There is mild concentric left  ventricular hypertrophy. Left ventricular systolic function is normal. The  visual ejection fraction is estimated at 55-60%. Grade I or early diastolic  dysfunction. Paradoxical septum motion is noted.     Right Ventricle  The right ventricle is normal size. The right ventricular systolic function is  normal.     Atria  Normal left atrial size. Right atrial size is normal. There is no color  Doppler evidence of an atrial shunt.     Mitral Valve  The mitral valve leaflets are mildly thickened. There is mild mitral annular  calcification. There is trace mitral regurgitation.        Tricuspid Valve  There is mild to moderate (1-2+) tricuspid regurgitation. The right  ventricular systolic pressure is approximated at 48.3 mmHg plus the right  atrial pressure. Right ventricular systolic pressure is elevated, consistent  with moderate to severe pulmonary hypertension.     Aortic Valve  There is moderate trileaflet aortic sclerosis. No aortic regurgitation is  present. Mild valvular aortic stenosis. The peak AoV pressure gradient is 26.5  mmHg. The mean AoV pressure gradient is 14.2 mmHg. The calculated aortic valve  are is 1.4 cm^2.     Pulmonic Valve  There is trace pulmonic valvular regurgitation. There is no pulmonic valvular  stenosis.     Vessels  The aortic root is normal size. Normal size ascending aorta. The IVC is normal  in size and reactivity with respiration, suggesting normal central venous  pressure.     Pericardium  There is no pericardial effusion.        Rhythm  The rhythm was normal sinus.  _____________________________________________________________________________  __  MMode/2D Measurements &  Calculations  IVSd: 1.3 cm     LVIDd: 4.7 cm  LVIDs: 3.1 cm  LVPWd: 1.2 cm  FS: 33.5 %  EDV(Teich): 101.0 ml  ESV(Teich): 38.1 ml  LV mass(C)d: 216.2 grams  LV mass(C)dI: 132.5 grams/m2  Ao root diam: 3.2 cm  LA dimension: 3.6 cm  asc Aorta Diam: 3.1 cm  LA/Ao: 1.1  LVOT diam: 2.2 cm  LVOT area: 3.8 cm2        Doppler Measurements & Calculations  MV E max rogelio: 107.6 cm/sec  MV A max rogelio: 117.9 cm/sec  MV E/A: 0.91  MV dec time: 0.20 sec  Ao V2 max: 257.4 cm/sec  Ao max P.5 mmHg  Ao V2 mean: 179.2 cm/sec  Ao mean P.2 mmHg  Ao V2 VTI: 48.6 cm  PATRICIA(I,D): 1.4 cm2  PATRICIA(V,D): 1.3 cm2  LV V1 max PG: 3.0 mmHg  LV V1 max: 87.0 cm/sec  LV V1 VTI: 18.4 cm  SV(LVOT): 69.8 ml  TR max rogelio: 347.5 cm/sec  TR max P.3 mmHg  PATRICIA Index (cm2/m2): 0.88  Lateral E/e': 13.1     Medial E/e': 18.8           _____________________________________________________________________________  __           Report approved by: Atilio Blankenship 2017 03:02 PM   date: results:ECG reviewed date:2017 results:SR with 1st degree AVB, old infarct date: results:          METS/Exercise Tolerance:  1 - Eating, dressing   Hematologic:     (+) Anemia, -      Musculoskeletal: Comment: Thoracic or lumbosacral neuritis or radiculitis, unspecified  (+) arthritis, , fracture lower extremity: Hip, -       GI/Hepatic:  - neg GI/hepatic ROS       Renal/Genitourinary: Comment: Urinary incontinence        Endo:     (+) type II DM .      Psychiatric:     (+) psychiatric history depression      Infectious Disease:  - neg infectious disease ROS       Malignancy:      - no malignancy   Other:    - neg other ROS                 Physical Exam  Normal systems: cardiovascular and pulmonary    Airway   Mallampati: II  TM distance: >3 FB  Neck ROM: full    Dental   (+) missing    Cardiovascular   Rhythm and rate: regular and normal      Pulmonary (+) decreased breath sounds                       Anesthesia Plan      History & Physical Review  History and  physical reviewed and following examination; no interval change.    ASA Status:  3 emergent.    NPO Status:  > 8 hours    Plan for Spinal     Anesthesia d/w pt. An daughter.  Plan for SAB with IV sedation.  If unable to place SAB, will proceed with GETA.  Pt. And daughter wish to be DNR/DNI when d/c from PACU.  IF pt. Has an intraoperative event, she does not want chest compressions or defibrillation.  They are ok with intubation if necessary to administer general anesthesia for the procedure and has a reasonable chance of being extubated at the end of case.      Postoperative Care  Postoperative pain management:  IV analgesics and Oral pain medications.      Consents  Anesthetic plan, risks, benefits and alternatives discussed with:  Patient, legal guardian and Daughter/Son..                          .

## 2017-05-10 NOTE — PROVIDER NOTIFICATION
"   05/10/17 1806   Vital Signs   Temp 101.1  F (38.4  C)   Temp src Oral   Resp 20   Pulse 97   Pulse/Heart Rate Source Monitor   /54   BP Location Left arm   Oxygen Therapy   SpO2 94 %   O2 Device Nasal cannula   Oxygen Delivery 3 LPM   Respiratory Monitoring   Respiratory Monitoring (EtCO2) 39 mmHg   Integrated Pulmonary Index (IPI) 10   At the beginning of the shift, DBP in the 190s now BP has been stable with 160s/50s. Has ran a temp in the 100s and now up to 101.1. Capno monitoring on and numbers all over the place. Pt is busy in room, folding blankets and rearranging items. Has misplaced capno/oxygen tubing several times and needs very frequent check to maintain placement and to encourage pt to deep breath and cough with rebound of SATs into the mid to high 90s.   Oxygen on at this time at 3L due to frequent drops down into the 80s. With this drop, no change in status: No change in behavior, mentation, breathing pattern, color or report of breathing. Did feed self post set-up, small amount of food and then reported \"don't feel very good. I need to go home.\"  MERCEDES Corrales updated on above info and reviewed surgery reports and home meds list. Tylenol ordered and given orally. No other orders at this time as \"it was known that pt had an infection prior to surgery\". Pt will continue on Ancef and Rocephin.   "

## 2017-05-10 NOTE — ANESTHESIA PROCEDURE NOTES
Peripheral nerve/Neuraxial procedure note : intrathecal  Pre-Procedure  Performed by  EDDIE KILLIAN   Location: OR      Pre-Anesthestic Checklist: patient identified, IV checked, risks and benefits discussed, informed consent, monitors and equipment checked and pre-op evaluation    Timeout  Correct Patient: Yes   Correct Procedure: Yes   Correct Site: Yes   Correct Laterality: N/A   Correct Position: Yes   Site Marked: N/A   .   Procedure Documentation  ASA 3 and Emergent  .    Procedure:    Intrathecal.  Insertion Site:L4-5  (midline approach)      Patient Prep;mask, sterile gloves, povidone-iodine 7.5% surgical scrub, patient draped.  .  Needle: (). # of attempts: 1. # of redirects: 2.  Spinal Needle: Joo tip 25 G. 3.5 in.  Introducer used. . .     Assessment/Narrative  Paresthesias: No.  .  .  clear CSF fluid removed . Sensory Level: T6

## 2017-05-10 NOTE — ANESTHESIA CARE TRANSFER NOTE
Patient: Lulu Carlton    Procedure(s):  Open reduction internal fixation right hip bipolar gregg arthroplasty. - Wound Class: I-Clean    Diagnosis: Right hip fracture  Diagnosis Additional Information: No value filed.    Anesthesia Type:   Spinal     Note:  Airway :Face Mask  Patient transferred to:PACU        Vitals: (Last set prior to Anesthesia Care Transfer)    CRNA VITALS  5/10/2017 1201 - 5/10/2017 1248      5/10/2017             Pulse: 56    SpO2: 99 %                Electronically Signed By: Toni Woodruff CRNA, APRN CRNA  May 10, 2017  12:48 PM

## 2017-05-10 NOTE — PLAN OF CARE
"Problem: Fractured Hip (Adult)  Goal: Signs and Symptoms of Listed Potential Problems Will be Absent or Manageable (Fractured Hip)  Signs and symptoms of listed potential problems will be absent or manageable by discharge/transition of care (reference Fractured Hip (Adult) CPG).   Outcome: No Change  Pt awoke at 0200, explaining that she would \"like to get up and serve cookies to everyone\". Pt encouraged to stay in bed and bed alarm on for safety.   Pt rubs R leg and reports \"its sore\".  Bilat feet color is equal, with adequate movement, warm to touch with +PP.  Medicated with Dilaudid 0.2mg.   Temp 100.6, per order page out to MD to report and enquire about UA or further orders. Awaiting call back.           "

## 2017-05-10 NOTE — OR NURSING
Pt has had one dose of IV phenylephrine, BP increased and returning to normo-tensive. She had one <60 sec episode of bradycardia into the 30's, remained alert and talking throughout. CRNA notified and here but pt back to sinus arrythmia.

## 2017-05-10 NOTE — BRIEF OP NOTE
[facility]  Brief Operative Note      Pre-operative diagnosis: Right hip fracture, femoral neck   Post-operative diagnosis: Same   Procedure: Right hip bipolar arthroplasty   Surgeon: Riky Greenfield MD     Assistant(s): Lucas Bonilla PA-C   Anesthesia: Spinal Anesthesia   Estimated blood loss: Less than 50 ml               Drains: None   Specimens: None       Findings: See full dictated operative note for details   Complications: None                   Comments: See dictated operative report for full details     Condition: Stable   Weight bearing status: Weight bearing as tolerated   Activity: Activity as tolerated  Patient may move about with assist as indicated or with supervision   Anticoagulation plan:                 Lovenox inpatient and then  mg daily at discharge  for 42 days  Follow up plan                           Follow up in 2 week(s) with [facility] surgical team

## 2017-05-10 NOTE — PROGRESS NOTES
"SPIRITUAL HEALTH SERVICES  SPIRITUAL ASSESSMENT Progress Note  [facility] - Med/Surg    PRIMARY FOCUS:     Emotional/spiritual/Islam distress    Support for coping    ILLNESS CIRCUMSTANCES:   Reviewed documentation. Reflective conversation shared with Virginia's daughter and MAGDI which integrated elements of illness and family narratives.     Context of Serious Illness/Symptom(s) - Hip fracture requiring surgery; pt going to surgery this morning    Resources for Support - Family; pt is also in [facility] Care [facility] in [facility]    DISTRESS:     Emotional/Existential/Relational Distress - Pt was resting comfortably; daughter stated \"she has her days and nights switched\"    Spiritual/Protestant Distress - None    Social/Cultural/Economic Distress - None indicated    SPIRITUAL/Yarsani COPING:     Christian/Tammi - DaughterKeya, stated \"Mom has been Presbyterian all her life and knows Joss as her Savior\"    Spiritual Practice(s) - History of being active in her Catholic    Emotional/Existential/Relational Connections - Discussed with MAGDI her recent bout with cancer and how she is doing much better; she remembered visit from infusion clinic.  Connection with [place] was discussed and the importance of the parade at [place] time to family.    GOALS OF CARE:    Goals of Care - Surgery today with some concerns, per I/D rounds, related to pt's breathing and intubation needs    Meaning/Sense-Making - Family and tammi were lifted up as significant for pt.    PLAN: Daughter didn't want to wake pt and welcomed a follow up visit tomorrow after surgery.  I will provide.    Kirit Jean Baptiste M.A., [facility]  Staff   [facility]  Office: [phone]  Cell: [phone]  Pager [phone]    "

## 2017-05-11 ENCOUNTER — APPOINTMENT (OUTPATIENT)
Dept: PHYSICAL THERAPY | Facility: CLINIC | Age: 82
DRG: 469 | End: 2017-05-11
Payer: MEDICARE

## 2017-05-11 ENCOUNTER — APPOINTMENT (OUTPATIENT)
Dept: OCCUPATIONAL THERAPY | Facility: CLINIC | Age: 82
DRG: 469 | End: 2017-05-11
Payer: MEDICARE

## 2017-05-11 ENCOUNTER — APPOINTMENT (OUTPATIENT)
Dept: GENERAL RADIOLOGY | Facility: CLINIC | Age: 82
DRG: 469 | End: 2017-05-11
Attending: ORTHOPAEDIC SURGERY
Payer: MEDICARE

## 2017-05-11 LAB
ANION GAP SERPL CALCULATED.3IONS-SCNC: 7 MMOL/L (ref 3–14)
BUN SERPL-MCNC: 19 MG/DL (ref 7–30)
CALCIUM SERPL-MCNC: 8.4 MG/DL (ref 8.5–10.1)
CHLORIDE SERPL-SCNC: 105 MMOL/L (ref 94–109)
CO2 SERPL-SCNC: 28 MMOL/L (ref 20–32)
CREAT SERPL-MCNC: 0.76 MG/DL (ref 0.52–1.04)
ERYTHROCYTE [DISTWIDTH] IN BLOOD BY AUTOMATED COUNT: 12.3 % (ref 10–15)
GFR SERPL CREATININE-BSD FRML MDRD: 71 ML/MIN/1.7M2
GLUCOSE BLDC GLUCOMTR-MCNC: 148 MG/DL (ref 70–99)
GLUCOSE BLDC GLUCOMTR-MCNC: 151 MG/DL (ref 70–99)
GLUCOSE BLDC GLUCOMTR-MCNC: 155 MG/DL (ref 70–99)
GLUCOSE BLDC GLUCOMTR-MCNC: 192 MG/DL (ref 70–99)
GLUCOSE BLDC GLUCOMTR-MCNC: 197 MG/DL (ref 70–99)
GLUCOSE SERPL-MCNC: 160 MG/DL (ref 70–99)
HCT VFR BLD AUTO: 33.3 % (ref 35–47)
HGB BLD-MCNC: 10.5 G/DL (ref 11.7–15.7)
MCH RBC QN AUTO: 28.6 PG (ref 26.5–33)
MCHC RBC AUTO-ENTMCNC: 31.5 G/DL (ref 31.5–36.5)
MCV RBC AUTO: 91 FL (ref 78–100)
PLATELET # BLD AUTO: 113 10E9/L (ref 150–450)
POTASSIUM SERPL-SCNC: 3.7 MMOL/L (ref 3.4–5.3)
RBC # BLD AUTO: 3.67 10E12/L (ref 3.8–5.2)
SODIUM SERPL-SCNC: 140 MMOL/L (ref 133–144)
WBC # BLD AUTO: 10.4 10E9/L (ref 4–11)

## 2017-05-11 PROCEDURE — A9270 NON-COVERED ITEM OR SERVICE: HCPCS | Mod: GY | Performed by: PHYSICIAN ASSISTANT

## 2017-05-11 PROCEDURE — 97110 THERAPEUTIC EXERCISES: CPT | Mod: GP | Performed by: PHYSICAL THERAPIST

## 2017-05-11 PROCEDURE — 80048 BASIC METABOLIC PNL TOTAL CA: CPT | Performed by: PHYSICIAN ASSISTANT

## 2017-05-11 PROCEDURE — A9270 NON-COVERED ITEM OR SERVICE: HCPCS | Mod: GY | Performed by: FAMILY MEDICINE

## 2017-05-11 PROCEDURE — 25000128 H RX IP 250 OP 636: Performed by: PHYSICIAN ASSISTANT

## 2017-05-11 PROCEDURE — 40000986 XR PELVIS PORT 1/2 VW

## 2017-05-11 PROCEDURE — 12000000 ZZH R&B MED SURG/OB

## 2017-05-11 PROCEDURE — 97116 GAIT TRAINING THERAPY: CPT | Mod: GP | Performed by: PHYSICAL THERAPIST

## 2017-05-11 PROCEDURE — 97110 THERAPEUTIC EXERCISES: CPT | Mod: GO

## 2017-05-11 PROCEDURE — 25000132 ZZH RX MED GY IP 250 OP 250 PS 637: Mod: GY | Performed by: PHYSICIAN ASSISTANT

## 2017-05-11 PROCEDURE — 85027 COMPLETE CBC AUTOMATED: CPT | Performed by: PHYSICIAN ASSISTANT

## 2017-05-11 PROCEDURE — 97530 THERAPEUTIC ACTIVITIES: CPT | Mod: GP | Performed by: PHYSICAL THERAPIST

## 2017-05-11 PROCEDURE — 25000132 ZZH RX MED GY IP 250 OP 250 PS 637: Mod: GY | Performed by: FAMILY MEDICINE

## 2017-05-11 PROCEDURE — 97165 OT EVAL LOW COMPLEX 30 MIN: CPT | Mod: GO

## 2017-05-11 PROCEDURE — 36415 COLL VENOUS BLD VENIPUNCTURE: CPT | Performed by: PHYSICIAN ASSISTANT

## 2017-05-11 PROCEDURE — 97161 PT EVAL LOW COMPLEX 20 MIN: CPT | Mod: GP | Performed by: PHYSICAL THERAPIST

## 2017-05-11 PROCEDURE — 25000128 H RX IP 250 OP 636: Performed by: FAMILY MEDICINE

## 2017-05-11 PROCEDURE — 99233 SBSQ HOSP IP/OBS HIGH 50: CPT | Performed by: FAMILY MEDICINE

## 2017-05-11 PROCEDURE — 00000146 ZZHCL STATISTIC GLUCOSE BY METER IP

## 2017-05-11 PROCEDURE — 40000193 ZZH STATISTIC PT WARD VISIT: Performed by: PHYSICAL THERAPIST

## 2017-05-11 PROCEDURE — 40000133 ZZH STATISTIC OT WARD VISIT

## 2017-05-11 RX ORDER — CEFTRIAXONE 1 G/1
1 INJECTION, POWDER, FOR SOLUTION INTRAMUSCULAR; INTRAVENOUS EVERY 24 HOURS
Status: DISCONTINUED | OUTPATIENT
Start: 2017-05-12 | End: 2017-05-12

## 2017-05-11 RX ORDER — LISINOPRIL 5 MG/1
5 TABLET ORAL DAILY
Status: DISCONTINUED | OUTPATIENT
Start: 2017-05-11 | End: 2017-05-13 | Stop reason: HOSPADM

## 2017-05-11 RX ADMIN — ENOXAPARIN SODIUM 40 MG: 40 INJECTION SUBCUTANEOUS at 11:54

## 2017-05-11 RX ADMIN — LISINOPRIL 5 MG: 5 TABLET ORAL at 13:10

## 2017-05-11 RX ADMIN — OXYCODONE HYDROCHLORIDE 5 MG: 5 TABLET ORAL at 21:40

## 2017-05-11 RX ADMIN — ACETAMINOPHEN 650 MG: 325 TABLET, FILM COATED ORAL at 08:37

## 2017-05-11 RX ADMIN — CEFTRIAXONE 1 G: 1 INJECTION, POWDER, FOR SOLUTION INTRAMUSCULAR; INTRAVENOUS at 05:56

## 2017-05-11 RX ADMIN — SENNOSIDES AND DOCUSATE SODIUM 2 TABLET: 8.6; 5 TABLET ORAL at 11:55

## 2017-05-11 RX ADMIN — SENNOSIDES AND DOCUSATE SODIUM 2 TABLET: 8.6; 5 TABLET ORAL at 02:27

## 2017-05-11 RX ADMIN — SENNOSIDES AND DOCUSATE SODIUM 2 TABLET: 8.6; 5 TABLET ORAL at 21:38

## 2017-05-11 RX ADMIN — ACETAMINOPHEN 650 MG: 325 TABLET, FILM COATED ORAL at 13:10

## 2017-05-11 RX ADMIN — OMEPRAZOLE 20 MG: 20 CAPSULE, DELAYED RELEASE ORAL at 13:10

## 2017-05-11 RX ADMIN — ACETAMINOPHEN 650 MG: 325 TABLET, FILM COATED ORAL at 02:27

## 2017-05-11 RX ADMIN — CEFAZOLIN SODIUM 1 G: 1 INJECTION, SOLUTION INTRAVENOUS at 03:23

## 2017-05-11 RX ADMIN — ACETAMINOPHEN 650 MG: 325 TABLET, FILM COATED ORAL at 18:40

## 2017-05-11 NOTE — PROGRESS NOTES
[facility]    Hospital Medicine   Care Update  Date of Service: 5/10/2017     I was called due to insulin order set.    Previously on medium SSI NPO.  Now eating and drinking.    Plan: transition to medium SSI insulin order set for those who are eating/drinking PO, blood glucose checks before meals, at bedtime, and 0200      Janki Ovalle PA-C

## 2017-05-11 NOTE — PLAN OF CARE
Problem: Fractured Hip (Adult)  Goal: Signs and Symptoms of Listed Potential Problems Will be Absent or Manageable (Fractured Hip)  Signs and symptoms of listed potential problems will be absent or manageable by discharge/transition of care (reference Fractured Hip (Adult) CPG).   Outcome: Therapy, progress toward functional goals as expected  Pt pleasant tonight. Denies pain when asked. No signs of discomfort. Sleeping on and off. Archuleta patent. Pt awake at 2000 and requested to eat supper. Took in a cup of chicken noodle soup and drank water. Family here to visit.   Color equal bilat, moving LEs equally, reports feeling to toes and foot with touch. Pedal pulse weak bilat, equal. TEDS on. Pt doesn't tolerated PCDs due to confusion.   Capno monitoring continued.   Abd pillow in place.

## 2017-05-11 NOTE — PLAN OF CARE
Problem: Goal Outcome Summary  Goal: Goal Outcome Summary  OT: Eval complete. Partial co-tx with PT. Transfers from supine in bed to bedside chair with Mod Ax2 and FWW. Participated in B UE AROM exercises with fair tolerance.      REC: TCU

## 2017-05-11 NOTE — PROGRESS NOTES
[facility] Orth[facility] Progress Note      Post-operative Day: 1 Day Post-Op    Procedure(s):  Open reduction internal fixation right hip bipolar gregg arthroplasty. - Wound Class: I-Clean      Subjective: Patient comfortable, eating breakfast. States that she is feeling pretty good. Minimal pain while sedentary. Has not been out of bed yet.     Pain: minimal  Chest pain, SOB:  No      Objective: Patient comfortable siting up in bed. Neurovascularly intact to bilateral lower extremities. Abductor pillow in place. Dressing C,D,I. Minimally tender to palpation around surgical area. Hip movements elicit pain. A/P pelvis x ray obtained this AM demonstrates good positioning of right Bipolar hemiarthroplasty.  Blood pressure 131/43, pulse 83, temperature 97.6  F (36.4  C), temperature source Oral, resp. rate 20, weight 68 kg (150 lb), SpO2 99 %.    Patient Vitals for the past 24 hrs:   BP Temp Temp src Pulse Heart Rate Resp SpO2   05/11/17 0704 131/43 97.6  F (36.4  C) Oral - 79 20 99 %   05/11/17 0225 163/57 99.8  F (37.7  C) Oral 83 - 18 98 %   05/11/17 0145 - - - - - - 97 %   05/10/17 2231 132/51 99.3  F (37.4  C) Axillary - 80 23 97 %   05/10/17 2217 - - - - - - 97 %   05/10/17 1900 117/53 100  F (37.8  C) Oral 107 - 18 94 %   05/10/17 1806 166/54 101.1  F (38.4  C) Oral 97 - 20 94 %   05/10/17 1711 168/73 100.7  F (38.2  C) Oral 86 - 20 94 %   05/10/17 1542 190/58 100.2  F (37.9  C) Oral - 81 24 98 %   05/10/17 1532 - - - - - - 98 %   05/10/17 1530 151/67 - - - 79 - 97 %   05/10/17 1500 131/50 - - - 70 - 94 %   05/10/17 1448 130/49 - - - 67 - 97 %   05/10/17 1442 134/41 98.1  F (36.7  C) Oral - 69 20 96 %   05/10/17 1425 119/69 98.9  F (37.2  C) Oral - 67 20 97 %   05/10/17 1400 (!) 111/97 - - - 69 20 96 %   05/10/17 1345 142/74 - - - 85 20 96 %   05/10/17 1330 137/77 98.2  F (36.8  C) Oral - 70 16 97 %   05/10/17 1325 (!) 133/112 - - - 38 - -   05/10/17 1320 (!) 179/96 - - - 84 16 99 %   05/10/17 1315 (!) 130/116 -  - - 84 16 -   05/10/17 1310 96/71 - - - 76 16 100 %   05/10/17 1305 111/87 - - - 73 16 100 %   05/10/17 1300 (!) 68/50 - - - 78 16 -   05/10/17 1255 (!) 82/72 - - - 75 16 100 %   05/10/17 1250 96/52 - - - 68 14 100 %   05/10/17 1245 108/65 - - - 60 15 100 %   05/10/17 1240 (!) 67/47 - - - 54 18 100 %   05/10/17 1237 (!) 62/39 - - - 75 12 94 %   05/10/17 1236 (!) 66/39 98  F (36.7  C) Axillary - 60 (!) 4 90 %       Wt Readings from Last 4 Encounters:   05/09/17 68 kg (150 lb)   03/09/17 67.7 kg (149 lb 3.2 oz)   02/09/17 68 kg (150 lb)   12/08/16 68 kg (150 lb)         Motor function, sensation, and circulation intact   Yes  Wound status: incisions are clean dry and intact. Yes  Calf tenderness: Bilateral  No    Pertinent Labs   Lab Results: personally reviewed.     Recent Labs   Lab Test  05/11/17   0630  05/10/17   0405  05/09/17   1300  05/01/17   1015   09/21/15   1340   INR   --    --    --   1.02   --   1.07   HGB  10.5*  12.5  12.8  13.3   < >  13.8   HCT  33.3*  38.9  39.5  41.0   < >  42.3   MCV  91  89  89  90   < >  86   PLT  113*  142*  167  183   < >  185   NA  140  141  139  144   < >  139   CRP   --    --    --   <2.9   --   <2.9    < > = values in this interval not displayed.       Plan: Anticoagulation protocol: Lovenox inpatient and then  mg daily at discharge  x 42  days            Pain medications:  Tylenol, with Percocet available.            Weight bearing status:  WBAT            Disposition:  Inpatient as UTI treated and until passes PT, which will start today. Then probable TCU until safe to return to assisted living.             Continue cares and rehabilitation     Report completed by:  Lucas Bonilla PA-C  Date: 5/11/2017  Time: 9:06 AM

## 2017-05-11 NOTE — PROGRESS NOTES
"SPIRITUAL HEALTH SERVICES  SPIRITUAL ASSESSMENT Progress Note  [facility] - Med/Surg    Pt, Virginia, was awake for part of the visit.  Primary conversation was with daughter, Keya, who stated surgery had gone well yesterday.  She stated that they were hoping to discharge to [place] since it would be much closer for Keya to visit but that if Virginia had to return to where she had been, [facility], that would be fine also.  I provided prayer for a \"good day of healing and progress\" for pt.    Kirit Jean Baptiste M.A., [facility]  Staff   [facility]  Office: [phone]  Cell: [phone]  Pager [phone]    "

## 2017-05-11 NOTE — PLAN OF CARE
Problem: Fractured Hip (Adult)  Goal: Signs and Symptoms of Listed Potential Problems Will be Absent or Manageable (Fractured Hip)  Signs and symptoms of listed potential problems will be absent or manageable by discharge/transition of care (reference Fractured Hip (Adult) CPG).   Outcome: No Change  Dtr here and voicing concern that abductor pillow was not in place and that abduction was supported with pillows and blankets. Explained to the dtr that pt was manipulating the straps and rolled them up to the point that it was tight around the leg. For that reason and the need to reposition the pt onto partially onto side to relieve pressure to the skin for explained. Dtr explains that it was suppose to be used to anchor into to bed to prevent her from attempting to get out of bed. Explained to the dtr that the pt made no attempts to get out of bed last brandie or any type of squirming or repositioning self.  Xray ordered for this am, did call out to Dr Holt to enquire about xray and he explained that it is a routine post-op procedure and portable would be fine. Dr Holt also updated on status overnight and this am and will be in to see pt today later if family will be available.

## 2017-05-11 NOTE — PLAN OF CARE
"Problem: Fractured Hip (Adult)  Goal: Signs and Symptoms of Listed Potential Problems Will be Absent or Manageable (Fractured Hip)  Signs and symptoms of listed potential problems will be absent or manageable by discharge/transition of care (reference Fractured Hip (Adult) CPG).   Outcome: Therapy, progress toward functional goals as expected  Pt has had a quiet night, sleeping most of the night. Awake for short periods of time, taking off gown and folding blankets and then returned to sleep.  Pleasant and cooperative, no signs of discomfort at rest. Did grimace and reported that her leg was \"sore\" when repositioned.   Medicated with Tylenol X2.  Turn and repositioned with A-2 and abduction maintained with support of pillows. Skin checked completed and lotion applied, skin intact with no redness.   Drg reinforced due to pt loosening.   Nasal cannula changed to oxymask at 3L due to mouth breathing when sleeping and dropping SATs, SATs stable in the mid to high 90s with mask.         "

## 2017-05-11 NOTE — PROGRESS NOTES
05/11/17 1000   Quick Adds   Type of Visit Initial PT Evaluation   Living Environment   Lives With facility resident   Functional Level Prior   Ambulation 1-->assistive equipment   Transferring 1-->assistive equipment   Toileting 0-->independent   Bathing 2-->assistive person   Dressing 0-->independent   Eating 0-->independent   Communication 0-->understands/communicates without difficulty   Swallowing 0-->swallows foods/liquids without difficulty   Cognition 1 - attention or memory deficits   Fall history within last six months yes   Number of times patient has fallen within last six months 2   Prior Functional Level Comment Pt resides at Memory care unit.  Per daughter- Pt ambulatory using a 4ww within  the structured memory care unit.  Unwitness  fall   General Information   Onset of Illness/Injury or Date of Surgery - Date 05/10/17   Referring Physician Jean Pierre   Patient/Family Goals Statement Pt unable to  state goal.    Pertinent History of Current Problem (include personal factors and/or comorbidities that impact the POC) Open reduction internal fixation right hip bipolar gregg arthroplasty. -   Precautions/Limitations fall precautions   General Observations Pt alert, supine in bed.  Pt unaware she  is in the hospital  and had surgery. Minimal pain w/ activity; resolves at rest   DAughter present    General Info Comments 2 LO2   Cognitive Status Examination   Orientation person   Level of Consciousness alert;lethargic/somnolent  (Occassionally closing her eyes)   Follows Commands and Answers Questions able to follow single-step instructions;75% of the time  (Able to follow one step cues and participate)   Personal Safety and Judgment impaired  (Not witnessed.  Deficts at baseline per daughter-)   Pain Assessment   Patient Currently in Pain Yes, see Vital Sign flowsheet   Range of Motion (ROM)   ROM Comment Limited Right hip/ knee AROM due to weakness, unable  to flex knee. track LE in bed due to weakness.   Pt  allowing approx 40 degrees of Right hip/ knee AAROM in supine.  R hip flexion to 90 degrees in sitting    MMT: Hip, Rehab Eval   Hip Flexion - Right Side (3-/5) fair minus, right   Hip ABduction - Right Side (2/5) poor, right   MMT: Knee, Rehab Eval   Knee Extension - Right Side (3-/5) fair minus, right   Bed Mobility   Bed Mobility Comments Pt required moderate assistance  of 2 with  Supine> sitting.    Assistance w/ iniital sitting balance; then able to  maintain static sitting w/ SBA and  UE support- able to scoot to the EOB w/ SBA.     Transfer Skills   Transfer Comments Pt transferred bed> chair w/ walker with minimal to moderate assistance of 2. WBAT.   Weak, right  LE giving away during stance phase, but  Pt  able to take 4-5 steps w/ the transfer    Gait   Gait Comments Unable   Balance   Balance Comments fair/ good  for static  and dynamci sitting balance- see comments above.   Fair balance  In standing due to weakness   Sensory Examination   Sensory Perception no deficits were identified   Modality Interventions   Planned Modality Interventions Cryotherapy   General Therapy Interventions   Planned Therapy Interventions gait training;bed mobility training;ROM;strengthening;transfer training   Clinical Impression   Criteria for Skilled Therapeutic Intervention yes, treatment indicated   PT Diagnosis RIght hip hemiarthroplasty   Influenced by the following impairments Decreased strength Right LE.  Pain   Functional limitations due to impairments Altered mobility- Decreased independence w/ bed mobility, transfers. Unable to ambulate    Clinical Presentation Stable/Uncomplicated   Clinical Presentation Rationale clinical judgement   Clinical Decision Making (Complexity) Low complexity   Therapy Frequency` 2 times/day   Predicted Duration of Therapy Intervention (days/wks) 2 days   Anticipated Equipment Needs at Discharge (Pt owns RW and 4ww for home use.)   Anticipated Discharge Disposition Transitional  "Care Facility   Risk & Benefits of therapy have been explained Yes   Patient, Family & other staff in agreement with plan of care Yes   Clinical Impression Comments Hx of dementia but following cues and particiapting.    Indep. at baseline w/in a structured environment     [facility] AM-PAC  \"6 Clicks\" V.2 Basic Mobility Inpatient Short Form   1. Turning from your back to your side while in a flat bed without using bedrails? 2 - A Lot   2. Moving from lying on your back to sitting on the side of a flat bed without using bedrails? 2 - A Lot   3. Moving to and from a bed to a chair (including a wheelchair)? 2 - A Lot   4. Standing up from a chair using your arms (e.g., wheelchair, or bedside chair)? 2 - A Lot   5. To walk in hospital room? 1 - Total   6. Climbing 3-5 steps with a railing? 1 - Total   Basic Mobility Raw Score (Score out of 24.Lower scores equate to lower levels of function) 10     "

## 2017-05-11 NOTE — OP NOTE
DATE OF OPERATION:  05/10/2017.      PREOPERATIVE DIAGNOSES:   1.  Displaced right hip femoral neck fracture.   2.  Type 2 diabetes, insulin-dependent.   3.  Significant dementia with residence in a memory care unit.      POSTOPERATIVE DIAGNOSES:   1.  Displaced right hip femoral neck fracture.   2.  Type 2 diabetes, insulin-dependent.   3.  Significant dementia with residence in a memory care unit.      SURGEON:  Riky Greenfield MD      ASSISTANT:  Nelson Bonilla PA-C, PA services are required for patient positioning, soft tissue retraction, instrumentation and patient safety.      PROCEDURE PERFORMED:  Right hip bipolar hemiarthroplasty for treatment of a displaced femoral neck fracture, posterior approach.      MEDICAL SUMMARY:  Ms. Lulu Carlton is a 89-year-old ambulatory female who resides in a memory care unit due to progressive dementia.  She was found at her residence on the floor with complaints of right hip pain.  Evaluation in the emergency room has identified a displaced femoral neck fracture associated with baseline osteoporosis.  She is hospitalized for medical evaluation and is prepared for surgical intervention to restore her ambulatory status.  Radiographs show a displaced femoral neck fracture.  Recommendations for right hip bipolar hemiarthroplasty have been made and discussed with her offspring.  Her son, POA, and sister are involved in the discussion regarding the nature of this hip fracture and the recommended treatment.  I again reviewed the indications, general and specific risks, benefits and rehabilitation issues with the patient and her family.  Family appears to understand the issues and have signed the consent as POA and guardian.      PROCEDURE DETAIL:  After satisfactory spinal anesthesia, the patient is placed on the operating table in a lateral decubitus position with the right hip presented.  The right leg is then prepped and draped in the usual sterile manner.  Timeout  protocols are observed.      Lateral incision centered at the greater trochanter is then done.  The IT band is split and a posterior approach to the right hip was then pursued.  The sciatic nerve is palpated and protected and a hip joint T capsulotomy performed.  Fracture hematoma was decompressed.  The fractured femoral neck is presented.  On the posterior side, the fracture exists somewhat low in the femoral neck, but is still satisfactory with an intact medial calcar.  A femoral neck cut is made 1 fingerbreadth above the lesser trochanter.  Fracture debris is removed.  The femoral head is removed and sized.  After several diameter assessments, it appears the average diameter is 45 mm.  The acetabulum is irrigated and inspected.  Fracture debris is removed.  Only mild chondromalacia is evident within the acetabulum.      While protecting the acetabulum, the femoral canal is then prepared.  Straight reamers are placed down the shaft by hand with some lateralization efforts.  Femoral broaches are then placed.  It appears that a #5 implant would work the best and fill of the metaphyseal region.  A trial reduction with a #5 implant, a standard femoral neck and a 45 mm UHR is then done.  Very good restoration of the femoral neck length is achieved.  Assessment of range of motion shows very good stability at full extension and 90 degrees of flexion.  The trial implants are then removed and the final implants are secured.      Secured implants include DePuy cemented [place] stem, #5, with an associated cement restrictor, centralizer, a 28 mm head with +1.5 neck length and a 45 mm UHR.  Thorough irrigation of the femoral shaft is then done.  Cement restrictor is placed.  Cement is placed with finger compression.  The #5 femoral implant with centralizer is then put in place with all excess cement removed.  The implant is placed to match the natural femoral anteversion.  It is held there with good calcar contact until cement  is cured.  The final implant is then assembled by placing a 28 mm head and the subsequent UHR.  The wound is copiously irrigated and the hip reduced with good restoration of femoral neck length and good demonstration of stability.      The T capsulotomy is repaired using interrupted Ethibond.  The IT band is approximated using interrupted Ethibond and running 0 Vicryl suture.  The subcutaneous layer is closed in layers throughout the subq and skin.  Steri-Strips with benzoin are then placed with a sterile dressing.  An abduction pillow is put in place and the patient placed into her hospital bed in satisfactory condition.  Patient appeared to tolerate the procedure well.  She is escorted to the recovery room in satisfactory condition.      PLAN:  Postop protocols will include physical therapy and likely transfer to a transitional care unit for additional rehab before anticipated return to her memory care unit.         RIKY GREENFIELD MD             D: 05/10/2017 12:49   T: 2017 10:47   MT: TS      Name:     EL BARRIENTOS   MRN:      [id number]        Account:        [id number]   :      1921           Procedure Date: 05/10/2017      Document: [id number]       cc: Riky Greenfield MD

## 2017-05-11 NOTE — PROGRESS NOTES
[facility]      Subjective:  No complaints    Review of Systems:  C: NEGATIVE for fever, chills, change in weight  I: NEGATIVE for worrisome rashes, moles or lesions  E: NEGATIVE for vision changes or irritation  E/M: NEGATIVE for ear, mouth and throat problems  R: NEGATIVE for significant cough or SOB  B: NEGATIVE for masses, tenderness or discharge  CV: NEGATIVE for chest pain, palpitations or peripheral edema  GI: NEGATIVE for nausea, abdominal pain, heartburn, or change in bowel habits  : NEGATIVE for frequency, dysuria, or hematuria  M: NEGATIVE for significant arthralgias or myalgia  N: NEGATIVE for weakness, dizziness or paresthesias  E: NEGATIVE for temperature intolerance, skin/hair changes  H: NEGATIVE for bleeding problems  P: NEGATIVE for changes in mood or affect    Physical Exam:  Vitals Were Reviewed    Patient Vitals for the past 16 hrs:   BP Temp Temp src Pulse Heart Rate Resp SpO2   05/11/17 0704 131/43 97.6  F (36.4  C) Oral - 79 20 99 %   05/11/17 0225 163/57 99.8  F (37.7  C) Oral 83 - 18 98 %   05/11/17 0145 - - - - - - 97 %   05/10/17 2231 132/51 99.3  F (37.4  C) Axillary - 80 23 97 %   05/10/17 2217 - - - - - - 97 %         Intake/Output Summary (Last 24 hours) at 05/11/17 1234  Last data filed at 05/11/17 0705   Gross per 24 hour   Intake              900 ml   Output              575 ml   Net              325 ml       GENERAL APPEARANCE: suprisingly alert, nad, up in chair, bright  EYES: conjunctiva clear, eyes grossly normal  RESP: lungs clear to auscultation - no rales, rhonchi or wheezes  CV: regular rate and rhythm, normal S1 S2, no S3 or S4 and no murmur, click or rub   ABDOMEN: soft, nontender, no HSM or masses and bowel sounds normal  MS: no clubbing, cyanosis; no edema  SKIN: clear without significant rashes or lesions  NEURO: Normal strength and tone, sensory exam grossly normal, mentation intact and speech normal    Lab:  Recent Labs   Lab Test   05/11/17   0630  05/10/17   0405   NA  140  141   POTASSIUM  3.7  3.7   CHLORIDE  105  105   CO2  28  28   ANIONGAP  7  8   GLC  160*  152*   BUN  19  14   CR  0.76  0.73   SLIM  8.4*  8.3*     CBC RESULTS:   Recent Labs   Lab Test  05/11/17   0630  05/10/17   0405   WBC  10.4  10.6   RBC  3.67*  4.36   HGB  10.5*  12.5   HCT  33.3*  38.9   PLT  113*  142*       Results for orders placed or performed during the hospital encounter of 05/09/17 (from the past 24 hour(s))   Glucose by meter   Result Value Ref Range    Glucose 154 (H) 70 - 99 mg/dL   Glucose by meter   Result Value Ref Range    Glucose 149 (H) 70 - 99 mg/dL   Glucose by meter   Result Value Ref Range    Glucose 228 (H) 70 - 99 mg/dL   Glucose by meter   Result Value Ref Range    Glucose 151 (H) 70 - 99 mg/dL   Basic metabolic panel   Result Value Ref Range    Sodium 140 133 - 144 mmol/L    Potassium 3.7 3.4 - 5.3 mmol/L    Chloride 105 94 - 109 mmol/L    Carbon Dioxide 28 20 - 32 mmol/L    Anion Gap 7 3 - 14 mmol/L    Glucose 160 (H) 70 - 99 mg/dL    Urea Nitrogen 19 7 - 30 mg/dL    Creatinine 0.76 0.52 - 1.04 mg/dL    GFR Estimate 71 >60 mL/min/1.7m2    GFR Estimate If Black 86 >60 mL/min/1.7m2    Calcium 8.4 (L) 8.5 - 10.1 mg/dL   CBC with platelets   Result Value Ref Range    WBC 10.4 4.0 - 11.0 10e9/L    RBC Count 3.67 (L) 3.8 - 5.2 10e12/L    Hemoglobin 10.5 (L) 11.7 - 15.7 g/dL    Hematocrit 33.3 (L) 35.0 - 47.0 %    MCV 91 78 - 100 fl    MCH 28.6 26.5 - 33.0 pg    MCHC 31.5 31.5 - 36.5 g/dL    RDW 12.3 10.0 - 15.0 %    Platelet Count 113 (L) 150 - 450 10e9/L   Glucose by meter   Result Value Ref Range    Glucose 148 (H) 70 - 99 mg/dL   Glucose by meter   Result Value Ref Range    Glucose 197 (H) 70 - 99 mg/dL       Assessment and Plan:    Acute right femoral neck fracture.  May 11, 2017 day 1 surgical repair      Mild hypoxia with chest x-ray that suggests some pulmonary edema in a patient with no history of heart failure/echo with diastolic  dysfunction and pulmonary htn-suspect acute exacerbation of diastolic heart failure.  May 10, 2017 diuresed 1405 cc urine, oxygen sat 97% two liters, looks comfortable  May 11, 2017 sat 99% two liters.     Fever to 100.6-source unclear, ddx includes trauma, uti or pulmonary, blood cultures taken  May 11, 2017 fever to 100.7 overnight, urine culture neg at 20 hours (pyuria present)--continue rocephin for now     History of hearing loss.      Diabetes mellitus type 2.   May 11, 2017 am glucose 160, holding glipizide, on ss     History of hypertension.  May 11, 2017 restart lisinopril       gerd  On iv protonix  May 11, 2017 convert to oral    Mild thrombocytopenia  May 11, 2017 platelets 142-113     Prophylaxis-SCD    Continued cares  No more lasix  [place] out tomorrow  Restart lisinopril and ppi.

## 2017-05-11 NOTE — PLAN OF CARE
Problem: Goal Outcome Summary  Goal: Goal Outcome Summary  PT-  Evaluation completed. Pt alert, supine in bed. Pt unaware she is in the hospital and had surgery.   Pt required moderate assistance of 2 with  supine> sitting .  Pt transferred bed> chair w/ walker with minimal to moderate assistance of 2. WBAT.   Weak, right  LE giving away but  pt  able to take 4-5 steps w/ the transfer .  Pt w/ history of dementia, but following cues and able to participate w/ exercises    REC_ TCU , continued PT to increase strength  and improve pt's ability to transfer and ambulate

## 2017-05-11 NOTE — PLAN OF CARE
Problem: Goal Outcome Summary  Goal: Goal Outcome Summary  Outcome: Improving  Patient with minimal complaints of pain.  Pain controlled with Tylenol PRN.  Required heavy assist of 2, gait belt, and walker to ambulate from bed to chair.  EZ Stand required for return to bed.  Urine output 125 ml this shift.  Per MD, maurice to remove chahal despite low urine output.  Dressing C,D,I.  Ice to hip.

## 2017-05-11 NOTE — PROGRESS NOTES
05/11/17 1500   Quick Adds   Type of Visit Initial Occupational Therapy Evaluation   Living Environment   Lives With facility resident  (MCU)   Home Accessibility no concerns   Living Environment Comment Family not present during OT evaluation. PLOF gathered from discussion with Physical therapy.    Functional Level Prior   Ambulation 1-->assistive equipment  (4WW)   Transferring 1-->assistive equipment   Toileting 0-->independent   Bathing 3-->assistive equipment and person   Dressing 0-->independent   Eating 0-->independent   Communication 0-->understands/communicates without difficulty   Swallowing 0-->swallows foods/liquids without difficulty   Cognition 1 - attention or memory deficits   Fall history within last six months yes   Number of times patient has fallen within last six months 2   General Information   Onset of Illness/Injury or Date of Surgery - Date 05/09/17   Referring Physician Lucas Bonilla PA-C   Patient/Family Goals Statement None stated   Additional Occupational Profile Info/Pertinent History of Current Problem Open reduction internal fixation right hip bipolar gregg arthroplasty. s/p fall   Precautions/Limitations fall precautions   Weight-Bearing Status - RLE weight-bearing as tolerated   Cognitive Status Examination   Orientation person  (unable to state place, knows she is here because she fell)   Level of Consciousness alert   Pain Assessment   Patient Currently in Pain Yes, see Vital Sign flowsheet  (Unable to rate)   Range of Motion (ROM)   ROM Comment B UE ROM: WNL   Strength   Strength Comments Not formally assessed, however observed to have generalized weakness.    Transfer Skills   Transfer Comments Min-Mod Ax2 for supine to sit and Min-Mod Ax2 for sit to stand. Min Ax2 using FWW to ambulate 3 steps to bedside chair.    Upper Body Dressing   Level of [place]: Dress Upper Body stand-by assist   Lower Body Dressing   Level of [place]: Dress Lower Body maximum  "assist (25% patients effort)   Physical Assist/Nonphysical Assist: Dress Lower Body 1 person assist   Instrumental Activities of Daily Living (IADL)   IADL Comments Memory care completes IADL tasks.    Activities of Daily Living Analysis   Impairments Contributing to Impaired Activities of Daily Living cognition impaired;pain;post surgical precautions;strength decreased   General Therapy Interventions   Planned Therapy Interventions ADL retraining   Clinical Impression   Criteria for Skilled Therapeutic Interventions Met yes, treatment indicated   OT Diagnosis decreased independence with ADLs and functional mobility   Influenced by the following impairments s/p ORIF R hip, increased pain   Assessment of Occupational Performance 1-3 Performance Deficits   Identified Performance Deficits dressing, toileting, showering   Clinical Decision Making (Complexity) Low complexity   Therapy Frequency daily   Predicted Duration of Therapy Intervention (days/wks) 2-3 days   Anticipated Equipment Needs at Discharge (TBD at TCU)   Anticipated Discharge Disposition Transitional Care Facility   Risks and Benefits of Treatment have been explained. Yes   Patient, Family & other staff in agreement with plan of care Yes   [facility] AM-PAC  \"6 Clicks\" Daily Activity Inpatient Short Form   1. Putting on and taking off regular lower body clothing? 2 - A Lot   2. Bathing (including washing, rinsing, drying)? 2 - A Lot   3. Toileting, which includes using toilet, bedpan or urinal? 2 - A Lot   4. Putting on and taking off regular upper body clothing? 3 - A Little   5. Taking care of personal grooming such as brushing teeth? 3 - A Little   6. Eating meals? 4 - None   Daily Activity Raw Score (Score out of 24.Lower scores equate to lower levels of function) 16   Total Evaluation Time   Total Evaluation Time (Minutes) 6     Nel Mccall OTR/L    "

## 2017-05-12 ENCOUNTER — APPOINTMENT (OUTPATIENT)
Dept: PHYSICAL THERAPY | Facility: CLINIC | Age: 82
DRG: 469 | End: 2017-05-12
Payer: MEDICARE

## 2017-05-12 ENCOUNTER — APPOINTMENT (OUTPATIENT)
Dept: OCCUPATIONAL THERAPY | Facility: CLINIC | Age: 82
DRG: 469 | End: 2017-05-12
Payer: MEDICARE

## 2017-05-12 LAB
ANION GAP SERPL CALCULATED.3IONS-SCNC: 8 MMOL/L (ref 3–14)
BUN SERPL-MCNC: 21 MG/DL (ref 7–30)
CALCIUM SERPL-MCNC: 8.5 MG/DL (ref 8.5–10.1)
CHLORIDE SERPL-SCNC: 101 MMOL/L (ref 94–109)
CO2 SERPL-SCNC: 27 MMOL/L (ref 20–32)
CREAT SERPL-MCNC: 0.79 MG/DL (ref 0.52–1.04)
ERYTHROCYTE [DISTWIDTH] IN BLOOD BY AUTOMATED COUNT: 12 % (ref 10–15)
GFR SERPL CREATININE-BSD FRML MDRD: 68 ML/MIN/1.7M2
GLUCOSE BLDC GLUCOMTR-MCNC: 135 MG/DL (ref 70–99)
GLUCOSE BLDC GLUCOMTR-MCNC: 151 MG/DL (ref 70–99)
GLUCOSE BLDC GLUCOMTR-MCNC: 164 MG/DL (ref 70–99)
GLUCOSE BLDC GLUCOMTR-MCNC: 185 MG/DL (ref 70–99)
GLUCOSE BLDC GLUCOMTR-MCNC: 195 MG/DL (ref 70–99)
GLUCOSE BLDC GLUCOMTR-MCNC: 198 MG/DL (ref 70–99)
GLUCOSE SERPL-MCNC: 135 MG/DL (ref 70–99)
HCT VFR BLD AUTO: 34.4 % (ref 35–47)
HGB BLD-MCNC: 11 G/DL (ref 11.7–15.7)
MCH RBC QN AUTO: 28.6 PG (ref 26.5–33)
MCHC RBC AUTO-ENTMCNC: 32 G/DL (ref 31.5–36.5)
MCV RBC AUTO: 90 FL (ref 78–100)
PLATELET # BLD AUTO: 136 10E9/L (ref 150–450)
POTASSIUM SERPL-SCNC: 4.1 MMOL/L (ref 3.4–5.3)
RBC # BLD AUTO: 3.84 10E12/L (ref 3.8–5.2)
SODIUM SERPL-SCNC: 136 MMOL/L (ref 133–144)
WBC # BLD AUTO: 10.5 10E9/L (ref 4–11)

## 2017-05-12 PROCEDURE — 40000193 ZZH STATISTIC PT WARD VISIT: Performed by: PHYSICAL THERAPIST

## 2017-05-12 PROCEDURE — 36415 COLL VENOUS BLD VENIPUNCTURE: CPT | Performed by: PHYSICIAN ASSISTANT

## 2017-05-12 PROCEDURE — 25000128 H RX IP 250 OP 636: Performed by: PHYSICIAN ASSISTANT

## 2017-05-12 PROCEDURE — 80048 BASIC METABOLIC PNL TOTAL CA: CPT | Performed by: PHYSICIAN ASSISTANT

## 2017-05-12 PROCEDURE — 25000132 ZZH RX MED GY IP 250 OP 250 PS 637: Mod: GY | Performed by: FAMILY MEDICINE

## 2017-05-12 PROCEDURE — 97530 THERAPEUTIC ACTIVITIES: CPT | Mod: GP | Performed by: PHYSICAL THERAPIST

## 2017-05-12 PROCEDURE — 99233 SBSQ HOSP IP/OBS HIGH 50: CPT | Performed by: FAMILY MEDICINE

## 2017-05-12 PROCEDURE — 85027 COMPLETE CBC AUTOMATED: CPT | Performed by: PHYSICIAN ASSISTANT

## 2017-05-12 PROCEDURE — 00000146 ZZHCL STATISTIC GLUCOSE BY METER IP

## 2017-05-12 PROCEDURE — 25000132 ZZH RX MED GY IP 250 OP 250 PS 637: Mod: GY | Performed by: PHYSICIAN ASSISTANT

## 2017-05-12 PROCEDURE — 97110 THERAPEUTIC EXERCISES: CPT | Mod: GO

## 2017-05-12 PROCEDURE — 40000133 ZZH STATISTIC OT WARD VISIT

## 2017-05-12 PROCEDURE — A9270 NON-COVERED ITEM OR SERVICE: HCPCS | Mod: GY | Performed by: PHYSICIAN ASSISTANT

## 2017-05-12 PROCEDURE — 12000000 ZZH R&B MED SURG/OB

## 2017-05-12 PROCEDURE — A9270 NON-COVERED ITEM OR SERVICE: HCPCS | Mod: GY | Performed by: FAMILY MEDICINE

## 2017-05-12 PROCEDURE — 97110 THERAPEUTIC EXERCISES: CPT | Mod: GP | Performed by: PHYSICAL THERAPIST

## 2017-05-12 RX ADMIN — OXYCODONE HYDROCHLORIDE 5 MG: 5 TABLET ORAL at 06:27

## 2017-05-12 RX ADMIN — SENNOSIDES AND DOCUSATE SODIUM 1 TABLET: 8.6; 5 TABLET ORAL at 08:27

## 2017-05-12 RX ADMIN — OMEPRAZOLE 20 MG: 20 CAPSULE, DELAYED RELEASE ORAL at 08:26

## 2017-05-12 RX ADMIN — ENOXAPARIN SODIUM 40 MG: 40 INJECTION SUBCUTANEOUS at 11:52

## 2017-05-12 RX ADMIN — LISINOPRIL 5 MG: 5 TABLET ORAL at 08:26

## 2017-05-12 RX ADMIN — SENNOSIDES AND DOCUSATE SODIUM 2 TABLET: 8.6; 5 TABLET ORAL at 20:02

## 2017-05-12 RX ADMIN — ACETAMINOPHEN 650 MG: 325 TABLET, FILM COATED ORAL at 11:49

## 2017-05-12 RX ADMIN — OXYCODONE HYDROCHLORIDE 5 MG: 5 TABLET ORAL at 11:49

## 2017-05-12 RX ADMIN — ACETAMINOPHEN 650 MG: 325 TABLET, FILM COATED ORAL at 06:27

## 2017-05-12 NOTE — PLAN OF CARE
Problem: Goal Outcome Summary  Goal: Goal Outcome Summary  Outcome: No Change  Pt has been up with heavy assist of 2. Dressing is CD&I. C/o pain in right hip- prn tylenol and oxycodone given- ice to the area. Pt was able to void on the commode- 100 cc- was not incontinent. Still low UO. Is passing flatus, BS +. Blood sugars were 155 and 192. /51 (BP Location: Right arm)  Pulse 76  Temp 98.9  F (37.2  C) (Oral)  Resp 18  Wt 68 kg (150 lb)  SpO2 92%  BMI 30.82 kg/m2  Carmen Ansari RN BSN

## 2017-05-12 NOTE — PROGRESS NOTES
[facility]      Subjective:  No difficulty breathing  No complaints    Review of Systems:  C: NEGATIVE for fever, chills, change in weight  I: NEGATIVE for worrisome rashes, moles or lesions  E: NEGATIVE for vision changes or irritation  E/M: NEGATIVE for ear, mouth and throat problems  R: NEGATIVE for significant cough or SOB  B: NEGATIVE for masses, tenderness or discharge  CV: NEGATIVE for chest pain, palpitations or peripheral edema  GI: NEGATIVE for nausea, abdominal pain, heartburn, or change in bowel habits  : NEGATIVE for frequency, dysuria, or hematuria  MUSCULOSKELETAL:some hip pain  N: NEGATIVE for weakness, dizziness or paresthesias  E: NEGATIVE for temperature intolerance, skin/hair changes  H: NEGATIVE for bleeding problems  P: NEGATIVE for changes in mood or affect    Physical Exam:  Vitals Were Reviewed    Patient Vitals for the past 16 hrs:   BP Temp Temp src Pulse Heart Rate Resp SpO2 Weight   05/12/17 0741 124/50 98.8  F (37.1  C) Oral 79 - 18 93 % -   05/12/17 0605 - - - - - - - 70.8 kg (156 lb 1.4 oz)   05/12/17 0316 125/52 99.8  F (37.7  C) Oral - 96 18 95 % -   05/11/17 2354 - - - - - - 93 % -   05/11/17 2330 127/56 99.7  F (37.6  C) Oral - 90 18 (!) 79 % -   05/11/17 1854 115/51 98.9  F (37.2  C) Oral - 85 18 92 % -         Intake/Output Summary (Last 24 hours) at 05/12/17 1004  Last data filed at 05/12/17 0918   Gross per 24 hour   Intake             1455 ml   Output              525 ml   Net              930 ml       GENERAL APPEARANCE: pleasant and alert  EYES: conjunctiva clear, eyes grossly normal  RESP: bibasilar crackles  CV: regular rate and rhythm, normal S1 S2, no S3 or S4 and no murmur, click or rub   ABDOMEN: soft, nontender, no HSM or masses and bowel sounds normal  MS: no clubbing, cyanosis; tr edema-incision is clean  SKIN: clear without significant rashes or lesions  NEURO: mildly confused, nonfocal    Lab:  Recent Labs   Lab Test  05/12/17   0609   05/11/17   0630   NA  136  140   POTASSIUM  4.1  3.7   CHLORIDE  101  105   CO2  27  28   ANIONGAP  8  7   GLC  135*  160*   BUN  21  19   CR  0.79  0.76   SLIM  8.5  8.4*     CBC RESULTS:   Recent Labs   Lab Test  05/12/17   0633  05/11/17   0630   WBC  10.5  10.4   RBC  3.84  3.67*   HGB  11.0*  10.5*   HCT  34.4*  33.3*   PLT  136*  113*       Results for orders placed or performed during the hospital encounter of 05/09/17 (from the past 24 hour(s))   Glucose by meter   Result Value Ref Range    Glucose 197 (H) 70 - 99 mg/dL   Glucose by meter   Result Value Ref Range    Glucose 155 (H) 70 - 99 mg/dL   Glucose by meter   Result Value Ref Range    Glucose 192 (H) 70 - 99 mg/dL   Glucose by meter   Result Value Ref Range    Glucose 164 (H) 70 - 99 mg/dL   Glucose by meter   Result Value Ref Range    Glucose 135 (H) 70 - 99 mg/dL   Basic metabolic panel   Result Value Ref Range    Sodium 136 133 - 144 mmol/L    Potassium 4.1 3.4 - 5.3 mmol/L    Chloride 101 94 - 109 mmol/L    Carbon Dioxide 27 20 - 32 mmol/L    Anion Gap 8 3 - 14 mmol/L    Glucose 135 (H) 70 - 99 mg/dL    Urea Nitrogen 21 7 - 30 mg/dL    Creatinine 0.79 0.52 - 1.04 mg/dL    GFR Estimate 68 >60 mL/min/1.7m2    GFR Estimate If Black 82 >60 mL/min/1.7m2    Calcium 8.5 8.5 - 10.1 mg/dL   CBC with platelets   Result Value Ref Range    WBC 10.5 4.0 - 11.0 10e9/L    RBC Count 3.84 3.8 - 5.2 10e12/L    Hemoglobin 11.0 (L) 11.7 - 15.7 g/dL    Hematocrit 34.4 (L) 35.0 - 47.0 %    MCV 90 78 - 100 fl    MCH 28.6 26.5 - 33.0 pg    MCHC 32.0 31.5 - 36.5 g/dL    RDW 12.0 10.0 - 15.0 %    Platelet Count 136 (L) 150 - 450 10e9/L       Assessment and Plan:    Acute right femoral neck fracture.  May 12, 2017 day 2 surgical repair       Mild hypoxia with chest x-ray that suggests some pulmonary edema in a patient with no history of heart failure/echo with diastolic dysfunction and pulmonary htn-suspect acute exacerbation of diastolic heart failure.  May 10, 2017  diuresed 1405 cc urine, oxygen sat 97% two liters, looks comfortable  May 11, 2017 sat 99% two liters.  May 12, 2017 sat 93% one liter, 300 cc uo overnight-creat stable at 0.79, will observe the urine output and not hydrate due to the previous pulmonary edema      Fever to 100.6-source unclear, ddx includes trauma, uti or pulmonary, blood cultures taken  May 11, 2017 fever to 100.7 overnight, urine culture neg at 20 hours (pyuria present)--continue rocephin for now  May 12, 2017 afebrile, UC with less than 10 k staph (neg), will stop rocephin      History of hearing loss.       Diabetes mellitus type 2.   May 12, 2017 am glucose 135, holding glipizide, on ss      History of hypertension.  May 11, 2017 restart lisinopril        gerd  On iv protonix  May 11, 2017 convert to oral     Mild thrombocytopenia  May 12, 2017 platelets 142-113-136      Prophylaxis-SCD     Continued cares  Need to watch in hospital another day to watch general status , renal function , urine output and respiratory status  Might be ok to go the the tcu tomorrow  Stop rocephin  Iv is out  Watch for any need for further diuresis.

## 2017-05-12 NOTE — PLAN OF CARE
Problem: Goal Outcome Summary  Goal: Goal Outcome Summary  OT: Just returned supine in bed with physical therapy. Willing to participate in B UE exercises. Completes 5 exercises, frequently falling asleep during exercises, needing cues to maintain alertness.      REC: TCU

## 2017-05-12 NOTE — PROGRESS NOTES
[facility] Orthopaedics Progress Note      Post-operative Day: 2 Days Post-Op    S/p right hip bipolar hemiarthroplasty for displaced femoral neck fracture    Subjective:   Dementia status quo.  No reports of pain until attempting to move.  OOB yesterday, WBAT with SBA*2.  Expect TCU when medically ok.  Return to long term dementia care facility is desired by family.    Pain: minimal at rest, moderate with activity.  Chest pain, SOB:  No      Objective:  Blood pressure 125/52, pulse 76, temperature 99.8  F (37.7  C), temperature source Oral, resp. rate 18, weight 70.8 kg (156 lb 1.4 oz), SpO2 95 %.    Patient Vitals for the past 24 hrs:   BP Temp Temp src Pulse Heart Rate Resp SpO2 Weight   05/12/17 0605 - - - - - - - 70.8 kg (156 lb 1.4 oz)   05/12/17 0316 125/52 99.8  F (37.7  C) Oral - 96 18 95 % -   05/11/17 2354 - - - - - - 93 % -   05/11/17 2330 127/56 99.7  F (37.6  C) Oral - 90 18 (!) 79 % -   05/11/17 1854 115/51 98.9  F (37.2  C) Oral - 85 18 92 % -   05/11/17 1532 118/56 97.9  F (36.6  C) Oral 76 - 18 94 % -   05/11/17 1305 120/45 - - - 87 - 96 % -       Wt Readings from Last 4 Encounters:   05/12/17 70.8 kg (156 lb 1.4 oz)   03/09/17 67.7 kg (149 lb 3.2 oz)   02/09/17 68 kg (150 lb)   12/08/16 68 kg (150 lb)         Motor function, sensation, and circulation intact   Yes  Wound status: incisions are clean dry and intact. Yes  Calf tenderness: Bilateral  No    Pertinent Labs   Lab Results: personally reviewed.     Recent Labs   Lab Test  05/12/17   0633  05/11/17   0630  05/10/17   0405   05/01/17   1015   09/21/15   1340   INR   --    --    --    --   1.02   --   1.07   HGB  11.0*  10.5*  12.5   < >  13.3   < >  13.8   HCT  34.4*  33.3*  38.9   < >  41.0   < >  42.3   MCV  90  91  89   < >  90   < >  86   PLT  136*  113*  142*   < >  183   < >  185   NA  136  140  141   < >  144   < >  139   CRP   --    --    --    --   <2.9   --   <2.9    < > = values in this interval not displayed.       Plan:  Anticoagulation protocol: Lovenox inpatient and then  mg daily at discharge  x 42  days            Pain medications:  oxycodone            Weight bearing status:  WBAT            Disposition:  TCU when medically approved             Continue cares and rehabilitation     Report completed by:  Riky Greenfield MD  Date: 5/12/2017  Time: 7:38 AM

## 2017-05-12 NOTE — PLAN OF CARE
Problem: Goal Outcome Summary  Goal: Goal Outcome Summary  Outcome: Improving  Pt up with assist of 2 and walker to commode and chair, voided 300cc this morning.  Pain is controlled with tylenol and 5mg oxycodone as needed.  Shower was completed today.  Incision to right hip is well approximated without drainage, new dressing applied following shower.  Eating ok.  Vitals are stable, pt off O2 since this morning and sats have been 92% on room air.  Assisted pt with using her IS.  Also need to encourage oral fluid intake, pt not drinking much.  Pt will discharge to TCU, possibly tomorrow, will continue to monitor.

## 2017-05-12 NOTE — PLAN OF CARE
Problem: Goal Outcome Summary  Goal: Goal Outcome Summary  PT-  Pt progressing towards goals. Pain appears controlled. Pt required minimal assistance of one w/ sit to stand w/ walker. Practiced static standing and also was able to ambulate a short distance(  5 feet x1)  with RW and moderate assistance of one and SBA/ 1 to follow w/ the chair for safety .  Pt continues w/ weakness, Right LE giving away during gait.  Pt also participated w/ strengthening exercises     REC- TCU

## 2017-05-12 NOTE — PLAN OF CARE
Problem: Goal Outcome Summary  Goal: Goal Outcome Summary  Outcome: No Change  Pt slept comfortably until this morning, tolerated repositioning during night, did not void, bladder scanned this am for 238 cc. Encouraging PO fluids, pt removed her SL so Rocephin is delayed, charge nurse attempted restart w/o success, she will update day shift charge nurse. Pillow in place for abduction.

## 2017-05-12 NOTE — PROGRESS NOTES
Patient accepted at [facility] (Phone: [phone] Fax: [phone]).  Discussed transportation with patient's daughter, Keya.  She is aware of the private cost.  Transport arranged with [employer] for 1230 on Saturday.  Nel, at [facility], notified of discharge time.    PAS-RR    D: Per DHS regulation, MARQUIS completed and submitted PAS-RR to [place] Board on Aging Direct Connect via the Senior LinkAge Line.  PAS-RR confirmation # is : [id number]    P: Further questions may be directed to Senior LinkAge Line at #[phone], option #4 for PAS-RR staff.    Wanda Minor POPEYE  [facility] [phone]/ [facility] [phone]

## 2017-05-13 ENCOUNTER — APPOINTMENT (OUTPATIENT)
Dept: PHYSICAL THERAPY | Facility: CLINIC | Age: 82
DRG: 469 | End: 2017-05-13
Payer: MEDICARE

## 2017-05-13 ENCOUNTER — APPOINTMENT (OUTPATIENT)
Dept: OCCUPATIONAL THERAPY | Facility: CLINIC | Age: 82
DRG: 469 | End: 2017-05-13
Payer: MEDICARE

## 2017-05-13 VITALS
RESPIRATION RATE: 18 BRPM | BODY MASS INDEX: 31.79 KG/M2 | HEART RATE: 88 BPM | WEIGHT: 154.76 LBS | SYSTOLIC BLOOD PRESSURE: 151 MMHG | TEMPERATURE: 97.6 F | DIASTOLIC BLOOD PRESSURE: 57 MMHG | OXYGEN SATURATION: 93 %

## 2017-05-13 LAB
ANION GAP SERPL CALCULATED.3IONS-SCNC: 8 MMOL/L (ref 3–14)
BUN SERPL-MCNC: 24 MG/DL (ref 7–30)
CALCIUM SERPL-MCNC: 8.6 MG/DL (ref 8.5–10.1)
CHLORIDE SERPL-SCNC: 100 MMOL/L (ref 94–109)
CO2 SERPL-SCNC: 30 MMOL/L (ref 20–32)
CREAT SERPL-MCNC: 0.76 MG/DL (ref 0.52–1.04)
ERYTHROCYTE [DISTWIDTH] IN BLOOD BY AUTOMATED COUNT: 11.9 % (ref 10–15)
GFR SERPL CREATININE-BSD FRML MDRD: 71 ML/MIN/1.7M2
GLUCOSE BLDC GLUCOMTR-MCNC: 152 MG/DL (ref 70–99)
GLUCOSE BLDC GLUCOMTR-MCNC: 174 MG/DL (ref 70–99)
GLUCOSE BLDC GLUCOMTR-MCNC: 236 MG/DL (ref 70–99)
GLUCOSE SERPL-MCNC: 163 MG/DL (ref 70–99)
HCT VFR BLD AUTO: 31.5 % (ref 35–47)
HGB BLD-MCNC: 10.1 G/DL (ref 11.7–15.7)
MCH RBC QN AUTO: 28.7 PG (ref 26.5–33)
MCHC RBC AUTO-ENTMCNC: 32.1 G/DL (ref 31.5–36.5)
MCV RBC AUTO: 90 FL (ref 78–100)
PLATELET # BLD AUTO: 160 10E9/L (ref 150–450)
POTASSIUM SERPL-SCNC: 4.3 MMOL/L (ref 3.4–5.3)
RBC # BLD AUTO: 3.52 10E12/L (ref 3.8–5.2)
SODIUM SERPL-SCNC: 138 MMOL/L (ref 133–144)
WBC # BLD AUTO: 8.1 10E9/L (ref 4–11)

## 2017-05-13 PROCEDURE — A9270 NON-COVERED ITEM OR SERVICE: HCPCS | Mod: GY | Performed by: FAMILY MEDICINE

## 2017-05-13 PROCEDURE — 97110 THERAPEUTIC EXERCISES: CPT | Mod: GP | Performed by: PHYSICAL THERAPY ASSISTANT

## 2017-05-13 PROCEDURE — 85027 COMPLETE CBC AUTOMATED: CPT | Performed by: PHYSICIAN ASSISTANT

## 2017-05-13 PROCEDURE — 80048 BASIC METABOLIC PNL TOTAL CA: CPT | Performed by: PHYSICIAN ASSISTANT

## 2017-05-13 PROCEDURE — 00000146 ZZHCL STATISTIC GLUCOSE BY METER IP

## 2017-05-13 PROCEDURE — 97116 GAIT TRAINING THERAPY: CPT | Mod: GP | Performed by: PHYSICAL THERAPY ASSISTANT

## 2017-05-13 PROCEDURE — 40000133 ZZH STATISTIC OT WARD VISIT: Performed by: OCCUPATIONAL THERAPIST

## 2017-05-13 PROCEDURE — 25000132 ZZH RX MED GY IP 250 OP 250 PS 637: Mod: GY | Performed by: PHYSICIAN ASSISTANT

## 2017-05-13 PROCEDURE — 40000193 ZZH STATISTIC PT WARD VISIT: Performed by: PHYSICAL THERAPY ASSISTANT

## 2017-05-13 PROCEDURE — 25000132 ZZH RX MED GY IP 250 OP 250 PS 637: Mod: GY | Performed by: FAMILY MEDICINE

## 2017-05-13 PROCEDURE — 97535 SELF CARE MNGMENT TRAINING: CPT | Mod: GO | Performed by: OCCUPATIONAL THERAPIST

## 2017-05-13 PROCEDURE — A9270 NON-COVERED ITEM OR SERVICE: HCPCS | Mod: GY | Performed by: PHYSICIAN ASSISTANT

## 2017-05-13 PROCEDURE — 25000128 H RX IP 250 OP 636: Performed by: PHYSICIAN ASSISTANT

## 2017-05-13 PROCEDURE — 99232 SBSQ HOSP IP/OBS MODERATE 35: CPT | Performed by: FAMILY MEDICINE

## 2017-05-13 PROCEDURE — 99207 ZZC CDG-CHARGE REQUIRED MANUAL ENTRY: CPT | Performed by: FAMILY MEDICINE

## 2017-05-13 RX ORDER — OXYCODONE HYDROCHLORIDE 5 MG/1
5 TABLET ORAL
Qty: 40 TABLET | Refills: 0 | Status: ON HOLD | OUTPATIENT
Start: 2017-05-13 | End: 2017-06-23

## 2017-05-13 RX ORDER — FUROSEMIDE 20 MG
20 TABLET ORAL ONCE
Status: COMPLETED | OUTPATIENT
Start: 2017-05-13 | End: 2017-05-13

## 2017-05-13 RX ORDER — FUROSEMIDE 20 MG
20 TABLET ORAL DAILY
Qty: 5 TABLET | Refills: 0 | Status: SHIPPED | OUTPATIENT
Start: 2017-05-13 | End: 2017-06-15

## 2017-05-13 RX ADMIN — OMEPRAZOLE 20 MG: 20 CAPSULE, DELAYED RELEASE ORAL at 08:41

## 2017-05-13 RX ADMIN — OXYCODONE HYDROCHLORIDE 5 MG: 5 TABLET ORAL at 04:56

## 2017-05-13 RX ADMIN — FUROSEMIDE 20 MG: 20 TABLET ORAL at 12:07

## 2017-05-13 RX ADMIN — LISINOPRIL 5 MG: 5 TABLET ORAL at 08:41

## 2017-05-13 RX ADMIN — OXYCODONE HYDROCHLORIDE 5 MG: 5 TABLET ORAL at 11:29

## 2017-05-13 RX ADMIN — ACETAMINOPHEN 650 MG: 325 TABLET, FILM COATED ORAL at 11:29

## 2017-05-13 RX ADMIN — ACETAMINOPHEN 650 MG: 325 TABLET, FILM COATED ORAL at 04:56

## 2017-05-13 RX ADMIN — ENOXAPARIN SODIUM 40 MG: 40 INJECTION SUBCUTANEOUS at 11:28

## 2017-05-13 RX ADMIN — SENNOSIDES AND DOCUSATE SODIUM 1 TABLET: 8.6; 5 TABLET ORAL at 08:41

## 2017-05-13 NOTE — PROGRESS NOTES
[facility] Progress Note           Assessment and Plan:   Acute right femoral neck fracture.  May 12, 2017 day 2 surgical repair   May 13 ,2017 post op day 3      Mild hypoxia with chest x-ray that suggests some pulmonary edema in a patient with no history of heart failure/echo with diastolic dysfunction and pulmonary htn-suspect acute exacerbation of diastolic heart failure.  May 10, 2017 diuresed 1405 cc urine, oxygen sat 97% two liters, looks comfortable  May 11, 2017 sat 99% two liters.  May 12, 2017 sat 93% one liter, 300 cc uo overnight-creat stable at 0.79, will observe the urine output and not hydrate due to the previous pulmonary edema  May 13,2017 . Patient seen at rounds, comfortable sitting on chair , color was good.  Oxygen saturation on room air hovering around 90- 93 %, dips with activity .will need home oxygen. Asked that she be given one dose of furosemide 20 mg oral .Lungs still a bit wet.       Fever to 100.6-source unclear, ddx includes trauma, uti or pulmonary, blood cultures taken  May 11, 2017 fever to 100.7 overnight, urine culture neg at 20 hours (pyuria present)--continue rocephin for now  May 12, 2017 afebrile, UC with less than 10 k staph (neg), will stop rocephin  May 13, 2017 - No fevers for the last 24 hours , had a 99 temp at 0131 this morning . Wbc remain within normal limits. Hgb low but patient has chronic anemia      History of hearing loss.       Diabetes mellitus type 2.   May 12, 2017 am glucose 135, holding glipizide, on ss  May 13,2017 . Blood glucose this morning was 236, will resume home medication .        History of hypertension.  May 11, 2017 restart lisinopril   May 13, 2017 - Stable Blood pressure readings.       gerd  On iv protonix  May 11, 2017 convert to oral      Mild thrombocytopenia  May 12, 2017 platelets 142-113-136      Prophylaxis-SCD      Continued cares  Need to watch in hospital another day to watch general status , renal function ,  urine output and respiratory status  May 13 ,2017 - General status improved, renal function stable Cr today is 0.76 . Urine output 360/2050 in the last 24 hours.  Patient discharged to TCU with oxygen and a couple of days of furosemide. Recommend rechecking BMP on Monday .              Interval History:   Continues to improve.  Vital signs generally better.  Eating and voiding well.  Tolerating medications without significant side effects.  No new concerns today.  Patient dong better today though oxygen still dips with activity. Will be discharged to TCU today on oxygen . Also gave patient a dose of furosemide today as lungs still a bit wet.             Significant Problems:   Active Problems:    Hip fracture (H)    Displaced fracture of right femoral neck (H)    Fracture of femoral neck, right (H)             Review of Systems:   The Review of Systems is negative other than noted in the HPI            Medications:       lisinopril  5 mg Oral Daily     omeprazole  20 mg Oral Daily     sodium chloride (PF)  3 mL Intracatheter Q8H     enoxaparin  40 mg Subcutaneous Q24H     senna-docusate  1-2 tablet Oral BID     insulin aspart  1-7 Units Subcutaneous TID AC     insulin aspart  1-5 Units Subcutaneous At Bedtime             Physical Exam:   Vitals were reviewed  Temp: 97.6  F (36.4  C) Temp src: Oral BP: 151/57 Pulse: 88 Heart Rate: 110 Resp: 18 SpO2: 93 % O2 Device: None (Room air) Oxygen Delivery: 2 LPM  Blood pressure range: Systolic (24hrs), Av , Min:140 , Max:175   Blood pressure range: Diastolic (24hrs), Av, Min:56, Max:70  I/O this shift:  In: -   Out: 1050 [Urine:1050]    Intake/Output Summary (Last 24 hours) at 17 1208  Last data filed at 17 1109   Gross per 24 hour   Intake              360 ml   Output             2050 ml   Net            -1690 ml     Constitutional:   awake, alert, cooperative, no apparent distress, and appears stated age     Lungs:   no increased work of breathing, mild  air exchange, no retractions and crackles right base, right middle lobe and left base     Cardiovascular:   Normal apical impulse, regular rate and rhythm, normal S1 and S2, no S3 or S4, and systolic murmur noted     Musculoskeletal:   no lower extremity pitting edema present  RIGHT HIP:  redness absent  warmth absent  swelling absent  tenderness absent  incision site clean, dry and intact     Neurologic:   Awake, alert, oriented to name, place and time.          Skin:   no bruising or bleeding             Data:     Lab Results   Component Value Date    NTBNPI 159 09/21/2015     Lab Results   Component Value Date    WBC 8.1 05/13/2017    WBC 10.5 05/12/2017    WBC 10.4 05/11/2017    HGB 10.1 (L) 05/13/2017    HGB 11.0 (L) 05/12/2017    HGB 10.5 (L) 05/11/2017    HCT 31.5 (L) 05/13/2017    HCT 34.4 (L) 05/12/2017    HCT 33.3 (L) 05/11/2017    MCV 90 05/13/2017    MCV 90 05/12/2017    MCV 91 05/11/2017     05/13/2017     (L) 05/12/2017     (L) 05/11/2017     Lab Results   Component Value Date    CR 0.76 05/13/2017    CR 0.79 05/12/2017    CR 0.76 05/11/2017     Lab Results   Component Value Date     05/13/2017     05/12/2017     05/11/2017    POTASSIUM 4.3 05/13/2017    POTASSIUM 4.1 05/12/2017    POTASSIUM 3.7 05/11/2017    CHLORIDE 100 05/13/2017    CHLORIDE 101 05/12/2017    CHLORIDE 105 05/11/2017    CO2 30 05/13/2017    CO2 27 05/12/2017    CO2 28 05/11/2017     (H) 05/13/2017     (H) 05/12/2017     (H) 05/11/2017     Lab Results   Component Value Date    WBC 8.1 05/13/2017    WBC 10.5 05/12/2017    WBC 10.4 05/11/2017          Attestation:  I have reviewed today's vital signs, notes, medications, labs and imaging.  Amount of time performed on this daily note: 20 minutes.    Danisha Reaves MD

## 2017-05-13 NOTE — PROGRESS NOTES
[facility] DISCHARGE NOTE    Patient discharged to transitional care unit at 12:54 PM via wheel chair. Accompanied by daughter and staff. Discharge instructions reviewed with patient and daughter, opportunity offered to ask questions. Prescriptions sent with patient to fill . All belongings sent with patient.    Mansi Cortez

## 2017-05-13 NOTE — DISCHARGE SUMMARY
[facility] Discharge Summary    EL BARRIENTOS [id number]   Age: 89 year old   11/8/1921       Date of Admission:  5/9/2017  Date of Discharge::  5/13/2017  Admitting Physician:  Riky Greenfield   Discharge Physician:  Lucas Reddy            Admission Diagnoses:   Hip fracture, right, closed, initial encounter (H) [S72.001A]          Discharge Diagnosis:   Hip fracture, right, closed, initial encounter (H) [S72.001A]          Procedures:   Procedure(s): Total hip arthoplasty (Right)                Medications Prior to Admission:     Prescriptions Prior to Admission   Medication Sig Dispense Refill Last Dose     Acetaminophen (TYLENOL PO) Take 650 mg by mouth every 4 hours as needed for mild pain or fever Do not exceed 4,000 mg of Acetaminophen from all sources in 24 hours   5/9/2017 at 0130     omeprazole (PRILOSEC) 20 MG CR capsule Take 1 capsule (20 mg) by mouth daily 30 capsule 11 5/9/2017 at 0600     glipiZIDE (GLIPIZIDE XL) 5 MG 24 hr tablet Take 1 tablet (5 mg) by mouth daily 30 tablet 11 5/9/2017 at 0730     multivitamin (OCUVITE) TABS tablet Take 1 tablet by mouth daily 30 each 0 5/9/2017 at am     lisinopril (PRINIVIL,ZESTRIL) 5 MG tablet Take 1 tablet (5 mg) by mouth daily 30 tablet 11 5/9/2017 at am     Cyanocobalamin (B-12) 1000 MCG TBCR Take 1,000 mcg by mouth daily 30 tablet 11 5/9/2017 at s,     calcium-vitamin D (CALTRATE) 600-400 MG-UNIT per tablet Take 1 tablet by mouth every evening   5/8/2017 at hs     Glucose Blood (BLOOD GLUCOSE TEST STRIPS) STRP by In Vitro route as needed   Unknown at Unknown time     CYCLOBENZAPRINE HCL PO Take 5 mg by mouth 3 times daily as needed for muscle spasms   none yet at Unknown time     LOPERAMIDE HCL PO Take 2 mg by mouth daily as needed   Unknown at Unknown time     MECLIZINE HCL PO Take 12.5 mg by mouth 2 times daily as needed for dizziness   Unknown at Unknown time     triamcinolone (KENALOG) 0.1 % cream Apply topically 2 times daily as needed  for irritation   Unknown at Unknown time             Discharge Medications:     Current Discharge Medication List      CONTINUE these medications which have NOT CHANGED    Details   Acetaminophen (TYLENOL PO) Take 650 mg by mouth every 4 hours as needed for mild pain or fever Do not exceed 4,000 mg of Acetaminophen from all sources in 24 hours      omeprazole (PRILOSEC) 20 MG CR capsule Take 1 capsule (20 mg) by mouth daily  Qty: 30 capsule, Refills: 11    Associated Diagnoses: Nausea      glipiZIDE (GLIPIZIDE XL) 5 MG 24 hr tablet Take 1 tablet (5 mg) by mouth daily  Qty: 30 tablet, Refills: 11    Associated Diagnoses: Type 2 diabetes mellitus with hyperglycemia, without long-term current use of insulin (H)      multivitamin (OCUVITE) TABS tablet Take 1 tablet by mouth daily  Qty: 30 each, Refills: 0      lisinopril (PRINIVIL,ZESTRIL) 5 MG tablet Take 1 tablet (5 mg) by mouth daily  Qty: 30 tablet, Refills: 11    Associated Diagnoses: Essential hypertension with goal blood pressure less than 140/90      Cyanocobalamin (B-12) 1000 MCG TBCR Take 1,000 mcg by mouth daily  Qty: 30 tablet, Refills: 11    Associated Diagnoses: Vitamin B 12 deficiency      calcium-vitamin D (CALTRATE) 600-400 MG-UNIT per tablet Take 1 tablet by mouth every evening      Glucose Blood (BLOOD GLUCOSE TEST STRIPS) STRP by In Vitro route as needed      CYCLOBENZAPRINE HCL PO Take 5 mg by mouth 3 times daily as needed for muscle spasms      LOPERAMIDE HCL PO Take 2 mg by mouth daily as needed      MECLIZINE HCL PO Take 12.5 mg by mouth 2 times daily as needed for dizziness      triamcinolone (KENALOG) 0.1 % cream Apply topically 2 times daily as needed for irritation                    Hospital Course:   The patient tolerated the procedure well and was taken to postop recovery in stable condition.  Please refer to the full operative note for complete details.  Post-operative films show components in excellent position.  Patient had adequate  pain control and was prescribed physical therapy.  The patient was given 24 hrs of perioperative antibiotics.  The was followed by the hospitalist during this hospital visit to manage his medical problems.   The patient was discharged on home medications as outlined in medication reconciliation list outlined below.  The patient has instructions that if he has increased pain, fever, erythema, swelling or drainage to immediately call.          Discharge Instructions and Follow-Up:   Discharge to: TCU  Activity: as tolerated. No driving until cleared by ortho  Weight bearing status: weight bearing as tolerated  Wound care: Daily dressing change. Keep clean and dry. May get incision wet in shower if no drainage is present. No soaking or scrubbing incision.  Follow up with Orthopedic Surgeon/Physician Assistant in 2 weeks. Call [phone] for appointment.  Bilateral APRIL hose for 2 weeks. May take off at night. Discuss with surgeon at first visit.  You have been started on the anticoagulation medication Lovenox in hospital but will change to  mg PO QDay at discharge.  Your length of therapy is for 6 weeks.           Discharge Disposition:   Discharged to rehabilitation facility      Attestation:  I have reviewed today's vital signs, notes, medications, labs and imaging.      Lucas Reddy

## 2017-05-13 NOTE — PROGRESS NOTES
[facility] Orthopaedics Progress Note      Post-operative Day: 3 Days Post-Op    Procedure(s):  Open reduction internal fixation right hip bipolar gregg arthroplasty. - Wound Class: I-Clean      Subjective:    Pain: minimal  Chest pain, SOB:  No      Objective:  Blood pressure 151/57, pulse 88, temperature 97.6  F (36.4  C), temperature source Oral, resp. rate 18, weight 70.2 kg (154 lb 12.2 oz), SpO2 95 %.    Patient Vitals for the past 24 hrs:   BP Temp Temp src Pulse Heart Rate Resp SpO2 Weight   05/13/17 0733 151/57 97.6  F (36.4  C) Oral 88 - 18 95 % -   05/13/17 0457 - - - - - - - 70.2 kg (154 lb 12.2 oz)   05/13/17 0132 160/66 - - - - - 94 % -   05/13/17 0131 175/65 99  F (37.2  C) Oral - 110 18 (!) 85 % -   05/12/17 2014 144/56 100.6  F (38.1  C) Oral 88 - 18 99 % -   05/12/17 1550 140/70 99.1  F (37.3  C) Oral 89 - 18 93 % -   05/12/17 1120 115/48 97.6  F (36.4  C) Oral 79 - 18 92 % -       Wt Readings from Last 4 Encounters:   05/13/17 70.2 kg (154 lb 12.2 oz)   03/09/17 67.7 kg (149 lb 3.2 oz)   02/09/17 68 kg (150 lb)   12/08/16 68 kg (150 lb)         Motor function, sensation, and circulation intact   Yes  Wound status: incisions are clean dry and intact. Yes  Calf tenderness: Bilateral  No    Pertinent Labs   Lab Results: personally reviewed.     Recent Labs   Lab Test  05/13/17   0635  05/12/17   0633  05/11/17   0630   05/01/17   1015   09/21/15   1340   INR   --    --    --    --   1.02   --   1.07   HGB  10.1*  11.0*  10.5*   < >  13.3   < >  13.8   HCT  31.5*  34.4*  33.3*   < >  41.0   < >  42.3   MCV  90  90  91   < >  90   < >  86   PLT  160  136*  113*   < >  183   < >  185   NA  138  136  140   < >  144   < >  139   CRP   --    --    --    --   <2.9   --   <2.9    < > = values in this interval not displayed.       Plan: Anticoagulation protocol: Lovenox inpatient and then  mg daily at discharge  x 42  days            Pain medications:  oxycodone            Weight bearing status:   WBAT            Disposition:  TCU today             Continue cares and rehabilitation     Report completed by:  Lucas Reddy MD  Date: 5/13/2017  Time: 9:17 AM

## 2017-05-13 NOTE — PLAN OF CARE
Problem: Goal Outcome Summary  Goal: Goal Outcome Summary  Pt was alert, forgetful at 3PM.  She got up with heavy assist of 2, having to move commode and/or bed to accommodate her poor movement.  Pt  Takes small steps and then just stps.  She is turned to commode or chair with difficulty.  Pt has a good appetite.  Pt  has been letting us know when she needs to void, not by using call light, but by telling us when we are in her room.  Set off chair alarm once when she needed commode.  Dressing to R hip CDI.  CMS RLE good.  Pt can be redirected, but towards wvening her confusion becomes worse and is not as easily redirectable.  Pt is appropriate for her age.  Pt tem fluctuating, up to 100.6 at 1900, oxygen needs are increasing.  She can drop to 84%, and goes back up into low 90's on 2L.    CRISTY Hallman RN

## 2017-05-13 NOTE — PLAN OF CARE
Problem: Goal Outcome Summary  Goal: Goal Outcome Summary  Outcome: No Change  Pt continues to be confused, however, able to correctly answer that she is in the hospital d/t a broken hip. Pt 2x picked at and removed bandages and steri strips. Hip is warm to the touch and pt reports is very sore. Medicated with 5mg oxycodone and Tylenol x1. Up to the commode by an assist of two with a walker and gait belt.

## 2017-05-13 NOTE — PROGRESS NOTES
Report was called to Lyndsey RUIZ at [facility] at this time.  Mansi Cortez 5/13/2017 12:40 PM

## 2017-05-13 NOTE — PLAN OF CARE
Problem: Goal Outcome Summary  Goal: Goal Outcome Summary  Outcome: Adequate for Discharge Date Met:  05/13/17  Occupational Therapy Discharge Summary     Reason for therapy discharge:    Discharged to transitional care facility.     Progress towards therapy goal(s). See goals on Care Plan in [facility] electronic health record for goal details.  Goals not met.  Barriers to achieving goals:   Pt continues to be weak. 1-2 assist for transfers.  Mod-max assist for dressing and bathing.  Slow to progress towards goals but participates and cooperative.  .     Therapy recommendation(s):    Continued therapy is recommended.  Rationale/Recommendations:  OT to follow at TCU in an effort to regain baseline functional abilities.  .      Michaela Hutchison OTR/L

## 2017-05-13 NOTE — PROGRESS NOTES
Physical Therapy Discharge Summary    Reason for therapy discharge:    Discharged to transitional care facility.  All goals and outcomes met, no further needs identified.    Progress towards therapy goal(s). See goals on Care Plan in [facility] electronic health record for goal details.  Goals met    Therapy recommendation(s):    Continued therapy is recommended.  Rationale/Recommendations:  PT to follow at TCU.

## 2017-05-13 NOTE — PROGRESS NOTES
Name: Lulu Carlton    MRN#: [id number]    Reason for Hospitalization: Hip fracture, right, closed, initial encounter (H) [S72.001A]    Discharge Date: 5/13/2017    Patient / Family response to discharge plan: Pt will dc today to [place] on [facility] (Phone: [phone] Fax: [phone]) at 1230.     Follow-Up Appt: Future Appointments  Date Time Provider Department Center   4/19/2017 2:00 PM [facility]       Other Providers (Care Coordinator, County Services, PCA services etc): No    Discharge Disposition: transitional care unit    Tania VIVAR, [facility], [facility] [phone]

## 2017-05-14 VITALS
HEART RATE: 98 BPM | TEMPERATURE: 99.3 F | BODY MASS INDEX: 31.84 KG/M2 | WEIGHT: 155 LBS | SYSTOLIC BLOOD PRESSURE: 126 MMHG | RESPIRATION RATE: 20 BRPM | DIASTOLIC BLOOD PRESSURE: 69 MMHG

## 2017-05-14 LAB
BACTERIA SPEC CULT: ABNORMAL
MICRO REPORT STATUS: ABNORMAL
MICROORGANISM SPEC CULT: ABNORMAL
SPECIMEN SOURCE: ABNORMAL

## 2017-05-15 ENCOUNTER — CARE COORDINATION (OUTPATIENT)
Dept: CARE COORDINATION | Facility: CLINIC | Age: 82
End: 2017-05-15

## 2017-05-15 ENCOUNTER — NURSING HOME VISIT (OUTPATIENT)
Dept: GERIATRICS | Facility: CLINIC | Age: 82
End: 2017-05-15
Payer: COMMERCIAL

## 2017-05-15 VITALS
WEIGHT: 155 LBS | TEMPERATURE: 98.8 F | DIASTOLIC BLOOD PRESSURE: 81 MMHG | SYSTOLIC BLOOD PRESSURE: 165 MMHG | HEART RATE: 94 BPM | BODY MASS INDEX: 31.84 KG/M2 | RESPIRATION RATE: 18 BRPM

## 2017-05-15 DIAGNOSIS — Z47.89 ORTHOPEDIC AFTERCARE: Primary | ICD-10-CM

## 2017-05-15 DIAGNOSIS — E55.9 VITAMIN D DEFICIENCY: ICD-10-CM

## 2017-05-15 DIAGNOSIS — E11.8 TYPE 2 DIABETES MELLITUS WITH COMPLICATION, WITHOUT LONG-TERM CURRENT USE OF INSULIN (H): ICD-10-CM

## 2017-05-15 DIAGNOSIS — Z96.649 S/P HIP HEMIARTHROPLASTY: ICD-10-CM

## 2017-05-15 DIAGNOSIS — R53.81 PHYSICAL DECONDITIONING: ICD-10-CM

## 2017-05-15 DIAGNOSIS — I10 BENIGN ESSENTIAL HYPERTENSION: ICD-10-CM

## 2017-05-15 DIAGNOSIS — K59.00 CONSTIPATION, UNSPECIFIED CONSTIPATION TYPE: ICD-10-CM

## 2017-05-15 DIAGNOSIS — R30.0 DYSURIA: ICD-10-CM

## 2017-05-15 DIAGNOSIS — G70.00 MYASTHENIA GRAVIS (H): ICD-10-CM

## 2017-05-15 DIAGNOSIS — M19.90 OSTEOARTHRITIS, UNSPECIFIED OSTEOARTHRITIS TYPE, UNSPECIFIED SITE: ICD-10-CM

## 2017-05-15 PROCEDURE — 99207 ZZC CDG-CORRECTLY CODED, REVIEWED AND AGREE: CPT | Performed by: NURSE PRACTITIONER

## 2017-05-15 PROCEDURE — 99310 SBSQ NF CARE HIGH MDM 45: CPT | Performed by: NURSE PRACTITIONER

## 2017-05-15 NOTE — PROGRESS NOTES
Clinic Care Coordination Contact  Care Team Conversations    Pt is discharging to [place] on [facility] (Phone: [phone] Fax: [phone])  Today. Tania VIVAR, [facility], [facility] [phone]

## 2017-05-15 NOTE — PROGRESS NOTES
[facility]  PRIMARY CARE PROVIDER AND CLINIC:  Todd Villeda [facility] [address]  / [facility] 550*  Chief Complaint   Patient presents with     Hospital F/U       HPI:    Lluu Carlton is a 89 year old  (11/8/1921),admitted to the [facility] from [facility].  Hospital stay 5/9/17 through 5/13/17.  Admitted to this facility for  rehab, medical management and nursing care. Current issues are:        Orthopedic aftercare  S/P hip hemiarthroplasty  -Resident fractured right hip after sustaining a mecahnical fall at [facility] care unit. She underwent a right hip bipolar hemiarthroplasty for displaced femoral neck fracture on 5/1017.   -Poor historian. No family present during visit.  -Denies pain  - Pain medication optimal     Dysuria  -UTI resolved during hospitalization after course of abx.   -No s/sx noted.      Osteoarthritis, unspecified osteoarthritis type, unspecified site  -Generalized pain  -Has taken PRN acetaminophen     Vitamin D deficiency  -On caltrate qday    Type 2 diabetes mellitus with complication, without long-term current use of insulin (H)  -Currently taking glipizide 5 mg Po qday.   -Sometimes gets nauseated int he the AM; however, was recently taken off omeprazole.  -Denies numbness, burning in feet and sores.      Benign essential hypertension  -Has history of high blood pressures d/t anxiety.   -Baseline blood pressures 110-120/50-60  -Denies CP, SOB, or lightheadedness  -Currently taking lisinopril 5 mg po qday    Myasthenia gravis (H)  -History of- no current s/sx.     Constipation  -Resident reports that her stools are never hard.   -Has loperamide available.     CODE STATUS/ADVANCE DIRECTIVES DISCUSSION:   DNR / DNI  Patient's living condition: lives in memory care unit    ALLERGIES:Hydrocodone-acetaminophen; Trazodone; and Metformin  PAST MEDICAL HISTORY:  has a past medical history of Anemia,  unspecified (hosp. 4/3/07 [place] ); Central nervous system complication (hosp. 4/3/07 [place]); Depressive disorder, not elsewhere classified; Myasthenia gravis; Other malaise and fatigue (hosp. 4/3/07 [facility] ); Other urinary incontinence; Thoracic or lumbosacral neuritis or radiculitis, unspecified; and Unspecified essential hypertension.  PAST SURGICAL HISTORY:  has a past surgical history that includes surgical history of -; surgical history of -; and Open reduction internal fixation hip bipolar (Right, 5/10/2017).  FAMILY HISTORY: family history is not on file.  SOCIAL HISTORY:  reports that she has never smoked. She does not have any smokeless tobacco history on file. She reports that she does not drink alcohol or use illicit drugs.    Post Discharge Medication Reconciliation Status: discharge medications reconciled, continue medications without change.  Current Outpatient Prescriptions   Medication Sig Dispense Refill     oxyCODONE (ROXICODONE) 5 MG IR tablet Take 1 tablet (5 mg) by mouth every 3 hours as needed for moderate to severe pain 40 tablet 0     furosemide (LASIX) 20 MG tablet Take 1 tablet (20 mg) by mouth daily 5 tablet 0     aspirin  MG EC tablet Take 1 tablet (325 mg) by mouth daily 40 tablet 0     Glucose Blood (BLOOD GLUCOSE TEST STRIPS) STRP by In Vitro route as needed       CYCLOBENZAPRINE HCL PO Take 5 mg by mouth 3 times daily as needed for muscle spasms       Acetaminophen (TYLENOL PO) Take 650 mg by mouth every 4 hours as needed for mild pain or fever Do not exceed 4,000 mg of Acetaminophen from all sources in 24 hours       LOPERAMIDE HCL PO Take 2 mg by mouth daily as needed       MECLIZINE HCL PO Take 12.5 mg by mouth 2 times daily as needed for dizziness       omeprazole (PRILOSEC) 20 MG CR capsule Take 1 capsule (20 mg) by mouth daily 30 capsule 11     glipiZIDE (GLIPIZIDE XL) 5 MG 24 hr tablet Take 1 tablet (5 mg) by mouth daily 30 tablet 11     multivitamin (OCUVITE)  TABS tablet Take 1 tablet by mouth daily 30 each 0     lisinopril (PRINIVIL,ZESTRIL) 5 MG tablet Take 1 tablet (5 mg) by mouth daily 30 tablet 11     Cyanocobalamin (B-12) 1000 MCG TBCR Take 1,000 mcg by mouth daily 30 tablet 11     calcium-vitamin D (CALTRATE) 600-400 MG-UNIT per tablet Take 1 tablet by mouth every evening       triamcinolone (KENALOG) 0.1 % cream Apply topically 2 times daily as needed for irritation         ROS:  4 point ROS including Respiratory, CV, GI and , other than that noted in the HPI,  is negative    Exam:  /69  Pulse 98  Temp 99.3  F (37.4  C)  Resp 20  Wt 155 lb (70.3 kg)  BMI 31.84 kg/m2  GENERAL APPEARANCE:  Alert  ENT:  Mouth and posterior oropharynx normal, moist mucous membranes  RESP:  respiratory effort and palpation of chest normal, lungs clear to auscultation   CV:  Palpation and auscultation of heart done , regular rate and rhythm, no murmur, rub, or gallop  M/S:   Gait and station normal  Digits and nails normal  SKIN:  wound healing well, no signs of infection right hip  NEURO:   Cranial nerves 2-12 are normal tested and grossly at patient's baseline  PSYCH:  memory impaired , affect and mood normal    Lab/Diagnostic data:     CBC RESULTS:   Recent Labs   Lab Test  05/13/17   0635  05/12/17   0633   WBC  8.1  10.5   RBC  3.52*  3.84   HGB  10.1*  11.0*   HCT  31.5*  34.4*   MCV  90  90   MCH  28.7  28.6   MCHC  32.1  32.0   RDW  11.9  12.0   PLT  160  136*       Last Basic Metabolic Panel:  Recent Labs   Lab Test  05/13/17   0635  05/12/17   0633   NA  138  136   POTASSIUM  4.3  4.1   CHLORIDE  100  101   SLIM  8.6  8.5   CO2  30  27   BUN  24  21   CR  0.76  0.79   GLC  163*  135*       Liver Function Studies -   Recent Labs   Lab Test  05/09/17   1300  05/01/17   1015   PROTTOTAL  6.5*  6.7*   ALBUMIN  3.3*  3.5   BILITOTAL  0.5  0.4   ALKPHOS  93  82   AST  18  12   ALT  22  20       TSH   Date Value Ref Range Status   05/01/2017 1.13 0.40 - 4.00 mU/L Final    12/08/2016 1.90 0.40 - 4.00 mU/L Final   ]    Lab Results   Component Value Date    A1C 6.3 05/10/2017    A1C 6.1 03/09/2017       ASSESSMENT/PLAN:  (Z47.89) Orthopedic aftercare  (primary encounter diagnosis)  (Z96.649) S/P hip hemiarthroplasty  -Pain medication optimal  -Follow orthopedic recommendations  -Check hemoglobin and hematocrit next lab day to ensure stability.     (R30.0) Dysuria  -UTI resolved during hospitalization after course of abx.   -Continue to monitor and notify NP with changes.     (M19.90) Osteoarthritis, unspecified osteoarthritis type, unspecified site  -PRN tylenol available.     (E55.9) Vitamin D deficiency  -Continue calcium/vitamin D as ordered.     (E11.8) Type 2 diabetes mellitus with complication, without long-term current use of insulin (H)  -Continue to monitor BS  -Continue glipizide as ordered     (I10) Benign essential hypertension  -Chronic, stable.   -Continue PTA lisinopril as ordered.   -Keep SBP> 130 mmHg and DBP > 65 mmHg (levels below these increase mortality as shown by standard studies and observations).     (G70.00) Myasthenia gravis (H)  -History of with no s/sx.     (K59.00) Constipation, unspecified constipation type  -Stable. No problems at this time. Having regular BMs    (R53.81) Physical deconditioning  -Appreciate occupational and physical therapy consults.     Information reviewed:  Medications, vital signs, orders, nursing notes, problem list, hospital information. Total time spent with patient visit was 45 min including patient visit and review of past records. Greater than 50% of total time spent with counseling and coordinating care.    Electronically signed by:  HANG Moralez [facility]

## 2017-05-15 NOTE — PROGRESS NOTES
Clinic Care Coordination Contact  Care Team Conversations    Care Coordination Communication    Referral Source: CTS    Clinical Data: RN CC received CTS referral. Pt was discharged from [facility] on 5/13/17 [facility] RN CC faxed communication sheet to  at TCU.     Plan:  RN CC will wait for f/u from  at [facility] notifying RN CC of pt's discharge plans/needs.        Marcelle Tejeda RN, [facility]  RN Care Coordinator  [facility], [facility]  Phone # [phone]

## 2017-05-15 NOTE — LETTER
Hand-off  for Care Coordination  What is Care Coordination?  [facility] Care Coordination Services are available to people in complex situations,   for example medical, social or financial. The Care Coordinator, a SW or an RN, works with the   patient and their doctor to determine health goals, obtain resources, achieve outcomes,   and develop plans to coordinate care across settings.      o Patient Name:   Lulu Carlton  o Patient :     1921  o Patient PCP:     Todd Villeda MD    o Patient Primary Clinic:   [address] [address]  o D/C Facility: _____________________________________________   o TCU Contact Info for questions: ___________________________  o D/C Date:  ______________________________________________  o Follow-up Apt with PCP after TCU D/C:   ____________________  o Other Follow-up Apt s:      _________________________________________  Additional information (concerns, and Home Care, ect ):   __________________________________________________________________  __________________________________________________________________  Care Coordinator to Contact at [place]  Fax to: [phone] or e-mail [email or web address]  Attn: Marcelle Tejeda RN Care Coordinator for [facility]  Phone: [phone] or [email or web address]

## 2017-05-16 ENCOUNTER — NURSING HOME VISIT (OUTPATIENT)
Dept: GERIATRICS | Facility: CLINIC | Age: 82
End: 2017-05-16
Payer: COMMERCIAL

## 2017-05-16 DIAGNOSIS — E11.65 TYPE 2 DIABETES MELLITUS WITH HYPERGLYCEMIA, WITHOUT LONG-TERM CURRENT USE OF INSULIN (H): ICD-10-CM

## 2017-05-16 DIAGNOSIS — S72.001A: Primary | ICD-10-CM

## 2017-05-16 DIAGNOSIS — R53.81 PHYSICAL DECONDITIONING: ICD-10-CM

## 2017-05-16 DIAGNOSIS — Z96.641 AFTERCARE FOLLOWING RIGHT HIP JOINT REPLACEMENT SURGERY: ICD-10-CM
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
## 2017-05-16 DIAGNOSIS — R41.89 COGNITIVE IMPAIRMENT: ICD-10-CM

## 2017-05-16 DIAGNOSIS — D62 ANEMIA DUE TO BLOOD LOSS, ACUTE: ICD-10-CM

## 2017-05-16 DIAGNOSIS — I10 HYPERTENSION GOAL BP (BLOOD PRESSURE) < 140/90: ICD-10-CM

## 2017-05-16 DIAGNOSIS — Z47.1 AFTERCARE FOLLOWING RIGHT HIP JOINT REPLACEMENT SURGERY: ICD-10-CM

## 2017-05-16 LAB
BACTERIA SPEC CULT: NORMAL
BACTERIA SPEC CULT: NORMAL
Lab: NORMAL
Lab: NORMAL
MICRO REPORT STATUS: NORMAL
MICRO REPORT STATUS: NORMAL
SPECIMEN SOURCE: NORMAL
SPECIMEN SOURCE: NORMAL

## 2017-05-16 PROCEDURE — 99207 ZZC CDG-CORRECTLY CODED, REVIEWED AND AGREE: CPT | Performed by: FAMILY MEDICINE

## 2017-05-16 PROCEDURE — 99306 1ST NF CARE HIGH MDM 50: CPT | Performed by: FAMILY MEDICINE

## 2017-05-16 NOTE — PROGRESS NOTES
[facility]  PRIMARY CARE PROVIDER AND CLINIC:  Todd Villeda [facility] [address]  / [facility] 550*  Chief Complaint   Patient presents with     Hospital F/U       HPI:   Obtained from the patient, medical record and from the medial staffs.     Lulu Carlton is a 89 year old  (11/8/1921),admitted to the [facility] from Hospital  [facility].  Hospital stay 5/9/17 through 5/13/17.  Admitted to this facility for  rehab, medical management and nursing care.     Current issues are:      Aftercare following right hip joint replacement surgery  Displaced fracture of right femoral neck, closed, initial encounter (H)  - Started on OT/PT, reports making a progress  - Reports pain is over  Back of right hip and also over left hip, aching and bothers her.Did not give a number.     Fall:  - Was found today on the floor. Pt tried to transfer herself from the Chair to bed without calling for help. Neuro-check wnl. Reports right hip hurts when stands up now. RN reports Pt was able to put some weight yesterday.       Anemia due to blood loss, acute  - denies fatigue.     Physical deconditioning  - started on OT/PT making progress.     Type 2 diabetes mellitus with hyperglycemia, without long-term current use of insulin (H)  - oral intake good.  - no hypoglycemic episode reported.     Hypertension goal BP (blood pressure) < 140/90  - No CP, HA or fainting     Cognitive Impairment:  - Lives at Memory care unit        CODE STATUS/ADVANCE DIRECTIVES DISCUSSION:   DNR / DNI  Patient's living condition: Memory care unit    ALLERGIES:Hydrocodone-acetaminophen; Trazodone; and Metformin  PAST MEDICAL HISTORY:  has a past medical history of Anemia, unspecified (hosp. 4/3/07 [place] ); Central nervous system complication (hosp. 4/3/07 [facility]); Depressive disorder, not elsewhere classified; Myasthenia gravis; Other malaise and fatigue (hosp. 4/3/07 [facility]  ); Other urinary incontinence; Thoracic or lumbosacral neuritis or radiculitis, unspecified; and Unspecified essential hypertension.  PAST SURGICAL HISTORY:  has a past surgical history that includes surgical history of -; surgical history of -; and Open reduction internal fixation hip bipolar (Right, 5/10/2017).  FAMILY HISTORY: family history is not on file.  SOCIAL HISTORY:  reports that she has never smoked. She does not have any smokeless tobacco history on file. She reports that she does not drink alcohol or use illicit drugs.    Post Discharge Medication Reconciliation Status: discharge medications reconciled and changed, per note/orders (see AVS).  Current Outpatient Prescriptions   Medication Sig Dispense Refill     oxyCODONE (ROXICODONE) 5 MG IR tablet Take 1 tablet (5 mg) by mouth every 3 hours as needed for moderate to severe pain 40 tablet 0     furosemide (LASIX) 20 MG tablet Take 1 tablet (20 mg) by mouth daily 5 tablet 0     aspirin  MG EC tablet Take 1 tablet (325 mg) by mouth daily 40 tablet 0     Glucose Blood (BLOOD GLUCOSE TEST STRIPS) STRP by In Vitro route as needed       CYCLOBENZAPRINE HCL PO Take 5 mg by mouth 3 times daily as needed for muscle spasms       Acetaminophen (TYLENOL PO) Take 650 mg by mouth every 4 hours as needed for mild pain or fever Do not exceed 4,000 mg of Acetaminophen from all sources in 24 hours       LOPERAMIDE HCL PO Take 2 mg by mouth daily as needed       MECLIZINE HCL PO Take 12.5 mg by mouth 2 times daily as needed for dizziness       omeprazole (PRILOSEC) 20 MG CR capsule Take 1 capsule (20 mg) by mouth daily 30 capsule 11     glipiZIDE (GLIPIZIDE XL) 5 MG 24 hr tablet Take 1 tablet (5 mg) by mouth daily 30 tablet 11     multivitamin (OCUVITE) TABS tablet Take 1 tablet by mouth daily 30 each 0     lisinopril (PRINIVIL,ZESTRIL) 5 MG tablet Take 1 tablet (5 mg) by mouth daily 30 tablet 11     Cyanocobalamin (B-12) 1000 MCG TBCR Take 1,000 mcg by mouth daily  30 tablet 11     calcium-vitamin D (CALTRATE) 600-400 MG-UNIT per tablet Take 1 tablet by mouth every evening       triamcinolone (KENALOG) 0.1 % cream Apply topically 2 times daily as needed for irritation         ROS:  10 point ROS of systems including Constitutional, Eyes, Respiratory, Cardiovascular, Gastroenterology, Genitourinary, Integumentary, Muscularskeletal, Psychiatric were all negative except for pertinent positives noted in my HPI.    Exam:  /81  Pulse 94  Temp 98.8  F (37.1  C)  Resp 18  Wt 155 lb (70.3 kg)  BMI 31.84 kg/m2  GENERAL APPEARANCE:  Alert, in no distress, cooperative  ENT:  Mouth and posterior oropharynx normal, moist mucous membranes, edentulous  with denture platedenture.   EYES:  EOM, conjunctivae, lids, pupils and irises normal  RESP:  respiratory effort and palpation of chest normal, crackles over bases more onthe right base, no wheezing.   CV:  Palpation and auscultation of heart done , regular rate and rhythm, no murmur, rub, or gallop, peripheral edema 1+ in right leg  CHEST (BREASTS):  .  ABDOMEN:  normal bowel sounds, soft, nontender, no hepatosplenomegaly or other masses  M/S:   Gait and station abnormal uses WC for ambualtion. ROM passive and active diminished on the right hip bacause of pain. Wa able to flex left hip and limited. Tenderss over left grater trocahnter and posterior surfact of right hip.   SKIN:  Inspection of skin and subcutaneous tissue baseline, Palpation of skin and subcutaneous tissue baseline, wound healing well, no signs of infection over right hip area. Fading bruises over right thigh  NEURO:   Cranial nerves 2-12 are normal tested and grossly at patient's baseline, no purposeful movement in upper and lower extremities  PSYCH:  oriented to person, place, but not time (thinks it is April, in era of 60s). Speech fluent. Short term memory affected. Judgement and insight affected.     Lab/Diagnostic data:     CBC RESULTS:   Recent Labs   Lab Test   05/13/17   0635  05/12/17   0633   WBC  8.1  10.5   RBC  3.52*  3.84   HGB  10.1*  11.0*   HCT  31.5*  34.4*   MCV  90  90   MCH  28.7  28.6   MCHC  32.1  32.0   RDW  11.9  12.0   PLT  160  136*       Last Basic Metabolic Panel:  Recent Labs   Lab Test  05/13/17   0635  05/12/17   0633   NA  138  136   POTASSIUM  4.3  4.1   CHLORIDE  100  101   SLIM  8.6  8.5   CO2  30  27   BUN  24  21   CR  0.76  0.79   GLC  163*  135*       Liver Function Studies -   Recent Labs   Lab Test  05/09/17   1300  05/01/17   1015   PROTTOTAL  6.5*  6.7*   ALBUMIN  3.3*  3.5   BILITOTAL  0.5  0.4   ALKPHOS  93  82   AST  18  12   ALT  22  20       TSH   Date Value Ref Range Status   05/01/2017 1.13 0.40 - 4.00 mU/L Final   12/08/2016 1.90 0.40 - 4.00 mU/L Final   ]    Lab Results   Component Value Date    A1C 6.3 05/10/2017    A1C 6.1 03/09/2017       ASSESSMENT/PLAN:  Aftercare following right hip joint replacement surgery  Displaced fracture of right femoral neck, closed, initial encounter (H)  - Physical function improving with OT/PT, continue.Hold on Therapy now until Xray is cleared for any fx. If there is a fx update Ortho.   - Analgesia optimal  - Continue DVT Prophylaxis according to Orthopedist's recommendations  - Follow on the surgeon's recommendations      Anemia due to blood loss, acute  - Hb 10 from 13.3 prior to surgery  - Has some fatigue otherwise clinically stable  - Monitor HH closely    Physical deconditioning  - continue OT/PT, improving.   - Fall precaution    Type 2 diabetes mellitus with hyperglycemia, without long-term current use of insulin (H)  Lab Results   Component Value Date    A1C 6.3 05/10/2017    A1C 6.1 03/09/2017    A1C 8.5 10/31/2016   - over controleld, will reduce glipizide from 5 mg to 2.5 mg. PCP to follow on this with a possibility to dc it.   -  Keep HbA1C b/w 8-9% (per AGS there is a potential harm in lowering A1C to <6.5 % in older adults with diabetes), life expectancy less than 5 years, tight  glucose control is note recommended      Hypertension goal BP (blood pressure) < 140/90  BP Readings from Last 3 Encounters:   05/15/17 165/81   05/14/17 126/69   05/13/17 151/57   -   - Fluctuating readings. Unsure about the accuracy of these readings.   - Clinically stable  - Keep SBP> 130 mmHg and DBP > 65 mmHg (levels below these increase mortality as shown by standard studies and observations).   - ON lisinopril 5 mg, adjust dosage accordingly.      Cognitive impairment  - Continue to anticipate needs. Chronic condition, ongoing decline expected.   -  Continue to provide redirection and reassurance as needed. Maintain safe living situation with goals focused on comfort.  - No behavioral issue. Not on meds for dementia.      Hypoxia during hospitalization:  - felt to be possible 2/2 to acute CHF. Given lasix, improved. Now saturation  wnl on RA, but there is crackles at the bases R>L. Monitor and manage accordingly.   - Consider short course of lasix if get BLACK.     Orders:  - Reduce Glipizide from 5 mg to 2.5 mg  - DC Meclizine (per Beer's Criteria to be avoided in Elderly, especially those with cognitive impairment.).   - Xray hip joint b/l AP/Lateral. Follow on the result, and manage accordingly.     Information reviewed:  Medications, vital signs, orders, nursing notes, problem list, hospital information. Total time spent with patient visit was 45 min including patient visit and review of past records, discussing with the nursing staffs and therapists, and GNP.     Electronically signed by:  Radha Rodríguez MD

## 2017-05-30 ENCOUNTER — NURSING HOME VISIT (OUTPATIENT)
Dept: GERIATRICS | Facility: CLINIC | Age: 82
End: 2017-05-30
Payer: COMMERCIAL

## 2017-05-30 VITALS
RESPIRATION RATE: 19 BRPM | TEMPERATURE: 98.2 F | HEART RATE: 92 BPM | WEIGHT: 144 LBS | BODY MASS INDEX: 29.58 KG/M2 | OXYGEN SATURATION: 93 % | SYSTOLIC BLOOD PRESSURE: 117 MMHG | DIASTOLIC BLOOD PRESSURE: 69 MMHG

## 2017-05-30 DIAGNOSIS — E11.65 TYPE 2 DIABETES MELLITUS WITH HYPERGLYCEMIA, WITHOUT LONG-TERM CURRENT USE OF INSULIN (H): ICD-10-CM

## 2017-05-30 DIAGNOSIS — I10 HYPERTENSION GOAL BP (BLOOD PRESSURE) < 140/90: ICD-10-CM

## 2017-05-30 DIAGNOSIS — Z47.1 AFTERCARE FOLLOWING RIGHT HIP JOINT REPLACEMENT SURGERY: Primary | ICD-10-CM

## 2017-05-30 DIAGNOSIS — Z96.641 AFTERCARE FOLLOWING RIGHT HIP JOINT REPLACEMENT SURGERY: Primary | ICD-10-CM

## 2017-05-30 PROCEDURE — 99207 ZZC CDG-CORRECTLY CODED, REVIEWED AND AGREE: CPT | Performed by: NURSE PRACTITIONER

## 2017-05-30 PROCEDURE — 99309 SBSQ NF CARE MODERATE MDM 30: CPT | Performed by: NURSE PRACTITIONER

## 2017-05-30 NOTE — PROGRESS NOTES
[facility]    Chief Complaint   Patient presents with     RECHECK       HPI:    Lulu Carlton is a 89 year old  (11/8/1921), who is being seen today for an episodic care visit at [facility].  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 Some right hip pain with ambulation and physical and occupational therapy.     Hypertension goal BP (blood pressure) < 140/90  -Denies CP, SOB, and fainting     Type 2 diabetes mellitus with hyperglycemia, without long-term current use of insulin (H)  -Denies numbness and tingling.   -Currently taking glipizide      ALLERGIES: Hydrocodone-acetaminophen; Trazodone; and Metformin  Past Medical, Surgical, Family and Social History reviewed and updated in [facility].    Current Outpatient Prescriptions   Medication Sig Dispense Refill     oxyCODONE (ROXICODONE) 5 MG IR tablet Take 1 tablet (5 mg) by mouth every 3 hours as needed for moderate to severe pain 40 tablet 0     furosemide (LASIX) 20 MG tablet Take 1 tablet (20 mg) by mouth daily 5 tablet 0     aspirin  MG EC tablet Take 1 tablet (325 mg) by mouth daily 40 tablet 0     Glucose Blood (BLOOD GLUCOSE TEST STRIPS) STRP by In Vitro route as needed       CYCLOBENZAPRINE HCL PO Take 5 mg by mouth 3 times daily as needed for muscle spasms       Acetaminophen (TYLENOL PO) Take 650 mg by mouth every 4 hours as needed for mild pain or fever Do not exceed 4,000 mg of Acetaminophen from all sources in 24 hours       LOPERAMIDE HCL PO Take 2 mg by mouth daily as needed       MECLIZINE HCL PO Take 12.5 mg by mouth 2 times daily as needed for dizziness       omeprazole (PRILOSEC) 20 MG CR capsule Take 1 capsule (20 mg) by mouth daily 30 capsule 11     glipiZIDE (GLIPIZIDE XL) 5 MG 24 hr tablet Take 1 tablet (5 mg) by mouth daily 30 tablet 11      multivitamin (OCUVITE) TABS tablet Take 1 tablet by mouth daily 30 each 0     lisinopril (PRINIVIL,ZESTRIL) 5 MG tablet Take 1 tablet (5 mg) by mouth daily 30 tablet 11     Cyanocobalamin (B-12) 1000 MCG TBCR Take 1,000 mcg by mouth daily 30 tablet 11     calcium-vitamin D (CALTRATE) 600-400 MG-UNIT per tablet Take 1 tablet by mouth every evening       triamcinolone (KENALOG) 0.1 % cream Apply topically 2 times daily as needed for irritation       Medications reviewed:  Medications reconciled to facility chart and changes were made to reflect current medications as identified as above med list. Below are the changes that were made:   Medications stopped since last EPIC medication reconciliation:   There are no discontinued medications.    Medications started since last [facility] medication reconciliation:  No orders of the defined types were placed in this encounter.        REVIEW OF SYSTEMS:  4 point ROS including Respiratory, CV, GI and , other than that noted in the HPI,  is negative    Physical Exam:  /69  Pulse 92  Temp 98.2  F (36.8  C)  Resp 19  Wt 144 lb (65.3 kg)  SpO2 93%  BMI 29.58 kg/m2  GENERAL APPEARANCE:  Alert, in no distress  ENT:  Mouth and posterior oropharynx normal, moist mucous membranes  RESP:  respiratory effort and palpation of chest normal, lungs clear to auscultation   CV:  Palpation and auscultation of heart done , regular rate and rhythm, no murmur, rub, or gallop  M/S:   Gait and station normal  Digits and nails normal  SKIN:  Inspection of skin and subcutaneous tissue baseline, Palpation of skin and subcutaneous tissue baseline  NEURO:   Cranial nerves 2-12 are normal tested and grossly at patient's baseline  PSYCH:  memory impaired , affect and mood normal    Recent Labs:    CBC RESULTS:   Recent Labs   Lab Test  05/13/17   0635  05/12/17   0633   WBC  8.1  10.5   RBC  3.52*  3.84   HGB  10.1*  11.0*   HCT  31.5*  34.4*   MCV  90  90   MCH  28.7  28.6   MCHC  32.1  32.0   RDW   11.9  12.0   PLT  160  136*       Last Basic Metabolic Panel:  Recent Labs   Lab Test  05/13/17   0635  05/12/17   0633   NA  138  136   POTASSIUM  4.3  4.1   CHLORIDE  100  101   SLIM  8.6  8.5   CO2  30  27   BUN  24  21   CR  0.76  0.79   GLC  163*  135*       Liver Function Studies -   Recent Labs   Lab Test  05/09/17   1300  05/01/17   1015   PROTTOTAL  6.5*  6.7*   ALBUMIN  3.3*  3.5   BILITOTAL  0.5  0.4   ALKPHOS  93  82   AST  18  12   ALT  22  20       TSH   Date Value Ref Range Status   05/01/2017 1.13 0.40 - 4.00 mU/L Final   12/08/2016 1.90 0.40 - 4.00 mU/L Final   ]    Lab Results   Component Value Date    A1C 6.3 05/10/2017    A1C 6.1 03/09/2017     Assessment/Plan:  (Z47.1,  Z96.641) Aftercare following right hip joint replacement surgery  (primary encounter diagnosis)  -Pain medication optimal   -Continue physical and occupational therapy    (E11.65) Type 2 diabetes mellitus with hyperglycemia, without long-term current use of insulin (H)  -Keep HbA1C b/w 8-8.5% (per AGS there is a potential harm in lowering A1C to <6.5 % in older adults with diabetes), life expectancy b/w 5-10, tight glucose control is note recommended    (I10) Hypertension goal BP (blood pressure) < 140/90  -Stable on medication. Continue medication as ordered   -Keep SBP> 130 mmHg and DBP > 65 mmHg (levels below these increase mortality as shown by standard studies and observations).       Total time spent with patient visit at the skilled nursing facility was 35 min including patient visit and review of past records. Greater than 50% of total time spent with counseling and coordinating care due to multiple comorbidities     Electronically signed by  HANG Moralez CNP  [facility]

## 2017-06-01 ENCOUNTER — MEDICAL CORRESPONDENCE (OUTPATIENT)
Dept: HEALTH INFORMATION MANAGEMENT | Facility: CLINIC | Age: 82
End: 2017-06-01

## 2017-06-05 ENCOUNTER — TELEPHONE (OUTPATIENT)
Dept: FAMILY MEDICINE | Facility: CLINIC | Age: 82
End: 2017-06-05

## 2017-06-05 NOTE — TELEPHONE ENCOUNTER
Reason for Call:  Home Health Care- Needs verbal OK    Arlet with [facility] called regarding (reason for call): PT order    Orders are needed for this patient.     PT: 1 x a week for one week, 2 x a week for 6 weeks and then 1 x a week for 2 weeks    OT: OT will be doing an eval next week     Pt Provider: Ronal    Phone Number Homecare Nurse can be reached at: Needs Verbal OK.  [phone]    Can we leave a detailed message on this number? YES  This is a secure voice mail    Call taken on 6/5/2017 at 8:34 AM by Urvashi Torres

## 2017-06-07 ENCOUNTER — CARE COORDINATION (OUTPATIENT)
Dept: CARE COORDINATION | Facility: CLINIC | Age: 82
End: 2017-06-07

## 2017-06-07 NOTE — PROGRESS NOTES
Clinic Care Coordination Contact    Situation: Patient chart reviewed by care coordinator.    Background: RN CC reviewed chart as patient had sustained a Right Hip fracture, then had it repaired sent to TCU for therapies.     Assessment: RN CC reviewed chart, patient is no longer in the TCU, she is not back in her own place which is [facility] with therapy and skilled nursing visits.    Plan/Recommendations: Due to being in a long term care setting, care coordination is no longer needed. No further outreaches will be made at this time.    Marcelle Tejeda RN, [facility]  RN Care Coordinator  [facility]  Phone # [phone]  6/7/2017 1:30 PM

## 2017-06-09 ENCOUNTER — TELEPHONE (OUTPATIENT)
Dept: FAMILY MEDICINE | Facility: CLINIC | Age: 82
End: 2017-06-09

## 2017-06-09 ENCOUNTER — MEDICAL CORRESPONDENCE (OUTPATIENT)
Dept: HEALTH INFORMATION MANAGEMENT | Facility: CLINIC | Age: 82
End: 2017-06-09

## 2017-06-09 PROCEDURE — G0180 MD CERTIFICATION HHA PATIENT: HCPCS | Performed by: FAMILY MEDICINE

## 2017-06-12 ENCOUNTER — RECORDS - HEALTHEAST (OUTPATIENT)
Dept: LAB | Facility: CLINIC | Age: 82
End: 2017-06-12

## 2017-06-13 LAB — HBA1C MFR BLD: 6.6 % (ref 4.2–6.1)

## 2017-06-14 ENCOUNTER — MEDICAL CORRESPONDENCE (OUTPATIENT)
Dept: HEALTH INFORMATION MANAGEMENT | Facility: CLINIC | Age: 82
End: 2017-06-14

## 2017-06-15 ENCOUNTER — ANESTHESIA EVENT (OUTPATIENT)
Dept: EMERGENCY MEDICINE | Facility: CLINIC | Age: 82
End: 2017-06-15
Payer: MEDICARE

## 2017-06-15 ENCOUNTER — APPOINTMENT (OUTPATIENT)
Dept: GENERAL RADIOLOGY | Facility: CLINIC | Age: 82
End: 2017-06-15
Attending: FAMILY MEDICINE
Payer: MEDICARE

## 2017-06-15 ENCOUNTER — HOSPITAL ENCOUNTER (EMERGENCY)
Facility: CLINIC | Age: 82
Discharge: HOME | End: 2017-06-15
Attending: FAMILY MEDICINE | Admitting: FAMILY MEDICINE
Payer: MEDICARE

## 2017-06-15 ENCOUNTER — ANESTHESIA (OUTPATIENT)
Dept: EMERGENCY MEDICINE | Facility: CLINIC | Age: 82
End: 2017-06-15
Payer: MEDICARE

## 2017-06-15 VITALS
HEART RATE: 75 BPM | DIASTOLIC BLOOD PRESSURE: 57 MMHG | RESPIRATION RATE: 8 BRPM | TEMPERATURE: 97.6 F | OXYGEN SATURATION: 97 % | SYSTOLIC BLOOD PRESSURE: 121 MMHG

## 2017-06-15 DIAGNOSIS — S73.004A DISLOCATION OF HIP, RIGHT, INITIAL ENCOUNTER (H): ICD-10-CM

## 2017-06-15 LAB
ANION GAP SERPL CALCULATED.3IONS-SCNC: 6 MMOL/L (ref 3–14)
BASOPHILS # BLD AUTO: 0 10E9/L (ref 0–0.2)
BASOPHILS NFR BLD AUTO: 0.6 %
BUN SERPL-MCNC: 11 MG/DL (ref 7–30)
CALCIUM SERPL-MCNC: 8.6 MG/DL (ref 8.5–10.1)
CHLORIDE SERPL-SCNC: 107 MMOL/L (ref 94–109)
CO2 SERPL-SCNC: 29 MMOL/L (ref 20–32)
CREAT SERPL-MCNC: 0.64 MG/DL (ref 0.52–1.04)
DIFFERENTIAL METHOD BLD: NORMAL
EOSINOPHIL # BLD AUTO: 0.2 10E9/L (ref 0–0.7)
EOSINOPHIL NFR BLD AUTO: 4.6 %
ERYTHROCYTE [DISTWIDTH] IN BLOOD BY AUTOMATED COUNT: 13.1 % (ref 10–15)
GFR SERPL CREATININE-BSD FRML MDRD: 86 ML/MIN/1.7M2
GLUCOSE SERPL-MCNC: 165 MG/DL (ref 70–99)
HCT VFR BLD AUTO: 38.6 % (ref 35–47)
HGB BLD-MCNC: 12.2 G/DL (ref 11.7–15.7)
IMM GRANULOCYTES # BLD: 0 10E9/L (ref 0–0.4)
IMM GRANULOCYTES NFR BLD: 0.2 %
LYMPHOCYTES # BLD AUTO: 1.2 10E9/L (ref 0.8–5.3)
LYMPHOCYTES NFR BLD AUTO: 24.5 %
MCH RBC QN AUTO: 28 PG (ref 26.5–33)
MCHC RBC AUTO-ENTMCNC: 31.6 G/DL (ref 31.5–36.5)
MCV RBC AUTO: 89 FL (ref 78–100)
MONOCYTES # BLD AUTO: 0.3 10E9/L (ref 0–1.3)
MONOCYTES NFR BLD AUTO: 5.6 %
NEUTROPHILS # BLD AUTO: 3.1 10E9/L (ref 1.6–8.3)
NEUTROPHILS NFR BLD AUTO: 64.5 %
PLATELET # BLD AUTO: 151 10E9/L (ref 150–450)
POTASSIUM SERPL-SCNC: 4.2 MMOL/L (ref 3.4–5.3)
RBC # BLD AUTO: 4.36 10E12/L (ref 3.8–5.2)
SODIUM SERPL-SCNC: 142 MMOL/L (ref 133–144)
WBC # BLD AUTO: 4.8 10E9/L (ref 4–11)

## 2017-06-15 PROCEDURE — 37000011 ZZH ANESTHESIA WARD SERVICE: Performed by: NURSE ANESTHETIST, CERTIFIED REGISTERED

## 2017-06-15 PROCEDURE — 25000125 ZZHC RX 250: Performed by: FAMILY MEDICINE

## 2017-06-15 PROCEDURE — 96375 TX/PRO/DX INJ NEW DRUG ADDON: CPT

## 2017-06-15 PROCEDURE — 80048 BASIC METABOLIC PNL TOTAL CA: CPT | Performed by: FAMILY MEDICINE

## 2017-06-15 PROCEDURE — 99285 EMERGENCY DEPT VISIT HI MDM: CPT | Mod: 25

## 2017-06-15 PROCEDURE — 96374 THER/PROPH/DIAG INJ IV PUSH: CPT

## 2017-06-15 PROCEDURE — 40000671 ZZH STATISTIC ANESTHESIA CASE

## 2017-06-15 PROCEDURE — 40000986 XR PELVIS PORT 1/2 VW

## 2017-06-15 PROCEDURE — 99285 EMERGENCY DEPT VISIT HI MDM: CPT | Performed by: FAMILY MEDICINE

## 2017-06-15 PROCEDURE — 25000125 ZZHC RX 250: Performed by: NURSE ANESTHETIST, CERTIFIED REGISTERED

## 2017-06-15 PROCEDURE — 73502 X-RAY EXAM HIP UNI 2-3 VIEWS: CPT

## 2017-06-15 PROCEDURE — 96376 TX/PRO/DX INJ SAME DRUG ADON: CPT

## 2017-06-15 PROCEDURE — 25000128 H RX IP 250 OP 636: Performed by: FAMILY MEDICINE

## 2017-06-15 PROCEDURE — 85025 COMPLETE CBC W/AUTO DIFF WBC: CPT | Performed by: FAMILY MEDICINE

## 2017-06-15 RX ORDER — DIPHENHYDRAMINE HYDROCHLORIDE 50 MG/ML
25 INJECTION INTRAMUSCULAR; INTRAVENOUS ONCE
Status: COMPLETED | OUTPATIENT
Start: 2017-06-15 | End: 2017-06-15

## 2017-06-15 RX ORDER — FENTANYL CITRATE 50 UG/ML
25 INJECTION, SOLUTION INTRAMUSCULAR; INTRAVENOUS ONCE
Status: COMPLETED | OUTPATIENT
Start: 2017-06-15 | End: 2017-06-15

## 2017-06-15 RX ORDER — FENTANYL CITRATE 50 UG/ML
50 INJECTION, SOLUTION INTRAMUSCULAR; INTRAVENOUS ONCE
Status: COMPLETED | OUTPATIENT
Start: 2017-06-15 | End: 2017-06-15

## 2017-06-15 RX ORDER — PROPOFOL 10 MG/ML
INJECTION, EMULSION INTRAVENOUS PRN
Status: DISCONTINUED | OUTPATIENT
Start: 2017-06-15 | End: 2017-06-15

## 2017-06-15 RX ORDER — AMOXICILLIN 250 MG
CAPSULE ORAL
COMMUNITY

## 2017-06-15 RX ADMIN — PROPOFOL 20 MG: 10 INJECTION, EMULSION INTRAVENOUS at 10:07

## 2017-06-15 RX ADMIN — PROPOFOL 30 MG: 10 INJECTION, EMULSION INTRAVENOUS at 10:00

## 2017-06-15 RX ADMIN — FENTANYL CITRATE 25 MCG: 50 INJECTION INTRAMUSCULAR; INTRAVENOUS at 10:04

## 2017-06-15 RX ADMIN — DIPHENHYDRAMINE HYDROCHLORIDE 25 MG: 50 INJECTION, SOLUTION INTRAMUSCULAR; INTRAVENOUS at 10:04

## 2017-06-15 RX ADMIN — FENTANYL CITRATE 50 MCG: 50 INJECTION INTRAMUSCULAR; INTRAVENOUS at 09:05

## 2017-06-15 NOTE — ED AVS SNAPSHOT
[facility] Emergency Department    [address] [address]    Phone:  [phone]    Fax:  [phone]                                       Lulu Carlton   MRN: [id number]    Department:  [facility] Emergency Department   Date of Visit:  6/15/2017           Patient Information     Date Of Birth          11/8/1921        Your diagnoses for this visit were:     Dislocation of hip, right, initial encounter (H)        You were seen by Avery Nguyễn MD.        Discharge Instructions       Return to the Emergency Room if the following occurs:     Recurrent dislocation, worsened pain, or for any concern at anytime.    Or, follow-up with the following provider as we discussed:     Return to your primary orthopedic surgeon as needed.    Medications discussed:    None new.  No changes.    If you received pain-relieving or sedating medication during your time in the ER, avoid alcohol, driving automobiles, or working with machinery.  Also, a responsible adult must stay with you.      If you had X-rays or labs done we will attempt to contact you if there is a change needed in your care.      Call the Nurse Advice Line at [phone] or [phone] for any concern at anytime.      24 Hour Appointment Hotline       To make an appointment at any [facility], call [phone] ([phone]). If you don't have a family doctor or clinic, we will help you find one. [facility] are conveniently located to serve the needs of you and your family.             Review of your medicines      Our records show that you are taking the medicines listed below. If these are incorrect, please call your family doctor or clinic.        Dose / Directions Last dose taken    aspirin  MG EC tablet   Dose:  325 mg   Quantity:  40 tablet        Take 1 tablet (325 mg) by mouth daily   Refills:  0        B-12 1000 MCG Tbcr   Dose:  1000 mcg   Quantity:  30 tablet        Take 1,000 mcg by  mouth daily   Refills:  11        BLOOD GLUCOSE TEST STRIPS Strp        by In Vitro route as needed   Refills:  0        calcium-vitamin D 600-400 MG-UNIT per tablet   Commonly known as:  CALTRATE   Dose:  1 tablet        Take 1 tablet by mouth every evening   Refills:  0        CYCLOBENZAPRINE HCL PO   Dose:  5 mg        Take 5 mg by mouth 3 times daily as needed for muscle spasms   Refills:  0        GLIPIZIDE XL PO   Dose:  2.5 mg        Take 2.5 mg by mouth daily (with breakfast)   Refills:  0        lisinopril 5 MG tablet   Commonly known as:  PRINIVIL/ZESTRIL   Dose:  5 mg   Quantity:  30 tablet        Take 1 tablet (5 mg) by mouth daily   Refills:  11        LOPERAMIDE HCL PO   Dose:  2 mg        Take 2 mg by mouth daily as needed   Refills:  0        MECLIZINE HCL PO   Dose:  12.5 mg        Take 12.5 mg by mouth 2 times daily as needed for dizziness   Refills:  0        multivitamin Tabs tablet   Dose:  1 tablet   Quantity:  30 each        Take 1 tablet by mouth daily   Refills:  0        omeprazole 20 MG CR capsule   Commonly known as:  priLOSEC   Dose:  20 mg   Quantity:  30 capsule        Take 1 capsule (20 mg) by mouth daily   Refills:  11        oxyCODONE 5 MG IR tablet   Commonly known as:  ROXICODONE   Dose:  5 mg   Quantity:  40 tablet        Take 1 tablet (5 mg) by mouth every 3 hours as needed for moderate to severe pain   Refills:  0        senna-docusate 8.6-50 MG per tablet   Commonly known as:  SENOKOT-S;PERICOLACE   Dose:  2 tablet        Take 2 tablets by mouth 2 times daily as needed for constipation   Refills:  0        triamcinolone 0.1 % cream   Commonly known as:  KENALOG        Apply topically 2 times daily as needed for irritation   Refills:  0        TYLENOL PO   Dose:  1300 mg        Take 1,300 mg by mouth every morning Do not exceed 4,000 mg of Acetaminophen from all sources in 24 hours   Refills:  0                Procedures and tests performed during your visit     Basic metabolic  panel    CBC with platelets, differential    Pelvis XR w/ unilateral hip right    Pelvis XR,  1 vw PORTABLE      Orders Needing Specimen Collection     None      Pending Results     No orders found from 6/13/2017 to 6/16/2017.            Pending Culture Results     No orders found from 6/13/2017 to 6/16/2017.            Pending Results Instructions     If you had any lab results that were not finalized at the time of your Discharge, you can call the ED Lab Result RN at [phone]. You will be contacted by this team for any positive Lab results or changes in treatment. The nurses are available 7 days a week from 10A to 6:30P.  You can leave a message 24 hours per day and they will return your call.        Test Results From Your Hospital Stay        6/15/2017  8:54 AM      Component Results     Component Value Ref Range & Units Status    WBC 4.8 4.0 - 11.0 10e9/L Final    RBC Count 4.36 3.8 - 5.2 10e12/L Final    Hemoglobin 12.2 11.7 - 15.7 g/dL Final    Hematocrit 38.6 35.0 - 47.0 % Final    MCV 89 78 - 100 fl Final    MCH 28.0 26.5 - 33.0 pg Final    MCHC 31.6 31.5 - 36.5 g/dL Final    RDW 13.1 10.0 - 15.0 % Final    Platelet Count 151 150 - 450 10e9/L Final    Diff Method Automated Method  Final    % Neutrophils 64.5 % Final    % Lymphocytes 24.5 % Final    % Monocytes 5.6 % Final    % Eosinophils 4.6 % Final    % Basophils 0.6 % Final    % Immature Granulocytes 0.2 % Final    Absolute Neutrophil 3.1 1.6 - 8.3 10e9/L Final    Absolute Lymphocytes 1.2 0.8 - 5.3 10e9/L Final    Absolute Monocytes 0.3 0.0 - 1.3 10e9/L Final    Absolute Eosinophils 0.2 0.0 - 0.7 10e9/L Final    Absolute Basophils 0.0 0.0 - 0.2 10e9/L Final    Abs Immature Granulocytes 0.0 0 - 0.4 10e9/L Final         6/15/2017  9:05 AM      Component Results     Component Value Ref Range & Units Status    Sodium 142 133 - 144 mmol/L Final    Potassium 4.2 3.4 - 5.3 mmol/L Final    Chloride 107 94 - 109 mmol/L Final    Carbon Dioxide 29 20 - 32 mmol/L  "Final    Anion Gap 6 3 - 14 mmol/L Final    Glucose 165 (H) 70 - 99 mg/dL Final    Urea Nitrogen 11 7 - 30 mg/dL Final    Creatinine 0.64 0.52 - 1.04 mg/dL Final    GFR Estimate 86 >60 mL/min/1.7m2 Final    Non  GFR Calc    GFR Estimate If Black >90   GFR Calc   >60 mL/min/1.7m2 Final    Calcium 8.6 8.5 - 10.1 mg/dL Final         6/15/2017 10:35 AM      Narrative     XR PELVIS AND HIP RIGHT 1 VIEW 6/15/2017 9:28 AM    HISTORY: Pain. Fall.    COMPARISON: 5/11/2017.        Impression     IMPRESSION: A single view of the pelvis and right hip shows the  femoral and acetabular components of the right hip prosthesis to be  dislocated, together as a unit, superior to the acetabulum. No  fracture is seen.    GERHARD SCHWAB MD         6/15/2017 10:37 AM      Narrative     XR PELVIS PORT 1/2 VW 6/15/2017 10:33 AM    HISTORY: Post reduction.    COMPARISON: Prereduction views done earlier today.        Impression     IMPRESSION: A single view of the pelvis and hips show interval  reduction of the right hip prosthesis back into the acetabular cup, in  appropriate position.     GERHARD SCHWAB MD                Thank you for choosing [facility]       Thank you for choosing [facility] for your care. Our goal is always to provide you with excellent care. Hearing back from our patients is one way we can continue to improve our services. Please take a few minutes to complete the written survey that you may receive in the mail after you visit with us. Thank you!        MyNextRun Information     MyNextRun lets you send messages to your doctor, view your test results, renew your prescriptions, schedule appointments and more. To sign up, go to [email or web address] . Click on \"Log in\" on the left side of the screen, which will take you to the Welcome page. Then click on \"Sign up Now\" on the right side of the page.     You will be asked to enter the access code listed below, as well as some personal information. " Please follow the directions to create your username and password.     Your access code is: [email or web address]  Expires: 8/10/2017  2:04 PM     Your access code will  in 90 days. If you need help or a new code, please call your [place] clinic or [phone].        Care EveryWhere ID     This is your Care EveryWhere ID. This could be used by other organizations to access your [place] medical records  [id number]        After Visit Summary       This is your record. Keep this with you and show to your community pharmacist(s) and doctor(s) at your next visit.

## 2017-06-15 NOTE — ANESTHESIA POSTPROCEDURE EVALUATION
Patient: Lulu Carlton    * No procedures listed *    Diagnosis:* No pre-op diagnosis entered *  Diagnosis Additional Information: No value filed.    Anesthesia Type:  Spinal    Note:  Anesthesia Post Evaluation    Patient location during evaluation: ED  Patient participation: Unable to participate in evaluation secondary to underlying medical condition  Level of consciousness: awake  Pain management: adequate  Airway patency: patent  Cardiovascular status: acceptable  Respiratory status: acceptable  Hydration status: acceptable  PONV: none     Anesthetic complications: None          Last vitals:  Vitals:    06/15/17 1015 06/15/17 1030 06/15/17 1045   BP: 118/67 128/67 121/57   Pulse:      Resp:      Temp:      SpO2:  97% 97%         Electronically Signed By: HANG Sal CRNA  Norma 15, 2017  10:51 AM

## 2017-06-15 NOTE — DISCHARGE INSTRUCTIONS
Return to the Emergency Room if the following occurs:     Recurrent dislocation, worsened pain, or for any concern at anytime.    Or, follow-up with the following provider as we discussed:     Return to your primary orthopedic surgeon as needed.    Medications discussed:    None new.  No changes.    If you received pain-relieving or sedating medication during your time in the ER, avoid alcohol, driving automobiles, or working with machinery.  Also, a responsible adult must stay with you.      If you had X-rays or labs done we will attempt to contact you if there is a change needed in your care.      Call the Nurse Advice Line at [phone] or [phone] for any concern at anytime.

## 2017-06-15 NOTE — ED PROVIDER NOTES
Cost TRAUMA RECORD    TRAUMA ACTIVATION   Standard Emergency Evaluation      HPI      Patient is a 89-year-old female presenting by ambulance from a [facility] after being found down on the ground.  She had a hip fracture with open reduction internal fixation on the right side as recently as 5/9.  She has diabetes and hypertension.  She has myasthenia gravis.  She takes aspirin only for anticoagulation.  She has oxycodone available for pain control.  She takes glipizide, Lasix, and lisinopril.  She does not smoke.  No drugs.  No alcohol.    The patient was found down this morning in her room.  They tend to check on the patient's about every hour.  The staff presume she may have been on the ground as long as 30 minutes.  The patient remembers falling but does not remember how or why.  She denies loss of consciousness.  Currently, she reports pain in the right hip.  She received 50  g of fentanyl in route.  The pain is mild when she lies still.  No headache.  No chest pain.  No shortness of breath.  No recent fever or illnesses reported.    ROS: All other review of systems are negative other than that noted above.    PMH: Reviewed.  SH: Reviewed.  FH: Reviewed.        PRIMARY SURVEY  Airway: The airway is intact.  The patient has clear, appropriate responses to questions.  There is no observed face, neck, or upper chest trauma.  The patient has no respiratory distress and there is no stridor.  There is no oropharyngeal cavity disruption, trauma to the teeth or tongue.  There is no palpable crepitus or swelling of the anterior neck.    PROCEDURE  None.    Spine: No midline cervical or thoracic tenderness.  No lumbar tenderness.    BACKBOARD REMOVAL  Backboard not applied      C-COLLAR/IMMOBILIZATION  not indicated, cleared.     Breathing and Ventilation: There is no observed chest wall injury.  The chest wall moves symmetrically and evenly while breathing.  Breathe sounds are equal and full at the axilla  and apices, bilaterally.  There is no palpable crepitus or deformity of the chest.    PROCEDURE  None.    Circulation:  There is no observed exsanguinating blood loss.  There is a palpable pulse at the carotid artery.     PROCEDURE  None.    Disability and Neurologic Evaluation:  PEERL.  EOM are intact.  Is moving upper and lower extremities equally bilaterally.  There are no lateralizing symptoms and sensation is grossly intact to touch.    GCS ARRIVAL  Motor 6=Obeys commands   Verbal 5=Oriented   Eye Opening 4=Spontaneous   Total: 15     Exposure and Environmental: No signs of injury after exposure. Normal temperature.      SECONDARY SURVEY  /57  Pulse 75  Temp 97.6  F (36.4  C) (Oral)  Resp 8  SpO2 97%  General: Patient is alert and in moderate distress.  Lying on her back with the right leg lying over the left leg with hips rotated slightly to the left.  Neurological: Alert.  Moving upper and lower extremities equally, bilaterally.  Head / Neck: Atraumatic.  Ears: Not done.  Eyes: Pupils are equal, round, and reactive.  Normal conjunctiva.  Nose: Midline.  No epistaxis.  Mouth / Throat: No ulcerations or lesions.  Upper pharynx is not erythematous.  Moist.  Respiratory: No respiratory distress. CTA B.  Cardiovascular: Regular rhythm.  Peripheral extremities are warm.  No edema.  No calf tenderness.  Abdomen / Pelvis: Not tender.  No distention.  Soft throughout.  Genitalia: Not done.  Musculoskeletal: Tender right greater trochanter.  Pain with any range of motion at the hip.  Skin: No evidence of rash or trauma.      ED COURSE  0835.  The patient has had a fall and is complaining of right hip pain.  This complicated by her recent right hip fracture with open reduction internal fixation.  X-ray pending.  Pain control as needed.  CBC and basic metabolic panel pending.    IMAGING  Pelvis and hip x-ray.  Images reviewed by me.  Radiology report was also reviewed.      PROCEDURE  Hip reduction.  Anesthesia  "was consulted for procedural sedation.  With assist from nursing, I did provide traction and internal and external rotation of the femur at the hip.  I did feel a \"clunk\" and then draw away from the pelvis.  Range of motion with expected limitations followed.  No complications obvious.    IMAGING  Portable x-ray pelvis and right hip, post reduction.  Images reviewed by me.  Radiology report was also reviewed.    1052.  Postreduction x-ray shows proper anatomic position of the right hip.  The patient feels better.  Follow up discussed.  No changes in medications.  Avoidance of risk movements discussed.    ADMIT/TRANSFER CONSULTANT  Consult order placed: No  Imaging reviewed by consultant: No    GCS DISPOSITION  Motor 6=Obeys commands   Verbal 5=Oriented   Eye Opening 4=Spontaneous   Total: 15         IMPRESSION    ICD-10-CM    1. Dislocation of hip, right, initial encounter (H) S73.004A                 Avery Nguyễn MD  06/15/17 1056    "

## 2017-06-15 NOTE — ED AVS SNAPSHOT
[facility] Emergency Department    [address] [address]    Phone:  [phone]    Fax:  [phone]                                       Lulu Carlton   MRN: [id number]    Department:  [facility] Emergency Department   Date of Visit:  6/15/2017           After Visit Summary Signature Page     I have received my discharge instructions, and my questions have been answered. I have discussed any challenges I see with this plan with the nurse or doctor.    ..........................................................................................................................................  Patient/Patient Representative Signature      ..........................................................................................................................................  Patient Representative Print Name and Relationship to Patient    ..................................................               ................................................  Date                                            Time    ..........................................................................................................................................  Reviewed by Signature/Title    ...................................................              ..............................................  Date                                                            Time

## 2017-06-15 NOTE — ANESTHESIA PREPROCEDURE EVALUATION
Anesthesia Evaluation     . Pt has had prior anesthetic. Type: General, Regional and MAC    No history of anesthetic complications          ROS/MED HX    ENT/Pulmonary: Comment: Mild pulmonary edema, diuresed, on O2 2LPM, no resp distress, good echo      Neurologic: Comment: Hx myasthenia gravis, [place]      Cardiovascular: Comment: Old MI on EKG, denies CP    Mild AS    EF 55-60%    (+) hypertension--CAD, -past MI,-. : . . . :. . Previous cardiac testing [place]ch[place]:2017results:[facility]  Echocardiography Laboratory  [address] [place] [address]        Name: EL BARRIENTOS  MRN: [id number]  : 1921  Study Date: 2017 02:14 PM  Age: 89 yrs  Gender: Female  Patient Location: [facility]  Reason For Study: Dyspnea  Ordering Physician: OSVALDO GRANT  Referring Physician: Todd Villeda  Performed By: Sandie Ochoa RDCS     BSA: 1.6 m2  Height: 59 in  Weight: 150 lb  HR: 76  BP: 138/69 mmHg  _____________________________________________________________________________  __        Procedure  Complete Portable Echo Adult.  _____________________________________________________________________________  __        Interpretation Summary     The left ventricle is normal in size. There is mild concentric left  ventricular hypertrophy. Left ventricular systolic function is normal. The  visual ejection fraction is estimated at 55-60%. Grade I or early diastolic  dysfunction. Paradoxical septum motion is noted.  The right ventricle is normal size. The right ventricular systolic function is  normal.  There is mild to moderate (1-2+) tricuspid regurgitation. The right  ventricular systolic pressure is approximated at 48.3 mmHg plus the right  atrial pressure. Right ventricular systolic pressure is elevated, consistent  with moderate to severe pulmonary hypertension.  There is moderate trileaflet aortic sclerosis. No aortic regurgitation is  present. Mild valvular aortic stenosis. The peak AoV  pressure gradient is 26.5  mmHg. The mean AoV pressure gradient is 14.2 mmHg.  No pericardial effusion.  No previous study for comparison.  _____________________________________________________________________________  __        Left Ventricle  The left ventricle is normal in size. There is mild concentric left  ventricular hypertrophy. Left ventricular systolic function is normal. The  visual ejection fraction is estimated at 55-60%. Grade I or early diastolic  dysfunction. Paradoxical septum motion is noted.     Right Ventricle  The right ventricle is normal size. The right ventricular systolic function is  normal.     Atria  Normal left atrial size. Right atrial size is normal. There is no color  Doppler evidence of an atrial shunt.     Mitral Valve  The mitral valve leaflets are mildly thickened. There is mild mitral annular  calcification. There is trace mitral regurgitation.        Tricuspid Valve  There is mild to moderate (1-2+) tricuspid regurgitation. The right  ventricular systolic pressure is approximated at 48.3 mmHg plus the right  atrial pressure. Right ventricular systolic pressure is elevated, consistent  with moderate to severe pulmonary hypertension.     Aortic Valve  There is moderate trileaflet aortic sclerosis. No aortic regurgitation is  present. Mild valvular aortic stenosis. The peak AoV pressure gradient is 26.5  mmHg. The mean AoV pressure gradient is 14.2 mmHg. The calculated aortic valve  are is 1.4 cm^2.     Pulmonic Valve  There is trace pulmonic valvular regurgitation. There is no pulmonic valvular  stenosis.     Vessels  The aortic root is normal size. Normal size ascending aorta. The IVC is normal  in size and reactivity with respiration, suggesting normal central venous  pressure.     Pericardium  There is no pericardial effusion.        Rhythm  The rhythm was normal sinus.  _____________________________________________________________________________  __  MMode/2D Measurements &  Calculations  IVSd: 1.3 cm     LVIDd: 4.7 cm  LVIDs: 3.1 cm  LVPWd: 1.2 cm  FS: 33.5 %  EDV(Teich): 101.0 ml  ESV(Teich): 38.1 ml  LV mass(C)d: 216.2 grams  LV mass(C)dI: 132.5 grams/m2  Ao root diam: 3.2 cm  LA dimension: 3.6 cm  asc Aorta Diam: 3.1 cm  LA/Ao: 1.1  LVOT diam: 2.2 cm  LVOT area: 3.8 cm2        Doppler Measurements & Calculations  MV E max rogelio: 107.6 cm/sec  MV A max rogelio: 117.9 cm/sec  MV E/A: 0.91  MV dec time: 0.20 sec  Ao V2 max: 257.4 cm/sec  Ao max P.5 mmHg  Ao V2 mean: 179.2 cm/sec  Ao mean P.2 mmHg  Ao V2 VTI: 48.6 cm  PATRICIA(I,D): 1.4 cm2  PATRICIA(V,D): 1.3 cm2  LV V1 max PG: 3.0 mmHg  LV V1 max: 87.0 cm/sec  LV V1 VTI: 18.4 cm  SV(LVOT): 69.8 ml  TR max rogelio: 347.5 cm/sec  TR max P.3 mmHg  PATRICIA Index (cm2/m2): 0.88  Lateral E/e': 13.1     Medial E/e': 18.8           _____________________________________________________________________________  __           Report approved by: Atilio Blankenship 2017 03:02 PM   date: results:ECG reviewed date:2017 results:SR with 1st degree AVB, old infarct date: results:          METS/Exercise Tolerance:  1 - Eating, dressing   Hematologic:     (+) Anemia, -      Musculoskeletal: Comment: Thoracic or lumbosacral neuritis or radiculitis, unspecified  (+) arthritis, , fracture lower extremity: Hip, -       GI/Hepatic:  - neg GI/hepatic ROS       Renal/Genitourinary: Comment: Urinary incontinence        Endo:     (+) type II DM .      Psychiatric:     (+) psychiatric history depression      Infectious Disease:  - neg infectious disease ROS       Malignancy:      - no malignancy   Other:    - neg other ROS                 Physical Exam  Normal systems: cardiovascular and pulmonary    Airway   Mallampati: II  TM distance: >3 FB  Neck ROM: full    Dental   (+) missing    Cardiovascular   Rhythm and rate: regular and normal      Pulmonary (+) decreased breath sounds                           Anesthesia Plan      History & Physical Review  History and  physical reviewed and following examination; no interval change.    ASA Status:  3 emergent.    NPO Status:  > 8 hours    Plan for Spinal     Anesthesia d/w pt. An daughter.  Plan for SAB with IV sedation.  If unable to place SAB, will proceed with GETA.  Pt. And daughter wish to be DNR/DNI when d/c from PACU.  IF pt. Has an intraoperative event, she does not want chest compressions or defibrillation.  They are ok with intubation if necessary to administer general anesthesia for the procedure and has a reasonable chance of being extubated at the end of case.      Postoperative Care  Postoperative pain management:  IV analgesics and Oral pain medications.      Consents  Anesthetic plan, risks, benefits and alternatives discussed with:  Patient, legal guardian and Daughter/Son..                          .

## 2017-06-21 ENCOUNTER — HOSPITAL ENCOUNTER (OUTPATIENT)
Facility: CLINIC | Age: 82
Setting detail: OBSERVATION
Discharge: NURSING, REHAB OR LONG-TERM CARE | End: 2017-06-23
Attending: STUDENT IN AN ORGANIZED HEALTH CARE EDUCATION/TRAINING PROGRAM | Admitting: FAMILY MEDICINE
Payer: MEDICARE

## 2017-06-21 ENCOUNTER — APPOINTMENT (OUTPATIENT)
Dept: GENERAL RADIOLOGY | Facility: CLINIC | Age: 82
End: 2017-06-21
Attending: STUDENT IN AN ORGANIZED HEALTH CARE EDUCATION/TRAINING PROGRAM
Payer: MEDICARE

## 2017-06-21 ENCOUNTER — ANESTHESIA EVENT (OUTPATIENT)
Dept: EMERGENCY MEDICINE | Facility: CLINIC | Age: 82
End: 2017-06-21
Payer: MEDICARE

## 2017-06-21 ENCOUNTER — ANESTHESIA (OUTPATIENT)
Dept: EMERGENCY MEDICINE | Facility: CLINIC | Age: 82
End: 2017-06-21
Payer: MEDICARE

## 2017-06-21 ENCOUNTER — MEDICAL CORRESPONDENCE (OUTPATIENT)
Dept: HEALTH INFORMATION MANAGEMENT | Facility: CLINIC | Age: 82
End: 2017-06-21

## 2017-06-21 DIAGNOSIS — M25.551 HIP PAIN, RIGHT: ICD-10-CM

## 2017-06-21 DIAGNOSIS — S73.004A HIP DISLOCATION, RIGHT, INITIAL ENCOUNTER (H): ICD-10-CM

## 2017-06-21 DIAGNOSIS — S72.001A: ICD-10-CM

## 2017-06-21 PROBLEM — F03.90 DEMENTIA (H): Status: ACTIVE | Noted: 2017-06-21

## 2017-06-21 PROBLEM — F03.90 DEMENTIA (H): Chronic | Status: ACTIVE | Noted: 2017-06-21

## 2017-06-21 PROBLEM — S72.009A HIP FRACTURE (H): Status: RESOLVED | Noted: 2017-05-09 | Resolved: 2017-06-21

## 2017-06-21 PROCEDURE — 27265 TREAT HIP DISLOCATION: CPT

## 2017-06-21 PROCEDURE — 25000125 ZZHC RX 250: Performed by: NURSE ANESTHETIST, CERTIFIED REGISTERED

## 2017-06-21 PROCEDURE — 73502 X-RAY EXAM HIP UNI 2-3 VIEWS: CPT | Mod: RT

## 2017-06-21 PROCEDURE — 27265 TREAT HIP DISLOCATION: CPT | Performed by: STUDENT IN AN ORGANIZED HEALTH CARE EDUCATION/TRAINING PROGRAM

## 2017-06-21 PROCEDURE — 99285 EMERGENCY DEPT VISIT HI MDM: CPT | Mod: 25 | Performed by: STUDENT IN AN ORGANIZED HEALTH CARE EDUCATION/TRAINING PROGRAM

## 2017-06-21 PROCEDURE — 99285 EMERGENCY DEPT VISIT HI MDM: CPT | Mod: 25

## 2017-06-21 PROCEDURE — 40000671 ZZH STATISTIC ANESTHESIA CASE

## 2017-06-21 PROCEDURE — 25000128 H RX IP 250 OP 636: Performed by: NURSE ANESTHETIST, CERTIFIED REGISTERED

## 2017-06-21 PROCEDURE — 40000986 XR PELVIS PORT 1/2 VW

## 2017-06-21 PROCEDURE — 37000011 ZZH ANESTHESIA WARD SERVICE: Performed by: NURSE ANESTHETIST, CERTIFIED REGISTERED

## 2017-06-21 RX ORDER — PROPOFOL 10 MG/ML
INJECTION, EMULSION INTRAVENOUS PRN
Status: DISCONTINUED | OUTPATIENT
Start: 2017-06-21 | End: 2017-06-21

## 2017-06-21 RX ORDER — SODIUM CHLORIDE 9 MG/ML
INJECTION, SOLUTION INTRAVENOUS CONTINUOUS PRN
Status: DISCONTINUED | OUTPATIENT
Start: 2017-06-21 | End: 2017-06-21

## 2017-06-21 RX ADMIN — SODIUM CHLORIDE: 9 INJECTION, SOLUTION INTRAVENOUS at 21:17

## 2017-06-21 RX ADMIN — PROPOFOL 20 MG: 10 INJECTION, EMULSION INTRAVENOUS at 21:25

## 2017-06-21 RX ADMIN — PROPOFOL 50 MG: 10 INJECTION, EMULSION INTRAVENOUS at 21:22

## 2017-06-21 RX ADMIN — PROPOFOL 50 MG: 10 INJECTION, EMULSION INTRAVENOUS at 21:19

## 2017-06-21 NOTE — IP AVS SNAPSHOT
[facility]    [address] [address]    Phone:  [phone]    Fax:  [phone]                                       After Visit Summary   6/21/2017    Lulu Carlton    MRN: [id number]           After Visit Summary Signature Page     I have received my discharge instructions, and my questions have been answered. I have discussed any challenges I see with this plan with the nurse or doctor.    ..........................................................................................................................................  Patient/Patient Representative Signature      ..........................................................................................................................................  Patient Representative Print Name and Relationship to Patient    ..................................................               ................................................  Date                                            Time    ..........................................................................................................................................  Reviewed by Signature/Title    ...................................................              ..............................................  Date                                                            Time

## 2017-06-21 NOTE — IP AVS SNAPSHOT
` `     [facility] INTENSIVE CARE: [phone]                 INTERAGENCY TRANSFER FORM - NOTES (H&P, Discharge Summary, Consults, Procedures, Therapies)   2017                    Hospital Admission Date: 2017  LULU BARRIENTOS   : 1921  Sex: Female        Patient PCP Information     Provider PCP Type    Todd Villeda MD General         History & Physicals      H&P by Moni Bland PA-C at 2017  7:06 AM     Author:  Moni Bland PA-C Service:  Hospitalist Author Type:  Physician Assistant - C    Filed:  2017  5:24 PM Date of Service:  2017  7:06 AM Creation Time:  2017  7:05 AM    Status:  Attested :  Moni Bland PA-C (Physician Assistant - C)    Cosigner:  Mo aMrtin MD at 2017  6:32 AM        Attestation signed by Mo Martin MD at 2017  6:32 AM        Physician Attestation   I, Mo Martin MD, saw and evaluated Lulu Barrientos as part of a shared visit.  I have reviewed and discussed with the advanced practice provider their history, physical and plan.    I personally reviewed the vital signs, medications, labs and imaging.    My key history or physical exam findings: see separate note    Key management decisions made by me: see separate note    Mo Martin MD  Date of Service (when I saw the patient): 17                               [facility]    History and Physical  Hospital Medicine       Date of Admission:  2017  Date of Service: 2017[CL1.1]     Assessment & Plan[CL1.2]   Lulu Barrientos is a 89 year old female with[CL1.1] recent OPAL and subsequent hip dislocation on right, dementia, HTN, DMT2 and GERD[CL1.3] who presents with[CL1.1] right hip pain    Hip Dislocation, Right   Presented to ER with notes of increased right hip pain and deformity per staffing. No[CL1.3]t[CL1.4] fall/found on floor this time. Previous hip  dislocation (6/15), relocated in ED without issue. Ortho was consulted and do not think surgery is indicated at this time. Patient will be fitted for brace.   -non-weight bearing until brace applied  -ortho following  -pain control with scheduled Tylenol and oxycodone PRN  -PT/OT/care transition consultation pending    Recent OPAL, Right   ORIF completed (5/10/2017) by Dr. Greenfield following fall and subsequent right hip fracture.   -continue PTA ASA for DVT ppx  -continue PTA oxycodone PRN  -continue PTA bowel regimen    Normocytic Normochromic Anemia   Hg 9.6, down from 12.2 recently. Baseline slowly declining from 15 since 2015. N[CL1.3]o[CL1.4] evidence of bleeding. VS stable.   -CBC in AM  -iron studies, ferritin, tsh, vit b12 and folate pending    Diabetes Mellitus, Type II  Chronic, stable.  Last a1C 6.3% on 5/2017.   -hold PTA glipizide   -mod SSI  -hypo/hyperglycemia protocol with blood glucose monitoring    HTN   Controlled.  -continue PTA lisinopril    GERD  -continue PTA omeprazole[CL1.3]    FEN:  -[CL1.1]tolerating PO intake well[CL1.3]  -Will monitor electrolytes and replace as needed  -[CL1.1]diabetic[CL1.3] diet    DVT Prophylaxis:[CL1.1] Low Risk and will be ambulatory following brace application[CL1.3]  Code Status:[CL1.1] DNR / DNI[CL1.3]    Disposition: Anticipate discharge in[CL1.1] 1-2[CL1.3] days once[CL1.1] cleared by PT/OT, pain tolerable on oral medications with safe disposition[CL1.3]. Appropriate for[CL1.1] OBSERVATION[CL1.3] care    I have discussed patient and formulated plan with[CL1.1] Dr. Martin. Assessment and plan as above.[CL1.3]     Moni Bland PA-C  [facility] Medicine[CL1.1]        Primary Care Physician[CL1.2]   Todd Villeda [phone][CL1.1]    History is obtained from the patient,[CL1.5] ED notes and review of the EMR.[CL1.1]    Past Medical History    Past Medical History:   Diagnosis Date     Anemia, unspecified hosp. 4/3/07 [place]      Central nervous system  "complication hosp. 4/3/07 [place]     Depressive disorder, not elsewhere classified      Fracture of femoral neck, right (H) 5/10/2017     Myasthenia gravis      Other malaise and fatigue hosp. 4/3/07 [place]     intermittent episodes of slurred speech, headaches, some confusion w/general weakness.      Other urinary incontinence      Thoracic or lumbosacral neuritis or radiculitis, unspecified     lumbar radiculopathy involving right leg.      Unspecified essential hypertension       Diagnosis Date Noted     Hip dislocation, right, sequela 06/22/2017     Priority: Medium     Status post total replacement of right hip 06/22/2017     Priority: Medium     Dementia 06/21/2017     Priority: Medium     Displaced fracture of right femoral neck (H) 05/09/2017     Priority: Medium     Hypertension goal BP (blood pressure) < 140/90 12/12/2016     Priority: Medium     Urinary incontinence 10/31/2016     Priority: Medium     Osteoporosis 10/31/2016     Priority: Medium     Vitamin D deficiency 10/31/2016     Priority: Medium     Anemia, iron deficiency 10/31/2016     Priority: Medium     Type 2 diabetes mellitus with hyperglycemia, without long-term current use of insulin (H) 10/31/2016     Priority: Medium     Primary osteoarthritis of left knee 10/31/2016     Priority: Medium     Steatosis of liver 04/16/2013     Priority: Medium     Malignant lymphoma, large cell, diffuse (H) 08/29/2011     Priority: Medium     Do not resuscitate status 08/29/2011     Priority: Medium     Myasthenia gravis (H) 02/08/2007     Priority: Medium     Overview:   NEUROLOGIST YASEMIN MCWILLIAMS  \"I don't think I have that anymore.\" Has not seen a neurologist in 4 or 5 years. \"I've been weaker now, dizzy and week. I put that to the lack of sleep. I was only sleeping for three hours a night for a while there. I'm doing better now with that.\"   2-11       Sensorineural hearing loss, asymmetrical 10/17/2007      Past Surgical History   Past " Surgical History:   Procedure Laterality Date     OPEN REDUCTION INTERNAL FIXATION HIP BIPOLAR Right 5/10/2017    Procedure: OPEN REDUCTION INTERNAL FIXATION HIP BIPOLAR;  Open reduction internal fixation right hip bipolar gregg arthroplasty.;  Surgeon: Riky Greenfield MD;  Location: [facility] OR     SURGICAL HISTORY OF -       thymus removal      SURGICAL HISTORY OF -       cholecystectomy      History of Present Illness[CL1.2]   Lulu Carlton is a 89 year old female who presents with[CL1.1] right hip pain    Patient is unable to tell history due to baseline dementia. Resides at [facility]    Patient presented from EMS after staff noted return of right hip tenderness and right hip deformity. Staff unaware of recent fall or finding patient on the ground. Staff administering Percocet 5mg q3h PRN without relief. Patient found to be sitting in wheelchair when EMS arrived.     Patient has no recollection of fall. Had hip replacement after fall resulting in right hip fracture (5/10). Had recent right hip dislocation (6/15) and see in ER with successful relocation.     Intermittent headaches none currently.[CL1.3]  Denies any fever, chills, cold-like symptoms (congestion, pharyngitis, sinus pressure, rhinorrhea), CP, SOB, cough, abdominal pain, N/V/D, dysuria, lightheadedness, dizziness, leg swelling, rashes[CL1.5]    Prior to Admission Medications   Prior to Admission Medications   Prescriptions Last Dose Informant Patient Reported? Taking?   Acetaminophen (TYLENOL PO) 6/21/2017 at am Nursing Home Yes Yes   Sig: Take 1,300 mg by mouth every morning Do not exceed 4,000 mg of Acetaminophen from all sources in 24 hours    CYCLOBENZAPRINE HCL PO  Nursing Home Yes No   Sig: Take 5 mg by mouth 3 times daily as needed for muscle spasms   Cyanocobalamin (B-12) 1000 MCG TBCR 6/21/2017 at am Nursing Home No Yes   Sig: Take 1,000 mcg by mouth daily   GLIPIZIDE XL PO  Nursing Home Yes No   Sig: Take 2.5  "mg by mouth daily (with breakfast)   Glucose Blood (BLOOD GLUCOSE TEST STRIPS) STRP 6/21/2017 at 0730 Nursing Home Yes Yes   Sig: by In Vitro route as needed   LOPERAMIDE HCL PO  Nursing Home Yes No   Sig: Take 2 mg by mouth daily as needed   MECLIZINE HCL PO  Nursing Home Yes No   Sig: Take 12.5 mg by mouth 2 times daily as needed for dizziness   aspirin  MG EC tablet 6/21/2017 at am Nursing Home No Yes   Sig: Take 1 tablet (325 mg) by mouth daily   calcium-vitamin D (CALTRATE) 600-400 MG-UNIT per tablet 6/20/2017 at hs Nursing Home Yes Yes   Sig: Take 1 tablet by mouth every evening   lisinopril (PRINIVIL,ZESTRIL) 5 MG tablet 6/21/2017 at am Nursing Home No Yes   Sig: Take 1 tablet (5 mg) by mouth daily   multivitamin (OCUVITE) TABS tablet 6/21/2017 at am Nursing Home Yes Yes   Sig: Take 1 tablet by mouth daily   omeprazole (PRILOSEC) 20 MG CR capsule 6/21/2017 at 0600 Nursing Home No Yes   Sig: Take 1 capsule (20 mg) by mouth daily   oxyCODONE (ROXICODONE) 5 MG IR tablet 6/21/2017 at Unknown time longterm No Yes   Sig: Take 1 tablet (5 mg) by mouth every 3 hours as needed for moderate to severe pain   senna-docusate (SENOKOT-S;PERICOLACE) 8.6-50 MG per tablet  Nursing Home Yes No   Sig: Take 2 tablets by mouth 2 times daily as needed for constipation   triamcinolone (KENALOG) 0.1 % cream  Nursing Home Yes No   Sig: Apply topically 2 times daily as needed for irritation      Facility-Administered Medications: None     Allergies   Allergies   Allergen Reactions     Hydrocodone-Acetaminophen Other (See Comments)     Trazodone Other (See Comments)     \"She gets crazy dreams and then can't fall back asleep.\"     Metformin Rash     Family History    No family history on file.      Social History   Social History     Social History     Marital status:      Spouse name: N/A     Number of children: N/A     Years of education: N/A     Occupational History     Not on file.     Social History Main Topics     " "Smoking status: Never Smoker     Smokeless tobacco: Not on file     Alcohol use No     Drug use: No     Sexual activity: Not on file     Other Topics Concern     Not on file     Social History Narrative[CL1.2]    Lives in [facility] unit [facility][CL1.3]    Review of Systems[CL1.2]   The 10 point Review of Systems is negative other than noted in the HPI.[CL1.5]    Physical Exam   /67 (BP Location: Left arm)  Pulse 87  Temp 97.8  F (36.6  C) (Oral)  Resp 18  Ht 1.6 m (5' 3\")  Wt 69.4 kg (153 lb)  SpO2 95%  BMI 27.1 kg/m2[CL1.2]     Weight:[CL1.1] 152 lbs 15.99 oz Body mass index is 27.1 kg/(m^2).[CL1.2]     Constitutional: Alert, oriented[CL1.1] to person not time, situation (thinks she may have fallen), place[CL1.3], cooperative, no apparent distress, appears nontoxic, Appears stated age.  Eyes: Sclera are anicteric, EOMI[CL1.1].[CL1.3]  HENT: Normocephalic. Atraumatic.[CL1.1] MMM.[CL1.3]  Lymph/Hematologic: No preauricular, postauricular, occipital, sub-mandibular, tonsillar, sub-mental, anterior or posterior cervical, or supraclavicular lymphadenopathy is appreciated.  Cardiovascular: Regular rate and rhythm,[CL1.1] systolic murmur noted.[CL1.3] Radial pulses are 2+ bilaterally. Distal pulses are intact. No lower extremity edema.  Respiratory: No accessory muscle usage. Speaking in full sentences. Clear to auscultation bilaterally without wheezes, crackles or rhonchi.    GI:bowel sounds present, soft, non-tender,non-distended. No rebound or guarding.   Genitourinary: Deferred  Musculoskeletal: Normal muscle bulk and tone.[CL1.1] Limited movement of RLE due to pain. Hip brace currently being applied. Ecchymosis to right knee and right lateral thigh - appears to be healing.[CL1.3]  Skin: Warm and dry, no rashes.   Neurologic: Neck supple. Cranial nerves 3-12 are grossly intact.[CL1.1]     Data[CL1.2]   Data reviewed today:[CL1.1]     Recent Labs  Lab 06/22/17  0015   WBC 9.0   HGB 9.6* "   MCV 89         POTASSIUM 4.3   CHLORIDE 102   CO2 30   BUN 15   CR 0.65   ANIONGAP 6   SLIM 8.7   *     Recent Results (from the past 24 hour(s))   XR Pelvis w Hip Right 1 View    Narrative    PELVIS AND HIP RIGHT ONE VIEW  6/21/2017 8:24 PM      HISTORY: Pain, history of prosthetic dislocation.    COMPARISON: 6/15/2017      Impression    IMPRESSION: Superior dislocation of the right hip arthroplasty. No  fracture.    SABRA VILLARREAL MD   XR Pelvis Port 1/2 Views    Narrative    PELVIS PORTABLE ONE - TWO VIEW  6/21/2017 9:41 PM      HISTORY: Post reduction right hip dislocation.     COMPARISON: 6/21/2017      Impression    IMPRESSION: The right hip arthroplasty has been successfully reduced  into the acetabulum. No fracture.    SABRA VILLARREAL MD[CL1.2]     I personally reviewed[CL1.1] no images or EKG's today[CL1.3].    I have discussed patient and formulated plan with[CL1.1] Dr. Martin. Assessment and plan as above.[CL1.5]     Chart documentation with keystrokes and/or Dragon voice recognition software. Although reviewed after completion, some word and grammatical error may remain.  Moni Bland PA-C  [facility] Medicine[CL1.1]           Revision History        User Key Date/Time User Provider Type Action    > CL1.4 6/22/2017  5:24 PM Moni Bland PA-C Physician Assistant - MANUEL Sign     CL1.2 6/22/2017 11:58 AM Moni Bland PA-C Physician Assistant - C      CL1.3 6/22/2017 11:34 AM Moni Bland PA-C Physician Assistant - C      CL1.5 6/22/2017 11:03 AM Moni Bland PA-C Physician Assistant - C      CL1.1 6/22/2017  7:05 AM Moni Bland PA-C Physician Assistant - MANUEL                   Discharge Summaries     No notes of this type exist for this encounter.         Consult Notes      Consults by Tania Marin LICSW at 6/22/2017 12:52 PM     Author:  Tania Marin LICSW Service:  (none) Author Type:      Filed:   6/22/2017  1:04 PM Date of Service:  6/22/2017 12:52 PM Creation Time:  6/22/2017 12:50 PM    Status:  Addendum :  Tania Marin LICSW ()     Consult Orders:    1. Care Transition RN/SW IP Consult [[id number]] ordered by Moni Bland PA-C at 06/22/17 0704                CARE TRANSITION SOCIAL WORK INITIAL ASSESSMENT:      Met with: Patient and Family.    DATA  Principal Problem:    Hip dislocation, right, sequela  Active Problems:    Anemia, iron deficiency    Type 2 diabetes mellitus with hyperglycemia, without long-term current use of insulin (H)    Hypertension goal BP (blood pressure) < 140/90    Displaced fracture of right femoral neck (H)    Dementia    Status post total replacement of right hip       Primary Care Clinic Name:  [facility])  Primary Care MD Name:  (Ronal)  Contact information and PCP information verified: Yes      ASSESSMENT  Cognitive Status: awake.       Resources List: Transitional Care     Lives With: facility resident        Description of Support System: Supportive, Involved   Who is your support system?: Children   Support Assessment: Adequate family and caregiver support, Adequate social supports   Insurance Concerns: No Insurance issues identified                  This writer met with pt and pts two adult children introduced self and role. Discussed discharge planning and medicare guidelines in regards to home care, TCU and LTC. Pt currently lives at [facility] (Phone: [phone] Fax:[phone] RN report: [phone] or [phone]) [facility]. Family is requesting TCU, Patient was provided with Medicare certified nursing home list. Pts choices are as follows [facility] (Phone: [phone] Fax: [phone]) and [facility] (Phone: [phone]) Fax: ([phone]).  Referrals are pending at both facilities. Pt recently at [facility] and is still in a 30 day window for TCU coverage. Will wait on TCU bed  availability.[AK1.1]      PAS-RR    Per DHS regulation, CTS team completed and submitted PAS-RR to [place] Board on Aging Direct Connect via the Senior [employer] Line. CTS team advised SNF and they are aware a PAS-RR has been submitted.     CTS team reviewed with pt or health care agent that they may be contacted for a follow up appointment within 10 days of hospital discharge if SNF stay is <30 days. Contact information for [employer] was also provided.     Pt or health care agent verbalized understanding.     PAS-RR # [id number][AK1.2]          PLAN    Waiting on TCU bed availability    Discharge Planner   Discharge Plans in progress: TCU  Barriers to discharge plan: bed avaialability  Follow up plan: TCU       Entered by: Tania Marin 2017 12:50 PM             Tania Marin MSW, OANH, [phone][AK1.1]     Revision History        User Key Date/Time User Provider Type Action    > AK1.2 2017  1:04 PM Tania Marin LICSW  Addend     AK1.1 2017 12:52 PM Tania Marin LICSW  Sign            Consults by Sandie Wilkinson PA-C at 2017 12:40 PM     Author:  Sandie Wilkinson PA-C Service:  Orthopedics Author Type:  Physician Assistant - MANUEL    Filed:  2017 12:48 PM Date of Service:  2017 12:40 PM Creation Time:  2017 12:40 PM    Status:  Cosign Needed :  Sandie Wilkinson PA-C (Physician Assistant - C)    Cosign Required:  Yes             [facility] Consultation    Consultation - [facility]  Level of consult: Consult, follow and place orders    Lulu Carlton, JUSTINO 1921, MRN [id number]     Admitting Dx: Hip pain, right [M25.551]  Hip dislocation, right, initial encounter (H) [S73.004A]     PCP: Todd Villeda, [phone]     Code status:  Prior     Extended Emergency Contact Information  Primary Emergency Contact: Benigno Shane  Address: [address]  [place]  Home Phone: [phone]  Mobile Phone: [phone]  Relation: Son  Secondary Emergency Contact: Keya Kong   [facility]  Home Phone: [phone]  Mobile Phone: [phone]  Relation: Daughter     Assessment:    R hip dislocation s/p R hip hemiarthroplasty on 5/10 by Dr. Greenfield. She was discharged to a TCU and then returned to her primary residence at [facility]. She was in with a hip dislocation on 6/15 and again on 6/21, both successfully reduced in the ED.     Plan:  Bedrest until abductor brace is on  Abductor brace ordered, may WBAT in brace. Should have brace on at all times, except for hygiene and skin checks.   Follow up with Dr. Greenfield in 1-2 weeks   May be discharged to an appropriate setting when medically stable.     Principal Problem:    Hip dislocation, right, sequela  Active Problems:    Anemia, iron deficiency    Type 2 diabetes mellitus with hyperglycemia, without long-term current use of insulin (H)    Hypertension goal BP (blood pressure) < 140/90    Displaced fracture of right femoral neck (H)    Dementia    Status post total replacement of right hip       Chief Complaint  R hip pain      HPI  We have been requested by Dr. Hernandes to evaluate Lulu Carlton who is a 89 year old year old female for R hip dislocation s/p R hip hemiarthroplasty on 5/10 by Dr. Greenfield. She was discharged to a TCU and then returned to her primary residence at [facility]. She was in with a hip dislocation on 6/15 and again on 6/21, both successfully reduced in the ED. She is a poor historian and the history is primarily obtained from her daughter. Her daughter stated that she fell upon the occurrence of the first hip dislocation. Upon this occurrence it is unclear the MOA of the dislocation. Per the ED notes it states nursing staff found the patient complaining of R hip pain while laying down.      History is obtained from the electronic health record and patient's daughter     Past Medical  History  Past Medical History:   Diagnosis Date     Anemia, unspecified hosp. 4/3/07 [place]      Central nervous system complication hosp. 4/3/07 [place]     Depressive disorder, not elsewhere classified      Fracture of femoral neck, right (H) 5/10/2017     Myasthenia gravis      Other malaise and fatigue hosp. 4/3/07 [place]     intermittent episodes of slurred speech, headaches, some confusion w/general weakness.      Other urinary incontinence      Thoracic or lumbosacral neuritis or radiculitis, unspecified     lumbar radiculopathy involving right leg.      Unspecified essential hypertension        Surgical History  Past Surgical History:   Procedure Laterality Date     OPEN REDUCTION INTERNAL FIXATION HIP BIPOLAR Right 5/10/2017    Procedure: OPEN REDUCTION INTERNAL FIXATION HIP BIPOLAR;  Open reduction internal fixation right hip bipolar gregg arthroplasty.;  Surgeon: Riky Greenfield MD;  Location: [facility] OR     SURGICAL HISTORY OF -       thymus removal      SURGICAL HISTORY OF -       cholecystectomy        Social History  Social History     Social History     Marital status:      Spouse name: N/A     Number of children: N/A     Years of education: N/A     Occupational History     Not on file.     Social History Main Topics     Smoking status: Never Smoker     Smokeless tobacco: Not on file     Alcohol use No     Drug use: No     Sexual activity: Not on file     Other Topics Concern     Not on file     Social History Narrative       Family History  No family history on file.     Allergies:  Hydrocodone-acetaminophen; Trazodone; and Metformin      Current Medications:  Current Facility-Administered Medications   Medication     acetaminophen (TYLENOL) tablet 650 mg     ondansetron (ZOFRAN-ODT) ODT tab 4 mg    Or     ondansetron (ZOFRAN) injection 4 mg     glucose 40 % gel 15-30 g    Or     dextrose 50 % injection 25-50 mL    Or     glucagon injection 1 mg     insulin aspart (NovoLOG) inj (RAPID  ACTING)     insulin aspart (NovoLOG) inj (RAPID ACTING)     aspirin EC EC tablet 325 mg     cyclobenzaprine (FLEXERIL) tablet 5 mg     lisinopril (PRINIVIL/ZESTRIL) tablet 5 mg     omeprazole (priLOSEC) CR capsule 20 mg     senna-docusate (SENOKOT-S;PERICOLACE) 8.6-50 MG per tablet 2 tablet     naloxone (NARCAN) injection 0.1-0.4 mg     [START ON 6/23/2017] acetaminophen (TYLENOL) tablet 650 mg     oxyCODONE (ROXICODONE) IR half-tab 2.5-5 mg       Review of Systems:  The Review of Systems is negative other than noted in the HPI    Physical Exam:  Temp:  [97.2  F (36.2  C)-98.6  F (37  C)] 97.8  F (36.6  C)  Pulse:  [87-88] 87  Heart Rate:  [61-98] 74  Resp:  [0-27] 18  BP: (109-183)/(56-89) 122/67  SpO2:  [92 %-100 %] 95 %    GEN: no apparent distress, non-labored breating.   R hip: well healed incision, ecchymosis noted on anterior midshaft thigh and lateral calf. Non-tender to palpation over greater trochanter, tenderness over ecchymotic areas. Gentle hip flexion tolerated well. Able to perform a straight leg rasie.      Pertinent Labs  Lab Results: personally reviewed.  Lab Results   Component Value Date    WBC 9.0 06/22/2017    HGB 9.6 (L) 06/22/2017    HCT 30.6 (L) 06/22/2017    MCV 89 06/22/2017     06/22/2017     No results for input(s): INR in the last 168 hours.    Pertinent Radiology  Radiology Results: images and radiology report reviewed  Recent Results (from the past 24 hour(s))   XR Pelvis w Hip Right 1 View    Narrative    PELVIS AND HIP RIGHT ONE VIEW  6/21/2017 8:24 PM      HISTORY: Pain, history of prosthetic dislocation.    COMPARISON: 6/15/2017      Impression    IMPRESSION: Superior dislocation of the right hip arthroplasty. No  fracture.    SABRA VILLARREAL MD   XR Pelvis Port 1/2 Views    Narrative    PELVIS PORTABLE ONE - TWO VIEW  6/21/2017 9:41 PM      HISTORY: Post reduction right hip dislocation.     COMPARISON: 6/21/2017      Impression    IMPRESSION: The right hip arthroplasty has  been successfully reduced  into the acetabulum. No fracture.    SABRA VILLARREAL MD       Attestation:  I have reviewed today's vital signs, notes, medications, labs and imaging.  Amount of time performed on this consult: 30 minutes.  I have discussed the case with Dr. Greenfield remotely.      Sandie Wilkinson[TW1.1]       Revision History        User Key Date/Time User Provider Type Action    > TW1.1 6/22/2017 12:48 PM Sandie Wilkinson PA-MANUEL Physician Assistant - C Sign                     Progress Notes - Physician (Notes from 06/20/17 through 06/23/17)      Progress Notes by Mo Martin MD at 6/23/2017  6:37 AM     Author:  Mo Martin MD Service:  Hospitalist Author Type:  Physician    Filed:  6/23/2017  7:28 AM Date of Service:  6/23/2017  6:37 AM Creation Time:  6/23/2017  6:37 AM    Status:  Signed :  Mo Martin MD (Physician)         [facility] Service      Subjective:[JE1.1]  Impulsive-wants to get out of bed  Some hip pain[JE1.2]    Review of Systems:[JE1.1]  C: NEGATIVE for fever, chills, change in weight  E/M: NEGATIVE for ear, mouth and throat problems  R: NEGATIVE for significant cough or SOB  CV: NEGATIVE for chest pain, palpitations or peripheral edema    P[JE1.2]hysical Exam:  Vitals Were Reviewed    Patient Vitals for the past 16 hrs:   BP Temp Temp src Pulse Heart Rate Resp SpO2 Weight   06/23/17 0355 154/80 98  F (36.7  C) Oral - 88 18 94 % 68.9 kg (151 lb 14.4 oz)   06/22/17 2304 178/69 98.2  F (36.8  C) Oral - 102 18 94 % -   06/22/17 1941 164/65 98.4  F (36.9  C) Axillary 87 - 20 96 % -   06/22/17 1601 - - - - 87 18 95 % -   06/22/17 1535 133/72 98  F (36.7  C) Oral 95 - 18 93 % -         Intake/Output Summary (Last 24 hours) at 06/23/17 0637  Last data filed at 06/23/17 0356   Gross per 24 hour   Intake              730 ml   Output             2125 ml   Net            -1395 ml[JE1.1]       GENERAL APPEARANCE: healthy, alert and no  distress  EYES: conjunctiva clear, eyes grossly normal  RESP: lungs clear to auscultation - no rales, rhonchi or wheezes  CV: regular rate and rhythm, normal S1 S2, no S3 or S4 and no murmur, click or rub   ABDOMEN: soft, nontender, no HSM or masses and bowel sounds normal  MS: in abductor brace  SKIN: clear without significant rashes or lesions    Lab:[JE1.2]  Recent Labs   Lab Test  06/22/17   0015  06/15/17   0840   NA  138  142   POTASSIUM  4.3  4.2   CHLORIDE  102  107   CO2  30  29   ANIONGAP  6  6   GLC  193*  165*   BUN  15  11   CR  0.65  0.64   SLIM  8.7  8.6     CBC RESULTS:   Recent Labs   Lab Test  06/22/17   0015  06/15/17   0840   WBC  9.0  4.8   RBC  3.44*  4.36   HGB  9.6*  12.2   HCT  30.6*  38.6   PLT  261  151       Results for orders placed or performed during the hospital encounter of 06/21/17 (from the past 24 hour(s))   Care Transition RN/SW IP Consult    Narrative    Tania Marin [facility]     6/22/2017  1:04 PM  CARE TRANSITION SOCIAL WORK INITIAL ASSESSMENT:      Met with: Patient and Family.    DATA  Principal Problem:    Hip dislocation, right, sequela  Active Problems:    Anemia, iron deficiency    Type 2 diabetes mellitus with hyperglycemia, without long-term   current use of insulin (H)    Hypertension goal BP (blood pressure) < 140/90    Displaced fracture of right femoral neck (H)    Dementia    Status post total replacement of right hip       Primary Care Clinic Name:  ([facility])  Primary Care MD Name:  (Ronal)  Contact information and PCP information verified: Yes      ASSESSMENT  Cognitive Status: awake.       Resources List: Transitional Care     Lives With: facility resident        Description of Support System: Supportive, Involved   Who is your support system?: Children   Support Assessment: Adequate family and caregiver support,   Adequate social supports   Insurance Concerns: No Insurance issues identified                  This writer met with pt and pts two adult children  introduced   self and role. Discussed discharge planning and medicare   guidelines in regards to home care, TCU and LTC. Pt currently   lives at [facility] (Phone: [phone]   Fax:[phone] RN report: [phone] or [phone]) [facility].   Family is requesting TCU, Patient was provided with Medicare   certified nursing home list. Pts choices are as follows [facility] (Phone: [phone] Fax: [phone])   and [facility] (Phone: [phone]) Fax:   ([phone]).  Referrals are pending at both facilities. Pt   recently at TCU and is still in a 30 day window for TCU coverage.   Will wait on TCU bed availability.      PAS-RR    Per DHS regulation, CTS team completed and submitted PAS-RR to [place]   [employer]ging Direct Connect via the Senior LinkAge Line. CTS   team advised SNF and they are aware a PAS-RR has been submitted.     CTS team reviewed with pt or health care agent that they may be   contacted for a follow up appointment within 10 days of hospital   discharge if SNF stay is <30 days. Contact information for Senior   LinkAge Line was also provided.     Pt or health care agent verbalized understanding.     PAS-RR # [id number]          PLAN    Waiting on TCU bed availability    Discharge Planner   Discharge Plans in progress: TCU  Barriers to discharge plan: bed avaialability  Follow up plan: TCU       Entered by: Tania Marin 06/22/2017 12:50 PM             Tania Marin MSW, [facility], [facility] [phone]   Glucose by meter   Result Value Ref Range    Glucose 150 (H) 70 - 99 mg/dL   Glucose by meter   Result Value Ref Range    Glucose 202 (H) 70 - 99 mg/dL   Methicillin Resistant Staph Aureus PCR   Result Value Ref Range    Specimen Description Nares     S Aur Meth Resis PCR  NEG     Negative  MRSA Negative: SA Negative  MRSA and Staphylococcus aureus target DNA not   detected, presumed negative for MRSA and SA colonization or the number of   bacteria  present may be below the limit of detection for the assay. FDA   approved assay performed using [employer] GeneXpert(R) real-time PCR.     Glucose by meter   Result Value Ref Range    Glucose 171 (H) 70 - 99 mg/dL   Glucose by meter   Result Value Ref Range    Glucose 168 (H) 70 - 99 mg/dL   Glucose by meter   Result Value Ref Range    Glucose 181 (H) 70 - 99 mg/dL   Glucose by meter   Result Value Ref Range    Glucose 157 (H) 70 - 99 mg/dL       Assessment and Plan:    Lulu Carlton is a 89 year old female with recent OPAL and subsequent hip dislocation on right, dementia, HTN, DMT2 and GERD who presents with right hip pain     Hip Dislocation, Right   Presented to ER with notes of increased right hip pain and deformity per staffing. Not fall/found on floor this time. Previous hip dislocation (6/15), relocated in ED without issue. Ortho was consulted and do not think surgery is indicated at this time. Patient will be fitted for brace.[JE1.1]   June 23, 2017[JE1.3] in abductor brace, will dc tcu     Recent OPAL, Right   ORIF completed (5/10/2017) by Dr. Greenfield following fall and subsequent right hip fracture.   -continue PTA ASA for DVT ppx  -continue PTA oxycodone PRN  -continue PTA bowel regimen     Normocytic Normochromic Anemia[JE1.1]-probable anemia of chronic ds[JE1.3]   Hg 9.6, down from 12.2 recently. Baseline slowly declining from 15 since 2015. No evidence of bleeding. VS stable.        Diabetes Mellitus, Type II  Chronic, stable.  Last a1C 6.3% on 5/2017.   -hold PTA glipizide   -mod SSI  -hypo/hyperglycemia protocol with blood glucose monitoring     HTN   Controlled.  -continue PTA lisinopril     GERD  -continue PTA omeprazole     FEN:  -tolerating PO intake well  -Will monitor electrolytes and replace as needed  -diabetic diet     DVT Prophylaxis: Low Risk and will be ambulatory following brace application  Code Status: DNR / DNI   [JE1.1]  dc[JE1.2]     Revision History        User Key Date/Time  User Provider Type Action    > JE1.2 6/23/2017  7:28 AM Mo Martin MD Physician Sign     JE1.3 6/23/2017  6:38 AM Mo Martin MD Physician      JE1.1 6/23/2017  6:37 AM Mo Martin MD Physician             Progress Notes by Ashley Bridges PT at 6/22/2017  3:39 PM     Author:  Ashley Bridges PT Service:  (none) Author Type:  Physical Therapist    Filed:  6/22/2017  3:39 PM Date of Service:  6/22/2017  3:39 PM Creation Time:  6/22/2017  3:39 PM    Status:  Signed :  Ashley Bridges PT (Physical Therapist)         Physical Therapy Evaluation and Discharge Summary     06/22/17 1500   Quick Adds   Type of Visit Initial PT Evaluation   Living Environment   Lives With facility resident   Living Arrangements extended care facility   Home Accessibility no concerns   Functional Level Prior   Ambulation 2-->assistive person   Transferring 2-->assistive person   Toileting 2-->assistive person   Bathing 2-->assistive person   Dressing 2-->assistive person   Eating 0-->independent   Communication 2-->difficulty understanding (not related to language barrier)   Swallowing 0-->swallows foods/liquids without difficulty   Cognition 2 - difficulty with organizing thoughts   Fall history within last six months yes   Number of times patient has fallen within last six months 2   Which of the above functional risks had a recent onset or change? ambulation   General Information   Onset of Illness/Injury or Date of Surgery - Date 06/21/17   Referring Physician Moni Bland PA-C   Patient/Family Goals Statement unable to verbalize a goal due to dementia   Pertinent History of Current Problem (include personal factors and/or comorbidities that impact the POC) Per H&P: Lulu Carlton is a 89 year old female who presents to the department from assisted living facility for evaluation of right hip pain and internally rotated right leg. Records confirm that the patient had right hip replacement 5 weeks  ago by orthopedist Dr. Reddy and without postoperative complication. However she returned to the department on 6/15/17 for evaluation of right hip pain and it was determined that she suffered from dislocation of the right hip. After reduction she had returned home and again seemingly doing well. Today nursing staff found the patient lying down complaining of right hip pain but no apparent injury. She does have baseline dementia and very poor historian, does not recall fall or injury.   Weight-Bearing Status - RLE weight-bearing as tolerated  (with hip abductor brace on)   Cognitive Status Examination   Orientation person   Level of Consciousness alert   Follows Commands and Answers Questions 100% of the time   Personal Safety and Judgment impaired   Memory impaired   Pain Assessment   Patient Currently in Pain Yes, see Vital Sign flowsheet  (R hip pain--unable to rate)   Posture    Posture Forward head position;Protracted shoulders;Kyphosis   Range of Motion (ROM)   ROM Comment WFL except R hip limited due to abductor brace   Strength   Strength Comments WFL except R LE less than antigravity   Bed Mobility   Bed Mobility Comments sit <> supine with mod assist of 2   Transfer Skills   Transfer Comments sit <> stand with CGA   Gait   Gait Gait Skill   Gait Skills   Level of [place]: Gait contact guard   Physical Assist/Nonphysical Assist: Gait 1 person assist   Weight-Bearing Restrictions: Gait weight-bearing as tolerated   Assistive Device for Transfer: Gait rolling walker   Gait Distance 50 feet   Balance   Balance Comments good with RW   General Therapy Interventions   Planned Therapy Interventions bed mobility training;gait training;ROM;strengthening   Clinical Impression   Criteria for Skilled Therapeutic Intervention yes, treatment indicated   PT Diagnosis relocated R hip dislocation   Influenced by the following impairments dementia, pain and weakness   Functional limitations due to impairments mobility  "and gait   Clinical Presentation Stable/Uncomplicated   Clinical Presentation Rationale clinical judgement   Clinical Decision Making (Complexity) Low complexity   Therapy Frequency` daily   Predicted Duration of Therapy Intervention (days/wks) 1 visit only--pt to be d/c'd to [facility] tomorrow morning   Anticipated Discharge Disposition Transitional Care Facility   Risk & Benefits of therapy have been explained Yes   Patient, Family & other staff in agreement with plan of care Yes   Clinical Impression Comments Pt would benefit from PT at TCU for R hip strengthening and gait and transfer training.   [facility] AM-PAC TM \"6 Clicks\"   2016, Trustees of [facility], under license to [employer].  All rights reserved.   6 Clicks Short Forms Basic Mobility Inpatient Short Form   [facility] AM-PAC  \"6 Clicks\" V.2 Basic Mobility Inpatient Short Form   1. Turning from your back to your side while in a flat bed without using bedrails? 3 - A Little   2. Moving from lying on your back to sitting on the side of a flat bed without using bedrails? 2 - A Lot   3. Moving to and from a bed to a chair (including a wheelchair)? 3 - A Little   4. Standing up from a chair using your arms (e.g., wheelchair, or bedside chair)? 3 - A Little   5. To walk in hospital room? 3 - A Little   6. Climbing 3-5 steps with a railing? 2 - A Lot   Basic Mobility Raw Score (Score out of 24.Lower scores equate to lower levels of function) 16   Total Evaluation Time   Total Evaluation Time (Minutes) 15     Ashley Bridges PT[KJ1.1]         Revision History        User Key Date/Time User Provider Type Action    > KJ1.1 6/22/2017  3:39 PM Ashley Bridges, PT Physical Therapist Sign            Progress Notes by Tania Marin LICSW at 6/22/2017  2:41 PM     Author:  Tania Marin LICSW Service:  (none) Author Type:      Filed:  6/22/2017  2:42 PM Date of Service:  6/22/2017  2:41 PM Creation Time:  6/22/2017  2:41 PM    Status:  " Signed :  Tania Marin LICSW ()         Reason for Follow up: DC planning    Anticipated discharge needs: Pt has been accepted at [facility] (Phone: [phone]) Fax: ([phone]) and has been declined admission at [facility].     Family is in agreement with [facility], ride has been arranged for pt tomorrow at 1100 per family request.     Next steps: DC Friday    Tania Marin MSW, [facility], [phone]    Discharge Planner   Discharge Plans in progress: TCU  Barriers to discharge plan: medical stability  Follow up plan: TCU       Entered by: Tania Marin 06/22/2017 2:41 PM[AK1.1]            Revision History        User Key Date/Time User Provider Type Action    > AK1.1 6/22/2017  2:42 PM Tania Marin LICSW  Sign            Progress Notes by Mo Martin MD at 6/22/2017  2:11 PM     Author:  Mo Martin MD Service:  Hospitalist Author Type:  Physician    Filed:  6/22/2017  2:13 PM Date of Service:  6/22/2017  2:11 PM Creation Time:  6/22/2017  2:11 PM    Status:  Signed :  Mo Martin MD (Physician)         2:12 PM[JE1.1]  Pt seen and examined.  History of right hemiarthroplasty.  Now with dislocation 6/15 and 6/21.  Ortho recommends abduction brace.  Will go to tcu- but no tcu bed available today.  Utilization asked for obs status.  H and P by Moni LONG[JE1.2]     Revision History        User Key Date/Time User Provider Type Action    > JE1.1 6/22/2017  2:13 PM oM Martin MD Physician Sign     JE1.2 6/22/2017  2:11 PM Mo Martin MD Physician             Progress Notes by Tania Marin LICSW at 6/22/2017 12:52 PM     Author:  Tania Marin LICSW Service:  (none) Author Type:      Filed:  6/22/2017 12:53 PM Date of Service:  6/22/2017 12:52 PM Creation Time:  6/22/2017 12:52 PM    Status:  Addendum :  Tania Marin LICSW ()         Change of status from  Inpatient to Observation.  Observation handout given and Observation status explained to patient[AK1.1]s son[AK1.2].  Tania Tania MSW, [facility], [facility] [phone][AK1.1]       Revision History        User Key Date/Time User Provider Type Action    > AK1.2 6/22/2017 12:53 PM Tania Marin LICSW  Addend     AK1.1 6/22/2017 12:52 PM Tania Marin LICSW  Sign            ED Provider Notes by Todd Martins DO at 6/21/2017  7:07 PM     Author:  Todd Martins DO Service:  Emergency Medicine Author Type:  Physician    Filed:  6/22/2017  1:29 AM Date of Service:  6/21/2017  7:07 PM Creation Time:  6/22/2017  1:16 AM    Status:  Signed :  Todd Martins DO (Physician)           History[TS1.1]     Chief Complaint   Patient presents with     Hip Pain[TS1.2]     HPI  Lulu Carlton is a 89 year old female who presents to the department from assisted living facility for evaluation of right hip pain and internally rotated right leg.[TS1.1] Records[TS1.3] confirm that the patient had right hip replacement 5 weeks ago[TS1.1] by orthopedist Dr. Reddy and without postoperative complication.[TS1.3] However she returned to the department[TS1.1] on 6/15/17 for evaluation of right hip pain and it was determined that she suffered from dislocation of the right hip. After reduction she had returned home and again seemingly doing well. Today nursing staff found the patient lying down complaining of right hip pain but no apparent injury. She does have baseline dementia and very poor historian, does not recall fall or injury. No other history provided.[TS1.3]    I have reviewed the Medications, Allergies, Past Medical and Surgical History, and Social History in the Epic system.[TS1.1]    Patient Active Problem List   Diagnosis     Sensorineural hearing loss, asymmetrical     Urinary incontinence     Osteoporosis     Vitamin D deficiency     Anemia, iron deficiency     Type 2 diabetes mellitus  with hyperglycemia, without long-term current use of insulin (H)     Primary osteoarthritis of left knee     Hypertension goal BP (blood pressure) < 140/90     Displaced fracture of right femoral neck (H)     Fracture of femoral neck, right (H)     Malignant lymphoma, large cell, diffuse (H)     Do not resuscitate status     Steatosis of liver     Myasthenia gravis (H)     Dementia     Hip dislocation, right (H)       Past Surgical History:   Procedure Laterality Date     OPEN REDUCTION INTERNAL FIXATION HIP BIPOLAR Right 5/10/2017    Procedure: OPEN REDUCTION INTERNAL FIXATION HIP BIPOLAR;  Open reduction internal fixation right hip bipolar gregg arthroplasty.;  Surgeon: Riky Greenfield MD;  Location: [facility] OR     SURGICAL HISTORY OF -       thymus removal      SURGICAL HISTORY OF -       cholecystectomy       Social History     Social History     Marital status:      Spouse name: N/A     Number of children: N/A     Years of education: N/A     Occupational History     Not on file.     Social History Main Topics     Smoking status: Never Smoker     Smokeless tobacco: Not on file     Alcohol use No     Drug use: No     Sexual activity: Not on file     Other Topics Concern     Not on file     Social History Narrative       No family history on file.    Most Recent Immunizations   Administered Date(s) Administered     Influenza (High Dose) 3 valent vaccine 09/05/2014     Influenza (intradermal) 09/06/2013     Influenza Vaccine, 3 YRS +, IM (QUADRIVALENT W/PRESERVATIVES) 11/03/2016     Pneumococcal (PCV 13) 10/31/2016     Pneumococcal 23 valent 10/21/2011     TDAP Vaccine (Adacel) 09/06/2011[TS1.2]         Review of Systems[TS1.1] unable to obtain from patient, primarily provided by accompanying daughter...  Constitutional: No recent fever or illness.  Respiratory: No recent cough.  Cardiovascular: Negative for complaint of chest pain.  Gastrointestinal: Negative for abdominal pain.  Musculoskeletal: Positive  "for right hip pain. Denies active neck pain, back pain, pelvic pain, or injuries.  Neurological: Negative for headache or dizziness.    All others reviewed and are negative.[TS1.3]      Physical Exam   BP: 139/77  Pulse: 88  Heart Rate: 78  Temp: 97.2  F (36.2  C)  Resp: 13  Height: 160 cm (5' 3\")  Weight: 69.4 kg (153 lb)  SpO2: 96 %  Physical Exam[TS1.1]  Constitutional: Well developed, well nourished. Appears stable and in no acute distress.   Head: Atraumatic appearance of face. Negative for Raccoon eyes and Castanon sign. No tenderness to palpation of facial bones or skull circumferentially.  Eye: No obvious proptosis or subconjunctival hemorrhage. Eyelids appear symmetrical. EOMI and patient denies diplopia. PERRLA without pain.  Oral: Patient is without trismus or malocclusion. Moist oral mucosa without oral laceration.   Ears: Denies tenderness of the auricle or tragus. Typical appearance of the external auditory canal bilaterally, tympanic membranes visualized and without hemotympanum. No mastoid region tenderness.  Neck: No tenderness to palpation of midline cervical vertebra. Full ROM without pain.   Cardiovascular: No cyanosis. RRR. No audible murmurs noted.   Respiratory/Chest: Effort normal, no respiratory distress. CTAB without diminished regions.  Gastrointestinal: Soft, nontender and nondistended. No guarding, rigidity, or rebound tenderness. No organomegaly.  Musculoskeletal: Large contusion overlying lateral aspect of right hip with tenderness, right lower extremity is internally rotated. No pelvic instability. L5 dorsiflexion/foot inversion and dorsal foot sensory intact. S1 plantar flexion, foot eversion, and plantar foot sensation intact. Sensation intact of all digits diffusely, including deep fibular distribution of first webbing space. 2/4 palpable dorsalis pedis and posterior tibial pulses. No cyanosis and capillary refill less than 2 seconds in each digit.   No step-offs and no tenderness to " palpation of midline cervical, thoracic, or lumbosacral vertebra. Moves upper extremities spontaneously and without complaint.  Neuro: Patient is alert but is not oriented to place or time. GCS of 14 is likely baseline.  Skin: Skin is warm and dry, not diaphoretic. No abrasions, contusions, ecchymosis, or lacerations.  Psych: Appears to have a normal mood and affect.[TS1.3]       ED Course[TS1.1]     ED Course[TS1.2]     Procedures[TS1.1]        [facility] Procedure Note      Procedure:  Reduction of right hip dislocation    Performed by:  Todd Martins    Procedure: Reduction of right hip    Indication: Dislocation    Consent: The patient was consented for the procedure of reduction of right hip, accompanying daughter signed consent. They understand the risks and benefits of performing the procedure of right hip reduction, as I have thoroughly described in terms that the non medical can understand. Risks specifically discussed included vascular injury, nerve injury, malunion or nonunion, and may still result in surgical procedure or correction. The patient has a history of dementia so her daughter at provided written consent for the procedure.    Procedure Note:  A time-out was completed verifying correct patient, procedure, site, positioning, and correct equipment. The patient was placed in a position appropriate for reduction, yet maintaining relative anatomic comfort. Performing gentle steady traction, Hip reduction was attempted without difficulty or complications. The patient was monitored during the entire procedure and anesthesia was present.     Post-reduction films were obtained after completion to confirm reduction. After allowing for splint to set and significant monitoring, extremity was reassessed. Sensation intact, full range of motion of digits, 5/5 strength, and improvement in pain when compared prior to reduction.    The patient tolerated the procedure well and there were no  complications.[TS1.4]        Critical Care time:[TS1.1]  none[TS1.3]               Results for orders placed or performed during the hospital encounter of 06/21/17 (from the past 24 hour(s))   XR Pelvis w Hip Right 1 View    Narrative    PELVIS AND HIP RIGHT ONE VIEW  6/21/2017 8:24 PM      HISTORY: Pain, history of prosthetic dislocation.    COMPARISON: 6/15/2017      Impression    IMPRESSION: Superior dislocation of the right hip arthroplasty. No  fracture.    SABRA VILLARREAL MD   XR Pelvis Port 1/2 Views    Narrative    PELVIS PORTABLE ONE - TWO VIEW  6/21/2017 9:41 PM      HISTORY: Post reduction right hip dislocation.     COMPARISON: 6/21/2017      Impression    IMPRESSION: The right hip arthroplasty has been successfully reduced  into the acetabulum. No fracture.    SABRA VILLARREAL MD   Basic metabolic panel   Result Value Ref Range    Sodium 138 133 - 144 mmol/L    Potassium 4.3 3.4 - 5.3 mmol/L    Chloride 102 94 - 109 mmol/L    Carbon Dioxide 30 20 - 32 mmol/L    Anion Gap 6 3 - 14 mmol/L    Glucose 193 (H) 70 - 99 mg/dL    Urea Nitrogen 15 7 - 30 mg/dL    Creatinine 0.65 0.52 - 1.04 mg/dL    GFR Estimate 85 >60 mL/min/1.7m2    GFR Estimate If Black >90   GFR Calc   >60 mL/min/1.7m2    Calcium 8.7 8.5 - 10.1 mg/dL   CBC with platelets, differential   Result Value Ref Range    WBC 9.0 4.0 - 11.0 10e9/L    RBC Count 3.44 (L) 3.8 - 5.2 10e12/L    Hemoglobin 9.6 (L) 11.7 - 15.7 g/dL    Hematocrit 30.6 (L) 35.0 - 47.0 %    MCV 89 78 - 100 fl    MCH 27.9 26.5 - 33.0 pg    MCHC 31.4 (L) 31.5 - 36.5 g/dL    RDW 13.1 10.0 - 15.0 %    Platelet Count 261 150 - 450 10e9/L    Diff Method Automated Method     % Neutrophils 72.1 %    % Lymphocytes 17.4 %    % Monocytes 7.2 %    % Eosinophils 2.9 %    % Basophils 0.2 %    % Immature Granulocytes 0.2 %    Absolute Neutrophil 6.5 1.6 - 8.3 10e9/L    Absolute Lymphocytes 1.6 0.8 - 5.3 10e9/L    Absolute Monocytes 0.7 0.0 - 1.3 10e9/L    Absolute Eosinophils  0.3 0.0 - 0.7 10e9/L    Absolute Basophils 0.0 0.0 - 0.2 10e9/L    Abs Immature Granulocytes 0.0 0 - 0.4 10e9/L[TS1.2]         Assessments & Plan (with Medical Decision Making)[TS1.1]   Lulu Carlton is a 89 year old female who presented to the department for evaluation of right hip pain after she was found down complaining of pain. She had no other sign of injury and there was no witnessed fall or trauma. Her right hip again appears dislocated, after obtaining consent was relocated without difficulty or complication. Placement confirmed with postreduction films. 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 Unfortunately she has severe pain and is unable to safely ambulate or discharge. The patient will require admission for monitoring, analgesia, and orthopedic consultation. Hospitalist MERCEDES Ovalle has been briefed on patient presentation and workup thus far. She agrees with plan for admission. Temporary transition orders were placed per protocol.    The patient and accompanying family have been informed of her results and the recommendation for admission. They have verbalized an understanding, all questions answered, and they are in agreement with the plan at this time.      Disclaimer: This note consists of symbols derived from keyboarding, dictation, and/or voice recognition software. As a result, there may be errors in the script that have gone undetected.  Please consider this when interpreting information found in the chart.[TS1.4]        I have reviewed the nursing notes.    I have reviewed the findings, diagnosis, plan and need for follow up with the patient.[TS1.1]      Current Discharge Medication List          Final diagnoses:   Hip pain, right   Hip dislocation, right, initial encounter (H)[TS1.2]       6/21/2017   [facility] INTENSIVE CARE[TS1.1]    "  LakshmiTodd,   06/22/17 0129  [TS1.2]     Revision History        User Key Date/Time User Provider Type Action    > TS1.2 6/22/2017  1:29 AM LakshmiTodd,  Physician Sign     TS1.4 6/22/2017  1:24 AM Todd Martins,  Physician      TS1.3 6/22/2017  1:18 AM Todd Martins, DO Physician      TS1.1 6/22/2017  1:16 AM Todd Martins, DO Physician             Progress Notes by Kristel Gruber RN at 6/22/2017 12:42 AM     Author:  Kristel Gruber RN Service:  (none) Author Type:  Registered Nurse    Filed:  6/22/2017 12:43 AM Date of Service:  6/22/2017 12:42 AM Creation Time:  6/22/2017 12:42 AM    Status:  Signed :  Kristel Gruber RN (Registered Nurse)         [facility] ADMISSION NOTE    Patient admitted to room 1004 at approximately 0030 via cart from emergency room. Patient was accompanied by transport tech and daughter.     Verbal SBAR report received from SARA Tinajero prior to patient arrival.     Patient trasferred to bed via air waqas. Patient alert and oriented X 1. The patient is not having any pain.  . Admission vital signs: Blood pressure 134/56, pulse 88, temperature 98  F (36.7  C), temperature source Oral, resp. rate 16, height 1.6 m (5' 3\"), weight 69.4 kg (153 lb), SpO2 98 %. Patient was oriented to plan of care, call light, bed controls, tv, telephone, bathroom and visiting hours.     The following safety risks were identified during admission: fall. Yellow risk band applied: YES.     Kristel Gruber[LB1.1]       Revision History        User Key Date/Time User Provider Type Action    > LB1.1 6/22/2017 12:43 AM Kristel Gruber RN Registered Nurse Sign            ED Notes by Shaji Mata at 6/21/2017 10:49 PM     Author:  Shaji Mata Service:  (none) Author Type:  Technician    Filed:  6/21/2017 10:51 PM Date of Service:  6/21/2017 10:49 PM Creation Time:  6/21/2017 10:49 PM    Status:  Signed :  Shaji Mata (Technician)         Patient attempted to ambulate but did not make it " past the first step due to pain.[KO1.1]     Revision History        User Key Date/Time User Provider Type Action    > KO1.1 6/21/2017 10:51 PM Shaji Mata Technician Sign            ED Notes by Pati Williamson RN at 6/21/2017  7:30 PM     Author:  Pati Williamson RN Service:  (none) Author Type:  Registered Nurse    Filed:  6/21/2017  8:23 PM Date of Service:  6/21/2017  7:30 PM Creation Time:  6/21/2017  8:23 PM    Status:  Signed :  Pati Williamson RN (Registered Nurse)         Pt here via EMS with shortening and pain to the right hip. The patient has had previous dislocation of the right hip, bruising continues. Denies fall or other trauma. Staff at [facility] noted the deformity.[TC1.1]      Revision History        User Key Date/Time User Provider Type Action    > TC1.1 6/21/2017  8:23 PM Pati Williamson RN Registered Nurse Sign            ED Notes by Pati Williamson RN at 6/21/2017  7:42 PM     Author:  Pati Williamson RN Service:  (none) Author Type:  Registered Nurse    Filed:  6/21/2017  7:42 PM Date of Service:  6/21/2017  7:42 PM Creation Time:  6/21/2017  7:42 PM    Status:  Signed :  Pati Williamson RN (Registered Nurse)         Labs drawn with IV insertion and held.[TC1.1]      Revision History        User Key Date/Time User Provider Type Action    > TC1.1 6/21/2017  7:42 PM Pati Williamson RN Registered Nurse Sign            ED Notes by Pati Williamson RN at 6/21/2017  7:39 PM     Author:  Pati Williamson RN Service:  (none) Author Type:  Registered Nurse    Filed:  6/21/2017  7:41 PM Date of Service:  6/21/2017  7:39 PM Creation Time:  6/21/2017  7:41 PM    Status:  Signed :  Pati Williamson RN (Registered Nurse)         Daughter at bedside voiced concern that we find out when she last ate. [facility] and got the report that she last ate at 1630, a small meal, 100%.[TC1.1]     Revision History        User Key Date/Time User Provider Type Action    >  TC1.1 6/21/2017  7:41 PM Pati Williamson RN Registered Nurse Sign            ED Notes by Tita Bender RN at 6/21/2017  7:07 PM     Author:  Tita Bender RN Service:  (none) Author Type:  Registered Nurse    Filed:  6/21/2017  7:07 PM Date of Service:  6/21/2017  7:07 PM Creation Time:  6/21/2017  7:07 PM    Status:  Signed :  Tita Bender RN (Registered Nurse)         Bed: ED03  Expected date:   Expected time:   Means of arrival:   Comments:  Ambulance     Revision History        User Key Date/Time User Provider Type Action    > DL1.1 6/21/2017  7:07 PM Tita Bender RN Registered Nurse Sign                  Procedure Notes     No notes of this type exist for this encounter.         Progress Notes - Therapies (Notes from 06/20/17 through 06/23/17)      Progress Notes by Ashley Bridges PT at 6/22/2017  3:39 PM     Author:  Ashley Bridges PT Service:  (none) Author Type:  Physical Therapist    Filed:  6/22/2017  3:39 PM Date of Service:  6/22/2017  3:39 PM Creation Time:  6/22/2017  3:39 PM    Status:  Signed :  Ashley Bridges PT (Physical Therapist)         Physical Therapy Evaluation and Discharge Summary     06/22/17 1500   Quick Adds   Type of Visit Initial PT Evaluation   Living Environment   Lives With facility resident   Living Arrangements extended care facility   Home Accessibility no concerns   Functional Level Prior   Ambulation 2-->assistive person   Transferring 2-->assistive person   Toileting 2-->assistive person   Bathing 2-->assistive person   Dressing 2-->assistive person   Eating 0-->independent   Communication 2-->difficulty understanding (not related to language barrier)   Swallowing 0-->swallows foods/liquids without difficulty   Cognition 2 - difficulty with organizing thoughts   Fall history within last six months yes   Number of times patient has fallen within last six months 2   Which of the above functional risks had a recent onset or change? ambulation   General  Information   Onset of Illness/Injury or Date of Surgery - Date 06/21/17   Referring Physician Moni Bland PA-C   Patient/Family Goals Statement unable to verbalize a goal due to dementia   Pertinent History of Current Problem (include personal factors and/or comorbidities that impact the POC) Per H&P: Lulu Carlton is a 89 year old female who presents to the department from assisted living facility for evaluation of right hip pain and internally rotated right leg. Records confirm that the patient had right hip replacement 5 weeks ago by orthopedist Dr. Reddy and without postoperative complication. However she returned to the department on 6/15/17 for evaluation of right hip pain and it was determined that she suffered from dislocation of the right hip. After reduction she had returned home and again seemingly doing well. Today nursing staff found the patient lying down complaining of right hip pain but no apparent injury. She does have baseline dementia and very poor historian, does not recall fall or injury.   Weight-Bearing Status - RLE weight-bearing as tolerated  (with hip abductor brace on)   Cognitive Status Examination   Orientation person   Level of Consciousness alert   Follows Commands and Answers Questions 100% of the time   Personal Safety and Judgment impaired   Memory impaired   Pain Assessment   Patient Currently in Pain Yes, see Vital Sign flowsheet  (R hip pain--unable to rate)   Posture    Posture Forward head position;Protracted shoulders;Kyphosis   Range of Motion (ROM)   ROM Comment WFL except R hip limited due to abductor brace   Strength   Strength Comments WFL except R LE less than antigravity   Bed Mobility   Bed Mobility Comments sit <> supine with mod assist of 2   Transfer Skills   Transfer Comments sit <> stand with CGA   Gait   Gait Gait Skill   Gait Skills   Level of Edwards: Gait contact guard   Physical Assist/Nonphysical Assist: Gait 1 person assist  "  Weight-Bearing Restrictions: Gait weight-bearing as tolerated   Assistive Device for Transfer: Gait rolling walker   Gait Distance 50 feet   Balance   Balance Comments good with RW   General Therapy Interventions   Planned Therapy Interventions bed mobility training;gait training;ROM;strengthening   Clinical Impression   Criteria for Skilled Therapeutic Intervention yes, treatment indicated   PT Diagnosis relocated R hip dislocation   Influenced by the following impairments dementia, pain and weakness   Functional limitations due to impairments mobility and gait   Clinical Presentation Stable/Uncomplicated   Clinical Presentation Rationale clinical judgement   Clinical Decision Making (Complexity) Low complexity   Therapy Frequency` daily   Predicted Duration of Therapy Intervention (days/wks) 1 visit only--pt to be d/c'd to [facility] tomorrow morning   Anticipated Discharge Disposition Transitional Care Facility   Risk & Benefits of therapy have been explained Yes   Patient, Family & other staff in agreement with plan of care Yes   Clinical Impression Comments Pt would benefit from PT at [facility] for R hip strengthening and gait and transfer training.   [facility] [employer] TM \"6 Clicks\"   2016, Trustees of [facility], under license to [employer].  All rights reserved.   6 Clicks Short Forms Basic Mobility Inpatient Short Form   [facility] AM-PAC  \"6 Clicks\" V.2 Basic Mobility Inpatient Short Form   1. Turning from your back to your side while in a flat bed without using bedrails? 3 - A Little   2. Moving from lying on your back to sitting on the side of a flat bed without using bedrails? 2 - A Lot   3. Moving to and from a bed to a chair (including a wheelchair)? 3 - A Little   4. Standing up from a chair using your arms (e.g., wheelchair, or bedside chair)? 3 - A Little   5. To walk in hospital room? 3 - A Little   6. Climbing 3-5 steps with a railing? 2 - A Lot   Basic Mobility Raw Score (Score out " of 24.Lower scores equate to lower levels of function) 16   Total Evaluation Time   Total Evaluation Time (Minutes) 15     Ashley Bridges PT[KJ1.1]         Revision History        User Key Date/Time User Provider Type Action    > KJ1.1 6/22/2017  3:39 PM Ashley Bridges, PT Physical Therapist Sign

## 2017-06-21 NOTE — IP AVS SNAPSHOT
"[facility] INTENSIVE CARE: [phone]                                              INTERAGENCY TRANSFER FORM - LAB / IMAGING / EKG / EMG RESULTS   2017                    Hospital Admission Date: 2017  EL BARRIENTOS   : 1921  Sex: Female        Attending Provider: Mo Martin MD     Allergies:  Hydrocodone-acetaminophen, Trazodone, Metformin    Infection:  None   Service:  HOSPITALIST    Ht:  1.6 m (5' 3\")   Wt:  68.9 kg (151 lb 14.4 oz)   Admission Wt:  69.4 kg (153 lb)    BMI:  26.91 kg/m 2   BSA:  1.75 m 2            Patient PCP Information     Provider PCP Type    Todd Villeda MD General         Lab Results - 3 Days      Glucose by meter [[id number]] (Abnormal)  Resulted: 17 0630, Result status: Final result    Ordering provider: Todd Martins,   17 06 Resulting lab: POINT OF CARE TEST, GLUCOSE    Specimen Information    Type Source Collected On     17 06          Components       Value Reference Range Flag Lab   Glucose 157 70 - 99 mg/dL H 170            Glucose by meter [[id number]] (Abnormal)  Resulted: 17 0206, Result status: Final result    Ordering provider: Todd Martins,   17 0201 Resulting lab: POINT OF CARE TEST, GLUCOSE    Specimen Information    Type Source Collected On     17 0201          Components       Value Reference Range Flag Lab   Glucose 181 70 - 99 mg/dL H 170            Methicillin Resistant Staph Aureus PCR [[id number]]  Resulted: 17 2311, Result status: Final result    Ordering provider: Mo Martin MD  17 0601 Resulting lab: [facility]    Specimen Information    Type Source Collected On   Nares  17 1450          Components       Value Reference Range Flag Lab   Specimen Description Nares   59   S Aur Meth Resis PCR -- NEG  75   Result:         Negative  MRSA Negative: SA Negative  MRSA and Staphylococcus aureus target DNA not   " detected, presumed negative for MRSA and SA colonization or the number of   bacteria present may be below the limit of detection for the assay. FDA   approved assay performed using [employer] GeneXpert(R) real-time PCR.              Glucose by meter [[id number]] (Abnormal)  Resulted: 06/22/17 2141, Result status: Final result    Ordering provider: Todd Martins, DO  06/22/17 2134 Resulting lab: POINT OF CARE TEST, GLUCOSE    Specimen Information    Type Source Collected On     06/22/17 2134          Components       Value Reference Range Flag Lab   Glucose 168 70 - 99 mg/dL H 170            Vitamin B12 [[id number]]  Resulted: 06/22/17 1703, Result status: Final result    Ordering provider: Moni Bland PA-C  06/22/17 1157 Resulting lab: [facility]    Specimen Information    Type Source Collected On   Blood  06/22/17 0015          Components       Value Reference Range Flag Lab   Vitamin B12 658 193 - 986 pg/mL  51            Glucose by meter [[id number]] (Abnormal)  Resulted: 06/22/17 1635, Result status: Final result    Ordering provider: Todd Martins, DO  06/22/17 1630 Resulting lab: POINT OF CARE TEST, GLUCOSE    Specimen Information    Type Source Collected On     06/22/17 1630          Components       Value Reference Range Flag Lab   Glucose 171 70 - 99 mg/dL H 170            Folate [[id number]]  Resulted: 06/22/17 1618, Result status: Final result    Ordering provider: Moni Bland PA-C  06/22/17 1157 Resulting lab: [facility]    Specimen Information    Type Source Collected On   Blood  06/22/17 0015          Components       Value Reference Range Flag Lab   Folate 18.4 >5.4 ng/mL  51            T4 free [[id number]]  Resulted: 06/22/17 1249, Result status: Final result    Ordering provider: Moni Bland PA-C  06/22/17 0015 Resulting lab: [facility]    Specimen Information    Type Source  Collected On     06/22/17 0015          Components       Value Reference Range Flag Lab   T4 Free 1.24 0.76 - 1.46 ng/dL  59            TSH with free T4 reflex [[id number]] (Abnormal)  Resulted: 06/22/17 1236, Result status: Final result    Ordering provider: Moni Bland PA-C  06/22/17 1157 Resulting lab: [facility]    Specimen Information    Type Source Collected On   Blood  06/22/17 0015          Components       Value Reference Range Flag Lab   TSH 4.45 0.40 - 4.00 mU/L H 59            Ferritin [[id number]]  Resulted: 06/22/17 1230, Result status: Final result    Ordering provider: Moni Bland PA-C  06/22/17 1157 Resulting lab: [facility]    Specimen Information    Type Source Collected On   Blood  06/22/17 0015          Components       Value Reference Range Flag Lab   Ferritin 223 8 - 252 ng/mL  59            Iron and iron binding capacity [[id number]] (Abnormal)  Resulted: 06/22/17 1230, Result status: Final result    Ordering provider: Moni Bland PA-C  06/22/17 1157 Resulting lab: [facility]    Specimen Information    Type Source Collected On   Blood  06/22/17 0015          Components       Value Reference Range Flag Lab   Iron 36 35 - 180 ug/dL  59   Iron Binding Cap 231 240 - 430 ug/dL L 59   Iron Saturation Index 16 15 - 46 %  59            Glucose by meter [[id number]] (Abnormal)  Resulted: 06/22/17 1121, Result status: Final result    Ordering provider: Todd Martins DO  06/22/17 1115 Resulting lab: POINT OF CARE TEST, GLUCOSE    Specimen Information    Type Source Collected On     06/22/17 1115          Components       Value Reference Range Flag Lab   Glucose 202 70 - 99 mg/dL H 170            Glucose by meter [[id number]] (Abnormal)  Resulted: 06/22/17 0751, Result status: Final result    Ordering provider: Todd Martins DO  06/22/17 0745 Resulting lab: POINT OF CARE TEST, GLUCOSE    Specimen  Information    Type Source Collected On     06/22/17 0745          Components       Value Reference Range Flag Lab   Glucose 150 70 - 99 mg/dL H 170            Basic metabolic panel [[id number]] (Abnormal)  Resulted: 06/22/17 0051, Result status: Final result    Ordering provider: Todd Martins,   06/21/17 2256 Resulting lab: [facility]    Specimen Information    Type Source Collected On   Blood  06/22/17 0015          Components       Value Reference Range Flag Lab   Sodium 138 133 - 144 mmol/L  59   Potassium 4.3 3.4 - 5.3 mmol/L  59   Chloride 102 94 - 109 mmol/L  59   Carbon Dioxide 30 20 - 32 mmol/L  59   Anion Gap 6 3 - 14 mmol/L  59   Glucose 193 70 - 99 mg/dL H 59   Urea Nitrogen 15 7 - 30 mg/dL  59   Creatinine 0.65 0.52 - 1.04 mg/dL  59   GFR Estimate 85 >60 mL/min/1.7m2  59   Comment:  Non  GFR Calc   GFR Estimate If Black -- >60 mL/min/1.7m2  59   Result:         >90   GFR Calc     Calcium 8.7 8.5 - 10.1 mg/dL  59   Result:              CBC with platelets, differential [[id number]] (Abnormal)  Resulted: 06/22/17 0037, Result status: Final result    Ordering provider: Todd Martins,   06/21/17 2256 Resulting lab: [facility]    Specimen Information    Type Source Collected On   Blood  06/22/17 0015          Components       Value Reference Range Flag Lab   WBC 9.0 4.0 - 11.0 10e9/L  59   RBC Count 3.44 3.8 - 5.2 10e12/L L 59   Hemoglobin 9.6 11.7 - 15.7 g/dL L 59   Hematocrit 30.6 35.0 - 47.0 % L 59   MCV 89 78 - 100 fl  59   MCH 27.9 26.5 - 33.0 pg  59   MCHC 31.4 31.5 - 36.5 g/dL L 59   RDW 13.1 10.0 - 15.0 %  59   Platelet Count 261 150 - 450 10e9/L  59   Diff Method Automated Method   59   % Neutrophils 72.1 %  59   % Lymphocytes 17.4 %  59   % Monocytes 7.2 %  59   % Eosinophils 2.9 %  59   % Basophils 0.2 %  59   % Immature Granulocytes 0.2 %  59   Absolute Neutrophil 6.5 1.6 - 8.3 10e9/L  59   Absolute Lymphocytes 1.6 0.8  - 5.3 10e9/L  59   Absolute Monocytes 0.7 0.0 - 1.3 10e9/L  59   Absolute Eosinophils 0.3 0.0 - 0.7 10e9/L  59   Absolute Basophils 0.0 0.0 - 0.2 10e9/L  59   Abs Immature Granulocytes 0.0 0 - 0.4 10e9/L  59            Testing Performed By     Lab - Abbreviation Name Director Address Valid Date Range    51 - Unknown [facility] [place] Unknown [address] [facility] [address] 12/31/14 1010 - Present    59 - Unknown [facility] Unknown [address] [address] 12/31/14 1006 - Present    75 - Unknown [facility] Unknown [address] [facility] [address] 01/15/15 1019 - Present    170 - Unknown POINT OF CARE TEST, GLUCOSE Unknown Unknown 10/31/11 1114 - Present            Unresulted Labs     None         Imaging Results - 3 Days      XR Pelvis Port 1/2 Views [[id number]]  Resulted: 06/21/17 2159, Result status: Final result    Ordering provider: Todd Martins DO  06/21/17 2126 Resulted by: Sabra Ordoñez MD    Performed: 06/21/17 2127 - 06/21/17 2141 Resulting lab: RADIOLOGY RESULTS    Narrative:       PELVIS PORTABLE ONE - TWO VIEW  6/21/2017 9:41 PM      HISTORY: Post reduction right hip dislocation.     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 No fracture.    SABRA ORDOÑEZ MD      XR Pelvis w Hip Right 1 View [[id number]]  Resulted: 06/21/17 2159, Result status: Final result    Ordering provider: Todd Martins DO  06/21/17 2001 Resulted by: Sabra Ordoñez MD    Performed: 06/21/17 2011 - 06/21/17 2024 Resulting lab: RADIOLOGY RESULTS    Narrative:       PELVIS AND HIP RIGHT ONE VIEW  6/21/2017 8:24 PM      HISTORY: Pain, history of prosthetic dislocation.    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 No  fracture.    SABRA ORDOÑEZ MD      Testing  Performed By     Lab - Abbreviation Name Director Address Valid Date Range    104 - Rad Rslts RADIOLOGY RESULTS Unknown Unknown 02/16/05 1553 - Present            Encounter-Level Documents:     There are no encounter-level documents.      Order-Level Documents:     There are no order-level documents.

## 2017-06-21 NOTE — IP AVS SNAPSHOT
Lulu Carlton #[id number] (CSN: [id number])  (89 year old F)  (Adm: 17)     [id number]               [facility] INTENSIVE CARE: [phone]            Patient Demographics     Patient Name Sex          Age SSN Address Phone    Lulu Carlton Female 1921 (89 year old) [id number]  [facility] [id number] [phone] (Home)  [phone] (Mobile)      Emergency Contact(s)     Name Relation Home Work Mobile    Benigno Shane Son [phone]    Keya Kong Daughter [phone]      Admission Information     Attending Provider Admitting Provider Admission Type Admission Date/Time    Mo Martin MD Eikens, John Patrick, MD Emergency 17  1907    Discharge Date Hospital Service Auth/Cert Status Service Area     Hospitalist [facility]    Unit Room/Bed Admission Status       [place] INTENSIVE CARE - Admission (Confirmed)       Admission     Complaint    Hip dislocation, right (H), Hip dislocation, right, sequela      Hospital Account     Name Acct ID Class Status Primary Coverage    Lulu Carlton [id number] Observation Open MEDICARE - MEDICARE FOR HB SUPPLEMENT            Guarantor Account (for Hospital Account #[id number])     Name Relation to Pt Service Area Active? Acct Type    Lulu Carlton  FCS Yes Personal/Family    Address Phone          [address] [place], [place] [id number] [phone](H)              Coverage Information (for Hospital Account #[id number])     1. MEDICARE/MEDICARE FOR HB SUPPLEMENT     F/O Payor/Plan Precert #    MEDICARE/MEDICARE FOR HB SUPPLEMENT     Subscriber Subscriber #    Lulu Carlton [id number]    Address Phone    ATTN CLAIMS  [address]  [place] [address] [phone]          2. BCBS/BCBS PLATINUM BLUE     F/O Payor/Plan Precert #    BCBS/BCBS PLATINUM BLUE     Subscriber Subscriber #    Lulu Carlton [id number]  "   Address Phone    [address] [address]  [place] [address] [phone]                                                      INTERAGENCY TRANSFER FORM - PHYSICIAN ORDERS   6/21/2017                       [facility] INTENSIVE CARE: [phone]            Attending Provider: Mo Martin MD     Allergies:  Hydrocodone-acetaminophen, Trazodone, Metformin    Infection:  None   Service:  HOSPITALIST    Ht:  1.6 m (5' 3\")   Wt:  68.9 kg (151 lb 14.4 oz)   Admission Wt:  69.4 kg (153 lb)    BMI:  26.91 kg/m 2   BSA:  1.75 m 2            ED Clinical Impression     Diagnosis Description Comment Added By Time Added    Hip pain, right [M25.551] Hip pain, right [M25.551]  Todd Martins DO 6/21/2017 10:58 PM    Hip dislocation, right, initial encounter (H) [S73.004A] Hip dislocation, right, initial encounter (H) [S73.004A]  Todd Martins DO 6/21/2017 10:58 PM      Hospital Problems as of 6/23/2017              Priority Class Noted POA    Anemia, iron deficiency Medium  10/31/2016 Yes    Type 2 diabetes mellitus with hyperglycemia, without long-term current use of insulin (H) Medium  10/31/2016 Yes    Hypertension goal BP (blood pressure) < 140/90 Medium  12/12/2016 Yes    Displaced fracture of right femoral neck (H) Medium  5/9/2017 Yes    Dementia Medium  6/21/2017 Yes    * (Principal)Hip dislocation, right, sequela Medium  6/22/2017 Yes    Status post total replacement of right hip Medium  6/22/2017 Yes      Non-Hospital Problems as of 6/23/2017              Priority Class Noted    Myasthenia gravis (H) Medium  2/8/2007    Sensorineural hearing loss, asymmetrical   10/17/2007    Malignant lymphoma, large cell, diffuse (H) Medium  8/29/2011    Do not resuscitate status Medium  8/29/2011    Steatosis of liver Medium  4/16/2013    Urinary incontinence Medium  10/31/2016    Osteoporosis Medium  10/31/2016    Vitamin D deficiency Medium  10/31/2016    Primary osteoarthritis of left knee Medium  10/31/2016    "   Code Status History     Date Active Date Inactive Code Status Order ID Comments User Context    5/13/2017 12:19 PM  DNR/DNI [id number]  Danisha Reaves MD Outpatient    5/13/2017  9:29 AM 5/13/2017 12:19 PM DNR/DNI [id number]  Lucas Reddy MD Outpatient    5/9/2017  4:33 PM 5/13/2017  9:29 AM DNR/DNI [id number]  Mo Martin MD Inpatient      Current Code Status     Date Active Code Status Order ID Comments User Context       Prior      Summary of Visit     Reason for your hospital stay       Right hip dislocation                Medication Review      CONTINUE these medications which may have CHANGED, or have new prescriptions. If we are uncertain of the size of tablets/capsules you have at home, strength may be listed as something that might have changed.        Dose / Directions Comments    oxyCODONE 5 MG IR tablet   Commonly known as:  ROXICODONE   This may have changed:  how much to take   Used for:  Hip dislocation, right, initial encounter (H)        Dose:  2.5 mg   Take 0.5 tablets (2.5 mg) by mouth every 3 hours as needed for moderate to severe pain   Quantity:  20 tablet   Refills:  0          CONTINUE these medications which have NOT CHANGED        Dose / Directions Comments    aspirin  MG EC tablet   Used for:  Hip fracture, right, closed, initial encounter (H)        Dose:  325 mg   Take 1 tablet (325 mg) by mouth daily   Quantity:  40 tablet   Refills:  0        B-12 1000 MCG Tbcr   Used for:  Vitamin B 12 deficiency        Dose:  1000 mcg   Take 1,000 mcg by mouth daily   Quantity:  30 tablet   Refills:  11        BLOOD GLUCOSE TEST STRIPS Strp        by In Vitro route as needed   Refills:  0        calcium-vitamin D 600-400 MG-UNIT per tablet   Commonly known as:  CALTRATE        Dose:  1 tablet   Take 1 tablet by mouth every evening   Refills:  0        CYCLOBENZAPRINE HCL PO        Dose:  5 mg   Take 5 mg by mouth 3 times daily as needed for muscle spasms   Refills:  0         GLIPIZIDE XL PO        Dose:  2.5 mg   Take 2.5 mg by mouth daily (with breakfast)   Refills:  0        lisinopril 5 MG tablet   Commonly known as:  PRINIVIL/ZESTRIL   Used for:  Essential hypertension with goal blood pressure less than 140/90        Dose:  5 mg   Take 1 tablet (5 mg) by mouth daily   Quantity:  30 tablet   Refills:  11        LOPERAMIDE HCL PO        Dose:  2 mg   Take 2 mg by mouth daily as needed   Refills:  0        MECLIZINE HCL PO        Dose:  12.5 mg   Take 12.5 mg by mouth 2 times daily as needed for dizziness   Refills:  0        multivitamin Tabs tablet        Dose:  1 tablet   Take 1 tablet by mouth daily   Quantity:  30 each   Refills:  0        omeprazole 20 MG CR capsule   Commonly known as:  priLOSEC   Used for:  Nausea        Dose:  20 mg   Take 1 capsule (20 mg) by mouth daily   Quantity:  30 capsule   Refills:  11        senna-docusate 8.6-50 MG per tablet   Commonly known as:  SENOKOT-S;PERICOLACE        Dose:  2 tablet   Take 2 tablets by mouth 2 times daily as needed for constipation   Refills:  0        triamcinolone 0.1 % cream   Commonly known as:  KENALOG        Apply topically 2 times daily as needed for irritation   Refills:  0        TYLENOL PO        Dose:  1300 mg   Take 1,300 mg by mouth every morning Do not exceed 4,000 mg of Acetaminophen from all sources in 24 hours   Refills:  0                After Care     Activity       Your activity upon discharge: Activity as tolerated, no driving until off narcotic pain medication. Must have brace on for weight bearing. Brace to be on at all times, except for hygiene and skin checks.       Diet       Follow this diet upon discharge: Orders Placed This Encounter      Moderate Consistent CHO Diet         General info for SNF       Length of Stay Estimate: Short Term Care: Estimated # of Days <30  Condition at Discharge: Stable  Level of care:skilled   Rehabilitation Potential: Fair  Admission H&P remains valid and  "up-to-date: Yes  Recent Chemotherapy: N/A  Use Nursing Home Standing Orders: Yes       Glucose monitor nursing POCT       Before meals and at bedtime       Mantoux instructions       Give two-step Mantoux (PPD) Per Facility Policy Yes               Further instructions from your care team       1.  Follow up with Dr. Riky Greenfield in 1-2 weeks for post hospital check.  Call [phone] if appointment needed or questions  2.  Use pain medication as directed  3.  Keep Abductor brace on at all times except for hygiene and skin checks until follow up appointment.            Referrals     Physical Therapy Referral       *This therapy referral will be filtered to a centralized scheduling office at [facility] and the patient will receive a call to schedule an appointment at a [facility] location most convenient for them. *     [facility] provides Physical Therapy evaluation and treatment and many specialty services across the [facility] system.  If requesting a specialty program, please choose from the list below.    If you have not heard from the scheduling office within 2 business days, please call [phone] for all locations, with the exception of [place], please call [phone].  Treatment: Evaluation & Treatment  Special Instructions/Modalities: none  Special Programs: None    Please be aware that coverage of these services is subject to the terms and limitations of your health insurance plan.  Call member services at your health plan with any benefit or coverage questions.      **Note to Provider:  If you are referring outside of [facility] for the therapy appointment, please list the name of the location in the \"special instructions\" above, print the referral and give to the patient to schedule the appointment.             Follow-Up Appointment Instructions     Follow-up and recommended labs and tests       Follow up with  Dr. Riky Greenfield at [facility], in " "1-2 weeks for post hospital follow up.             Statement of Approval     Ordered          06/23/17 0734  I have reviewed and agree with all the recommendations and orders detailed in this document.  EFFECTIVE NOW     Approved and electronically signed by:  Mo Martin MD           06/23/17 0642  I have reviewed and agree with all the recommendations and orders detailed in this document.  EFFECTIVE NOW     Approved and electronically signed by:  Mo Martin MD                                                 INTERAGENCY TRANSFER FORM - NURSING   6/21/2017                       [facility] INTENSIVE CARE: [phone]            Attending Provider: Mo Martin MD     Allergies:  Hydrocodone-acetaminophen, Trazodone, Metformin    Infection:  None   Service:  HOSPITALIST    Ht:  1.6 m (5' 3\")   Wt:  68.9 kg (151 lb 14.4 oz)   Admission Wt:  69.4 kg (153 lb)    BMI:  26.91 kg/m 2   BSA:  1.75 m 2            Advance Directives        Does patient have a scanned Advance Directive/ACP document in EPIC?           No        Immunizations     Name Date      Influenza (High Dose) 3 valent vaccine 09/05/14     Influenza (intradermal) 09/06/13     Influenza (intradermal) 09/14/12     Influenza (intradermal) 10/21/11     Influenza (intradermal) 09/29/10     Influenza (intradermal) 10/16/09     Influenza (intradermal) 11/02/07     Influenza Vaccine, 3 YRS +, IM (QUADRIVALENT W/PRESERVATIVES) 11/03/16     Pneumococcal (PCV 13) 10/31/16     Pneumococcal 23 valent 10/21/11     TDAP Vaccine (Adacel) 09/06/11       ASSESSMENT     Discharge Profile Flowsheet     EXPECTED DISCHARGE     FINAL RESOURCES      Expected Discharge Date  06/22/17 06/22/17 1250   Resources List  Transitional Care 06/22/17 1250    DISCHARGE NEEDS ASSESSMENT     PAS Number  [id number] 05/12/17 1436    Equipment Currently Used at Home  walker, standard 06/07/17 1327   SKIN      GASTROINTESTINAL (ADULT,PEDIATRIC,OB)     Inspection  " "Full 06/23/17 0935    GI WDL  WDL 06/23/17 0935   Skin WDL  ex;color;characteristics 06/23/17 0935    Passing flatus  yes 06/23/17 0935   Skin Color/Characteristics  bruised (ecchymotic) 06/23/17 0935    COMMUNICATION ASSESSMENT     SAFETY      Patient's communication style  spoken language (English or Bilingual) 06/21/17 1916   Safety WDL  WDL 06/23/17 0935                 Assessment WDL (Within Defined Limits) Definitions           Safety WDL     Effective: 09/28/15    Row Information: <b>WDL Definition:</b> Bed in low position, wheels locked; call light in reach; upper side rails up x 2; ID band on<br> <font color=\"gray\"><i>Item=AS safety wdl>>List=AS safety wdl>>Version=F14</i></font>      Skin WDL     Effective: 09/28/15    Row Information: <b>WDL Definition:</b> Warm; dry; intact; elastic; without discoloration; pressure points without redness<br> <font color=\"gray\"><i>Item=AS skin wdl>>List=AS skin wdl>>Version=F14</i></font>      Vitals     Vital Signs Flowsheet     VITAL SIGNS     ANALGESIA SIDE EFFECTS MONITORING      Temp  97.7  F (36.5  C) 06/23/17 0753   Side Effects Monitoring: Respiratory Quality  R 06/23/17 0920    Temp src  Oral 06/23/17 0753   Side Effects Monitoring: Respiratory Depth  N 06/23/17 0920    Resp  18 06/23/17 0753   Side Effects Monitoring: Sedation Level  1 06/23/17 0920    Pulse  87 06/22/17 2044   HEIGHT AND WEIGHT      Heart Rate  95 06/23/17 0753   Height  1.6 m (5' 3\") 06/22/17 0035    Pulse/Heart Rate Source  Monitor 06/23/17 0356   Height Method  Stated 06/22/17 0035    BP  147/58 06/23/17 0809   Weight  68.9 kg (151 lb 14.4 oz) 06/23/17 0357    BP Location  Left arm 06/23/17 0809   BSA (Calculated - sq m)  1.76 06/22/17 0035    OXYGEN THERAPY     BMI (Calculated)  27.16 06/22/17 0035    SpO2  94 % 06/23/17 0356   RESPIRATORY MONITORING      O2 Device  None (Room air) 06/23/17 0356   Respiratory Monitoring (EtCO2)  5 mmHg 06/21/17 2200    PAIN/COMFORT     POSITIONING      " Patient Currently in Pain  yes 06/23/17 0443   Body Position  supine 06/23/17 0245    Preferred Pain Scale  CAPA (Clinically Aligned Pain Assessment) ([facility] Adults Only) 06/23/17 0920   Head of Bed (HOB)  HOB at 20-30 degrees 06/23/17 0245    0-10 Pain Scale  -- (Discomfort per pt) 06/22/17 1602   Chair  Upright in chair 06/23/17 0935    Pain Location  Hip 06/23/17 0443   Positioning/Transfer Devices  pillows;in use 06/23/17 0245    Pain Orientation  Right 06/23/17 0443   DAILY CARE      Pain Descriptors  Patient unable to describe 06/22/17 2044   Activity Type  bedrest with commode;up in chair 06/23/17 0753    Pain Intervention(s)  Medication (See eMAR) 06/23/17 0443   Activity Level of Assistance  assistance, 1 person (abductor brace on) 06/23/17 0935    CLINICALLY ALIGNED PAIN ASSESSMENT (CAPA) ([facility] ADULTS ONLY)     Activity Assistive Device  walker 06/23/17 0935    Comfort  comfortably manageable 06/23/17 0920   Additional Documentation  Activity Device Assistance (Row) 06/22/17 1412    Change in Pain  about the same 06/23/17 0443   POINT OF CARE TESTING      Pain Control  fully effective 06/23/17 0920   Puncture Site  fingertip 06/22/17 1706    Functioning  can do most things, but pain gets in the way of some 06/23/17 0920   Bedside Glucose (mg/dl )   171 mg/dl 06/22/17 1706    Sleep  normal sleep 06/23/17 0444                 Patient Lines/Drains/Airways Status    Active LINES/DRAINS/AIRWAYS     Name: Placement date: Placement time: Site: Days: Last dressing change:    Peripheral IV 06/22/17 Right Lower forearm 06/22/17   1440   Lower forearm   less than 1     Incision/Surgical Site 05/10/17 Right Hip 05/10/17   1245    43             Patient Lines/Drains/Airways Status    Active PICC/CVC     None            Intake/Output Detail Report     Date Intake     Output Net    Shift P.O. I.V. IV Piggyback Total Urine Total       Noc 06/21/17 2300 - 06/22/17 0659 -- -- --  -- 150 150 -150    Day 06/22/17 0700 - 06/22/17 1459 240 -- -- 240 800 800 -560    Amna 06/22/17 1500 - 06/22/17 2259 340 -- -- 340 575 575 -235    Noc 06/22/17 2300 - 06/23/17 0659 150 -- -- 150 750 750 -600    Day 06/23/17 0700 - 06/23/17 1459 -- -- -- -- 200 200 -200      Last Void/BM       Most Recent Value    Urine Occurrence 1 at 06/22/2017 1800    Stool Occurrence       Case Management/Discharge Planning     Case Management/Discharge Planning Flowsheet     REFERRAL INFORMATION     Major Change/Loss/Stressor  none 06/22/17 0228    Did the Initial Social Work Assessment result in a Social Work Case?  Yes 06/22/17 1250   EXPECTED DISCHARGE      Admission Type  observation 06/22/17 1250   Expected Discharge Date  06/22/17 06/22/17 1250    Arrived From  long-term care 06/22/17 1250   DISCHARGE PLANNING      Primary Care Clinic Name  -- ([facility]) 06/22/17 1250   Outpatient/Agency/Support Group Needs  inpatient rehabilitation facility (specify) (TCU) 05/13/17 1244    Primary Care MD Name  -- ([facility]) 06/22/17 1250   FINAL RESOURCES      LIVING ENVIRONMENT     Equipment Currently Used at Home  walker, standard 06/07/17 1327    Lives With  facility resident 06/22/17 1521   Resources List  Transitional Care 06/22/17 1250    Living Arrangements  extended care facility 06/22/17 1521   PAS Number  [id number] 05/12/17 1436    Provides Primary Care For  no one, unable/limited ability to care for self 06/22/17 1250   ABUSE RISK SCREEN      ASSESSMENT OF FAMILY/SOCIAL SUPPORT     QUESTION TO PATIENT:  Has a member of your family or a partner(now or in the past) intimidated, hurt, manipulated, or controlled you in any way?  no 06/22/17 0229    Who is your support system?  Children 06/22/17 1250   QUESTION TO PATIENT: Do you feel safe going back to the place where you are living?  yes 06/22/17 0229    Description of Support System  Supportive;Involved 06/22/17 1250   OBSERVATION: Is there reason to believe there has been maltreatment  of a vulnerable adult (ie. Physical/Sexual/Emotional abuse, self neglect, lack of adequate food, shelter, medical care, or financial exploitation)?  no 06/22/17 0229    Support Assessment  Adequate family and caregiver support;Adequate social supports 06/22/17 1250   (R) MENTAL HEALTH SUICIDE RISK      COPING/STRESS     Are you depressed or being treated for depression?  No 06/22/17 0224                  [facility] INTENSIVE CARE: [phone]            Medication Administration Report for Lulu Carlton as of 06/23/17 0944   Legend:    Given Hold Not Given Due Canceled Entry Other Actions    Time Time (Time) Time  Time-Action       Inactive    Active    Linked        Medications 06/17/17 06/18/17 06/19/17 06/20/17 06/21/17 06/22/17 06/23/17    acetaminophen (TYLENOL) tablet 650 mg  Dose: 650 mg Freq: DAILY Route: PO  Start: 06/23/17 0800   Admin Instructions: Maximum acetaminophen dose from all sources = 75 mg/kg/day not to exceed 4 grams/day.           0801 (650 mg)-Given           acetaminophen (TYLENOL) tablet 650 mg  Dose: 650 mg Freq: EVERY 4 HOURS PRN Route: PO  PRN Reason: mild pain  Start: 06/22/17 0037   Admin Instructions: Alternate ibuprofen (if ordered) with acetaminophen.  Maximum acetaminophen dose from all sources = 75 mg/kg/day not to exceed 4 grams/day.          0131 (650 mg)-Given       0529 (650 mg)-Given       1601 (650 mg)-Given        0209 (650 mg)-Given           aspirin EC EC tablet 325 mg  Dose: 325 mg Freq: DAILY Route: PO  Start: 06/22/17 0800   Admin Instructions: DO NOT CRUSH.          (0902)-Not Given [C]       1021 (325 mg)-Given        0801 (325 mg)-Given           cyclobenzaprine (FLEXERIL) tablet 5 mg  Dose: 5 mg Freq: 3 TIMES DAILY PRN Route: PO  PRN Reason: muscle spasms  Start: 06/22/17 0705         1602 (5 mg)-Given            glucose 40 % gel 15-30 g  Dose: 15-30 g Freq: EVERY 15 MIN PRN Route: PO  PRN Reason: low blood sugar  Start: 06/22/17 0703   Admin  Instructions: Give 15 g for BG 51 to 69 mg/dL IF patient is conscious and able to swallow. Give 30 g for BG less than or equal to 50 mg/dL IF patient is conscious and able to swallow. Do NOT give glucose gel via enteral tube.  IF patient has enteral tube: give apple juice 120 mL (4 oz or 15 g of CHO) via enteral tube for BG 51 to 69 mg/dL.  Give apple juice 240 mL (8 oz or 30 g of CHO) via enteral tube for BG less than or equal to 50 mg/dL.    ~Oral gel is preferable for conscious and able to swallow patient.   ~IF gel unavailable or patient refuses may provide apple juice 120 mL (4 oz or 15 g of CHO). Document juice on I and O flowsheet.              Or  dextrose 50 % injection 25-50 mL  Dose: 25-50 mL Freq: EVERY 15 MIN PRN Route: IV  PRN Reason: low blood sugar  Start: 06/22/17 0703   Admin Instructions: Use if have IV access, BG less than 70 mg/dL and meet dose criteria below:  Dose if conscious and alert (or disorientated) and NPO = 25 mL  Dose if unconscious / not alert = 50 mL  Vesicant.              Or  glucagon injection 1 mg  Dose: 1 mg Freq: EVERY 15 MIN PRN Route: SC  PRN Reason: low blood sugar  PRN Comment: May repeat x 1 only  Start: 06/22/17 0703   Admin Instructions: May give SQ or IM. ONLY use glucagon IF patient has NO IV access AND is UNABLE to swallow AND blood glucose is LESS than or EQUAL to 50 mg/dL.               insulin aspart (NovoLOG) inj (RAPID ACTING)  Dose: 1-5 Units Freq: AT BEDTIME Route: SC  Start: 06/22/17 2200   Admin Instructions: MEDIUM INSULIN RESISTANCE DOSING    Do Not give Bedtime Correction Insulin if BG less than  200.   For  - 249 give 1 units.   For  - 299 give 2 units.   For  - 349 give 3 units.   For  -399 give 4 units.   For BG greater than or equal to 400 give 5 units.  Notify provider if glucose greater than or equal to 350 mg/dL after administration of correction dose.  If given at mealtime, must be administered 5 min before meal or  immediately after.          (2145)-Not Given [C]        [ ] 2200           insulin aspart (NovoLOG) inj (RAPID ACTING)  Dose: 1-7 Units Freq: 3 TIMES DAILY BEFORE MEALS Route: SC  Start: 06/22/17 0730   Admin Instructions: Correction Scale - MEDIUM INSULIN RESISTANCE DOSING     Do Not give Correction Insulin if Pre-Meal BG less than 140.   For Pre-Meal  - 189 give 1 unit.   For Pre-Meal  - 239 give 2 units.   For Pre-Meal  - 289 give 3 units.   For Pre-Meal  - 339 give 4 units.   For Pre-Meal - 399 give 5 units.   For Pre-Meal -449 give 6 units  For Pre-Meal BG greater than or equal to 450 give 7 units.   To be given with prandial insulin, and based on pre-meal blood glucose.    Notify provider if glucose greater than or equal to 350 mg/dL after administration of correction dose.  If given at mealtime, must be administered 5 min before meal or immediately after.          (0903)-Not Given       1215 (2 Units)-Given       1707 (1 Units)-Given        0806 (1 Units)-Given [C]       [ ] 1130       [ ] 1630           lisinopril (PRINIVIL/ZESTRIL) tablet 5 mg  Dose: 5 mg Freq: DAILY Route: PO  Start: 06/22/17 0800         0852 (5 mg)-Given        0802 (5 mg)-Given           naloxone (NARCAN) injection 0.1-0.4 mg  Dose: 0.1-0.4 mg Freq: EVERY 2 MIN PRN Route: IV  PRN Reason: opioid reversal  Start: 06/22/17 0706   Admin Instructions: For respiratory rate LESS than or EQUAL to 8.  Partial reversal dose:  0.1 mg titrated q 2 minutes for Analgesia Side Effects Monitoring Sedation Level of 3 (frequently drowsy, arousable, drifts to sleep during conversation).Full reversal dose:  0.4 mg bolus for Analgesia Side Effects Monitoring Sedation Level of 4 (somnolent, minimal or no response to stimulation).               omeprazole (priLOSEC) CR capsule 20 mg  Dose: 20 mg Freq: DAILY Route: PO  Start: 06/22/17 0800         0852 (20 mg)-Given        0805 (20 mg)-Given           ondansetron  (ZOFRAN-ODT) ODT tab 4 mg  Dose: 4 mg Freq: EVERY 6 HOURS PRN Route: PO  PRN Reason: nausea  Start: 06/22/17 0037   Admin Instructions: This is Step 1 of nausea and vomiting management.  If nausea not resolved in 15 minutes, go to Step 2 prochlorperazine (COMPAZINE). Do not push through foil backing. Peel back foil and gently remove. Place on tongue immediately. Administration with liquid unnecessary              Or  ondansetron (ZOFRAN) injection 4 mg  Dose: 4 mg Freq: EVERY 6 HOURS PRN Route: IV  PRN Reasons: nausea,vomiting  Start: 06/22/17 0037   Admin Instructions: This is Step 1 of nausea and vomiting management.  If nausea not resolved in 15 minutes, go to Step 2 prochlorperazine (COMPAZINE).  Irritant.               oxyCODONE (ROXICODONE) IR half-tab 2.5-5 mg  Dose: 2.5-5 mg Freq: EVERY 3 HOURS PRN Route: PO  PRN Reason: moderate to severe pain  Start: 06/22/17 1154         1939 (5 mg)-Given        0209 (5 mg)-Given       0803 (5 mg)-Given           senna-docusate (SENOKOT-S;PERICOLACE) 8.6-50 MG per tablet 2 tablet  Dose: 2 tablet Freq: 2 TIMES DAILY PRN Route: PO  PRN Reason: constipation  Start: 06/22/17 0705              sodium chloride (PF) 0.9% PF flush 3 mL  Dose: 3 mL Freq: EVERY 8 HOURS Route: IK  Start: 06/22/17 1445         1442 (3 mL)-Given        0000 (3 mL)-Given       [ ] 0800       [ ] 1600          Discontinued Medications  Medications 06/17/17 06/18/17 06/19/17 06/20/17 06/21/17 06/22/17 06/23/17         Dose: 975 mg Freq: 3 TIMES DAILY Route: PO  Start: 06/22/17 0800   End: 06/22/17 1154   Admin Instructions: Maximum acetaminophen dose from all sources = 75 mg/kg/day not to exceed 4 grams/day.          0852 (975 mg)-Given       1154-Med Discontinued          Dose: 5 mg Freq: EVERY 3 HOURS PRN Route: PO  PRN Reason: moderate to severe pain  Start: 06/22/17 0705   End: 06/22/17 1154         1154-Med Discontinued                 INTERAGENCY TRANSFER FORM - NOTES (H&P, Discharge Summary,  Consults, Procedures, Therapies)   6/21/2017                       [facility] INTENSIVE CARE: [phone]               History & Physicals      H&P by Moni Bland PA-C at 6/22/2017  7:06 AM     Author:  Moni Bland PA-C Service:  Hospitalist Author Type:  Physician Assistant - MANUEL    Filed:  6/22/2017  5:24 PM Date of Service:  6/22/2017  7:06 AM Creation Time:  6/22/2017  7:05 AM    Status:  Attested :  Moni Bland PA-C (Physician Assistant - MANUEL)    Cosigner:  Mo Martin MD at 6/23/2017  6:32 AM        Attestation signed by Mo Martin MD at 6/23/2017  6:32 AM        Physician Attestation   IMo MD, saw and evaluated Lulu [place] as part of a shared visit.  I have reviewed and discussed with the advanced practice provider their history, physical and plan.    I personally reviewed the vital signs, medications, labs and imaging.    My key history or physical exam findings: see separate note    Key management decisions made by me: see separate note    Mo Martin MD  Date of Service (when I saw the patient): 6/22/17                               [facility]    History and Physical  Hospital Medicine       Date of Admission:  6/21/2017  Date of Service: 6/22/2017[CL1.1]     Assessment & Plan[CL1.2]   Lulu [place] is a 89 year old female with[CL1.1] recent OPAL and subsequent hip dislocation on right, dementia, HTN, DMT2 and GERD[CL1.3] who presents with[CL1.1] right hip pain    Hip Dislocation, Right   Presented to ER with notes of increased right hip pain and deformity per staffing. No[CL1.3]t[CL1.4] fall/found on floor this time. Previous hip dislocation (6/15), relocated in ED without issue. Ortho was consulted and do not think surgery is indicated at this time. Patient will be fitted for brace.   -non-weight bearing until brace applied  -ortho following  -pain control with  scheduled Tylenol and oxycodone PRN  -PT/OT/care transition consultation pending    Recent OPAL, Right   ORIF completed (5/10/2017) by Dr. Greenfield following fall and subsequent right hip fracture.   -continue PTA ASA for DVT ppx  -continue PTA oxycodone PRN  -continue PTA bowel regimen    Normocytic Normochromic Anemia   Hg 9.6, down from 12.2 recently. Baseline slowly declining from 15 since 2015. N[CL1.3]o[CL1.4] evidence of bleeding. VS stable.   -CBC in AM  -iron studies, ferritin, tsh, vit b12 and folate pending    Diabetes Mellitus, Type II  Chronic, stable.  Last a1C 6.3% on 5/2017.   -hold PTA glipizide   -mod SSI  -hypo/hyperglycemia protocol with blood glucose monitoring    HTN   Controlled.  -continue PTA lisinopril    GERD  -continue PTA omeprazole[CL1.3]    FEN:  -[CL1.1]tolerating PO intake well[CL1.3]  -Will monitor electrolytes and replace as needed  -[CL1.1]diabetic[CL1.3] diet    DVT Prophylaxis:[CL1.1] Low Risk and will be ambulatory following brace application[CL1.3]  Code Status:[CL1.1] DNR / DNI[CL1.3]    Disposition: Anticipate discharge in[CL1.1] 1-2[CL1.3] days once[CL1.1] cleared by PT/OT, pain tolerable on oral medications with safe disposition[CL1.3]. Appropriate for[CL1.1] OBSERVATION[CL1.3] care    I have discussed patient and formulated plan with[CL1.1] Dr. Matrin. Assessment and plan as above.[CL1.3]     Moni Bland PA-C  Hospital Medicine[CL1.1]        Primary Care Physician[CL1.2]   Todd Villeda [phone][CL1.1]    History is obtained from the patient,[CL1.5] ED notes and review of the EMR.[CL1.1]    Past Medical History    Past Medical History:   Diagnosis Date     Anemia, unspecified hosp. 4/3/07 [facility]      Central nervous system complication hosp. 4/3/07 [facility]     Depressive disorder, not elsewhere classified      Fracture of femoral neck, right (H) 5/10/2017     Myasthenia gravis      Other malaise and fatigue hosp. 4/3/07 [facility]  "episodes of slurred speech, headaches, some confusion w/general weakness.      Other urinary incontinence      Thoracic or lumbosacral neuritis or radiculitis, unspecified     lumbar radiculopathy involving right leg.      Unspecified essential hypertension       Diagnosis Date Noted     Hip dislocation, right, sequela 06/22/2017     Priority: Medium     Status post total replacement of right hip 06/22/2017     Priority: Medium     Dementia 06/21/2017     Priority: Medium     Displaced fracture of right femoral neck (H) 05/09/2017     Priority: Medium     Hypertension goal BP (blood pressure) < 140/90 12/12/2016     Priority: Medium     Urinary incontinence 10/31/2016     Priority: Medium     Osteoporosis 10/31/2016     Priority: Medium     Vitamin D deficiency 10/31/2016     Priority: Medium     Anemia, iron deficiency 10/31/2016     Priority: Medium     Type 2 diabetes mellitus with hyperglycemia, without long-term current use of insulin (H) 10/31/2016     Priority: Medium     Primary osteoarthritis of left knee 10/31/2016     Priority: Medium     Steatosis of liver 04/16/2013     Priority: Medium     Malignant lymphoma, large cell, diffuse (H) 08/29/2011     Priority: Medium     Do not resuscitate status 08/29/2011     Priority: Medium     Myasthenia gravis (H) 02/08/2007     Priority: Medium     Overview:   NEUROLOGIST YASEMIN MCWILLIAMS  \"I don't think I have that anymore.\" Has not seen a neurologist in 4 or 5 years. \"I've been weaker now, dizzy and week. I put that to the lack of sleep. I was only sleeping for three hours a night for a while there. I'm doing better now with that.\"   2-11       Sensorineural hearing loss, asymmetrical 10/17/2007      Past Surgical History   Past Surgical History:   Procedure Laterality Date     OPEN REDUCTION INTERNAL FIXATION HIP BIPOLAR Right 5/10/2017    Procedure: OPEN REDUCTION INTERNAL FIXATION HIP BIPOLAR;  Open reduction internal fixation right hip bipolar gregg " arthroplasty.;  Surgeon: Riky Greenfield MD;  Location: [facility] OR     SURGICAL HISTORY OF -       thymus removal      SURGICAL HISTORY OF -       cholecystectomy      History of Present Illness[CL1.2]   Lulu [place] is a 89 year old female who presents with[CL1.1] right hip pain    Patient is unable to tell history due to baseline dementia. Resides at [facility]    Patient presented from EMS after staff noted return of right hip tenderness and right hip deformity. Staff unaware of recent fall or finding patient on the ground. Staff administering Percocet 5mg q3h PRN without relief. Patient found to be sitting in wheelchair when EMS arrived.     Patient has no recollection of fall. Had hip replacement after fall resulting in right hip fracture (5/10). Had recent right hip dislocation (6/15) and see in ER with successful relocation.     Intermittent headaches none currently.[CL1.3]  Denies any fever, chills, cold-like symptoms (congestion, pharyngitis, sinus pressure, rhinorrhea), CP, SOB, cough, abdominal pain, N/V/D, dysuria, lightheadedness, dizziness, leg swelling, rashes[CL1.5]    Prior to Admission Medications   Prior to Admission Medications   Prescriptions Last Dose Informant Patient Reported? Taking?   Acetaminophen (TYLENOL PO) 6/21/2017 at am Nursing Home Yes Yes   Sig: Take 1,300 mg by mouth every morning Do not exceed 4,000 mg of Acetaminophen from all sources in 24 hours    CYCLOBENZAPRINE HCL PO  Nursing Home Yes No   Sig: Take 5 mg by mouth 3 times daily as needed for muscle spasms   Cyanocobalamin (B-12) 1000 MCG TBCR 6/21/2017 at am Nursing Home No Yes   Sig: Take 1,000 mcg by mouth daily   GLIPIZIDE XL PO  Nursing Home Yes No   Sig: Take 2.5 mg by mouth daily (with breakfast)   Glucose Blood (BLOOD GLUCOSE TEST STRIPS) STRP 6/21/2017 at 0730 Nursing Home Yes Yes   Sig: by In Vitro route as needed   LOPERAMIDE HCL PO  Nursing Home Yes No   Sig: Take 2 mg by mouth  "daily as needed   MECLIZINE HCL PO  Nursing Home Yes No   Sig: Take 12.5 mg by mouth 2 times daily as needed for dizziness   aspirin  MG EC tablet 6/21/2017 at am Nursing Home No Yes   Sig: Take 1 tablet (325 mg) by mouth daily   calcium-vitamin D (CALTRATE) 600-400 MG-UNIT per tablet 6/20/2017 at hs Nursing Home Yes Yes   Sig: Take 1 tablet by mouth every evening   lisinopril (PRINIVIL,ZESTRIL) 5 MG tablet 6/21/2017 at am Nursing Home No Yes   Sig: Take 1 tablet (5 mg) by mouth daily   multivitamin (OCUVITE) TABS tablet 6/21/2017 at am Nursing Home Yes Yes   Sig: Take 1 tablet by mouth daily   omeprazole (PRILOSEC) 20 MG CR capsule 6/21/2017 at 0600 Nursing Home No Yes   Sig: Take 1 capsule (20 mg) by mouth daily   oxyCODONE (ROXICODONE) 5 MG IR tablet 6/21/2017 at [facility] time [facility] No Yes   Sig: Take 1 tablet (5 mg) by mouth every 3 hours as needed for moderate to severe pain   senna-docusate (SENOKOT-S;PERICOLACE) 8.6-50 MG per tablet  Nursing Home Yes No   Sig: Take 2 tablets by mouth 2 times daily as needed for constipation   triamcinolone (KENALOG) 0.1 % cream  Nursing Home Yes No   Sig: Apply topically 2 times daily as needed for irritation      Facility-Administered Medications: None     Allergies   Allergies   Allergen Reactions     Hydrocodone-Acetaminophen Other (See Comments)     Trazodone Other (See Comments)     \"She gets crazy dreams and then can't fall back asleep.\"     Metformin Rash     Family History    No family history on file.      Social History   Social History     Social History     Marital status:      Spouse name: N/A     Number of children: N/A     Years of education: N/A     Occupational History     Not on file.     Social History Main Topics     Smoking status: Never Smoker     Smokeless tobacco: Not on file     Alcohol use No     Drug use: No     Sexual activity: Not on file     Other Topics Concern     Not on file     Social History Narrative[CL1.2]    Lives in " "memory care unit - [facility][CL1.3]    Review of Systems[CL1.2]   The 10 point Review of Systems is negative other than noted in the HPI.[CL1.5]    Physical Exam   /67 (BP Location: Left arm)  Pulse 87  Temp 97.8  F (36.6  C) (Oral)  Resp 18  Ht 1.6 m (5' 3\")  Wt 69.4 kg (153 lb)  SpO2 95%  BMI 27.1 kg/m2[CL1.2]     Weight:[CL1.1] 152 lbs 15.99 oz Body mass index is 27.1 kg/(m^2).[CL1.2]     Constitutional: Alert, oriented[CL1.1] to person not time, situation (thinks she may have fallen), place[CL1.3], cooperative, no apparent distress, appears nontoxic, Appears stated age.  Eyes: Sclera are anicteric, EOMI[CL1.1].[CL1.3]  HENT: Normocephalic. Atraumatic.[CL1.1] MMM.[CL1.3]  Lymph/Hematologic: No preauricular, postauricular, occipital, sub-mandibular, tonsillar, sub-mental, anterior or posterior cervical, or supraclavicular lymphadenopathy is appreciated.  Cardiovascular: Regular rate and rhythm,[CL1.1] systolic murmur noted.[CL1.3] Radial pulses are 2+ bilaterally. Distal pulses are intact. No lower extremity edema.  Respiratory: No accessory muscle usage. Speaking in full sentences. Clear to auscultation bilaterally without wheezes, crackles or rhonchi.    GI:bowel sounds present, soft, non-tender,non-distended. No rebound or guarding.   Genitourinary: Deferred  Musculoskeletal: Normal muscle bulk and tone.[CL1.1] Limited movement of RLE due to pain. Hip brace currently being applied. Ecchymosis to right knee and right lateral thigh - appears to be healing.[CL1.3]  Skin: Warm and dry, no rashes.   Neurologic: Neck supple. Cranial nerves 3-12 are grossly intact.[CL1.1]     Data[CL1.2]   Data reviewed today:[CL1.1]     Recent Labs  Lab 06/22/17  0015   WBC 9.0   HGB 9.6*   MCV 89         POTASSIUM 4.3   CHLORIDE 102   CO2 30   BUN 15   CR 0.65   ANIONGAP 6   SLIM 8.7   *     Recent Results (from the past 24 hour(s))   XR Pelvis w Hip Right 1 View    Narrative    PELVIS AND " HIP RIGHT ONE VIEW  6/21/2017 8:24 PM      HISTORY: Pain, history of prosthetic dislocation.    COMPARISON: 6/15/2017      Impression    IMPRESSION: Superior dislocation of the right hip arthroplasty. No  fracture.    SABRA VILLARREAL MD   XR Pelvis Port 1/2 Views    Narrative    PELVIS PORTABLE ONE - TWO VIEW  6/21/2017 9:41 PM      HISTORY: Post reduction right hip dislocation.     COMPARISON: 6/21/2017      Impression    IMPRESSION: The right hip arthroplasty has been successfully reduced  into the acetabulum. No fracture.    SABRA VILLARREAL MD[CL1.2]     I personally reviewed[CL1.1] no images or EKG's today[CL1.3].    I have discussed patient and formulated plan with[CL1.1] Dr. Martin. Assessment and plan as above.[CL1.5]     Chart documentation with keystrokes and/or Dragon voice recognition software. Although reviewed after completion, some word and grammatical error may remain.  Moni Bland PA-C  [facility] Medicine[CL1.1]           Revision History        User Key Date/Time User Provider Type Action    > CL1.4 6/22/2017  5:24 PM Moni Bland PA-C Physician Assistant - MANUEL Sign     CL1.2 6/22/2017 11:58 AM Moni Bland PA-C Physician Assistant - C      CL1.3 6/22/2017 11:34 AM Moni Bland PA-C Physician Assistant - C      CL1.5 6/22/2017 11:03 AM Moni Bland PA-C Physician Assistant - C      CL1.1 6/22/2017  7:05 AM Moni Bland PA-C Physician Assistant - C                   Discharge Summaries     No notes of this type exist for this encounter.         Consult Notes      Consults by Tania Marin LICSW at 6/22/2017 12:52 PM     Author:  Tania Marin LICSW Service:  (none) Author Type:      Filed:  6/22/2017  1:04 PM Date of Service:  6/22/2017 12:52 PM Creation Time:  6/22/2017 12:50 PM    Status:  Addendum :  Tania Marin LICSW ()     Consult Orders:    1. Care Transition RN/SW IP Consult [[id number]]  ordered by Moni Bland PA-C at 06/22/17 0704                CARE TRANSITION SOCIAL WORK INITIAL ASSESSMENT:      Met with: Patient and Family.    DATA  Principal Problem:    Hip dislocation, right, sequela  Active Problems:    Anemia, iron deficiency    Type 2 diabetes mellitus with hyperglycemia, without long-term current use of insulin (H)    Hypertension goal BP (blood pressure) < 140/90    Displaced fracture of right femoral neck (H)    Dementia    Status post total replacement of right hip       Primary Care Clinic Name:  ([facility])  Primary Care MD Name:  ([facility])  Contact information and PCP information verified: Yes      ASSESSMENT  Cognitive Status: awake.       Resources List: Transitional Care     Lives With: facility resident        Description of Support System: Supportive, Involved   Who is your support system?: Children   Support Assessment: Adequate family and caregiver support, Adequate social supports   Insurance Concerns: No Insurance issues identified                  This writer met with pt and pts two adult children introduced self and role. Discussed discharge planning and medicare guidelines in regards to home care, TCU and LTC. Pt currently lives at [facility] (Phone: [phone] Fax:[phone] RN report: [phone] or [phone]) [facility]. Family is requesting TCU, Patient was provided with Medicare certified nursing home list. Pts choices are as follows [facility] (Phone: [phone] Fax: [phone]) and [facility] (Phone: [phone]) Fax: ([phone]).  Referrals are pending at both facilities. Pt recently at TCU and is still in a 30 day window for TCU coverage. Will wait on TCU bed availability.[AK1.1]      PAS-RR    Per DHS regulation, CTS team completed and submitted PAS-RR to [place] [employer] on [employer]ging Direct Connect via the [employer]. CTS team advised SNF and they are aware a PAS-RR has been submitted.     CTS  team reviewed with pt or health care agent that they may be contacted for a follow up appointment within 10 days of hospital discharge if SNF stay is <30 days. Contact information for [employer] was also provided.     Pt or health care agent verbalized understanding.     PAS-RR # [id number][AK1.2]          PLAN    Waiting on TCU bed availability    Discharge Planner   Discharge Plans in progress: TCU  Barriers to discharge plan: bed avaialability  Follow up plan: TCU       Entered by: Tania Marin 2017 12:50 PM             Tania Marin MSW, HENRYSW, [phone][AK1.1]     Revision History        User Key Date/Time User Provider Type Action    > AK1.2 2017  1:04 PM Tania Marin LICSW  Addend     AK1.1 2017 12:52 PM Tania Marin LICSW  Sign            Consults by Sandie Wilkinson PA-C at 2017 12:40 PM     Author:  Sandie Wilkinson PA-C Service:  Orthopedics Author Type:  Physician Assistant Callie JACKSON    Filed:  2017 12:48 PM Date of Service:  2017 12:40 PM Creation Time:  2017 12:40 PM    Status:  Cosign Needed :  Sandie Wilkinson PA-C (Physician Assistant - C)    Cosign Required:  Yes             [facility] Consultation    Consultation - [facility]  Level of consult: Consult, follow and place orders    Lulu [place],  1921, MRN [id number]     Admitting Dx: Hip pain, right [M25.551]  Hip dislocation, right, initial encounter (H) [S73.004A]     PCP: Todd Villeda, [phone]     Code status:  Prior     Extended Emergency Contact Information  Primary Emergency Contact: Benigno Shane  Address: [address] [place], [place] [id number] [place] [address]  Home Phone: [phone]  Mobile Phone: [phone]  Relation: Son  Secondary Emergency Contact: DilanKeya   [facility]  Home Phone: [phone]  Mobile Phone: [phone]  Relation: Daughter     Assessment:    R hip dislocation  s/p R hip hemiarthroplasty on 5/10 by Dr. Greenfield. She was discharged to a TCU and then returned to her primary residence at [facility]. She was in with a hip dislocation on 6/15 and again on 6/21, both successfully reduced in the ED.     Plan:  Bedrest until abductor brace is on  Abductor brace ordered, may WBAT in brace. Should have brace on at all times, except for hygiene and skin checks.   Follow up with Dr. Greenfield in 1-2 weeks   May be discharged to an appropriate setting when medically stable.     Principal Problem:    Hip dislocation, right, sequela  Active Problems:    Anemia, iron deficiency    Type 2 diabetes mellitus with hyperglycemia, without long-term current use of insulin (H)    Hypertension goal BP (blood pressure) < 140/90    Displaced fracture of right femoral neck (H)    Dementia    Status post total replacement of right hip       Chief Complaint  R hip pain      HPI  We have been requested by Dr. Hernandes to evaluate Lulu [place] who is a 89 year old year old female for R hip dislocation s/p R hip hemiarthroplasty on 5/10 by Dr. Greenfield. She was discharged to a TCU and then returned to her primary residence at [facility]. She was in with a hip dislocation on 6/15 and again on 6/21, both successfully reduced in the ED. She is a poor historian and the history is primarily obtained from her daughter. Her daughter stated that she fell upon the occurrence of the first hip dislocation. Upon this occurrence it is unclear the MOA of the dislocation. Per the ED notes it states nursing staff found the patient complaining of R hip pain while laying down.      History is obtained from the electronic health record and patient's daughter     Past Medical History  Past Medical History:   Diagnosis Date     Anemia, unspecified hosp. 4/3/07 [facility]      Central nervous system complication hosp. 4/3/07 [facility]     Depressive disorder, not elsewhere classified      Fracture of femoral neck, right  (H) 5/10/2017     Myasthenia gravis      Other malaise and fatigue hosp. 4/3/07 [facility]     intermittent episodes of slurred speech, headaches, some confusion w/general weakness.      Other urinary incontinence      Thoracic or lumbosacral neuritis or radiculitis, unspecified     lumbar radiculopathy involving right leg.      Unspecified essential hypertension        Surgical History  Past Surgical History:   Procedure Laterality Date     OPEN REDUCTION INTERNAL FIXATION HIP BIPOLAR Right 5/10/2017    Procedure: OPEN REDUCTION INTERNAL FIXATION HIP BIPOLAR;  Open reduction internal fixation right hip bipolar gregg arthroplasty.;  Surgeon: Riky Greenfield MD;  Location: [facility] OR     SURGICAL HISTORY OF -       thymus removal      SURGICAL HISTORY OF -       cholecystectomy        Social History  Social History     Social History     Marital status:      Spouse name: N/A     Number of children: N/A     Years of education: N/A     Occupational History     Not on file.     Social History Main Topics     Smoking status: Never Smoker     Smokeless tobacco: Not on file     Alcohol use No     Drug use: No     Sexual activity: Not on file     Other Topics Concern     Not on file     Social History Narrative       Family History  No family history on file.     Allergies:  Hydrocodone-acetaminophen; Trazodone; and Metformin      Current Medications:  Current Facility-Administered Medications   Medication     acetaminophen (TYLENOL) tablet 650 mg     ondansetron (ZOFRAN-ODT) ODT tab 4 mg    Or     ondansetron (ZOFRAN) injection 4 mg     glucose 40 % gel 15-30 g    Or     dextrose 50 % injection 25-50 mL    Or     glucagon injection 1 mg     insulin aspart (NovoLOG) inj (RAPID ACTING)     insulin aspart (NovoLOG) inj (RAPID ACTING)     aspirin EC EC tablet 325 mg     cyclobenzaprine (FLEXERIL) tablet 5 mg     lisinopril (PRINIVIL/ZESTRIL) tablet 5 mg     omeprazole (priLOSEC) CR capsule 20 mg     senna-docusate  (SENOKOT-S;PERICOLACE) 8.6-50 MG per tablet 2 tablet     naloxone (NARCAN) injection 0.1-0.4 mg     [START ON 6/23/2017] acetaminophen (TYLENOL) tablet 650 mg     oxyCODONE (ROXICODONE) IR half-tab 2.5-5 mg       Review of Systems:  The Review of Systems is negative other than noted in the HPI    Physical Exam:  Temp:  [97.2  F (36.2  C)-98.6  F (37  C)] 97.8  F (36.6  C)  Pulse:  [87-88] 87  Heart Rate:  [61-98] 74  Resp:  [0-27] 18  BP: (109-183)/(56-89) 122/67  SpO2:  [92 %-100 %] 95 %    GEN: no apparent distress, non-labored breating.   R hip: well healed incision, ecchymosis noted on anterior midshaft thigh and lateral calf. Non-tender to palpation over greater trochanter, tenderness over ecchymotic areas. Gentle hip flexion tolerated well. Able to perform a straight leg rasie.      Pertinent Labs  Lab Results: personally reviewed.  Lab Results   Component Value Date    WBC 9.0 06/22/2017    HGB 9.6 (L) 06/22/2017    HCT 30.6 (L) 06/22/2017    MCV 89 06/22/2017     06/22/2017     No results for input(s): INR in the last 168 hours.    Pertinent Radiology  Radiology Results: images and radiology report reviewed  Recent Results (from the past 24 hour(s))   XR Pelvis w Hip Right 1 View    Narrative    PELVIS AND HIP RIGHT ONE VIEW  6/21/2017 8:24 PM      HISTORY: Pain, history of prosthetic dislocation.    COMPARISON: 6/15/2017      Impression    IMPRESSION: Superior dislocation of the right hip arthroplasty. No  fracture.    SABRA VILLARREAL MD   XR Pelvis Port 1/2 Views    Narrative    PELVIS PORTABLE ONE - TWO VIEW  6/21/2017 9:41 PM      HISTORY: Post reduction right hip dislocation.     COMPARISON: 6/21/2017      Impression    IMPRESSION: The right hip arthroplasty has been successfully reduced  into the acetabulum. No fracture.    SABRA VILLARREAL MD       Attestation:  I have reviewed today's vital signs, notes, medications, labs and imaging.  Amount of time performed on this consult: 30 minutes.  I  have discussed the case with Dr. Greenfield remotely.      Sandie Wilkinson[TW1.1]       Revision History        User Key Date/Time User Provider Type Action    > TW1.1 6/22/2017 12:48 PM Sandie Wilkinson PA-C Physician Assistant - C Sign                     Progress Notes - Physician (Notes for yesterday and today)      Progress Notes by Mo Martin MD at 6/23/2017  6:37 AM     Author:  Mo Martin MD Service:  Hospitalist Author Type:  Physician    Filed:  6/23/2017  7:28 AM Date of Service:  6/23/2017  6:37 AM Creation Time:  6/23/2017  6:37 AM    Status:  Signed :  Mo Martin MD (Physician)         [facility] Service      Subjective:[JE1.1]  Impulsive-wants to get out of bed  Some hip pain[JE1.2]    Review of Systems:[JE1.1]  C: NEGATIVE for fever, chills, change in weight  E/M: NEGATIVE for ear, mouth and throat problems  R: NEGATIVE for significant cough or SOB  CV: NEGATIVE for chest pain, palpitations or peripheral edema    P[JE1.2]hysical Exam:  Vitals Were Reviewed    Patient Vitals for the past 16 hrs:   BP Temp Temp src Pulse Heart Rate Resp SpO2 Weight   06/23/17 0355 154/80 98  F (36.7  C) Oral - 88 18 94 % 68.9 kg (151 lb 14.4 oz)   06/22/17 2304 178/69 98.2  F (36.8  C) Oral - 102 18 94 % -   06/22/17 1941 164/65 98.4  F (36.9  C) Axillary 87 - 20 96 % -   06/22/17 1601 - - - - 87 18 95 % -   06/22/17 1535 133/72 98  F (36.7  C) Oral 95 - 18 93 % -         Intake/Output Summary (Last 24 hours) at 06/23/17 0637  Last data filed at 06/23/17 0356   Gross per 24 hour   Intake              730 ml   Output             2125 ml   Net            -1395 ml[JE1.1]       GENERAL APPEARANCE: healthy, alert and no distress  EYES: conjunctiva clear, eyes grossly normal  RESP: lungs clear to auscultation - no rales, rhonchi or wheezes  CV: regular rate and rhythm, normal S1 S2, no S3 or S4 and no murmur, click or rub   ABDOMEN: soft, nontender, no HSM or masses and  bowel sounds normal  MS: in abductor brace  SKIN: clear without significant rashes or lesions    Lab:[JE1.2]  Recent Labs   Lab Test  06/22/17   0015  06/15/17   0840   NA  138  142   POTASSIUM  4.3  4.2   CHLORIDE  102  107   CO2  30  29   ANIONGAP  6  6   GLC  193*  165*   BUN  15  11   CR  0.65  0.64   SLIM  8.7  8.6     CBC RESULTS:   Recent Labs   Lab Test  06/22/17   0015  06/15/17   0840   WBC  9.0  4.8   RBC  3.44*  4.36   HGB  9.6*  12.2   HCT  30.6*  38.6   PLT  261  151       Results for orders placed or performed during the hospital encounter of 06/21/17 (from the past 24 hour(s))   Care Transition RN/SW IP Consult    Narrative    Tania Marin, OANH     6/22/2017  1:04 PM  CARE TRANSITION SOCIAL WORK INITIAL ASSESSMENT:      Met with: Patient and Family.    DATA  Principal Problem:    Hip dislocation, right, sequela  Active Problems:    Anemia, iron deficiency    Type 2 diabetes mellitus with hyperglycemia, without long-term   current use of insulin (H)    Hypertension goal BP (blood pressure) < 140/90    Displaced fracture of right femoral neck (H)    Dementia    Status post total replacement of right hip       Primary Care [facility] Name:  ([facility])  Primary Care MD Name:  ([facility]onal)  Contact information and PCP information verified: Yes      ASSESSMENT  Cognitive Status: awake.       Resources List: Transitional Care     Lives With: facility resident        Description of Support System: Supportive, Involved   Who is your support system?: Children   Support Assessment: Adequate family and caregiver support,   Adequate social supports   Insurance Concerns: No Insurance issues identified                  This writer met with pt and pts two adult children introduced   self and role. Discussed discharge planning and medicare   guidelines in regards to home care, TCU and LTC. Pt currently   lives at [facility] (Phone: [phone]   Fax:[phone] RN report: [phone] or  [phone]) [facility].   Family is requesting TCU, Patient was provided with Medicare   certified nursing home list. Pts choices are as follows [facility]   [facility] (Phone: [phone] Fax: [phone])   and [facility] (Phone: [phone]) Fax:   ([phone]).  Referrals are pending at both facilities. Pt   recently at TCU and is still in a 30 day window for TCU coverage.   Will wait on TCU bed availability.      PAS-RR    Per DHS regulation, CTS team completed and submitted PAS-RR to [place]   [employer] on Aging Direct Connect via the Senior LinkAge Line. CTS   team advised SNF and they are aware a PAS-RR has been submitted.     CTS team reviewed with pt or health care agent that they may be   contacted for a follow up appointment within 10 days of hospital   discharge if SNF stay is <30 days. Contact information for Senior   LinkAge Line was also provided.     Pt or health care agent verbalized understanding.     PAS-RR # [id number]          PLAN    Waiting on TCU bed availability    Discharge Planner   Discharge Plans in progress: TCU  Barriers to discharge plan: bed avaialability  Follow up plan: TCU       Entered by: Tania Marin 06/22/2017 12:50 PM             Tania Marin MSW, [facility], [facility] [phone]   Glucose by meter   Result Value Ref Range    Glucose 150 (H) 70 - 99 mg/dL   Glucose by meter   Result Value Ref Range    Glucose 202 (H) 70 - 99 mg/dL   Methicillin Resistant Staph Aureus PCR   Result Value Ref Range    Specimen Description Nares     S Aur Meth Resis PCR  NEG     Negative  MRSA Negative: SA Negative  MRSA and Staphylococcus aureus target DNA not   detected, presumed negative for MRSA and SA colonization or the number of   bacteria present may be below the limit of detection for the assay. FDA   approved assay performed using ICU Metrix GeneXpert(R) real-time PCR.     Glucose by meter   Result Value Ref Range    Glucose 171 (H) 70 - 99 mg/dL   Glucose by meter   Result Value Ref  Range    Glucose 168 (H) 70 - 99 mg/dL   Glucose by meter   Result Value Ref Range    Glucose 181 (H) 70 - 99 mg/dL   Glucose by meter   Result Value Ref Range    Glucose 157 (H) 70 - 99 mg/dL       Assessment and Plan:    Lulu Carlton is a 89 year old female with recent OPAL and subsequent hip dislocation on right, dementia, HTN, DMT2 and GERD who presents with right hip pain     Hip Dislocation, Right   Presented to ER with notes of increased right hip pain and deformity per staffing. Not fall/found on floor this time. Previous hip dislocation (6/15), relocated in ED without issue. Ortho was consulted and do not think surgery is indicated at this time. Patient will be fitted for brace.[JE1.1]   June 23, 2017[JE1.3] in abductor brace, will dc tcu     Recent OPAL, Right   ORIF completed (5/10/2017) by Dr. Greenfield following fall and subsequent right hip fracture.   -continue PTA ASA for DVT ppx  -continue PTA oxycodone PRN  -continue PTA bowel regimen     Normocytic Normochromic Anemia[JE1.1]-probable anemia of chronic ds[JE1.3]   Hg 9.6, down from 12.2 recently. Baseline slowly declining from 15 since 2015. No evidence of bleeding. VS stable.        Diabetes Mellitus, Type II  Chronic, stable.  Last a1C 6.3% on 5/2017.   -hold PTA glipizide   -mod SSI  -hypo/hyperglycemia protocol with blood glucose monitoring     HTN   Controlled.  -continue PTA lisinopril     GERD  -continue PTA omeprazole     FEN:  -tolerating PO intake well  -Will monitor electrolytes and replace as needed  -diabetic diet     DVT Prophylaxis: Low Risk and will be ambulatory following brace application  Code Status: DNR / DNI   [JE1.1]  dc[JE1.2]     Revision History        User Key Date/Time User Provider Type Action    > JE1.2 6/23/2017  7:28 AM Mo Martin MD Physician Sign     JE1.3 6/23/2017  6:38 AM Mo Martin MD Physician      JE1.1 6/23/2017  6:37 AM Mo Martin MD Physician             Progress Notes  by Mo Martin MD at 6/22/2017  2:11 PM     Author:  Mo Martin MD Service:  Hospitalist Author Type:  Physician    Filed:  6/22/2017  2:13 PM Date of Service:  6/22/2017  2:11 PM Creation Time:  6/22/2017  2:11 PM    Status:  Signed :  Mo Martin MD (Physician)         2:12 PM[JE1.1]  Pt seen and examined.  History of right hemiarthroplasty.  Now with dislocation 6/15 and 6/21.  Ortho recommends abduction brace.  Will go to tcu- but no tcu bed available today.  Utilization asked for obs status.  H and P by Moni LONG[JE1.2]     Revision History        User Key Date/Time User Provider Type Action    > JE1.1 6/22/2017  2:13 PM Mo Martin MD Physician Sign     JE1.2 6/22/2017  2:11 PM Mo Martin MD Physician             ED Provider Notes by Todd Martins DO at 6/21/2017  7:07 PM     Author:  Todd Martins DO Service:  Emergency Medicine Author Type:  Physician    Filed:  6/22/2017  1:29 AM Date of Service:  6/21/2017  7:07 PM Creation Time:  6/22/2017  1:16 AM    Status:  Signed :  Todd Martins DO (Physician)           History[TS1.1]     Chief Complaint   Patient presents with     Hip Pain[TS1.2]     HPI  Lulu Carlton is a 89 year old female who presents to the department from assisted living facility for evaluation of right hip pain and internally rotated right leg.[TS1.1] Records[TS1.3] confirm that the patient had right hip replacement 5 weeks ago[TS1.1] by orthopedist Dr. Reddy and without postoperative complication.[TS1.3] However she returned to the department[TS1.1] on 6/15/17 for evaluation of right hip pain and it was determined that she suffered from dislocation of the right hip. After reduction she had returned home and again seemingly doing well. Today nursing staff found the patient lying down complaining of right hip pain but no apparent injury. She does have baseline dementia and very poor historian, does not recall  fall or injury. No other history provided.[TS1.3]    I have reviewed the Medications, Allergies, Past Medical and Surgical History, and Social History in the Epic system.[TS1.1]    Patient Active Problem List   Diagnosis     Sensorineural hearing loss, asymmetrical     Urinary incontinence     Osteoporosis     Vitamin D deficiency     Anemia, iron deficiency     Type 2 diabetes mellitus with hyperglycemia, without long-term current use of insulin (H)     Primary osteoarthritis of left knee     Hypertension goal BP (blood pressure) < 140/90     Displaced fracture of right femoral neck (H)     Fracture of femoral neck, right (H)     Malignant lymphoma, large cell, diffuse (H)     Do not resuscitate status     Steatosis of liver     Myasthenia gravis (H)     Dementia     Hip dislocation, right (H)       Past Surgical History:   Procedure Laterality Date     OPEN REDUCTION INTERNAL FIXATION HIP BIPOLAR Right 5/10/2017    Procedure: OPEN REDUCTION INTERNAL FIXATION HIP BIPOLAR;  Open reduction internal fixation right hip bipolar gregg arthroplasty.;  Surgeon: Riky Greenfield MD;  Location: [facility] OR     SURGICAL HISTORY OF -       thymus removal      SURGICAL HISTORY OF -       cholecystectomy       Social History     Social History     Marital status:      Spouse name: N/A     Number of children: N/A     Years of education: N/A     Occupational History     Not on file.     Social History Main Topics     Smoking status: Never Smoker     Smokeless tobacco: Not on file     Alcohol use No     Drug use: No     Sexual activity: Not on file     Other Topics Concern     Not on file     Social History Narrative       No family history on file.    Most Recent Immunizations   Administered Date(s) Administered     Influenza (High Dose) 3 valent vaccine 09/05/2014     Influenza (intradermal) 09/06/2013     Influenza Vaccine, 3 YRS +, IM (QUADRIVALENT W/PRESERVATIVES) 11/03/2016     Pneumococcal (PCV 13) 10/31/2016      "Pneumococcal 23 valent 10/21/2011     TDAP Vaccine (Adacel) 09/06/2011[TS1.2]         Review of Systems[TS1.1] unable to obtain from patient, primarily provided by accompanying daughter...  Constitutional: No recent fever or illness.  Respiratory: No recent cough.  Cardiovascular: Negative for complaint of chest pain.  Gastrointestinal: Negative for abdominal pain.  Musculoskeletal: Positive for right hip pain. Denies active neck pain, back pain, pelvic pain, or injuries.  Neurological: Negative for headache or dizziness.    All others reviewed and are negative.[TS1.3]      Physical Exam   BP: 139/77  Pulse: 88  Heart Rate: 78  Temp: 97.2  F (36.2  C)  Resp: 13  Height: 160 cm (5' 3\")  Weight: 69.4 kg (153 lb)  SpO2: 96 %  Physical Exam[TS1.1]  Constitutional: Well developed, well nourished. Appears stable and in no acute distress.   Head: Atraumatic appearance of face. Negative for Raccoon eyes and Castanon sign. No tenderness to palpation of facial bones or skull circumferentially.  Eye: No obvious proptosis or subconjunctival hemorrhage. Eyelids appear symmetrical. EOMI and patient denies diplopia. PERRLA without pain.  Oral: Patient is without trismus or malocclusion. Moist oral mucosa without oral laceration.   Ears: Denies tenderness of the auricle or tragus. Typical appearance of the external auditory canal bilaterally, tympanic membranes visualized and without hemotympanum. No mastoid region tenderness.  Neck: No tenderness to palpation of midline cervical vertebra. Full ROM without pain.   Cardiovascular: No cyanosis. RRR. No audible murmurs noted.   Respiratory/Chest: Effort normal, no respiratory distress. CTAB without diminished regions.  Gastrointestinal: Soft, nontender and nondistended. No guarding, rigidity, or rebound tenderness. No organomegaly.  Musculoskeletal: Large contusion overlying lateral aspect of right hip with tenderness, right lower extremity is internally rotated. No pelvic instability. " L5 dorsiflexion/foot inversion and dorsal foot sensory intact. S1 plantar flexion, foot eversion, and plantar foot sensation intact. Sensation intact of all digits diffusely, including deep fibular distribution of first webbing space. 2/4 palpable dorsalis pedis and posterior tibial pulses. No cyanosis and capillary refill less than 2 seconds in each digit.   No step-offs and no tenderness to palpation of midline cervical, thoracic, or lumbosacral vertebra. Moves upper extremities spontaneously and without complaint.  Neuro: Patient is alert but is not oriented to place or time. GCS of 14 is likely baseline.  Skin: Skin is warm and dry, not diaphoretic. No abrasions, contusions, ecchymosis, or lacerations.  Psych: Appears to have a normal mood and affect.[TS1.3]       ED Course[TS1.1]     ED Course[TS1.2]     Procedures[TS1.1]        [facility] Procedure Note      Procedure:  Reduction of right hip dislocation    Performed by:  Todd Martins    Procedure: Reduction of right hip    Indication: Dislocation    Consent: The patient was consented for the procedure of reduction of right hip, accompanying daughter signed consent. They understand the risks and benefits of performing the procedure of right hip reduction, as I have thoroughly described in terms that the non medical can understand. Risks specifically discussed included vascular injury, nerve injury, malunion or nonunion, and may still result in surgical procedure or correction. The patient has a history of dementia so her daughter at provided written consent for the procedure.    Procedure Note:  A time-out was completed verifying correct patient, procedure, site, positioning, and correct equipment. The patient was placed in a position appropriate for reduction, yet maintaining relative anatomic comfort. Performing gentle steady traction, Hip reduction was attempted without difficulty or complications. The patient was monitored during the  entire procedure and anesthesia was present.     Post-reduction films were obtained after completion to confirm reduction. After allowing for splint to set and significant monitoring, extremity was reassessed. Sensation intact, full range of motion of digits, 5/5 strength, and improvement in pain when compared prior to reduction.    The patient tolerated the procedure well and there were no complications.[TS1.4]        Critical Care time:[TS1.1]  none[TS1.3]               Results for orders placed or performed during the hospital encounter of 06/21/17 (from the past 24 hour(s))   XR Pelvis w Hip Right 1 View    Narrative    PELVIS AND HIP RIGHT ONE VIEW  6/21/2017 8:24 PM      HISTORY: Pain, history of prosthetic dislocation.    COMPARISON: 6/15/2017      Impression    IMPRESSION: Superior dislocation of the right hip arthroplasty. No  fracture.    SABRA VILLARREAL MD   XR Pelvis Port 1/2 Views    Narrative    PELVIS PORTABLE ONE - TWO VIEW  6/21/2017 9:41 PM      HISTORY: Post reduction right hip dislocation.     COMPARISON: 6/21/2017      Impression    IMPRESSION: The right hip arthroplasty has been successfully reduced  into the acetabulum. No fracture.    SABRA VILLARREAL MD   Basic metabolic panel   Result Value Ref Range    Sodium 138 133 - 144 mmol/L    Potassium 4.3 3.4 - 5.3 mmol/L    Chloride 102 94 - 109 mmol/L    Carbon Dioxide 30 20 - 32 mmol/L    Anion Gap 6 3 - 14 mmol/L    Glucose 193 (H) 70 - 99 mg/dL    Urea Nitrogen 15 7 - 30 mg/dL    Creatinine 0.65 0.52 - 1.04 mg/dL    GFR Estimate 85 >60 mL/min/1.7m2    GFR Estimate If Black >90   GFR Calc   >60 mL/min/1.7m2    Calcium 8.7 8.5 - 10.1 mg/dL   CBC with platelets, differential   Result Value Ref Range    WBC 9.0 4.0 - 11.0 10e9/L    RBC Count 3.44 (L) 3.8 - 5.2 10e12/L    Hemoglobin 9.6 (L) 11.7 - 15.7 g/dL    Hematocrit 30.6 (L) 35.0 - 47.0 %    MCV 89 78 - 100 fl    MCH 27.9 26.5 - 33.0 pg    MCHC 31.4 (L) 31.5 - 36.5 g/dL    RDW  13.1 10.0 - 15.0 %    Platelet Count 261 150 - 450 10e9/L    Diff Method Automated Method     % Neutrophils 72.1 %    % Lymphocytes 17.4 %    % Monocytes 7.2 %    % Eosinophils 2.9 %    % Basophils 0.2 %    % Immature Granulocytes 0.2 %    Absolute Neutrophil 6.5 1.6 - 8.3 10e9/L    Absolute Lymphocytes 1.6 0.8 - 5.3 10e9/L    Absolute Monocytes 0.7 0.0 - 1.3 10e9/L    Absolute Eosinophils 0.3 0.0 - 0.7 10e9/L    Absolute Basophils 0.0 0.0 - 0.2 10e9/L    Abs Immature Granulocytes 0.0 0 - 0.4 10e9/L[TS1.2]         Assessments & Plan (with Medical Decision Making)[TS1.1]   Lulu Carlton is a 89 year old female who presented to the department for evaluation of right hip pain after she was found down complaining of pain. She had no other sign of injury and there was no witnessed fall or trauma. Her right hip again appears dislocated, after obtaining consent was relocated without difficulty or complication. Placement confirmed with postreduction films. 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 Unfortunately she has severe pain and is unable to safely ambulate or discharge. The patient will require admission for monitoring, analgesia, and orthopedic consultation. Hospitalist MERCEDES Ovalle has been briefed on patient presentation and workup thus far. She agrees with plan for admission. Temporary transition orders were placed per protocol.    The patient and accompanying family have been informed of her results and the recommendation for admission. They have verbalized an understanding, all questions answered, and they are in agreement with the plan at this time.      Disclaimer: This note consists of symbols derived from keyboarding, dictation, and/or voice recognition software. As a result, there may be errors in the script that have gone undetected.  Please consider  this when interpreting information found in the chart.[TS1.4]        I have reviewed the nursing notes.    I have reviewed the findings, diagnosis, plan and need for follow up with the patient.[TS1.1]      Current Discharge Medication List          Final diagnoses:   Hip pain, right   Hip dislocation, right, initial encounter (H)[TS1.2]       6/21/2017   [facility] INTENSIVE CARE[TS1.1]     Todd Martins, DO  06/22/17 0129  [TS1.2]     Revision History        User Key Date/Time User Provider Type Action    > TS1.2 6/22/2017  1:29 AM Todd Martins, DO Physician Sign     TS1.4 6/22/2017  1:24 AM Todd Martins,  Physician      TS1.3 6/22/2017  1:18 AM Todd Martins, DO Physician      TS1.1 6/22/2017  1:16 AM Todd Martins, DO Physician                   Procedure Notes     No notes of this type exist for this encounter.         Progress Notes - Therapies (Notes from 06/20/17 through 06/23/17)      Progress Notes by Ashley Bridges PT at 6/22/2017  3:39 PM     Author:  Ashley Bridges PT Service:  (none) Author Type:  Physical Therapist    Filed:  6/22/2017  3:39 PM Date of Service:  6/22/2017  3:39 PM Creation Time:  6/22/2017  3:39 PM    Status:  Signed :  Ashley Bridges PT (Physical Therapist)         Physical Therapy Evaluation and Discharge Summary     06/22/17 1500   Quick Adds   Type of Visit Initial PT Evaluation   Living Environment   Lives With facility resident   Living Arrangements extended care facility   Home Accessibility no concerns   Functional Level Prior   Ambulation 2-->assistive person   Transferring 2-->assistive person   Toileting 2-->assistive person   Bathing 2-->assistive person   Dressing 2-->assistive person   Eating 0-->independent   Communication 2-->difficulty understanding (not related to language barrier)   Swallowing 0-->swallows foods/liquids without difficulty   Cognition 2 - difficulty with organizing thoughts   Fall history within last six months yes   Number  of times patient has fallen within last six months 2   Which of the above functional risks had a recent onset or change? ambulation   General Information   Onset of Illness/Injury or Date of Surgery - Date 06/21/17   Referring Physician Moni Bland PA-C   Patient/Family Goals Statement unable to verbalize a goal due to dementia   Pertinent History of Current Problem (include personal factors and/or comorbidities that impact the POC) Per H&P: Lulu Carlton is a 89 year old female who presents to the department from assisted living facility for evaluation of right hip pain and internally rotated right leg. Records confirm that the patient had right hip replacement 5 weeks ago by orthopedist Dr. Reddy and without postoperative complication. However she returned to the department on 6/15/17 for evaluation of right hip pain and it was determined that she suffered from dislocation of the right hip. After reduction she had returned home and again seemingly doing well. Today nursing staff found the patient lying down complaining of right hip pain but no apparent injury. She does have baseline dementia and very poor historian, does not recall fall or injury.   Weight-Bearing Status - RLE weight-bearing as tolerated  (with hip abductor brace on)   Cognitive Status Examination   Orientation person   Level of Consciousness alert   Follows Commands and Answers Questions 100% of the time   Personal Safety and Judgment impaired   Memory impaired   Pain Assessment   Patient Currently in Pain Yes, see Vital Sign flowsheet  (R hip pain--unable to rate)   Posture    Posture Forward head position;Protracted shoulders;Kyphosis   Range of Motion (ROM)   ROM Comment WFL except R hip limited due to abductor brace   Strength   Strength Comments WFL except R LE less than antigravity   Bed Mobility   Bed Mobility Comments sit <> supine with mod assist of 2   Transfer Skills   Transfer Comments sit <> stand with CGA   Gait   Gait  "Gait Skill   Gait Skills   Level of [place]: Gait contact guard   Physical Assist/Nonphysical Assist: Gait 1 person assist   Weight-Bearing Restrictions: Gait weight-bearing as tolerated   Assistive Device for Transfer: Gait rolling walker   Gait Distance 50 feet   Balance   Balance Comments good with RW   General Therapy Interventions   Planned Therapy Interventions bed mobility training;gait training;ROM;strengthening   Clinical Impression   Criteria for Skilled Therapeutic Intervention yes, treatment indicated   PT Diagnosis relocated R hip dislocation   Influenced by the following impairments dementia, pain and weakness   Functional limitations due to impairments mobility and gait   Clinical Presentation Stable/Uncomplicated   Clinical Presentation Rationale clinical judgement   Clinical Decision Making (Complexity) Low complexity   Therapy Frequency` daily   Predicted Duration of Therapy Intervention (days/wks) 1 visit only--pt to be d/c'd to [facility] tomorrow morning   Anticipated Discharge Disposition Transitional Care Facility   Risk & Benefits of therapy have been explained Yes   Patient, Family & other staff in agreement with plan of care Yes   Clinical Impression Comments Pt would benefit from PT at TCU for R hip strengthening and gait and transfer training.   [facility] Aqua-tools TM \"6 Clicks\"   2016, Trustees of [facility], under license to [employer].  All rights reserved.   6 Clicks Short Forms Basic Mobility Inpatient Short Form   [facility] [id number]  \"6 Clicks\" V.2 Basic Mobility Inpatient Short Form   1. Turning from your back to your side while in a flat bed without using bedrails? 3 - A Little   2. Moving from lying on your back to sitting on the side of a flat bed without using bedrails? 2 - A Lot   3. Moving to and from a bed to a chair (including a wheelchair)? 3 - A Little   4. Standing up from a chair using your arms (e.g., wheelchair, or bedside chair)? 3 - A Little "   5. To walk in hospital room? 3 - A Little   6. Climbing 3-5 steps with a railing? 2 - A Lot   Basic Mobility Raw Score (Score out of 24.Lower scores equate to lower levels of function) 16   Total Evaluation Time   Total Evaluation Time (Minutes) 15     Ashley Bridges PT[KJ1.1]         Revision History        User Key Date/Time User Provider Type Action    > KJ1.1 6/22/2017  3:39 PM Ashley rBidges, PT Physical Therapist Sign                                                      INTERAGENCY TRANSFER FORM - LAB / IMAGING / EKG / EMG RESULTS   6/21/2017                       [facility] INTENSIVE CARE: [phone]            Unresulted Labs     None         Lab Results - 3 Days      Glucose by meter [[id number]] (Abnormal)  Resulted: 06/23/17 0630, Result status: Final result    Ordering provider: Todd Martins,   06/23/17 0626 Resulting lab: POINT OF CARE TEST, GLUCOSE    Specimen Information    Type Source Collected On     06/23/17 0626          Components       Value Reference Range Flag Lab   Glucose 157 70 - 99 mg/dL H 170            Glucose by meter [[id number]] (Abnormal)  Resulted: 06/23/17 0206, Result status: Final result    Ordering provider: Todd Martins,   06/23/17 0201 Resulting lab: POINT OF CARE TEST, GLUCOSE    Specimen Information    Type Source Collected On     06/23/17 0201          Components       Value Reference Range Flag Lab   Glucose 181 70 - 99 mg/dL H 170            Methicillin Resistant Staph Aureus PCR [[id number]]  Resulted: 06/22/17 2311, Result status: Final result    Ordering provider: Mo Martin MD  06/22/17 0601 Resulting lab: [facility]    Specimen Information    Type Source Collected On   [facility]  06/22/17 1450          Components       Value Reference Range Flag Lab   Specimen Description Nar   59   S Aur Meth Resis PCR -- NEG  75   Result:         Negative  MRSA Negative: SA Negative  MRSA and Staphylococcus aureus target DNA not    detected, presumed negative for MRSA and SA colonization or the number of   bacteria present may be below the limit of detection for the assay. FDA   approved assay performed using [employer] GeneXpert(R) real-time PCR.              Glucose by meter [[id number]] (Abnormal)  Resulted: 06/22/17 2141, Result status: Final result    Ordering provider: Todd Martins, DO  06/22/17 2134 Resulting lab: POINT OF CARE TEST, GLUCOSE    Specimen Information    Type Source Collected On     06/22/17 2134          Components       Value Reference Range Flag Lab   Glucose 168 70 - 99 mg/dL H 170            Vitamin B12 [[id number]]  Resulted: 06/22/17 1703, Result status: Final result    Ordering provider: Moni Bland PA-C  06/22/17 1157 Resulting lab: [facility]    Specimen Information    Type Source Collected On   Blood  06/22/17 0015          Components       Value Reference Range Flag Lab   Vitamin B12 658 193 - 986 pg/mL  51            Glucose by meter [[id number]] (Abnormal)  Resulted: 06/22/17 1635, Result status: Final result    Ordering provider: Todd Martins, DO  06/22/17 1630 Resulting lab: POINT OF CARE TEST, GLUCOSE    Specimen Information    Type Source Collected On     06/22/17 1630          Components       Value Reference Range Flag Lab   Glucose 171 70 - 99 mg/dL H 170            Folate [[id number]]  Resulted: 06/22/17 1618, Result status: Final result    Ordering provider: Moni Bland PA-C  06/22/17 1157 Resulting lab: [facility]    Specimen Information    Type Source Collected On   Blood  06/22/17 0015          Components       Value Reference Range Flag Lab   Folate 18.4 >5.4 ng/mL  51            T4 free [[id number]]  Resulted: 06/22/17 1249, Result status: Final result    Ordering provider: Moni Bland PA-C  06/22/17 0015 Resulting lab: [facility]    Specimen Information    Type Source  Collected On     06/22/17 0015          Components       Value Reference Range Flag Lab   T4 Free 1.24 0.76 - 1.46 ng/dL  59            TSH with free T4 reflex [[id number]] (Abnormal)  Resulted: 06/22/17 1236, Result status: Final result    Ordering provider: Moni Bland PA-C  06/22/17 1157 Resulting lab: [facility]    Specimen Information    Type Source Collected On   Blood  06/22/17 0015          Components       Value Reference Range Flag Lab   TSH 4.45 0.40 - 4.00 mU/L H 59            Ferritin [[id number]]  Resulted: 06/22/17 1230, Result status: Final result    Ordering provider: Moni Bland PA-C  06/22/17 1157 Resulting lab: [facility]    Specimen Information    Type Source Collected On   Blood  06/22/17 0015          Components       Value Reference Range Flag Lab   Ferritin 223 8 - 252 ng/mL  59            Iron and iron binding capacity [[id number]] (Abnormal)  Resulted: 06/22/17 1230, Result status: Final result    Ordering provider: Moni Bland PA-C  06/22/17 1157 Resulting lab: [facility]    Specimen Information    Type Source Collected On   Blood  06/22/17 0015          Components       Value Reference Range Flag Lab   Iron 36 35 - 180 ug/dL  59   Iron Binding Cap 231 240 - 430 ug/dL L 59   Iron Saturation Index 16 15 - 46 %  59            Glucose by meter [[id number]] (Abnormal)  Resulted: 06/22/17 1121, Result status: Final result    Ordering provider: Todd Martins DO  06/22/17 1115 Resulting lab: POINT OF CARE TEST, GLUCOSE    Specimen Information    Type Source Collected On     06/22/17 1115          Components       Value Reference Range Flag Lab   Glucose 202 70 - 99 mg/dL H 170            Glucose by meter [[id number]] (Abnormal)  Resulted: 06/22/17 0751, Result status: Final result    Ordering provider: Todd Martins DO  06/22/17 0745 Resulting lab: POINT OF CARE TEST, GLUCOSE    Specimen  Information    Type Source Collected On     06/22/17 0745          Components       Value Reference Range Flag Lab   Glucose 150 70 - 99 mg/dL H 170            Basic metabolic panel [[id number]] (Abnormal)  Resulted: 06/22/17 0051, Result status: Final result    Ordering provider: Todd Martins,   06/21/17 2256 Resulting lab: [facility]    Specimen Information    Type Source Collected On   Blood  06/22/17 0015          Components       Value Reference Range Flag Lab   Sodium 138 133 - 144 mmol/L  59   Potassium 4.3 3.4 - 5.3 mmol/L  59   Chloride 102 94 - 109 mmol/L  59   Carbon Dioxide 30 20 - 32 mmol/L  59   Anion Gap 6 3 - 14 mmol/L  59   Glucose 193 70 - 99 mg/dL H 59   Urea Nitrogen 15 7 - 30 mg/dL  59   Creatinine 0.65 0.52 - 1.04 mg/dL  59   GFR Estimate 85 >60 mL/min/1.7m2  59   Comment:  Non  GFR Calc   GFR Estimate If Black -- >60 mL/min/1.7m2  59   Result:         >90   GFR Calc     Calcium 8.7 8.5 - 10.1 mg/dL  59   Result:              CBC with platelets, differential [[id number]] (Abnormal)  Resulted: 06/22/17 0037, Result status: Final result    Ordering provider: Todd Martins,   06/21/17 2256 Resulting lab: [facility]    Specimen Information    Type Source Collected On   Blood  06/22/17 0015          Components       Value Reference Range Flag Lab   WBC 9.0 4.0 - 11.0 10e9/L  59   RBC Count 3.44 3.8 - 5.2 10e12/L L 59   Hemoglobin 9.6 11.7 - 15.7 g/dL L 59   Hematocrit 30.6 35.0 - 47.0 % L 59   MCV 89 78 - 100 fl  59   MCH 27.9 26.5 - 33.0 pg  59   MCHC 31.4 31.5 - 36.5 g/dL L 59   RDW 13.1 10.0 - 15.0 %  59   Platelet Count 261 150 - 450 10e9/L  59   Diff Method Automated Method   59   % Neutrophils 72.1 %  59   % Lymphocytes 17.4 %  59   % Monocytes 7.2 %  59   % Eosinophils 2.9 %  59   % Basophils 0.2 %  59   % Immature Granulocytes 0.2 %  59   Absolute Neutrophil 6.5 1.6 - 8.3 10e9/L  59   Absolute Lymphocytes 1.6 0.8  - 5.3 10e9/L  59   Absolute Monocytes 0.7 0.0 - 1.3 10e9/L  59   Absolute Eosinophils 0.3 0.0 - 0.7 10e9/L  59   Absolute Basophils 0.0 0.0 - 0.2 10e9/L  59   Abs Immature Granulocytes 0.0 0 - 0.4 10e9/L  59            Testing Performed By     Lab - Abbreviation Name Director Address Valid Date Range    51 - Unknown [facility] [place] Unknown [address] [facility] [address] 12/31/14 1010 - Present    59 - Unknown [facility] Unknown [address] [facility] [address] 12/31/14 1006 - Present    75 - Unknown [facility] Unknown [address] [facility] [address] 01/15/15 1019 - Present    170 - Unknown POINT OF CARE TEST, GLUCOSE Unknown Unknown 10/31/11 1114 - Present               Imaging Results - 3 Days      XR Pelvis Port 1/2 Views [[id number]]  Resulted: 06/21/17 2159, Result status: Final result    Ordering provider: Todd Martins DO  06/21/17 2126 Resulted by: Sabra Ordoñez MD    Performed: 06/21/17 2127 - 06/21/17 2141 Resulting lab: RADIOLOGY RESULTS    Narrative:       PELVIS PORTABLE ONE - TWO VIEW  6/21/2017 9:41 PM      HISTORY: Post reduction right hip dislocation.     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 No fracture.    SABRA ORDOÑEZ MD      XR Pelvis w Hip Right 1 View [[id number]]  Resulted: 06/21/17 2159, Result status: Final result    Ordering provider: Todd Martins DO  06/21/17 2001 Resulted by: Sabra Ordoñez MD    Performed: 06/21/17 2011 - 06/21/17 2024 Resulting lab: RADIOLOGY RESULTS    Narrative:       PELVIS AND HIP RIGHT ONE VIEW  6/21/2017 8:24 PM      HISTORY: Pain, history of prosthetic dislocation.    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 No  fracture.    SABRA ORDOÑEZ MD      Testing Performed By     Lab -  Abbreviation Name Director Address Valid Date Range    104 - Rad Rslts RADIOLOGY RESULTS Unknown Unknown 02/16/05 1553 - Present            Encounter-Level Documents:     There are no encounter-level documents.      Order-Level Documents:     There are no order-level documents.

## 2017-06-21 NOTE — IP AVS SNAPSHOT
` `     [facility] INTENSIVE CARE: [phone]            Medication Administration Report for Lulu Carlton as of 06/23/17 0944   Legend:    Given Hold Not Given Due Canceled Entry Other Actions    Time Time (Time) Time  Time-Action       Inactive    Active    Linked        Medications 06/17/17 06/18/17 06/19/17 06/20/17 06/21/17 06/22/17 06/23/17    acetaminophen (TYLENOL) tablet 650 mg  Dose: 650 mg Freq: DAILY Route: PO  Start: 06/23/17 0800   Admin Instructions: Maximum acetaminophen dose from all sources = 75 mg/kg/day not to exceed 4 grams/day.           0801 (650 mg)-Given           acetaminophen (TYLENOL) tablet 650 mg  Dose: 650 mg Freq: EVERY 4 HOURS PRN Route: PO  PRN Reason: mild pain  Start: 06/22/17 0037   Admin Instructions: Alternate ibuprofen (if ordered) with acetaminophen.  Maximum acetaminophen dose from all sources = 75 mg/kg/day not to exceed 4 grams/day.          0131 (650 mg)-Given       0529 (650 mg)-Given       1601 (650 mg)-Given        0209 (650 mg)-Given           aspirin EC EC tablet 325 mg  Dose: 325 mg Freq: DAILY Route: PO  Start: 06/22/17 0800   Admin Instructions: DO NOT CRUSH.          (0902)-Not Given [C]       1021 (325 mg)-Given        0801 (325 mg)-Given           cyclobenzaprine (FLEXERIL) tablet 5 mg  Dose: 5 mg Freq: 3 TIMES DAILY PRN Route: PO  PRN Reason: muscle spasms  Start: 06/22/17 0705         1602 (5 mg)-Given            glucose 40 % gel 15-30 g  Dose: 15-30 g Freq: EVERY 15 MIN PRN Route: PO  PRN Reason: low blood sugar  Start: 06/22/17 0703   Admin Instructions: Give 15 g for BG 51 to 69 mg/dL IF patient is conscious and able to swallow. Give 30 g for BG less than or equal to 50 mg/dL IF patient is conscious and able to swallow. Do NOT give glucose gel via enteral tube.  IF patient has enteral tube: give apple juice 120 mL (4 oz or 15 g of CHO) via enteral tube for BG 51 to 69 mg/dL.  Give apple juice 240 mL (8 oz or 30 g of CHO) via enteral tube for  BG less than or equal to 50 mg/dL.    ~Oral gel is preferable for conscious and able to swallow patient.   ~IF gel unavailable or patient refuses may provide apple juice 120 mL (4 oz or 15 g of CHO). Document juice on I and O flowsheet.              Or  dextrose 50 % injection 25-50 mL  Dose: 25-50 mL Freq: EVERY 15 MIN PRN Route: IV  PRN Reason: low blood sugar  Start: 06/22/17 0703   Admin Instructions: Use if have IV access, BG less than 70 mg/dL and meet dose criteria below:  Dose if conscious and alert (or disorientated) and NPO = 25 mL  Dose if unconscious / not alert = 50 mL  Vesicant.              Or  glucagon injection 1 mg  Dose: 1 mg Freq: EVERY 15 MIN PRN Route: SC  PRN Reason: low blood sugar  PRN Comment: May repeat x 1 only  Start: 06/22/17 0703   Admin Instructions: May give SQ or IM. ONLY use glucagon IF patient has NO IV access AND is UNABLE to swallow AND blood glucose is LESS than or EQUAL to 50 mg/dL.               insulin aspart (NovoLOG) inj (RAPID ACTING)  Dose: 1-5 Units Freq: AT BEDTIME Route: SC  Start: 06/22/17 2200   Admin Instructions: MEDIUM INSULIN RESISTANCE DOSING    Do Not give Bedtime Correction Insulin if BG less than  200.   For  - 249 give 1 units.   For  - 299 give 2 units.   For  - 349 give 3 units.   For  -399 give 4 units.   For BG greater than or equal to 400 give 5 units.  Notify provider if glucose greater than or equal to 350 mg/dL after administration of correction dose.  If given at mealtime, must be administered 5 min before meal or immediately after.          (2142)-Not Given [C]        [ ] 2200           insulin aspart (NovoLOG) inj (RAPID ACTING)  Dose: 1-7 Units Freq: 3 TIMES DAILY BEFORE MEALS Route: SC  Start: 06/22/17 0730   Admin Instructions: Correction Scale - MEDIUM INSULIN RESISTANCE DOSING     Do Not give Correction Insulin if Pre-Meal BG less than 140.   For Pre-Meal  - 189 give 1 unit.   For Pre-Meal  - 239 give 2  units.   For Pre-Meal  - 289 give 3 units.   For Pre-Meal  - 339 give 4 units.   For Pre-Meal - 399 give 5 units.   For Pre-Meal -449 give 6 units  For Pre-Meal BG greater than or equal to 450 give 7 units.   To be given with prandial insulin, and based on pre-meal blood glucose.    Notify provider if glucose greater than or equal to 350 mg/dL after administration of correction dose.  If given at mealtime, must be administered 5 min before meal or immediately after.          (0903)-Not Given       1215 (2 Units)-Given       1707 (1 Units)-Given        0806 (1 Units)-Given [C]       [ ] 1130       [ ] 1630           lisinopril (PRINIVIL/ZESTRIL) tablet 5 mg  Dose: 5 mg Freq: DAILY Route: PO  Start: 06/22/17 0800         0852 (5 mg)-Given        0802 (5 mg)-Given           naloxone (NARCAN) injection 0.1-0.4 mg  Dose: 0.1-0.4 mg Freq: EVERY 2 MIN PRN Route: IV  PRN Reason: opioid reversal  Start: 06/22/17 0706   Admin Instructions: For respiratory rate LESS than or EQUAL to 8.  Partial reversal dose:  0.1 mg titrated q 2 minutes for Analgesia Side Effects Monitoring Sedation Level of 3 (frequently drowsy, arousable, drifts to sleep during conversation).Full reversal dose:  0.4 mg bolus for Analgesia Side Effects Monitoring Sedation Level of 4 (somnolent, minimal or no response to stimulation).               omeprazole (priLOSEC) CR capsule 20 mg  Dose: 20 mg Freq: DAILY Route: PO  Start: 06/22/17 0800         0852 (20 mg)-Given        0805 (20 mg)-Given           ondansetron (ZOFRAN-ODT) ODT tab 4 mg  Dose: 4 mg Freq: EVERY 6 HOURS PRN Route: PO  PRN Reason: nausea  Start: 06/22/17 0037   Admin Instructions: This is Step 1 of nausea and vomiting management.  If nausea not resolved in 15 minutes, go to Step 2 prochlorperazine (COMPAZINE). Do not push through foil backing. Peel back foil and gently remove. Place on tongue immediately. Administration with liquid unnecessary               Or  ondansetron (ZOFRAN) injection 4 mg  Dose: 4 mg Freq: EVERY 6 HOURS PRN Route: IV  PRN Reasons: nausea,vomiting  Start: 06/22/17 0037   Admin Instructions: This is Step 1 of nausea and vomiting management.  If nausea not resolved in 15 minutes, go to Step 2 prochlorperazine (COMPAZINE).  Irritant.               oxyCODONE (ROXICODONE) IR half-tab 2.5-5 mg  Dose: 2.5-5 mg Freq: EVERY 3 HOURS PRN Route: PO  PRN Reason: moderate to severe pain  Start: 06/22/17 1154         1939 (5 mg)-Given        0209 (5 mg)-Given       0803 (5 mg)-Given           senna-docusate (SENOKOT-S;PERICOLACE) 8.6-50 MG per tablet 2 tablet  Dose: 2 tablet Freq: 2 TIMES DAILY PRN Route: PO  PRN Reason: constipation  Start: 06/22/17 0705              sodium chloride (PF) 0.9% PF flush 3 mL  Dose: 3 mL Freq: EVERY 8 HOURS Route: IK  Start: 06/22/17 1445         1442 (3 mL)-Given        0000 (3 mL)-Given       [ ] 0800       [ ] 1600          Discontinued Medications  Medications 06/17/17 06/18/17 06/19/17 06/20/17 06/21/17 06/22/17 06/23/17         Dose: 975 mg Freq: 3 TIMES DAILY Route: PO  Start: 06/22/17 0800   End: 06/22/17 1154   Admin Instructions: Maximum acetaminophen dose from all sources = 75 mg/kg/day not to exceed 4 grams/day.          0852 (975 mg)-Given       1154-Med Discontinued          Dose: 5 mg Freq: EVERY 3 HOURS PRN Route: PO  PRN Reason: moderate to severe pain  Start: 06/22/17 0705   End: 06/22/17 1154         1154-Med Discontinued

## 2017-06-21 NOTE — IP AVS SNAPSHOT
` ` Patient Information     Patient Name Sex     Lulu Carlton ([id number]) Female 1921       Room Bed    1004 1004-01      Patient Demographics     Address Phone    [address] [facility] [address] [phone] (Home)  [phone] (Mobile)      Patient Ethnicity & Race     Ethnic Group Patient Race    American White      Emergency Contact(s)     Name Relation Home Work Mobile    Benigno Shane [phone]    Keya Kong [phone]      Documents on File        Status Date Received Description       Documents for the Patient    Privacy Notice - [place]  03     Insurance Card  10/17/07 BCBS & MEDICARE    Face Sheet  10/17/07     Affiliate Privacy placeholder   [email or web address]    Consent for Services - Hospital/Clinic Received () 06/03/15     Consent for EHR Access Received 06/03/15     External Medication Information Consent Accepted 06/03/15     Patient ID Received 17 [place] ID [email or web address]    [facility] Specified Other       Privacy Notice - [place] Received 06/03/15     Insurance Card Received () 06/03/15 medicare&bcbs    Insurance Card Received ()  back of cards    Consent for Services/Privacy Notice - Hospital/Clinic Received 10/31/16     Consent to Communicate Received 10/31/16     Consent to Communicate  16 AUTHORIZATION TO DISCUSS PHI    Business/Insurance/Care Coordination/Health Form - Patient  16 AUTHORIZATION FOR ADMINISTRATION OF MEDICATION 2016    Insurance Card Received 17 FRONT ONLY - BCBS PLATINUM    Advance Directives and Living Will Received 17 POLST 02/09/15    Consent for Services - Geriatrics Received 17     Insurance Card  (Deleted)         Documents for the Encounter    CMS IM for Patient Signature Received 17     Observation Notice Received 17     Consent for Services - Informed  17 INFORMED CONSENT FOR SURGERY OR DESIGNATED PROCEDURE (SHORT VERSION)      Admission  Information     Attending Provider Admitting Provider Admission Type Admission Date/Time    Mo Martin MD Eikens, John Patrick, MD Emergency 06/21/17  1907    Discharge Date Hospital Service Auth/Cert Status Service Area     Hospitalist [facility]    Unit Room/Bed Admission Status       [place] INTENSIVE CARE 1004/1004-01 Admission (Confirmed)       Admission     Complaint    Hip dislocation, right (H), Hip dislocation, right, sequela      Hospital Account     Name Acct ID Class Status Primary Coverage    Lulu Carlton [id number] Observation Open MEDICARE - MEDICARE FOR HB SUPPLEMENT            Guarantor Account (for Hospital Account #[id number])     Name Relation to Pt Service Area Active? Acct Type    Lulu Carlton  FCS Yes Personal/Family    Address Phone          [address] [address] [phone](H)              Coverage Information (for Hospital Account #[id number])     1. MEDICARE/MEDICARE FOR HB SUPPLEMENT     F/O Payor/Plan Precert #    MEDICARE/MEDICARE FOR HB SUPPLEMENT     Subscriber Subscriber #    Lulu Carlton [id number]    Address Phone    [employer]  [address]  [place] [address] [phone]          2. BCBS/BCBS PLATINUM BLUE     F/O Payor/Plan Precert #    BCBS/BCBS PLATINUM BLUE     Subscriber Subscriber #    Lulu Carlton [id number]    Address Phone    [address] [address]  [place] [address] [phone]

## 2017-06-21 NOTE — IP AVS SNAPSHOT
MRN:[id number]                      After Visit Summary   6/21/2017    Lulu Carlton    MRN: [id number]           Thank you!     Thank you for choosing [facility] for your care. Our goal is always to provide you with excellent care. Hearing back from our patients is one way we can continue to improve our services. Please take a few minutes to complete the written survey that you may receive in the mail after you visit with us. Thank you!        Patient Information     Date Of Birth          11/8/1921        About your hospital stay     You were admitted on:  June 21, 2017 You last received care in the:  [facility]    You were discharged on:  June 23, 2017        Reason for your hospital stay       Right hip dislocation                  Who to Call     For medical emergencies, please call 911.  For non-urgent questions about your medical care, please call your primary care provider or clinic, [phone]          Attending Provider     Provider Specialty    Todd Martins DO Emergency Medicine    Mo Martin MD [facility]       Primary Care Provider Office Phone # Fax #    Todd Villeda [phone]       When to contact your care team       Call your Orthopedic surgeon at [facility]  if you have any of the following: temperature greater than 100.4,  increased shortness of breath, increased pain to R hip                  After Care Instructions     Activity       Your activity upon discharge: Activity as tolerated, no driving until off narcotic pain medication. Must have brace on for weight bearing. Brace to be on at all times, except for hygiene and skin checks.            Diet       Follow this diet upon discharge: Orders Placed This Encounter      Moderate Consistent CHO Diet              General info for SNF       Length of Stay Estimate: Short Term Care: Estimated # of Days <30  Condition at Discharge: Stable  Level of  "care:skilled   Rehabilitation Potential: Fair  Admission H&P remains valid and up-to-date: Yes  Recent Chemotherapy: N/A  Use Nursing Home Standing Orders: Yes            Glucose monitor nursing POCT       Before meals and at bedtime            Mantoux instructions       Give two-step Mantoux (PPD) Per Facility Policy Yes                  Follow-up Appointments     Follow-up and recommended labs and tests       Follow up with  Dr. Riky Greenfield at [facility], in 1-2 weeks for post hospital follow up.                  Additional Services     Physical Therapy Referral       *This therapy referral will be filtered to a centralized scheduling office at [facility] and the patient will receive a call to schedule an appointment at a [place] location most convenient for them. *     [facility] provides Physical Therapy evaluation and treatment and many specialty services across the [place] system.  If requesting a specialty program, please choose from the list below.    If you have not heard from the scheduling office within 2 business days, please call [phone] for all locations, with the exception of [place], please call [phone].  Treatment: Evaluation & Treatment  Special Instructions/Modalities: none  Special Programs: None    Please be aware that coverage of these services is subject to the terms and limitations of your health insurance plan.  Call member services at your health plan with any benefit or coverage questions.      **Note to Provider:  If you are referring outside of [place] for the therapy appointment, please list the name of the location in the \"special instructions\" above, print the referral and give to the patient to schedule the appointment.                  Further instructions from your care team       1.  Follow up with Dr. Riky Greenfield in 1-2 weeks for post hospital check.  Call [phone] if appointment needed or questions  2. " " Use pain medication as directed  3.  Keep Abductor brace on at all times except for hygiene and skin checks until follow up appointment.            Pending Results     No orders found from 2017 to 2017.            Statement of Approval     Ordered          17 0734  I have reviewed and agree with all the recommendations and orders detailed in this document.  EFFECTIVE NOW     Approved and electronically signed by:  Mo Martin MD           17 0642  I have reviewed and agree with all the recommendations and orders detailed in this document.  EFFECTIVE NOW     Approved and electronically signed by:  Mo Martin MD             Admission Information     Date & Time Provider Department Dept. Phone    2017 Mo Martin MD [facility] [phone]      Your Vitals Were     Blood Pressure Pulse Temperature Respirations Height Weight    147/58 (BP Location: Left arm) 87 97.7  F (36.5  C) (Oral) 18 1.6 m (5' 3\") 68.9 kg (151 lb 14.4 oz)    Pulse Oximetry BMI (Body Mass Index)                94% 26.91 kg/m2          [employer] Information     [employer] lets you send messages to your doctor, view your test results, renew your prescriptions, schedule appointments and more. To sign up, go to [email or web address] . Click on \"Log in\" on the left side of the screen, which will take you to the Welcome page. Then click on \"Sign up Now\" on the right side of the page.     You will be asked to enter the access code listed below, as well as some personal information. Please follow the directions to create your username and password.     Your access code is: [email or web address]  Expires: 8/10/2017  2:04 PM     Your access code will  in 90 days. If you need help or a new code, please call your [facility] or [phone].        Care EveryWhere ID     This is your Care EveryWhere ID. This could be used by other organizations to access your [facility] medical " records  [id number]        Equal Access to Services     [facility] Center[facility] : Hadii elisabeth parson aníbal Sonic, waaxda luqadaha, qaybta kaalmaharshad mancia, nadine wilkins. So [facility] [phone].    ATENCIÓN: Si habla español, tiene a ponce disposición servicios gratuitos de asistencia lingüística. Nadiaame al [phone].    We comply with applicable federal civil rights laws and [place] laws. We do not discriminate on the basis of race, color, national origin, age, disability sex, sexual orientation or gender identity.               Review of your medicines      CONTINUE these medicines which may have CHANGED, or have new prescriptions. If we are uncertain of the size of tablets/capsules you have at home, strength may be listed as something that might have changed.        Dose / Directions    oxyCODONE 5 MG IR tablet   Commonly known as:  ROXICODONE   This may have changed:  how much to take   Used for:  Hip dislocation, right, initial encounter (H)        Dose:  2.5 mg   Take 0.5 tablets (2.5 mg) by mouth every 3 hours as needed for moderate to severe pain   Quantity:  20 tablet   Refills:  0         CONTINUE these medicines which have NOT CHANGED        Dose / Directions    aspirin  MG EC tablet   Used for:  Hip fracture, right, closed, initial encounter (H)        Dose:  325 mg   Take 1 tablet (325 mg) by mouth daily   Quantity:  40 tablet   Refills:  0       B-12 1000 MCG Tbcr   Used for:  Vitamin B 12 deficiency        Dose:  1000 mcg   Take 1,000 mcg by mouth daily   Quantity:  30 tablet   Refills:  11       BLOOD GLUCOSE TEST STRIPS Strp        by In Vitro route as needed   Refills:  0       calcium-vitamin D 600-400 MG-UNIT per tablet   Commonly known as:  CALTRATE        Dose:  1 tablet   Take 1 tablet by mouth every evening   Refills:  0       CYCLOBENZAPRINE HCL PO        Dose:  5 mg   Take 5 mg by mouth 3 times daily as needed for muscle spasms   Refills:  0       GLIPIZIDE XL PO         Dose:  2.5 mg   Take 2.5 mg by mouth daily (with breakfast)   Refills:  0       lisinopril 5 MG tablet   Commonly known as:  PRINIVIL/ZESTRIL   Used for:  Essential hypertension with goal blood pressure less than 140/90        Dose:  5 mg   Take 1 tablet (5 mg) by mouth daily   Quantity:  30 tablet   Refills:  11       LOPERAMIDE HCL PO        Dose:  2 mg   Take 2 mg by mouth daily as needed   Refills:  0       MECLIZINE HCL PO        Dose:  12.5 mg   Take 12.5 mg by mouth 2 times daily as needed for dizziness   Refills:  0       multivitamin Tabs tablet        Dose:  1 tablet   Take 1 tablet by mouth daily   Quantity:  30 each   Refills:  0       omeprazole 20 MG CR capsule   Commonly known as:  priLOSEC   Used for:  Nausea        Dose:  20 mg   Take 1 capsule (20 mg) by mouth daily   Quantity:  30 capsule   Refills:  11       senna-docusate 8.6-50 MG per tablet   Commonly known as:  SENOKOT-S;PERICOLACE        Dose:  2 tablet   Take 2 tablets by mouth 2 times daily as needed for constipation   Refills:  0       triamcinolone 0.1 % cream   Commonly known as:  KENALOG        Apply topically 2 times daily as needed for irritation   Refills:  0       TYLENOL PO        Dose:  1300 mg   Take 1,300 mg by mouth every morning Do not exceed 4,000 mg of Acetaminophen from all sources in 24 hours   Refills:  0            Where to get your medicines      Some of these will need a paper prescription and others can be bought over the counter. Ask your nurse if you have questions.     Bring a paper prescription for each of these medications     oxyCODONE 5 MG IR tablet                Protect others around you: Learn how to safely use, store and throw away your medicines at www.disposemymeds.org.             Medication List: This is a list of all your medications and when to take them. Check marks below indicate your daily home schedule. Keep this list as a reference.      Medications           Morning Afternoon Evening  Bedtime As Needed    aspirin  MG EC tablet   Take 1 tablet (325 mg) by mouth daily   Last time this was given:  325 mg on 6/23/2017  8:01 AM                                B-12 1000 MCG Tbcr   Take 1,000 mcg by mouth daily                                BLOOD GLUCOSE TEST STRIPS Strp   by In Vitro route as needed                                calcium-vitamin D 600-400 MG-UNIT per tablet   Commonly known as:  CALTRATE   Take 1 tablet by mouth every evening                                CYCLOBENZAPRINE HCL PO   Take 5 mg by mouth 3 times daily as needed for muscle spasms   Last time this was given:  5 mg on 6/22/2017  4:02 PM                                GLIPIZIDE XL PO   Take 2.5 mg by mouth daily (with breakfast)                                lisinopril 5 MG tablet   Commonly known as:  PRINIVIL/ZESTRIL   Take 1 tablet (5 mg) by mouth daily   Last time this was given:  5 mg on 6/23/2017  8:02 AM                                LOPERAMIDE HCL PO   Take 2 mg by mouth daily as needed                                MECLIZINE HCL PO   Take 12.5 mg by mouth 2 times daily as needed for dizziness                                multivitamin Tabs tablet   Take 1 tablet by mouth daily                                omeprazole 20 MG CR capsule   Commonly known as:  priLOSEC   Take 1 capsule (20 mg) by mouth daily   Last time this was given:  20 mg on 6/23/2017  8:05 AM                                oxyCODONE 5 MG IR tablet   Commonly known as:  ROXICODONE   Take 0.5 tablets (2.5 mg) by mouth every 3 hours as needed for moderate to severe pain   Last time this was given:  5 mg on 6/23/2017  8:03 AM                                senna-docusate 8.6-50 MG per tablet   Commonly known as:  SENOKOT-S;PERICOLACE   Take 2 tablets by mouth 2 times daily as needed for constipation                                triamcinolone 0.1 % cream   Commonly known as:  KENALOG   Apply topically 2 times daily as needed for irritation                                 TYLENOL PO   Take 1,300 mg by mouth every morning Do not exceed 4,000 mg of Acetaminophen from all sources in 24 hours   Last time this was given:  650 mg on 6/23/2017  8:01 AM

## 2017-06-21 NOTE — LETTER
Transition Communication Hand-off for Care Transitions to Next Level of Care Provider    Name: Lulu Carlton  MRN #: [id number]  Primary Care Provider: Todd Villeda  Primary Care MD Name:  (Ronal)  Primary Clinic: [facility] [id number] [place] [id number]  Primary Care Clinic Name:  ([facility])  Reason for Hospitalization:  Hip pain, right [M25.551]  Hip dislocation, right, initial encounter (H) [S73.004A]  Admit Date/Time: 6/21/2017  7:07 PM  Discharge Date: 6/23/17  Payor Source: Payor: BCBS / Plan: BCBS PLATINUM BLUE / Product Type: PPO /     Readmission Assessment Measure (CHARIS) Risk Score/category: average           Reason for Communication Hand-off Referral: Fragility    Discharge Plan: [facility] TCU       Concern for non-adherence with plan of care:   Y/N no  Discharge Needs Assessment:      Already enrolled in Tele-monitoring program and name of program:  no  Follow-up specialty is recommended: No    Follow-up plan:  No future appointments.    Any outstanding tests or procedures:        Referrals     Future Labs/Procedures    Physical Therapy Referral     Comments:    *This therapy referral will be filtered to a centralized scheduling office at [facility] and the patient will receive a call to schedule an appointment at a [place] location most convenient for them. *     [facility] provides Physical Therapy evaluation and treatment and many specialty services across the [place] system.  If requesting a specialty program, please choose from the list below.    If you have not heard from the scheduling office within 2 business days, please call [phone] for all locations, with the exception of [place], please call [phone].  Treatment: Evaluation & Treatment  Special Instructions/Modalities: none  Special Programs: None    Please be aware that coverage of these services is subject to the terms and limitations of your health insurance plan.  Call  "member services at your health plan with any benefit or coverage questions.      **Note to Provider:  If you are referring outside of [place] for the therapy appointment, please list the name of the location in the \"special instructions\" above, print the referral and give to the patient to schedule the appointment.            Key Recommendations:  Pt is discharging [facility] (Phone: [phone]) Fax: ([phone]) today. Pt lives long term at [facility], family is on waiting list at [facility]. However; pt may need more assistance following TCU, as last time pt was at [facility] TCU she was on a 1:1 the entire stay, per report from [facility].     Tania Marin MSW, [facility] Medical CenterSW, [facility] [phone]      AVS/Discharge Summary is the source of truth; this is a helpful guide for improved communication of patient story          "

## 2017-06-21 NOTE — IP AVS SNAPSHOT
"[facility] INTENSIVE CARE: [phone]                                              INTERAGENCY TRANSFER FORM - PHYSICIAN ORDERS   2017                    Hospital Admission Date: 2017  EL BARRIENTOS   : 1921  Sex: Female        Attending Provider: Mo Martin MD     Allergies:  Hydrocodone-acetaminophen, Trazodone, Metformin    Infection:  None   Service:  HOSPITALIST    Ht:  1.6 m (5' 3\")   Wt:  68.9 kg (151 lb 14.4 oz)   Admission Wt:  69.4 kg (153 lb)    BMI:  26.91 kg/m 2   BSA:  1.75 m 2            Patient PCP Information     Provider PCP Type    Todd Villeda MD General      ED Clinical Impression     Diagnosis Description Comment Added By Time Added    Hip pain, right [M25.551] Hip pain, right [M25.551]  Todd Martins DO 2017 10:58 PM    Hip dislocation, right, initial encounter (H) [S73.004A] Hip dislocation, right, initial encounter (H) [S73.004A]  Todd Martins DO 2017 10:58 PM      Hospital Problems as of 2017              Priority Class Noted POA    Anemia, iron deficiency Medium  10/31/2016 Yes    Type 2 diabetes mellitus with hyperglycemia, without long-term current use of insulin (H) Medium  10/31/2016 Yes    Hypertension goal BP (blood pressure) < 140/90 Medium  2016 Yes    Displaced fracture of right femoral neck (H) Medium  2017 Yes    Dementia Medium  2017 Yes    * (Principal)Hip dislocation, right, sequela Medium  2017 Yes    Status post total replacement of right hip Medium  2017 Yes      Non-Hospital Problems as of 2017              Priority Class Noted    Myasthenia gravis (H) Medium  2007    Sensorineural hearing loss, asymmetrical   10/17/2007    Malignant lymphoma, large cell, diffuse (H) Medium  2011    Do not resuscitate status Medium  2011    Steatosis of liver Medium  2013    Urinary incontinence Medium  10/31/2016    Osteoporosis Medium  10/31/2016    Vitamin D " deficiency Medium  10/31/2016    Primary osteoarthritis of left knee Medium  10/31/2016      Code Status History     Date Active Date Inactive Code Status Order ID Comments User Context    5/13/2017 12:19 PM  DNR/DNI [id number]  Danisha Reaves MD Outpatient    5/13/2017  9:29 AM 5/13/2017 12:19 PM DNR/DNI [id number]  Lucas Reddy MD Outpatient    5/9/2017  4:33 PM 5/13/2017  9:29 AM DNR/DNI [id number]  Mo Martin MD Inpatient         Medication Review      CONTINUE these medications which may have CHANGED, or have new prescriptions. If we are uncertain of the size of tablets/capsules you have at home, strength may be listed as something that might have changed.        Dose / Directions Comments    oxyCODONE 5 MG IR tablet   Commonly known as:  ROXICODONE   This may have changed:  how much to take   Used for:  Hip dislocation, right, initial encounter (H)        Dose:  2.5 mg   Take 0.5 tablets (2.5 mg) by mouth every 3 hours as needed for moderate to severe pain   Quantity:  20 tablet   Refills:  0          CONTINUE these medications which have NOT CHANGED        Dose / Directions Comments    aspirin  MG EC tablet   Used for:  Hip fracture, right, closed, initial encounter (H)        Dose:  325 mg   Take 1 tablet (325 mg) by mouth daily   Quantity:  40 tablet   Refills:  0        B-12 1000 MCG Tbcr   Used for:  Vitamin B 12 deficiency        Dose:  1000 mcg   Take 1,000 mcg by mouth daily   Quantity:  30 tablet   Refills:  11        BLOOD GLUCOSE TEST STRIPS Strp        by In Vitro route as needed   Refills:  0        calcium-vitamin D 600-400 MG-UNIT per tablet   Commonly known as:  CALTRATE        Dose:  1 tablet   Take 1 tablet by mouth every evening   Refills:  0        CYCLOBENZAPRINE HCL PO        Dose:  5 mg   Take 5 mg by mouth 3 times daily as needed for muscle spasms   Refills:  0        GLIPIZIDE XL PO        Dose:  2.5 mg   Take 2.5 mg by mouth daily (with breakfast)    Refills:  0        lisinopril 5 MG tablet   Commonly known as:  PRINIVIL/ZESTRIL   Used for:  Essential hypertension with goal blood pressure less than 140/90        Dose:  5 mg   Take 1 tablet (5 mg) by mouth daily   Quantity:  30 tablet   Refills:  11        LOPERAMIDE HCL PO        Dose:  2 mg   Take 2 mg by mouth daily as needed   Refills:  0        MECLIZINE HCL PO        Dose:  12.5 mg   Take 12.5 mg by mouth 2 times daily as needed for dizziness   Refills:  0        multivitamin Tabs tablet        Dose:  1 tablet   Take 1 tablet by mouth daily   Quantity:  30 each   Refills:  0        omeprazole 20 MG CR capsule   Commonly known as:  priLOSEC   Used for:  Nausea        Dose:  20 mg   Take 1 capsule (20 mg) by mouth daily   Quantity:  30 capsule   Refills:  11        senna-docusate 8.6-50 MG per tablet   Commonly known as:  SENOKOT-S;PERICOLACE        Dose:  2 tablet   Take 2 tablets by mouth 2 times daily as needed for constipation   Refills:  0        triamcinolone 0.1 % cream   Commonly known as:  KENALOG        Apply topically 2 times daily as needed for irritation   Refills:  0        TYLENOL PO        Dose:  1300 mg   Take 1,300 mg by mouth every morning Do not exceed 4,000 mg of Acetaminophen from all sources in 24 hours   Refills:  0                  Further instructions from your care team       1.  Follow up with Dr. Riky Greenfield in 1-2 weeks for post hospital check.  Call [phone] if appointment needed or questions  2.  Use pain medication as directed  3.  Keep Abductor brace on at all times except for hygiene and skin checks until follow up appointment.            Summary of Visit     Reason for your hospital stay       Right hip dislocation             After Care     Activity       Your activity upon discharge: Activity as tolerated, no driving until off narcotic pain medication. Must have brace on for weight bearing. Brace to be on at all times, except for hygiene and skin checks.        "Diet       Follow this diet upon discharge: Orders Placed This Encounter      Moderate Consistent CHO Diet         General info for SNF       Length of Stay Estimate: Short Term Care: Estimated # of Days <30  Condition at Discharge: Stable  Level of care:skilled   Rehabilitation Potential: Fair  Admission H&P remains valid and up-to-date: Yes  Recent Chemotherapy: N/A  Use Nursing Home Standing Orders: Yes       Glucose monitor nursing POCT       Before meals and at bedtime       Mantoux instructions       Give two-step Mantoux (PPD) Per Facility Policy Yes             Referrals     Physical Therapy Referral       *This therapy referral will be filtered to a centralized scheduling office at [facility] and the patient will receive a call to schedule an appointment at a [place] location most convenient for them. *     [facility] provides Physical Therapy evaluation and treatment and many specialty services across the [place] system.  If requesting a specialty program, please choose from the list below.    If you have not heard from the scheduling office within 2 business days, please call [phone] for all locations, with the exception of [place], please call [phone].  Treatment: Evaluation & Treatment  Special Instructions/Modalities: none  Special Programs: None    Please be aware that coverage of these services is subject to the terms and limitations of your health insurance plan.  Call member services at your health plan with any benefit or coverage questions.      **Note to Provider:  If you are referring outside of [place] for the therapy appointment, please list the name of the location in the \"special instructions\" above, print the referral and give to the patient to schedule the appointment.             Follow-Up Appointment Instructions     Future Labs/Procedures    Follow-up and recommended labs and tests     Comments:    Follow up with  Dr. Lan " Jean Pierre at [facility], in 1-2 weeks for post hospital follow up.      Follow-Up Appointment Instructions     Follow-up and recommended labs and tests       Follow up with  Dr. Riky Greenfield at [facility], in 1-2 weeks for post hospital follow up.             Statement of Approval     Ordered          06/23/17 0734  I have reviewed and agree with all the recommendations and orders detailed in this document.  EFFECTIVE NOW     Approved and electronically signed by:  Mo Martin MD           06/23/17 0642  I have reviewed and agree with all the recommendations and orders detailed in this document.  EFFECTIVE NOW     Approved and electronically signed by:  Mo Martin MD

## 2017-06-21 NOTE — IP AVS SNAPSHOT
"` `           [facility] INTENSIVE CARE: [phone]                                              INTERAGENCY TRANSFER FORM - NURSING   2017                    Hospital Admission Date: 2017  EL BARRIENTOS   : 1921  Sex: Female        Attending Provider: Mo Martin MD     Allergies:  Hydrocodone-acetaminophen, Trazodone, Metformin    Infection:  None   Service:  HOSPITALIST    Ht:  1.6 m (5' 3\")   Wt:  68.9 kg (151 lb 14.4 oz)   Admission Wt:  69.4 kg (153 lb)    BMI:  26.91 kg/m 2   BSA:  1.75 m 2            Patient PCP Information     Provider PCP Type    Todd Villeda MD General      Current Code Status     Date Active Code Status Order ID Comments User Context       Prior      Code Status History     Date Active Date Inactive Code Status Order ID Comments User Context    2017 12:19 PM  DNR/DNI [id number]  Danisha Reaves MD Outpatient    2017  9:29 AM 2017 12:19 PM DNR/DNI [id number]  Lucas Reddy MD Outpatient    2017  4:33 PM 2017  9:29 AM DNR/DNI [id number]  Mo Martin MD Inpatient      Advance Directives        Does patient have a scanned Advance Directive/ACP document in EPIC?           No        Hospital Problems as of 2017              Priority Class Noted POA    Anemia, iron deficiency Medium  10/31/2016 Yes    Type 2 diabetes mellitus with hyperglycemia, without long-term current use of insulin (H) Medium  10/31/2016 Yes    Hypertension goal BP (blood pressure) < 140/90 Medium  2016 Yes    Displaced fracture of right femoral neck (H) Medium  2017 Yes    Dementia Medium  2017 Yes    * (Principal)Hip dislocation, right, sequela Medium  2017 Yes    Status post total replacement of right hip Medium  2017 Yes      Non-Hospital Problems as of 2017              Priority Class Noted    Myasthenia gravis (H) Medium  2007    Sensorineural hearing loss, asymmetrical   10/17/2007    " "Malignant lymphoma, large cell, diffuse (H) Medium  8/29/2011    Do not resuscitate status Medium  8/29/2011    Steatosis of liver Medium  4/16/2013    Urinary incontinence Medium  10/31/2016    Osteoporosis Medium  10/31/2016    Vitamin D deficiency Medium  10/31/2016    Primary osteoarthritis of left knee Medium  10/31/2016      Immunizations     Name Date      Influenza (High Dose) 3 valent vaccine 09/05/14     Influenza (intradermal) 09/06/13     Influenza (intradermal) 09/14/12     Influenza (intradermal) 10/21/11     Influenza (intradermal) 09/29/10     Influenza (intradermal) 10/16/09     Influenza (intradermal) 11/02/07     Influenza Vaccine, 3 YRS +, IM (QUADRIVALENT W/PRESERVATIVES) 11/03/16     Pneumococcal (PCV 13) 10/31/16     Pneumococcal 23 valent 10/21/11     TDAP Vaccine (Adacel) 09/06/11          END      ASSESSMENT     Discharge Profile Flowsheet     EXPECTED DISCHARGE     FINAL RESOURCES      Expected Discharge Date  06/22/17 06/22/17 1250   Resources List  Transitional Care 06/22/17 1250    DISCHARGE NEEDS ASSESSMENT     PAS Number  [id number] 05/12/17 1436    Equipment Currently Used at Home  walker, standard 06/07/17 1327   SKIN      GASTROINTESTINAL (ADULT,PEDIATRIC,OB)     Inspection  Full 06/23/17 0935    GI WDL  WDL 06/23/17 0935   Skin WDL  ex;color;characteristics 06/23/17 0935    Passing flatus  yes 06/23/17 0935   Skin Color/Characteristics  bruised (ecchymotic) 06/23/17 0935    COMMUNICATION ASSESSMENT     SAFETY      Patient's communication style  spoken language (English or Bilingual) 06/21/17 1916   Safety WDL  WDL 06/23/17 0935                 Assessment WDL (Within Defined Limits) Definitions           Safety WDL     Effective: 09/28/15    Row Information: <b>WDL Definition:</b> Bed in low position, wheels locked; call light in reach; upper side rails up x 2; ID band on<br> <font color=\"gray\"><i>Item=AS safety wdl>>List=AS safety wdl>>Version=F14</i></font>      Skin WDL     " "Effective: 09/28/15    Row Information: <b>WDL Definition:</b> Warm; dry; intact; elastic; without discoloration; pressure points without redness<br> <font color=\"gray\"><i>Item=AS skin wdl>>List=AS skin wdl>>Version=F14</i></font>      Vitals     Vital Signs Flowsheet     VITAL SIGNS     ANALGESIA SIDE EFFECTS MONITORING      Temp  97.7  F (36.5  C) 06/23/17 0753   Side Effects Monitoring: Respiratory Quality  R 06/23/17 0920    Temp src  Oral 06/23/17 0753   Side Effects Monitoring: Respiratory Depth  N 06/23/17 0920    Resp  18 06/23/17 0753   Side Effects Monitoring: Sedation Level  1 06/23/17 0920    Pulse  87 06/22/17 2044   HEIGHT AND WEIGHT      Heart Rate  95 06/23/17 0753   Height  1.6 m (5' 3\") 06/22/17 0035    Pulse/Heart Rate Source  Monitor 06/23/17 0356   Height Method  Stated 06/22/17 0035    BP  147/58 06/23/17 0809   Weight  68.9 kg (151 lb 14.4 oz) 06/23/17 0357    BP Location  Left arm 06/23/17 0809   BSA (Calculated - sq m)  1.76 06/22/17 0035    OXYGEN THERAPY     BMI (Calculated)  27.16 06/22/17 0035    SpO2  94 % 06/23/17 0356   RESPIRATORY MONITORING      O2 Device  None (Room air) 06/23/17 0356   Respiratory Monitoring (EtCO2)  5 mmHg 06/21/17 2200    PAIN/COMFORT     POSITIONING      Patient Currently in Pain  yes 06/23/17 0443   Body Position  supine 06/23/17 0245    Preferred Pain Scale  CAPA (Clinically Aligned Pain Assessment) ([facility] Adults Only) 06/23/17 0920   Head of Bed (HOB)  HOB at 20-30 degrees 06/23/17 0245    0-10 Pain Scale  -- (Discomfort per pt) 06/22/17 1602   Chair  Upright in chair 06/23/17 0935    Pain Location  Hip 06/23/17 0443   Positioning/Transfer Devices  pillows;in use 06/23/17 0245    Pain Orientation  Right 06/23/17 0443   DAILY CARE      Pain Descriptors  Patient unable to describe 06/22/17 2044   Activity Type  bedrest with commode;up in chair 06/23/17 1893    Pain Intervention(s)  Medication (See eMAR) 06/23/17 9592   Activity Level of " Assistance  assistance, 1 person (abductor brace on) 06/23/17 0935    CLINICALLY ALIGNED PAIN ASSESSMENT (CAPA) ([facility] [place] ADULTS ONLY)     Activity Assistive Device  walker 06/23/17 0935    Comfort  comfortably manageable 06/23/17 0920   Additional Documentation  Activity Device Assistance (Row) 06/22/17 1412    Change in Pain  about the same 06/23/17 0443   POINT OF CARE TESTING      Pain Control  fully effective 06/23/17 0920   Puncture Site  fingertip 06/22/17 1706    Functioning  can do most things, but pain gets in the way of some 06/23/17 0920   Bedside Glucose (mg/dl )   171 mg/dl 06/22/17 1706    Sleep  normal sleep 06/23/17 0444                 Patient Lines/Drains/Airways Status    Active LINES/DRAINS/AIRWAYS     Name: Placement date: Placement time: Site: Days: Last dressing change:    Peripheral IV 06/22/17 Right Lower forearm 06/22/17   1440   Lower forearm   less than 1     Incision/Surgical Site 05/10/17 Right Hip 05/10/17   1245    43             Patient Lines/Drains/Airways Status    Active PICC/CVC     None            Intake/Output Detail Report     Date Intake     Output Net    Shift P.O. I.V. IV Piggyback Total Urine Total       Noc 06/21/17 2300 - 06/22/17 0659 -- -- -- -- 150 150 -150    Day 06/22/17 0700 - 06/22/17 1459 240 -- -- 240 800 800 -560    Amna 06/22/17 1500 - 06/22/17 2259 340 -- -- 340 575 575 -235    Noc 06/22/17 2300 - 06/23/17 0659 150 -- -- 150 750 750 -600    Day 06/23/17 0700 - 06/23/17 1459 -- -- -- -- 200 200 -200      Last Void/BM       Most Recent Value    Urine Occurrence 1 at 06/22/2017 1800    Stool Occurrence       Case Management/Discharge Planning     Case Management/Discharge Planning Flowsheet     REFERRAL INFORMATION     Major Change/Loss/Stressor  none 06/22/17 0228    Did the Initial Social Work Assessment result in a Social Work Case?  Yes 06/22/17 1250   EXPECTED DISCHARGE      Admission Type  observation 06/22/17 1250   Expected  Discharge Date  06/22/17 06/22/17 1250    Arrived From  long-term care 06/22/17 1250   DISCHARGE PLANNING      Primary Care Clinic Name  -- ([facility]) 06/22/17 1250   Outpatient/Agency/Support Group Needs  inpatient rehabilitation facility (specify) (TCU) 05/13/17 1244    Primary Care MD Name  -- ([facility]) 06/22/17 1250   FINAL RESOURCES      LIVING ENVIRONMENT     Equipment Currently Used at Home  walker, standard 06/07/17 1327    Lives With  facility resident 06/22/17 1521   Resources List  Transitional Care 06/22/17 1250    Living Arrangements  extended care facility 06/22/17 1521   PAS Number  [id number] 05/12/17 1436    Provides Primary Care For  no one, unable/limited ability to care for self 06/22/17 1250   ABUSE RISK SCREEN      ASSESSMENT OF FAMILY/SOCIAL SUPPORT     QUESTION TO PATIENT:  Has a member of your family or a partner(now or in the past) intimidated, hurt, manipulated, or controlled you in any way?  no 06/22/17 0229    Who is your support system?  Children 06/22/17 1250   QUESTION TO PATIENT: Do you feel safe going back to the place where you are living?  yes 06/22/17 0229    Description of Support System  Supportive;Involved 06/22/17 1250   OBSERVATION: Is there reason to believe there has been maltreatment of a vulnerable adult (ie. Physical/Sexual/Emotional abuse, self neglect, lack of adequate food, shelter, medical care, or financial exploitation)?  no 06/22/17 0229    Support Assessment  Adequate family and caregiver support;Adequate social supports 06/22/17 1250   (R) MENTAL HEALTH SUICIDE RISK      COPING/STRESS     Are you depressed or being treated for depression?  No 06/22/17 0224

## 2017-06-22 ENCOUNTER — APPOINTMENT (OUTPATIENT)
Dept: PHYSICAL THERAPY | Facility: CLINIC | Age: 82
End: 2017-06-22
Payer: MEDICARE

## 2017-06-22 PROBLEM — S72.001A FRACTURE OF FEMORAL NECK, RIGHT (H): Status: RESOLVED | Noted: 2017-05-10 | Resolved: 2017-06-22

## 2017-06-22 PROBLEM — S73.004S HIP DISLOCATION, RIGHT, SEQUELA: Status: ACTIVE | Noted: 2017-06-22

## 2017-06-22 PROBLEM — S73.004A HIP DISLOCATION, RIGHT (H): Status: ACTIVE | Noted: 2017-06-22

## 2017-06-22 PROBLEM — Z96.641 STATUS POST TOTAL REPLACEMENT OF RIGHT HIP: Status: ACTIVE | Noted: 2017-06-22

## 2017-06-22 LAB
ANION GAP SERPL CALCULATED.3IONS-SCNC: 6 MMOL/L (ref 3–14)
BASOPHILS # BLD AUTO: 0 10E9/L (ref 0–0.2)
BASOPHILS NFR BLD AUTO: 0.2 %
BUN SERPL-MCNC: 15 MG/DL (ref 7–30)
CALCIUM SERPL-MCNC: 8.7 MG/DL (ref 8.5–10.1)
CHLORIDE SERPL-SCNC: 102 MMOL/L (ref 94–109)
CO2 SERPL-SCNC: 30 MMOL/L (ref 20–32)
CREAT SERPL-MCNC: 0.65 MG/DL (ref 0.52–1.04)
DIFFERENTIAL METHOD BLD: ABNORMAL
EOSINOPHIL # BLD AUTO: 0.3 10E9/L (ref 0–0.7)
EOSINOPHIL NFR BLD AUTO: 2.9 %
ERYTHROCYTE [DISTWIDTH] IN BLOOD BY AUTOMATED COUNT: 13.1 % (ref 10–15)
FERRITIN SERPL-MCNC: 223 NG/ML (ref 8–252)
FOLATE SERPL-MCNC: 18.4 NG/ML
GFR SERPL CREATININE-BSD FRML MDRD: 85 ML/MIN/1.7M2
GLUCOSE BLDC GLUCOMTR-MCNC: 150 MG/DL (ref 70–99)
GLUCOSE BLDC GLUCOMTR-MCNC: 168 MG/DL (ref 70–99)
GLUCOSE BLDC GLUCOMTR-MCNC: 171 MG/DL (ref 70–99)
GLUCOSE BLDC GLUCOMTR-MCNC: 202 MG/DL (ref 70–99)
GLUCOSE SERPL-MCNC: 193 MG/DL (ref 70–99)
HCT VFR BLD AUTO: 30.6 % (ref 35–47)
HGB BLD-MCNC: 9.6 G/DL (ref 11.7–15.7)
IMM GRANULOCYTES # BLD: 0 10E9/L (ref 0–0.4)
IMM GRANULOCYTES NFR BLD: 0.2 %
IRON SATN MFR SERPL: 16 % (ref 15–46)
IRON SERPL-MCNC: 36 UG/DL (ref 35–180)
LYMPHOCYTES # BLD AUTO: 1.6 10E9/L (ref 0.8–5.3)
LYMPHOCYTES NFR BLD AUTO: 17.4 %
MCH RBC QN AUTO: 27.9 PG (ref 26.5–33)
MCHC RBC AUTO-ENTMCNC: 31.4 G/DL (ref 31.5–36.5)
MCV RBC AUTO: 89 FL (ref 78–100)
MONOCYTES # BLD AUTO: 0.7 10E9/L (ref 0–1.3)
MONOCYTES NFR BLD AUTO: 7.2 %
MRSA DNA SPEC QL NAA+PROBE: NORMAL
NEUTROPHILS # BLD AUTO: 6.5 10E9/L (ref 1.6–8.3)
NEUTROPHILS NFR BLD AUTO: 72.1 %
PLATELET # BLD AUTO: 261 10E9/L (ref 150–450)
POTASSIUM SERPL-SCNC: 4.3 MMOL/L (ref 3.4–5.3)
RBC # BLD AUTO: 3.44 10E12/L (ref 3.8–5.2)
SODIUM SERPL-SCNC: 138 MMOL/L (ref 133–144)
SPECIMEN SOURCE: NORMAL
T4 FREE SERPL-MCNC: 1.24 NG/DL (ref 0.76–1.46)
TIBC SERPL-MCNC: 231 UG/DL (ref 240–430)
TSH SERPL DL<=0.005 MIU/L-ACNC: 4.45 MU/L (ref 0.4–4)
VIT B12 SERPL-MCNC: 658 PG/ML (ref 193–986)
WBC # BLD AUTO: 9 10E9/L (ref 4–11)

## 2017-06-22 PROCEDURE — 97116 GAIT TRAINING THERAPY: CPT | Mod: GP

## 2017-06-22 PROCEDURE — L2830 SOFT INTERFACE ABOVE KNEE SE: HCPCS

## 2017-06-22 PROCEDURE — 96372 THER/PROPH/DIAG INJ SC/IM: CPT

## 2017-06-22 PROCEDURE — 84443 ASSAY THYROID STIM HORMONE: CPT | Performed by: PHYSICIAN ASSISTANT

## 2017-06-22 PROCEDURE — 82607 VITAMIN B-12: CPT | Performed by: PHYSICIAN ASSISTANT

## 2017-06-22 PROCEDURE — 25000132 ZZH RX MED GY IP 250 OP 250 PS 637: Mod: GY | Performed by: PHYSICIAN ASSISTANT

## 2017-06-22 PROCEDURE — 83540 ASSAY OF IRON: CPT | Performed by: PHYSICIAN ASSISTANT

## 2017-06-22 PROCEDURE — 25000131 ZZH RX MED GY IP 250 OP 636 PS 637: Mod: GY | Performed by: PHYSICIAN ASSISTANT

## 2017-06-22 PROCEDURE — 82728 ASSAY OF FERRITIN: CPT | Performed by: PHYSICIAN ASSISTANT

## 2017-06-22 PROCEDURE — 40000193 ZZH STATISTIC PT WARD VISIT

## 2017-06-22 PROCEDURE — 85025 COMPLETE CBC W/AUTO DIFF WBC: CPT | Performed by: STUDENT IN AN ORGANIZED HEALTH CARE EDUCATION/TRAINING PROGRAM

## 2017-06-22 PROCEDURE — G0378 HOSPITAL OBSERVATION PER HR: HCPCS

## 2017-06-22 PROCEDURE — 99218 ZZC INITIAL OBSERVATION CARE,LEVL I: CPT | Performed by: FAMILY MEDICINE

## 2017-06-22 PROCEDURE — 84439 ASSAY OF FREE THYROXINE: CPT | Performed by: PHYSICIAN ASSISTANT

## 2017-06-22 PROCEDURE — 82746 ASSAY OF FOLIC ACID SERUM: CPT | Performed by: PHYSICIAN ASSISTANT

## 2017-06-22 PROCEDURE — 99207 ZZC CDG-CODE CATEGORY CHANGED: CPT | Performed by: FAMILY MEDICINE

## 2017-06-22 PROCEDURE — 87641 MR-STAPH DNA AMP PROBE: CPT | Performed by: FAMILY MEDICINE

## 2017-06-22 PROCEDURE — 83550 IRON BINDING TEST: CPT | Performed by: PHYSICIAN ASSISTANT

## 2017-06-22 PROCEDURE — 97110 THERAPEUTIC EXERCISES: CPT | Mod: GP

## 2017-06-22 PROCEDURE — 87640 STAPH A DNA AMP PROBE: CPT | Performed by: FAMILY MEDICINE

## 2017-06-22 PROCEDURE — A9270 NON-COVERED ITEM OR SERVICE: HCPCS | Mod: GY | Performed by: PHYSICIAN ASSISTANT

## 2017-06-22 PROCEDURE — 97161 PT EVAL LOW COMPLEX 20 MIN: CPT | Mod: GP

## 2017-06-22 PROCEDURE — 25000132 ZZH RX MED GY IP 250 OP 250 PS 637: Mod: GY | Performed by: STUDENT IN AN ORGANIZED HEALTH CARE EDUCATION/TRAINING PROGRAM

## 2017-06-22 PROCEDURE — A9270 NON-COVERED ITEM OR SERVICE: HCPCS | Mod: GY | Performed by: STUDENT IN AN ORGANIZED HEALTH CARE EDUCATION/TRAINING PROGRAM

## 2017-06-22 PROCEDURE — 00000146 ZZHCL STATISTIC GLUCOSE BY METER IP

## 2017-06-22 PROCEDURE — 80048 BASIC METABOLIC PNL TOTAL CA: CPT | Performed by: STUDENT IN AN ORGANIZED HEALTH CARE EDUCATION/TRAINING PROGRAM

## 2017-06-22 RX ORDER — NICOTINE POLACRILEX 4 MG
15-30 LOZENGE BUCCAL
Status: DISCONTINUED | OUTPATIENT
Start: 2017-06-22 | End: 2017-06-23 | Stop reason: HOSPADM

## 2017-06-22 RX ORDER — CYCLOBENZAPRINE HCL 5 MG
5 TABLET ORAL 3 TIMES DAILY PRN
Status: DISCONTINUED | OUTPATIENT
Start: 2017-06-22 | End: 2017-06-23 | Stop reason: HOSPADM

## 2017-06-22 RX ORDER — ACETAMINOPHEN 325 MG/1
975 TABLET ORAL 3 TIMES DAILY
Status: DISCONTINUED | OUTPATIENT
Start: 2017-06-22 | End: 2017-06-22

## 2017-06-22 RX ORDER — OXYCODONE HYDROCHLORIDE 5 MG/1
5 TABLET ORAL
Status: DISCONTINUED | OUTPATIENT
Start: 2017-06-22 | End: 2017-06-22

## 2017-06-22 RX ORDER — LISINOPRIL 5 MG/1
5 TABLET ORAL DAILY
Status: DISCONTINUED | OUTPATIENT
Start: 2017-06-22 | End: 2017-06-23 | Stop reason: HOSPADM

## 2017-06-22 RX ORDER — ONDANSETRON 4 MG/1
4 TABLET, ORALLY DISINTEGRATING ORAL EVERY 6 HOURS PRN
Status: DISCONTINUED | OUTPATIENT
Start: 2017-06-22 | End: 2017-06-23 | Stop reason: HOSPADM

## 2017-06-22 RX ORDER — NALOXONE HYDROCHLORIDE 0.4 MG/ML
.1-.4 INJECTION, SOLUTION INTRAMUSCULAR; INTRAVENOUS; SUBCUTANEOUS
Status: DISCONTINUED | OUTPATIENT
Start: 2017-06-22 | End: 2017-06-23 | Stop reason: HOSPADM

## 2017-06-22 RX ORDER — ONDANSETRON 2 MG/ML
4 INJECTION INTRAMUSCULAR; INTRAVENOUS EVERY 6 HOURS PRN
Status: DISCONTINUED | OUTPATIENT
Start: 2017-06-22 | End: 2017-06-23 | Stop reason: HOSPADM

## 2017-06-22 RX ORDER — AMOXICILLIN 250 MG
2 CAPSULE ORAL 2 TIMES DAILY PRN
Status: DISCONTINUED | OUTPATIENT
Start: 2017-06-22 | End: 2017-06-23 | Stop reason: HOSPADM

## 2017-06-22 RX ORDER — ACETAMINOPHEN 325 MG/1
650 TABLET ORAL DAILY
Status: DISCONTINUED | OUTPATIENT
Start: 2017-06-23 | End: 2017-06-23 | Stop reason: HOSPADM

## 2017-06-22 RX ORDER — DEXTROSE MONOHYDRATE 25 G/50ML
25-50 INJECTION, SOLUTION INTRAVENOUS
Status: DISCONTINUED | OUTPATIENT
Start: 2017-06-22 | End: 2017-06-23 | Stop reason: HOSPADM

## 2017-06-22 RX ORDER — ACETAMINOPHEN 325 MG/1
650 TABLET ORAL EVERY 4 HOURS PRN
Status: DISCONTINUED | OUTPATIENT
Start: 2017-06-22 | End: 2017-06-23 | Stop reason: HOSPADM

## 2017-06-22 RX ADMIN — INSULIN ASPART 2 UNITS: 100 INJECTION, SOLUTION INTRAVENOUS; SUBCUTANEOUS at 12:15

## 2017-06-22 RX ADMIN — INSULIN ASPART 1 UNITS: 100 INJECTION, SOLUTION INTRAVENOUS; SUBCUTANEOUS at 17:07

## 2017-06-22 RX ADMIN — ASPIRIN 325 MG: 325 TABLET, COATED ORAL at 10:21

## 2017-06-22 RX ADMIN — LISINOPRIL 5 MG: 5 TABLET ORAL at 08:52

## 2017-06-22 RX ADMIN — OMEPRAZOLE 20 MG: 20 CAPSULE, DELAYED RELEASE ORAL at 08:52

## 2017-06-22 RX ADMIN — ACETAMINOPHEN 650 MG: 325 TABLET, FILM COATED ORAL at 16:01

## 2017-06-22 RX ADMIN — OXYCODONE HYDROCHLORIDE 5 MG: 5 TABLET ORAL at 19:39

## 2017-06-22 RX ADMIN — ACETAMINOPHEN 650 MG: 325 TABLET, FILM COATED ORAL at 01:31

## 2017-06-22 RX ADMIN — ACETAMINOPHEN 650 MG: 325 TABLET, FILM COATED ORAL at 05:29

## 2017-06-22 RX ADMIN — CYCLOBENZAPRINE 5 MG: 5 TABLET, FILM COATED ORAL at 16:02

## 2017-06-22 RX ADMIN — ACETAMINOPHEN 975 MG: 325 TABLET, FILM COATED ORAL at 08:52

## 2017-06-22 ASSESSMENT — ACTIVITIES OF DAILY LIVING (ADL)
FALL_HISTORY_WITHIN_LAST_SIX_MONTHS: YES
AMBULATION: 2-->ASSISTIVE PERSON
TOILETING: 2-->ASSISTIVE PERSON
COGNITION: 2 - DIFFICULTY WITH ORGANIZING THOUGHTS
TRANSFERRING: 2-->ASSISTIVE PERSON
WHICH_OF_THE_ABOVE_FUNCTIONAL_RISKS_HAD_A_RECENT_ONSET_OR_CHANGE?: AMBULATION
SWALLOWING: 0-->SWALLOWS FOODS/LIQUIDS WITHOUT DIFFICULTY
NUMBER_OF_TIMES_PATIENT_HAS_FALLEN_WITHIN_LAST_SIX_MONTHS: 2
RETIRED_EATING: 0-->INDEPENDENT
RETIRED_COMMUNICATION: 2-->DIFFICULTY UNDERSTANDING (NOT RELATED TO LANGUAGE BARRIER)
BATHING: 2-->ASSISTIVE PERSON
DRESS: 2-->ASSISTIVE PERSON

## 2017-06-22 ASSESSMENT — PAIN DESCRIPTION - DESCRIPTORS
DESCRIPTORS: ACHING
DESCRIPTORS: PATIENT UNABLE TO DESCRIBE

## 2017-06-22 NOTE — PLAN OF CARE
Problem: Discharge Planning  Goal: Discharge Planning (Adult, OB, Behavioral, Peds)  Outcome: No Change  Pt will discharge to TCU when bed available.     Problem: Goal Outcome Summary  Goal: Goal Outcome Summary  Outcome: No Change  Pt alert, disoriented to time, place and situation.  Does not remember she had hip surgery or has dislocated her hip.  Does not use call light.  Alarms on for safety.  Son and/or daughter have been present all shift today. Eats independently after tray set-up. Continent of urine today.  Used bedpan with 1 assist prior to be fitted for abductor brace. Pt turns from side to side in bed and lifts her hips independently.  Right leg bruised with some swelling.  Brace to be on at all times as pt non-compliant with hip precautions in bed. Remove brace every shift to monitor pressure points. Has been up to commode and to chair since brace applied.  Moves well with 1 assist and walker. Medicated with tylenol this morning.

## 2017-06-22 NOTE — UTILIZATION REVIEW
"  Admission Status; Secondary Review Determination         Under the authority of the Utilization Management Committee, the utilization review process indicated a secondary review on the above patient.  The review outcome is based on review of the medical records, discussions with staff, and applying clinical experience noted on the date of the review.          (x) Observation Status Appropriate - This patient does not meet hospital inpatient criteria and is placed in observation status. If this patient's primary payer is Medicare and was admitted as an inpatient, Condition Code 44 should be used and patient status changed to \"observation\".     RATIONALE FOR DETERMINATION   89 year old female who presents to the department from assisted living facility for evaluation of right hip pain and internally rotated right leg. Records confirm that the patient had right hip replacement 5 weeks ago by orthopedist Dr. Reddy and without postoperative complication. However she returned to the department on 6/15/17 for evaluation of right hip pain and it was determined that she suffered from dislocation of the right hip. After reduction she had returned home and again seemingly doing well. Lat night nursing staff found the patient lying down complaining of right hip pain but no apparent injury. She does have baseline dementia and very poor historian, does not recall fall or injury.  She was introduced in the emergency room, however she was in a lot of pain so she was kept in the hospital, discussed with the attending physician, orthotics were consulted for hip abductor, as soon as this is done patient likely with discharge back according to Dr. Martin. The severity of illness, intensity of service provided, expected LOS and risk for adverse outcome make the care appropriate for further observation; however, doesn't meet criteria for hospital inpatient admission.     This document was produced using voice recognition software.    "   The information on this document is developed by the utilization review team in order for the business office to ensure compliance.  This only denotes the appropriateness of proper admission status and does not reflect the quality of care rendered.         The definitions of Inpatient Status and Observation Status used in making the determination above are those provided in the CMS Coverage Manual, Chapter 1 and Chapter 6, section 70.4.      Sincerely,     RONAN ANDERSON MD    System Medical Director  Utilization Management  [facility].

## 2017-06-22 NOTE — ANESTHESIA PREPROCEDURE EVALUATION
Anesthesia Evaluation     . Pt has had prior anesthetic. Type: General, MAC and Regional    No history of anesthetic complications          ROS/MED HX    ENT/Pulmonary:  - neg pulmonary ROS     Neurologic: Comment: Myasthenia gravis    (+)other neuro Kake    Cardiovascular:     (+) hypertension----. : . . . :. . Previous cardiac testing Echodate:5/9/2017results:   The left ventricle is normal in size. There is mild concentric left  ventricular hypertrophy. Left ventricular systolic function is normal. The  visual ejection fraction is estimated at 55-60%. Grade I or early diastolic  dysfunction. Paradoxical septum motion is noted.  The right ventricle is normal size. The right ventricular systolic function is  normal.  There is mild to moderate (1-2+) tricuspid regurgitation. The right  ventricular systolic pressure is approximated at 48.3 mmHg plus the right  atrial pressure. Right ventricular systolic pressure is elevated, consistent  with moderate to severe pulmonary hypertension.  There is moderate trileaflet aortic sclerosis. No aortic regurgitation is  present. Mild valvular aortic stenosis. The peak AoV pressure gradient is 26.5  mmHg. The mean AoV pressure gradient is 14.2 mmHg.  No pericardial effusion.  No previous study for comparison.date: results:ECG reviewed date:5/9/2017 results:1st degree AVB, old inf MI date: results:          METS/Exercise Tolerance:  1 - Eating, dressing   Hematologic:     (+) Anemia, -      Musculoskeletal: Comment: Thoracic or lumbosacral neuritis or radiculitis  (+) arthritis, , , other musculoskeletal- Right hip pain/dislocation  s/p RTHA      GI/Hepatic:  - neg GI/hepatic ROS       Renal/Genitourinary: Comment: Urinary incontinence        Endo:     (+) type II DM .      Psychiatric:     (+) psychiatric history depression      Infectious Disease:  - neg infectious disease ROS       Malignancy:      - no malignancy   Other:    - neg other ROS                 Physical  Exam  Normal systems: cardiovascular, pulmonary and dental    Airway   Mallampati: II  TM distance: >3 FB  Neck ROM: full    Dental     Cardiovascular   Rhythm and rate: regular and normal      Pulmonary    breath sounds clear to auscultation                    Anesthesia Plan      History & Physical Review  History and physical reviewed and following examination; no interval change.    ASA Status:  3 emergent.    NPO Status:  Waived due to emergency and > 4 hours    Plan for MAC Reason for MAC:  Deep or markedly invasive procedure (G8)         Postoperative Care      Consents  Anesthetic plan, risks, benefits and alternatives discussed with:  Patient..                          .

## 2017-06-22 NOTE — DISCHARGE INSTRUCTIONS
1.  Follow up with Dr. Riky Greenfield in 1-2 weeks for post hospital check.  Call [phone] if appointment needed or questions  2.  Use pain medication as directed  3.  Keep Abductor brace on at all times except for hygiene and skin checks until follow up appointment.

## 2017-06-22 NOTE — PROGRESS NOTES
"[place] [facility] ADMISSION NOTE    Patient admitted to room 1004 at approximately 0030 via cart from emergency room. Patient was accompanied by transport tech and daughter.     Verbal SBAR report received from SARA Tinajero prior to patient arrival.     Patient trasferred to bed via air waqas. Patient alert and oriented X 1. The patient is not having any pain.  . Admission vital signs: Blood pressure 134/56, pulse 88, temperature 98  F (36.7  C), temperature source Oral, resp. rate 16, height 1.6 m (5' 3\"), weight 69.4 kg (153 lb), SpO2 98 %. Patient was oriented to plan of care, call light, bed controls, tv, telephone, bathroom and visiting hours.     The following safety risks were identified during admission: fall. Yellow risk band applied: YES.     Kristel Gruber    "

## 2017-06-22 NOTE — H&P
[facility]    History and Physical  Hospital Medicine       Date of Admission:  6/21/2017  Date of Service: 6/22/2017     Assessment & Plan   Lulu Carlton is a 89 year old female with recent OPAL and subsequent hip dislocation on right, dementia, HTN, DMT2 and GERD who presents with right hip pain    Hip Dislocation, Right   Presented to ER with notes of increased right hip pain and deformity per staffing. Not fall/found on floor this time. Previous hip dislocation (6/15), relocated in ED without issue. Ortho was consulted and do not think surgery is indicated at this time. Patient will be fitted for brace.   -non-weight bearing until brace applied  -ortho following  -pain control with scheduled Tylenol and oxycodone PRN  -PT/OT/care transition consultation pending    Recent OPAL, Right   ORIF completed (5/10/2017) by Dr. Greenfield following fall and subsequent right hip fracture.   -continue PTA ASA for DVT ppx  -continue PTA oxycodone PRN  -continue PTA bowel regimen    Normocytic Normochromic Anemia   Hg 9.6, down from 12.2 recently. Baseline slowly declining from 15 since 2015. No evidence of bleeding. VS stable.   -CBC in AM  -iron studies, ferritin, tsh, vit b12 and folate pending    Diabetes Mellitus, Type II  Chronic, stable.  Last a1C 6.3% on 5/2017.   -hold PTA glipizide   -mod SSI  -hypo/hyperglycemia protocol with blood glucose monitoring    HTN   Controlled.  -continue PTA lisinopril    GERD  -continue PTA omeprazole    FEN:  -tolerating PO intake well  -Will monitor electrolytes and replace as needed  -diabetic diet    DVT Prophylaxis: Low Risk and will be ambulatory following brace application  Code Status: DNR / DNI    Disposition: Anticipate discharge in 1-2 days once cleared by PT/OT, pain tolerable on oral medications with safe disposition. Appropriate for OBSERVATION care    I have discussed patient and formulated plan with Dr. Martin. Assessment and plan as  "above.     Moni Bland PA-C  [facility]        Primary Care Physician   RonalTodd [phone]    History is obtained from the patient, ED notes and review of the EMR.    Past Medical History    Past Medical History:   Diagnosis Date     Anemia, unspecified hosp. 4/3/07 [place]      Central nervous system complication hosp. 4/3/07 [place]     Depressive disorder, not elsewhere classified      Fracture of femoral neck, right (H) 5/10/2017     Myasthenia gravis      Other malaise and fatigue hosp. 4/3/07 [place]     intermittent episodes of slurred speech, headaches, some confusion w/general weakness.      Other urinary incontinence      Thoracic or lumbosacral neuritis or radiculitis, unspecified     lumbar radiculopathy involving right leg.      Unspecified essential hypertension       Diagnosis Date Noted     Hip dislocation, right, sequela 06/22/2017     Priority: Medium     Status post total replacement of right hip 06/22/2017     Priority: Medium     Dementia 06/21/2017     Priority: Medium     Displaced fracture of right femoral neck (H) 05/09/2017     Priority: Medium     Hypertension goal BP (blood pressure) < 140/90 12/12/2016     Priority: Medium     Urinary incontinence 10/31/2016     Priority: Medium     Osteoporosis 10/31/2016     Priority: Medium     Vitamin D deficiency 10/31/2016     Priority: Medium     Anemia, iron deficiency 10/31/2016     Priority: Medium     Type 2 diabetes mellitus with hyperglycemia, without long-term current use of insulin (H) 10/31/2016     Priority: Medium     Primary osteoarthritis of left knee 10/31/2016     Priority: Medium     Steatosis of liver 04/16/2013     Priority: Medium     Malignant lymphoma, large cell, diffuse (H) 08/29/2011     Priority: Medium     Do not resuscitate status 08/29/2011     Priority: Medium     Myasthenia gravis (H) 02/08/2007     Priority: Medium     Overview:   NEUROLOGIST YASEMIN MCWILLIAMS  \"I don't think I " "have that anymore.\" Has not seen a neurologist in 4 or 5 years. \"I've been weaker now, dizzy and week. I put that to the lack of sleep. I was only sleeping for three hours a night for a while there. I'm doing better now with that.\"   2-11       Sensorineural hearing loss, asymmetrical 10/17/2007      Past Surgical History   Past Surgical History:   Procedure Laterality Date     OPEN REDUCTION INTERNAL FIXATION HIP BIPOLAR Right 5/10/2017    Procedure: OPEN REDUCTION INTERNAL FIXATION HIP BIPOLAR;  Open reduction internal fixation right hip bipolar gregg arthroplasty.;  Surgeon: Riky Greenfield MD;  Location: [facility] OR     SURGICAL HISTORY OF -       thymus removal      SURGICAL HISTORY OF -       cholecystectomy      History of Present Illness   Lulu Carlton is a 89 year old female who presents with right hip pain    Patient is unable to tell history due to baseline dementia. Resides at [facility]    Patient presented from EMS after staff noted return of right hip tenderness and right hip deformity. Staff unaware of recent fall or finding patient on the ground. Staff administering Percocet 5mg q3h PRN without relief. Patient found to be sitting in wheelchair when EMS arrived.     Patient has no recollection of fall. Had hip replacement after fall resulting in right hip fracture (5/10). Had recent right hip dislocation (6/15) and see in ER with successful relocation.     Intermittent headaches none currently.  Denies any fever, chills, cold-like symptoms (congestion, pharyngitis, sinus pressure, rhinorrhea), CP, SOB, cough, abdominal pain, N/V/D, dysuria, lightheadedness, dizziness, leg swelling, rashes    Prior to Admission Medications   Prior to Admission Medications   Prescriptions Last Dose Informant Patient Reported? Taking?   Acetaminophen (TYLENOL PO) 6/21/2017 at am Nursing Home Yes Yes   Sig: Take 1,300 mg by mouth every morning Do not exceed 4,000 mg of Acetaminophen from " "all sources in 24 hours    CYCLOBENZAPRINE HCL PO  Nursing Home Yes No   Sig: Take 5 mg by mouth 3 times daily as needed for muscle spasms   Cyanocobalamin (B-12) 1000 MCG TBCR 6/21/2017 at am Nursing Home No Yes   Sig: Take 1,000 mcg by mouth daily   GLIPIZIDE XL PO  Nursing Home Yes No   Sig: Take 2.5 mg by mouth daily (with breakfast)   Glucose Blood (BLOOD GLUCOSE TEST STRIPS) STRP 6/21/2017 at 0730 Nursing Home Yes Yes   Sig: by In Vitro route as needed   LOPERAMIDE HCL PO  Nursing Home Yes No   Sig: Take 2 mg by mouth daily as needed   MECLIZINE HCL PO  Nursing Home Yes No   Sig: Take 12.5 mg by mouth 2 times daily as needed for dizziness   aspirin  MG EC tablet 6/21/2017 at am Nursing Home No Yes   Sig: Take 1 tablet (325 mg) by mouth daily   calcium-vitamin D (CALTRATE) 600-400 MG-UNIT per tablet 6/20/2017 at hs Nursing Home Yes Yes   Sig: Take 1 tablet by mouth every evening   lisinopril (PRINIVIL,ZESTRIL) 5 MG tablet 6/21/2017 at am Nursing Home No Yes   Sig: Take 1 tablet (5 mg) by mouth daily   multivitamin (OCUVITE) TABS tablet 6/21/2017 at am Nursing Home Yes Yes   Sig: Take 1 tablet by mouth daily   omeprazole (PRILOSEC) 20 MG CR capsule 6/21/2017 at 0600 Nursing Home No Yes   Sig: Take 1 capsule (20 mg) by mouth daily   oxyCODONE (ROXICODONE) 5 MG IR tablet 6/21/2017 at Unknown time intermediate No Yes   Sig: Take 1 tablet (5 mg) by mouth every 3 hours as needed for moderate to severe pain   senna-docusate (SENOKOT-S;PERICOLACE) 8.6-50 MG per tablet  Nursing Home Yes No   Sig: Take 2 tablets by mouth 2 times daily as needed for constipation   triamcinolone (KENALOG) 0.1 % cream  Nursing Home Yes No   Sig: Apply topically 2 times daily as needed for irritation      Facility-Administered Medications: None     Allergies   Allergies   Allergen Reactions     Hydrocodone-Acetaminophen Other (See Comments)     Trazodone Other (See Comments)     \"She gets crazy dreams and then can't fall back asleep.\" " "    Metformin Rash     Family History    No family history on file.      Social History   Social History     Social History     Marital status:      Spouse name: N/A     Number of children: N/A     Years of education: N/A     Occupational History     Not on file.     Social History Main Topics     Smoking status: Never Smoker     Smokeless tobacco: Not on file     Alcohol use No     Drug use: No     Sexual activity: Not on file     Other Topics Concern     Not on file     Social History Narrative    Lives in [facility] care unit - [facility]    Review of Systems   The 10 point Review of Systems is negative other than noted in the HPI.    Physical Exam   /67 (BP Location: Left arm)  Pulse 87  Temp 97.8  F (36.6  C) (Oral)  Resp 18  Ht 1.6 m (5' 3\")  Wt 69.4 kg (153 lb)  SpO2 95%  BMI 27.1 kg/m2     Weight: 152 lbs 15.99 oz Body mass index is 27.1 kg/(m^2).     Constitutional: Alert, oriented to person not time, situation (thinks she may have fallen), place, cooperative, no apparent distress, appears nontoxic, Appears stated age.  Eyes: Sclera are anicteric, EOMI.  HENT: Normocephalic. Atraumatic. MMM.  Lymph/Hematologic: No preauricular, postauricular, occipital, sub-mandibular, tonsillar, sub-mental, anterior or posterior cervical, or supraclavicular lymphadenopathy is appreciated.  Cardiovascular: Regular rate and rhythm, systolic murmur noted. Radial pulses are 2+ bilaterally. Distal pulses are intact. No lower extremity edema.  Respiratory: No accessory muscle usage. Speaking in full sentences. Clear to auscultation bilaterally without wheezes, crackles or rhonchi.    GI:bowel sounds present, soft, non-tender,non-distended. No rebound or guarding.   Genitourinary: Deferred  Musculoskeletal: Normal muscle bulk and tone. Limited movement of RLE due to pain. Hip brace currently being applied. Ecchymosis to right knee and right lateral thigh - appears to be healing.  Skin: Warm and dry, no " юлия.   Neurologic: Neck supple. Cranial nerves 3-12 are grossly intact.     Data   Data reviewed today:     Recent Labs  Lab 06/22/17  0015   WBC 9.0   HGB 9.6*   MCV 89         POTASSIUM 4.3   CHLORIDE 102   CO2 30   BUN 15   CR 0.65   ANIONGAP 6   SLMI 8.7   *     Recent Results (from the past 24 hour(s))   XR Pelvis w Hip Right 1 View    Narrative    PELVIS AND HIP RIGHT ONE VIEW  6/21/2017 8:24 PM      HISTORY: Pain, history of prosthetic dislocation.    COMPARISON: 6/15/2017      Impression    IMPRESSION: Superior dislocation of the right hip arthroplasty. No  fracture.    SABRA VILLARREAL MD   XR Pelvis Port 1/2 Views    Narrative    PELVIS PORTABLE ONE - TWO VIEW  6/21/2017 9:41 PM      HISTORY: Post reduction right hip dislocation.     COMPARISON: 6/21/2017      Impression    IMPRESSION: The right hip arthroplasty has been successfully reduced  into the acetabulum. No fracture.    SABRA VILLARREAL MD     I personally reviewed no images or EKG's today.    I have discussed patient and formulated plan with Dr. Martin. Assessment and plan as above.     Chart documentation with keystrokes and/or Dragon voice recognition software. Although reviewed after completion, some word and grammatical error may remain.  Moni Bland PA-C  [facility]

## 2017-06-22 NOTE — CONSULTS
CARE TRANSITION SOCIAL WORK INITIAL ASSESSMENT:      Met with: Patient and Family.    DATA  Principal Problem:    Hip dislocation, right, sequela  Active Problems:    Anemia, iron deficiency    Type 2 diabetes mellitus with hyperglycemia, without long-term current use of insulin (H)    Hypertension goal BP (blood pressure) < 140/90    Displaced fracture of right femoral neck (H)    Dementia    Status post total replacement of right hip       Primary Care Clinic Name:  ([facility])  Primary Care MD Name:  ([facility])  Contact information and PCP information verified: Yes      ASSESSMENT  Cognitive Status: awake.       Resources List: Transitional Care     Lives With: facility resident        Description of Support System: Supportive, Involved   Who is your support system?: Children   Support Assessment: Adequate family and caregiver support, Adequate social supports   Insurance Concerns: No Insurance issues identified                  This writer met with pt and pts two adult children introduced self and role. Discussed discharge planning and medicare guidelines in regards to home care, TCU and LTC. Pt currently lives at [facility] (Phone: [phone] Fax:[phone] RN report: [phone] or [phone]) [facility]. Family is requesting TCU, Patient was provided with Medicare certified nursing home list. Pts choices are as follows [facility] (Phone: [phone] Fax: [phone]) and [facility] (Phone: [phone]) Fax: ([phone]).  Referrals are pending at both facilities. Pt recently at TCU and is still in a 30 day window for TCU coverage. Will wait on TCU bed availability.      PAS-RR    Per DHS regulation, CTS team completed and submitted PAS-RR to [place] Board on Aging Direct Connect via the [employer]. CTS team advised SNF and they are aware a PAS-RR has been submitted.     CTS team reviewed with pt or health care agent that they may be contacted for a follow  up appointment within 10 days of hospital discharge if SNF stay is <30 days. Contact information for [employer] was also provided.     Pt or health care agent verbalized understanding.     PAS-RR # [id number]          PLAN    Waiting on TCU bed availability    Discharge Planner   Discharge Plans in progress: TCU  Barriers to discharge plan: bed avaialability  Follow up plan: TCU       Entered by: Tania Marin 06/22/2017 12:50 PM             Tania Marin MSW, LICSW, [facility] [phone]

## 2017-06-22 NOTE — PROGRESS NOTES
2:12 PM  Pt seen and examined.  History of right hemiarthroplasty.  Now with dislocation 6/15 and 6/21.  Ortho recommends abduction brace.  Will go to tcu- but no tcu bed available today.  Utilization asked for obs status.  H and P by Mnoi LONG

## 2017-06-22 NOTE — PROGRESS NOTES
Physical Therapy Evaluation and Discharge Summary     06/22/17 1500   Quick Adds   Type of Visit Initial PT Evaluation   Living Environment   Lives With facility resident   Living Arrangements extended care facility   Home Accessibility no concerns   Functional Level Prior   Ambulation 2-->assistive person   Transferring 2-->assistive person   Toileting 2-->assistive person   Bathing 2-->assistive person   Dressing 2-->assistive person   Eating 0-->independent   Communication 2-->difficulty understanding (not related to language barrier)   Swallowing 0-->swallows foods/liquids without difficulty   Cognition 2 - difficulty with organizing thoughts   Fall history within last six months yes   Number of times patient has fallen within last six months 2   Which of the above functional risks had a recent onset or change? ambulation   General Information   Onset of Illness/Injury or Date of Surgery - Date 06/21/17   Referring Physician Moni Bland PA-C   Patient/Family Goals Statement unable to verbalize a goal due to dementia   Pertinent History of Current Problem (include personal factors and/or comorbidities that impact the POC) Per H&P: Lulu Carlton is a 89 year old female who presents to the department from assisted living facility for evaluation of right hip pain and internally rotated right leg. Records confirm that the patient had right hip replacement 5 weeks ago by orthopedist Dr. Reddy and without postoperative complication. However she returned to the department on 6/15/17 for evaluation of right hip pain and it was determined that she suffered from dislocation of the right hip. After reduction she had returned home and again seemingly doing well. Today nursing staff found the patient lying down complaining of right hip pain but no apparent injury. She does have baseline dementia and very poor historian, does not recall fall or injury.   Weight-Bearing Status - RLE weight-bearing as  tolerated  (with hip abductor brace on)   Cognitive Status Examination   Orientation person   Level of Consciousness alert   Follows Commands and Answers Questions 100% of the time   Personal Safety and Judgment impaired   Memory impaired   Pain Assessment   Patient Currently in Pain Yes, see Vital Sign flowsheet  (R hip pain--unable to rate)   Posture    Posture Forward head position;Protracted shoulders;Kyphosis   Range of Motion (ROM)   ROM Comment WFL except R hip limited due to abductor brace   Strength   Strength Comments WFL except R LE less than antigravity   Bed Mobility   Bed Mobility Comments sit <> supine with mod assist of 2   Transfer Skills   Transfer Comments sit <> stand with CGA   Gait   Gait Gait Skill   Gait Skills   Level of [place]: Gait contact guard   Physical Assist/Nonphysical Assist: Gait 1 person assist   Weight-Bearing Restrictions: Gait weight-bearing as tolerated   Assistive Device for Transfer: Gait rolling walker   Gait Distance 50 feet   Balance   Balance Comments good with RW   General Therapy Interventions   Planned Therapy Interventions bed mobility training;gait training;ROM;strengthening   Clinical Impression   Criteria for Skilled Therapeutic Intervention yes, treatment indicated   PT Diagnosis relocated R hip dislocation   Influenced by the following impairments dementia, pain and weakness   Functional limitations due to impairments mobility and gait   Clinical Presentation Stable/Uncomplicated   Clinical Presentation Rationale clinical judgement   Clinical Decision Making (Complexity) Low complexity   Therapy Frequency` daily   Predicted Duration of Therapy Intervention (days/wks) 1 visit only--pt to be d/c'd to [facility] tomorrow morning   Anticipated Discharge Disposition Transitional Care Facility   Risk & Benefits of therapy have been explained Yes   Patient, Family & other staff in agreement with plan of care Yes   Clinical Impression Comments Pt would benefit from PT  "at [facility] for R hip strengthening and gait and transfer training.   [facility] AM-PAC TM \"6 Clicks\"   2016, Trustees of [facility], under license to [employer].  All rights reserved.   6 Clicks Short Forms Basic Mobility Inpatient Short Form   [facility] AM-PAC  \"6 Clicks\" V.2 Basic Mobility Inpatient Short Form   1. Turning from your back to your side while in a flat bed without using bedrails? 3 - A Little   2. Moving from lying on your back to sitting on the side of a flat bed without using bedrails? 2 - A Lot   3. Moving to and from a bed to a chair (including a wheelchair)? 3 - A Little   4. Standing up from a chair using your arms (e.g., wheelchair, or bedside chair)? 3 - A Little   5. To walk in hospital room? 3 - A Little   6. Climbing 3-5 steps with a railing? 2 - A Lot   Basic Mobility Raw Score (Score out of 24.Lower scores equate to lower levels of function) 16   Total Evaluation Time   Total Evaluation Time (Minutes) 15     Ashley Bridges PT      "

## 2017-06-22 NOTE — ANESTHESIA CARE TRANSFER NOTE
Patient: Lulu Carlton    * No procedures listed *    Diagnosis: * No pre-op diagnosis entered *  Diagnosis Additional Information: No value filed.    Anesthesia Type:   MAC     Note:  Airway :Nasal Cannula  Patient transferred to:Emergency Department        Vitals: (Last set prior to Anesthesia Care Transfer)    CRNA VITALS  6/21/2017 2107 - 6/21/2017 2137 6/21/2017             SpO2: 98 %    EKG: NSR                Electronically Signed By: Toni Woodruff CRNA, APRN CRNA  June 21, 2017  9:37 PM

## 2017-06-22 NOTE — ED PROVIDER NOTES
History     Chief Complaint   Patient presents with     Hip Pain     HPI  Lulu Carlton is a 89 year old female who presents to the department from assisted living facility for evaluation of right hip pain and internally rotated right leg. Records confirm that the patient had right hip replacement 5 weeks ago by orthopedist Dr. Reddy and without postoperative complication. However she returned to the department on 6/15/17 for evaluation of right hip pain and it was determined that she suffered from dislocation of the right hip. After reduction she had returned home and again seemingly doing well. Today nursing staff found the patient lying down complaining of right hip pain but no apparent injury. She does have baseline dementia and very poor historian, does not recall fall or injury. No other history provided.    I have reviewed the Medications, Allergies, Past Medical and Surgical History, and Social History in the Epic system.    Patient Active Problem List   Diagnosis     Sensorineural hearing loss, asymmetrical     Urinary incontinence     Osteoporosis     Vitamin D deficiency     Anemia, iron deficiency     Type 2 diabetes mellitus with hyperglycemia, without long-term current use of insulin (H)     Primary osteoarthritis of left knee     Hypertension goal BP (blood pressure) < 140/90     Displaced fracture of right femoral neck (H)     Fracture of femoral neck, right (H)     Malignant lymphoma, large cell, diffuse (H)     Do not resuscitate status     Steatosis of liver     Myasthenia gravis (H)     Dementia     Hip dislocation, right (H)       Past Surgical History:   Procedure Laterality Date     OPEN REDUCTION INTERNAL FIXATION HIP BIPOLAR Right 5/10/2017    Procedure: OPEN REDUCTION INTERNAL FIXATION HIP BIPOLAR;  Open reduction internal fixation right hip bipolar gregg arthroplasty.;  Surgeon: Riky Greenfield MD;  Location: [facility]     SURGICAL HISTORY OF -       thymus removal      SURGICAL  "HISTORY OF -       cholecystectomy       Social History     Social History     Marital status:      Spouse name: N/A     Number of children: N/A     Years of education: N/A     Occupational History     Not on file.     Social History Main Topics     Smoking status: Never Smoker     Smokeless tobacco: Not on file     Alcohol use No     Drug use: No     Sexual activity: Not on file     Other Topics Concern     Not on file     Social History Narrative       No family history on file.    Most Recent Immunizations   Administered Date(s) Administered     Influenza (High Dose) 3 valent vaccine 09/05/2014     Influenza (intradermal) 09/06/2013     Influenza Vaccine, 3 YRS +, IM (QUADRIVALENT W/PRESERVATIVES) 11/03/2016     Pneumococcal (PCV 13) 10/31/2016     Pneumococcal 23 valent 10/21/2011     TDAP Vaccine (Adacel) 09/06/2011         Review of Systems unable to obtain from patient, primarily provided by accompanying daughter...  Constitutional: No recent fever or illness.  Respiratory: No recent cough.  Cardiovascular: Negative for complaint of chest pain.  Gastrointestinal: Negative for abdominal pain.  Musculoskeletal: Positive for right hip pain. Denies active neck pain, back pain, pelvic pain, or injuries.  Neurological: Negative for headache or dizziness.    All others reviewed and are negative.      Physical Exam   BP: 139/77  Pulse: 88  Heart Rate: 78  Temp: 97.2  F (36.2  C)  Resp: 13  Height: 160 cm (5' 3\")  Weight: 69.4 kg (153 lb)  SpO2: 96 %  Physical Exam  Constitutional: Well developed, well nourished. Appears stable and in no acute distress.   Head: Atraumatic appearance of face. Negative for Raccoon eyes and Castanon sign. No tenderness to palpation of facial bones or skull circumferentially.  Eye: No obvious proptosis or subconjunctival hemorrhage. Eyelids appear symmetrical. EOMI and patient denies diplopia. PERRLA without pain.  Oral: Patient is without trismus or malocclusion. Moist oral mucosa " without oral laceration.   Ears: Denies tenderness of the auricle or tragus. Typical appearance of the external auditory canal bilaterally, tympanic membranes visualized and without hemotympanum. No mastoid region tenderness.  Neck: No tenderness to palpation of midline cervical vertebra. Full ROM without pain.   Cardiovascular: No cyanosis. RRR. No audible murmurs noted.   Respiratory/Chest: Effort normal, no respiratory distress. CTAB without diminished regions.  Gastrointestinal: Soft, nontender and nondistended. No guarding, rigidity, or rebound tenderness. No organomegaly.  Musculoskeletal: Large contusion overlying lateral aspect of right hip with tenderness, right lower extremity is internally rotated. No pelvic instability. L5 dorsiflexion/foot inversion and dorsal foot sensory intact. S1 plantar flexion, foot eversion, and plantar foot sensation intact. Sensation intact of all digits diffusely, including deep fibular distribution of first webbing space. 2/4 palpable dorsalis pedis and posterior tibial pulses. No cyanosis and capillary refill less than 2 seconds in each digit.   No step-offs and no tenderness to palpation of midline cervical, thoracic, or lumbosacral vertebra. Moves upper extremities spontaneously and without complaint.  Neuro: Patient is alert but is not oriented to place or time. GCS of 14 is likely baseline.  Skin: Skin is warm and dry, not diaphoretic. No abrasions, contusions, ecchymosis, or lacerations.  Psych: Appears to have a normal mood and affect.       ED Course     ED Course     Procedures        [facility] Procedure Note      Procedure:  Reduction of right hip dislocation    Performed by:  Todd Martins    Procedure: Reduction of right hip    Indication: Dislocation    Consent: The patient was consented for the procedure of reduction of right hip, accompanying daughter signed consent. They understand the risks and benefits of performing the procedure of right  hip reduction, as I have thoroughly described in terms that the non medical can understand. Risks specifically discussed included vascular injury, nerve injury, malunion or nonunion, and may still result in surgical procedure or correction. The patient has a history of dementia so her daughter at provided written consent for the procedure.    Procedure Note:  A time-out was completed verifying correct patient, procedure, site, positioning, and correct equipment. The patient was placed in a position appropriate for reduction, yet maintaining relative anatomic comfort. Performing gentle steady traction, Hip reduction was attempted without difficulty or complications. The patient was monitored during the entire procedure and anesthesia was present.     Post-reduction films were obtained after completion to confirm reduction. After allowing for splint to set and significant monitoring, extremity was reassessed. Sensation intact, full range of motion of digits, 5/5 strength, and improvement in pain when compared prior to reduction.    The patient tolerated the procedure well and there were no complications.        Critical Care time:  none               Results for orders placed or performed during the hospital encounter of 06/21/17 (from the past 24 hour(s))   XR Pelvis w Hip Right 1 View    Narrative    PELVIS AND HIP RIGHT ONE VIEW  6/21/2017 8:24 PM      HISTORY: Pain, history of prosthetic dislocation.    COMPARISON: 6/15/2017      Impression    IMPRESSION: Superior dislocation of the right hip arthroplasty. No  fracture.    SABRA VILLARREAL MD   XR Pelvis Port 1/2 Views    Narrative    PELVIS PORTABLE ONE - TWO VIEW  6/21/2017 9:41 PM      HISTORY: Post reduction right hip dislocation.     COMPARISON: 6/21/2017      Impression    IMPRESSION: The right hip arthroplasty has been successfully reduced  into the acetabulum. No fracture.    SABRA VILLARREAL MD   Basic metabolic panel   Result Value Ref Range    Sodium 138  133 - 144 mmol/L    Potassium 4.3 3.4 - 5.3 mmol/L    Chloride 102 94 - 109 mmol/L    Carbon Dioxide 30 20 - 32 mmol/L    Anion Gap 6 3 - 14 mmol/L    Glucose 193 (H) 70 - 99 mg/dL    Urea Nitrogen 15 7 - 30 mg/dL    Creatinine 0.65 0.52 - 1.04 mg/dL    GFR Estimate 85 >60 mL/min/1.7m2    GFR Estimate If Black >90   GFR Calc   >60 mL/min/1.7m2    Calcium 8.7 8.5 - 10.1 mg/dL   CBC with platelets, differential   Result Value Ref Range    WBC 9.0 4.0 - 11.0 10e9/L    RBC Count 3.44 (L) 3.8 - 5.2 10e12/L    Hemoglobin 9.6 (L) 11.7 - 15.7 g/dL    Hematocrit 30.6 (L) 35.0 - 47.0 %    MCV 89 78 - 100 fl    MCH 27.9 26.5 - 33.0 pg    MCHC 31.4 (L) 31.5 - 36.5 g/dL    RDW 13.1 10.0 - 15.0 %    Platelet Count 261 150 - 450 10e9/L    Diff Method Automated Method     % Neutrophils 72.1 %    % Lymphocytes 17.4 %    % Monocytes 7.2 %    % Eosinophils 2.9 %    % Basophils 0.2 %    % Immature Granulocytes 0.2 %    Absolute Neutrophil 6.5 1.6 - 8.3 10e9/L    Absolute Lymphocytes 1.6 0.8 - 5.3 10e9/L    Absolute Monocytes 0.7 0.0 - 1.3 10e9/L    Absolute Eosinophils 0.3 0.0 - 0.7 10e9/L    Absolute Basophils 0.0 0.0 - 0.2 10e9/L    Abs Immature Granulocytes 0.0 0 - 0.4 10e9/L         Assessments & Plan (with Medical Decision Making)   Lulu Carlton is a 89 year old female who presented to the department for evaluation of right hip pain after she was found down complaining of pain. She had no other sign of injury and there was no witnessed fall or trauma. Her right hip again appears dislocated, after obtaining consent was relocated without difficulty or complication. Placement confirmed with postreduction films. 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 Unfortunately she has severe pain and is unable to safely ambulate or discharge. The patient will require admission  for monitoring, analgesia, and orthopedic consultation. Hospitalist MERCEDES Ovalle has been briefed on patient presentation and workup thus far. She agrees with plan for admission. Temporary transition orders were placed per protocol.    The patient and accompanying family have been informed of her results and the recommendation for admission. They have verbalized an understanding, all questions answered, and they are in agreement with the plan at this time.      Disclaimer: This note consists of symbols derived from keyboarding, dictation, and/or voice recognition software. As a result, there may be errors in the script that have gone undetected.  Please consider this when interpreting information found in the chart.        I have reviewed the nursing notes.    I have reviewed the findings, diagnosis, plan and need for follow up with the patient.      Current Discharge Medication List          Final diagnoses:   Hip pain, right   Hip dislocation, right, initial encounter (H)       6/21/2017   [facility] INTENSIVE CARE     Todd Martins DO  06/22/17 0129

## 2017-06-22 NOTE — PLAN OF CARE
Problem: Goal Outcome Summary  Goal: Goal Outcome Summary  OT: CANCEL- Orders received for safe dispo. Per chart review and rounds, safe discharge plan already in place. Plans to discharge to TCU once bed is available. No IP OT needs identified at this time, defer to next level of care (TCU)

## 2017-06-22 NOTE — ED NOTES
Daughter at bedside voiced concern that we find out when she last ate. This RN called celsa and got the report that she last ate at 1630, a small meal, 100%.

## 2017-06-22 NOTE — ANESTHESIA POSTPROCEDURE EVALUATION
Patient: Lulu Carlton    * No procedures listed *    Diagnosis:* No pre-op diagnosis entered *  Diagnosis Additional Information: No value filed.    Anesthesia Type:  MAC    Note:  Anesthesia Post Evaluation    Patient location during evaluation: Phase 2 and Bedside  Patient participation: Able to fully participate in evaluation  Level of consciousness: awake and alert  Pain management: adequate  Airway patency: patent  Cardiovascular status: acceptable  Respiratory status: acceptable  Hydration status: acceptable  PONV: none     Anesthetic complications: None          Last vitals:  Vitals:    06/21/17 2115 06/21/17 2120 06/21/17 2125   BP: 180/83     Pulse:      Resp: (!) 7 13 27   Temp:      SpO2: 99% 98% 95%         Electronically Signed By: Toni Woodruff CRNA, APRN CRNA  June 21, 2017  9:37 PM

## 2017-06-22 NOTE — PLAN OF CARE
"Problem: Fall Risk (Adult)  Goal: Absence of Falls  Patient will demonstrate the desired outcomes by discharge/transition of care.   Outcome: No Change  Patient is alert, forgetful, confused. Prn tylenol given for R hip and leg pain. Denies nausea, SOB. LS clear. R leg and knee are bruised. Pt does not use call light. Bedrest. NPO since midnight. Pt made multiple attempts to get out of bed, sets off alarm. /71 (BP Location: Left arm)  Pulse 88  Temp 98.4  F (36.9  C) (Oral)  Resp 16  Ht 1.6 m (5' 3\")  Wt 69.4 kg (153 lb)  SpO2 94%  BMI 27.1 kg/m2          "

## 2017-06-22 NOTE — PROGRESS NOTES
Change of status from Inpatient to Observation.  Observation handout given and Observation status explained to patients son.  Tania Tania MS, [facility], [facility] [phone]

## 2017-06-22 NOTE — PROGRESS NOTES
Reason for Follow up: DC planning    Anticipated discharge needs: Pt has been accepted at [facility] (Phone: [phone]) Fax: [phone]) and has been declined admission at [facility].     Family is in agreement with [facility], ride has been arranged for pt tomorrow at 1100 per family request.     Next steps: DC Friday    Tania Marin MSW, [facility], [phone]    Discharge Planner   Discharge Plans in progress: TCU  Barriers to discharge plan: medical stability  Follow up plan: TCU       Entered by: Tania Marin 06/22/2017 2:41 PM

## 2017-06-22 NOTE — ED NOTES
Pt here via EMS with shortening and pain to the right hip. The patient has had previous dislocation of the right hip, bruising continues. Denies fall or other trauma. Staff at [facility] noted the deformity.

## 2017-06-23 ENCOUNTER — MEDICAL CORRESPONDENCE (OUTPATIENT)
Dept: HEALTH INFORMATION MANAGEMENT | Facility: CLINIC | Age: 82
End: 2017-06-23

## 2017-06-23 ENCOUNTER — TELEPHONE (OUTPATIENT)
Dept: FAMILY MEDICINE | Facility: CLINIC | Age: 82
End: 2017-06-23

## 2017-06-23 ENCOUNTER — CARE COORDINATION (OUTPATIENT)
Dept: CARE COORDINATION | Facility: CLINIC | Age: 82
End: 2017-06-23

## 2017-06-23 VITALS
HEIGHT: 63 IN | DIASTOLIC BLOOD PRESSURE: 58 MMHG | BODY MASS INDEX: 26.91 KG/M2 | TEMPERATURE: 97.7 F | SYSTOLIC BLOOD PRESSURE: 147 MMHG | WEIGHT: 151.9 LBS | HEART RATE: 87 BPM | OXYGEN SATURATION: 94 % | RESPIRATION RATE: 18 BRPM

## 2017-06-23 LAB
GLUCOSE BLDC GLUCOMTR-MCNC: 157 MG/DL (ref 70–99)
GLUCOSE BLDC GLUCOMTR-MCNC: 181 MG/DL (ref 70–99)

## 2017-06-23 PROCEDURE — 25000132 ZZH RX MED GY IP 250 OP 250 PS 637: Mod: GY | Performed by: PHYSICIAN ASSISTANT

## 2017-06-23 PROCEDURE — 25000132 ZZH RX MED GY IP 250 OP 250 PS 637: Mod: GY | Performed by: STUDENT IN AN ORGANIZED HEALTH CARE EDUCATION/TRAINING PROGRAM

## 2017-06-23 PROCEDURE — 99217 ZZC OBSERVATION CARE DISCHARGE: CPT | Performed by: FAMILY MEDICINE

## 2017-06-23 PROCEDURE — 99207 ZZC CDG-CHARGE REQUIRED MANUAL ENTRY: CPT | Performed by: FAMILY MEDICINE

## 2017-06-23 PROCEDURE — A9270 NON-COVERED ITEM OR SERVICE: HCPCS | Mod: GY | Performed by: PHYSICIAN ASSISTANT

## 2017-06-23 PROCEDURE — 00000146 ZZHCL STATISTIC GLUCOSE BY METER IP

## 2017-06-23 PROCEDURE — A9270 NON-COVERED ITEM OR SERVICE: HCPCS | Mod: GY | Performed by: STUDENT IN AN ORGANIZED HEALTH CARE EDUCATION/TRAINING PROGRAM

## 2017-06-23 PROCEDURE — 96372 THER/PROPH/DIAG INJ SC/IM: CPT

## 2017-06-23 PROCEDURE — G0378 HOSPITAL OBSERVATION PER HR: HCPCS

## 2017-06-23 RX ORDER — OXYCODONE HYDROCHLORIDE 5 MG/1
2.5 TABLET ORAL
Qty: 20 TABLET | Refills: 0 | Status: SHIPPED | OUTPATIENT
Start: 2017-06-23 | End: 2018-02-07

## 2017-06-23 RX ADMIN — LISINOPRIL 5 MG: 5 TABLET ORAL at 08:02

## 2017-06-23 RX ADMIN — OXYCODONE HYDROCHLORIDE 5 MG: 5 TABLET ORAL at 08:03

## 2017-06-23 RX ADMIN — ASPIRIN 325 MG: 325 TABLET, COATED ORAL at 08:01

## 2017-06-23 RX ADMIN — INSULIN ASPART 1 UNITS: 100 INJECTION, SOLUTION INTRAVENOUS; SUBCUTANEOUS at 08:06

## 2017-06-23 RX ADMIN — OXYCODONE HYDROCHLORIDE 5 MG: 5 TABLET ORAL at 02:09

## 2017-06-23 RX ADMIN — ACETAMINOPHEN 650 MG: 325 TABLET, FILM COATED ORAL at 02:09

## 2017-06-23 RX ADMIN — ACETAMINOPHEN 650 MG: 325 TABLET, FILM COATED ORAL at 08:01

## 2017-06-23 RX ADMIN — OXYCODONE HYDROCHLORIDE 5 MG: 5 TABLET ORAL at 10:59

## 2017-06-23 RX ADMIN — OMEPRAZOLE 20 MG: 20 CAPSULE, DELAYED RELEASE ORAL at 08:05

## 2017-06-23 RX ADMIN — SENNOSIDES AND DOCUSATE SODIUM 2 TABLET: 8.6; 5 TABLET ORAL at 09:54

## 2017-06-23 NOTE — PLAN OF CARE
"Problem: Goal Outcome Summary  Goal: Goal Outcome Summary  Outcome: Improving  Pt seems to be tolerating the abductor brace well, she doesn't remember that she dislocated her hip for the second time but she does remember \"I keep having problems with my right hip, I got to stop doing that don't I?\" Skin has been checked under the brace for any rubbing , or sores, all skin intact.  She has had Tylenol and Oxycodone for the pain.  Pt has been continent of urine t/o the night she doesn't use the call light, but when this writer has been in the room she will say when she has to use the BR.  Will continue to monitor close for changes.      "

## 2017-06-23 NOTE — LETTER
Hand-off  for Care Coordination  What is Care Coordination?  [facility] Care Coordination Services are available to people in complex situations,   for example medical, social or financial. The Care Coordinator, a SW or an RN, works with the   patient and their doctor to determine health goals, obtain resources, achieve outcomes,   and develop plans to coordinate care across settings.      o Patient Name:   Lulu Carlton  o Patient :     1921  o Patient PCP:     Todd Villeda MD    o Patient Primary Clinic:   [address] [address]  o D/C Facility: _____________________________________________   o TCU Contact Info for questions: ___________________________  o D/C Date:  ______________________________________________  o Follow-up Apt with PCP after TCU D/C:   ____________________  o Other Follow-up Apt s:      _________________________________________  Additional information (concerns, and Home Care, ect ):   __________________________________________________________________  __________________________________________________________________    Care Coordinator to Contact  **Please fax this sheet back to me when pt is ready to discharge**   Marcelle Tejeda RN    Clinic Care Coordinator  [facility]  Fax: [phone]   or e-mail [email or web address]  Phone: [phone]

## 2017-06-23 NOTE — PHARMACY - DISCHARGE MEDICATION RECONCILIATION
Discharge medication review for this patient is complete. Pharmacist assisted with medication reconciliation of discharge medications with prior to admission medications.     The following changes were made to the discharge medication list based on pharmacist review:  Added:  none  Discontinued: none  Changed: none      Patient's Discharge Medication List  - medications as listed on After Visit Summary (AVS)     Review of your medicines      CONTINUE these medicines which may have CHANGED, or have new prescriptions. If we are uncertain of the size of tablets/capsules you have at home, strength may be listed as something that might have changed.       Dose / Directions    oxyCODONE 5 MG IR tablet   Commonly known as:  ROXICODONE   This may have changed:  how much to take   Used for:  Hip dislocation, right, initial encounter (H)        Dose:  2.5 mg   Take 0.5 tablets (2.5 mg) by mouth every 3 hours as needed for moderate to severe pain   Quantity:  20 tablet   Refills:  0         CONTINUE these medicines which have NOT CHANGED       Dose / Directions    aspirin  MG EC tablet   Used for:  Hip fracture, right, closed, initial encounter (H)        Dose:  325 mg   Take 1 tablet (325 mg) by mouth daily   Quantity:  40 tablet   Refills:  0       B-12 1000 MCG Tbcr   Used for:  Vitamin B 12 deficiency        Dose:  1000 mcg   Take 1,000 mcg by mouth daily   Quantity:  30 tablet   Refills:  11       BLOOD GLUCOSE TEST STRIPS Strp        by In Vitro route as needed   Refills:  0       calcium-vitamin D 600-400 MG-UNIT per tablet   Commonly known as:  CALTRATE        Dose:  1 tablet   Take 1 tablet by mouth every evening   Refills:  0       CYCLOBENZAPRINE HCL PO        Dose:  5 mg   Take 5 mg by mouth 3 times daily as needed for muscle spasms   Refills:  0       GLIPIZIDE XL PO        Dose:  2.5 mg   Take 2.5 mg by mouth daily (with breakfast)   Refills:  0       lisinopril 5 MG tablet   Commonly known as:   PRINIVIL/ZESTRIL   Used for:  Essential hypertension with goal blood pressure less than 140/90        Dose:  5 mg   Take 1 tablet (5 mg) by mouth daily   Quantity:  30 tablet   Refills:  11       LOPERAMIDE HCL PO        Dose:  2 mg   Take 2 mg by mouth daily as needed   Refills:  0       MECLIZINE HCL PO        Dose:  12.5 mg   Take 12.5 mg by mouth 2 times daily as needed for dizziness   Refills:  0       multivitamin Tabs tablet        Dose:  1 tablet   Take 1 tablet by mouth daily   Quantity:  30 each   Refills:  0       omeprazole 20 MG CR capsule   Commonly known as:  priLOSEC   Used for:  Nausea        Dose:  20 mg   Take 1 capsule (20 mg) by mouth daily   Quantity:  30 capsule   Refills:  11       senna-docusate 8.6-50 MG per tablet   Commonly known as:  SENOKOT-S;PERICOLACE        Dose:  2 tablet   Take 2 tablets by mouth 2 times daily as needed for constipation   Refills:  0       triamcinolone 0.1 % cream   Commonly known as:  KENALOG        Apply topically 2 times daily as needed for irritation   Refills:  0       TYLENOL PO        Dose:  1300 mg   Take 1,300 mg by mouth every morning Do not exceed 4,000 mg of Acetaminophen from all sources in 24 hours   Refills:  0            Where to get your medicines      Some of these will need a paper prescription and others can be bought over the counter. Ask your nurse if you have questions.     Bring a paper prescription for each of these medications      oxyCODONE 5 MG IR tablet           Sophia Bridges, MatyD

## 2017-06-23 NOTE — PROGRESS NOTES
[facility] Hospitalist Service      Subjective:  Impulsive-wants to get out of bed  Some hip pain    Review of Systems:  C: NEGATIVE for fever, chills, change in weight  E/M: NEGATIVE for ear, mouth and throat problems  R: NEGATIVE for significant cough or SOB  CV: NEGATIVE for chest pain, palpitations or peripheral edema    Physical Exam:  Vitals Were Reviewed    Patient Vitals for the past 16 hrs:   BP Temp Temp src Pulse Heart Rate Resp SpO2 Weight   06/23/17 0355 154/80 98  F (36.7  C) Oral - 88 18 94 % 68.9 kg (151 lb 14.4 oz)   06/22/17 2304 178/69 98.2  F (36.8  C) Oral - 102 18 94 % -   06/22/17 1941 164/65 98.4  F (36.9  C) Axillary 87 - 20 96 % -   06/22/17 1601 - - - - 87 18 95 % -   06/22/17 1535 133/72 98  F (36.7  C) Oral 95 - 18 93 % -         Intake/Output Summary (Last 24 hours) at 06/23/17 0637  Last data filed at 06/23/17 0356   Gross per 24 hour   Intake              730 ml   Output             2125 ml   Net            -1395 ml       GENERAL APPEARANCE: healthy, alert and no distress  EYES: conjunctiva clear, eyes grossly normal  RESP: lungs clear to auscultation - no rales, rhonchi or wheezes  CV: regular rate and rhythm, normal S1 S2, no S3 or S4 and no murmur, click or rub   ABDOMEN: soft, nontender, no HSM or masses and bowel sounds normal  MS: in abductor brace  SKIN: clear without significant rashes or lesions    Lab:  Recent Labs   Lab Test  06/22/17   0015  06/15/17   0840   NA  138  142   POTASSIUM  4.3  4.2   CHLORIDE  102  107   CO2  30  29   ANIONGAP  6  6   GLC  193*  165*   BUN  15  11   CR  0.65  0.64   SLIM  8.7  8.6     CBC RESULTS:   Recent Labs   Lab Test  06/22/17   0015  06/15/17   0840   WBC  9.0  4.8   RBC  3.44*  4.36   HGB  9.6*  12.2   HCT  30.6*  38.6   PLT  261  151       Results for orders placed or performed during the hospital encounter of 06/21/17 (from the past 24 hour(s))   Care Transition RN/SW IP Consult    Narrative    Tania Marin, [facility]     6/22/2017   1:04 PM  CARE TRANSITION SOCIAL WORK INITIAL ASSESSMENT:      Met with: Patient and Family.    DATA  Principal Problem:    Hip dislocation, right, sequela  Active Problems:    Anemia, iron deficiency    Type 2 diabetes mellitus with hyperglycemia, without long-term   current use of insulin (H)    Hypertension goal BP (blood pressure) < 140/90    Displaced fracture of right femoral neck (H)    Dementia    Status post total replacement of right hip       Primary Care Clinic Name:  ([facility])  Primary Care MD Name:  (Ronal)  Contact information and PCP information verified: Yes      ASSESSMENT  Cognitive Status: awake.       Resources List: Transitional Care     Lives With: facility resident        Description of Support System: Supportive, Involved   Who is your support system?: Children   Support Assessment: Adequate family and caregiver support,   Adequate social supports   Insurance Concerns: No Insurance issues identified                  This writer met with pt and pts two adult children introduced   self and role. Discussed discharge planning and medicare   guidelines in regards to home care, TCU and LTC. Pt currently   lives at [facility] (Phone: [phone]   Fax:[phone] RN report: [phone] or [phone]) [facility].   Family is requesting TCU, Patient was provided with Medicare   certified nursing home list. Pts choices are as follows The   [facility] (Phone: [phone] Fax: [phone])   and [facility] (Phone: [phone]) Fax:   ([phone]).  Referrals are pending at both facilities. Pt   recently at TCU and is still in a 30 day window for TCU coverage.   Will wait on TCU bed availability.      PAS-RR    Per DHS regulation, CTS team completed and submitted PAS-RR to [place]   Board on Aging Direct Connect via the Senior LinkAge Line. CTS   team advised SNF and they are aware a PAS-RR has been submitted.     CTS team reviewed with pt or health care agent  that they may be   contacted for a follow up appointment within 10 days of hospital   discharge if SNF stay is <30 days. Contact information for Senior   LinkAge Line was also provided.     Pt or health care agent verbalized understanding.     PAS-RR # [id number]          PLAN    Waiting on TCU bed availability    Discharge Planner   Discharge Plans in progress: TCU  Barriers to discharge plan: bed avaialability  Follow up plan: TCU       Entered by: Tania Marin 06/22/2017 12:50 PM             Tania Marin MSW, [facility], [facility] [phone]   Glucose by meter   Result Value Ref [facility]    Glucose 150 (H) 70 - 99 mg/dL   Glucose by meter   Result Value Ref [facility]    Glucose 202 (H) 70 - 99 mg/dL   Methicillin Resistant Staph Aureus PCR   Result Value Ref [facility]    Specimen Description Nares     S Aur Meth Resis PCR  NEG     Negative  MRSA Negative: SA Negative  MRSA and Staphylococcus aureus target DNA not   detected, presumed negative for MRSA and SA colonization or the number of   bacteria present may be below the limit of detection for the assay. FDA   approved assay performed using [employer] GeneXpert(R) real-time PCR.     Glucose by meter   Result Value Ref [facility]    Glucose 171 (H) 70 - 99 mg/dL   Glucose by meter   Result Value Ref [facility]    Glucose 168 (H) 70 - 99 mg/dL   Glucose by meter   Result Value Ref [facility]    Glucose 181 (H) 70 - 99 mg/dL   Glucose by meter   Result Value Ref [place]    Glucose 157 (H) 70 - 99 mg/dL       Assessment and Plan:    Lulu Carlton is a 89 year old female with recent OPAL and subsequent hip dislocation on right, dementia, HTN, DMT2 and GERD who presents with right hip pain     Hip Dislocation, Right   Presented to ER with notes of increased right hip pain and deformity per staffing. Not fall/found on floor this time. Previous hip dislocation (6/15), relocated in ED without issue. Ortho was consulted and do not think surgery is indicated at this time. Patient will be fitted for  brace.   June 23, 2017 in abductor brace, will dc tcu     Recent OPAL, Right   ORIF completed (5/10/2017) by Dr. Greenfield following fall and subsequent right hip fracture.   -continue PTA ASA for DVT ppx  -continue PTA oxycodone PRN  -continue PTA bowel regimen     Normocytic Normochromic Anemia-probable anemia of chronic ds   Hg 9.6, down from 12.2 recently. Baseline slowly declining from 15 since 2015. No evidence of bleeding. VS stable.        Diabetes Mellitus, Type II  Chronic, stable.  Last a1C 6.3% on 5/2017.   -hold PTA glipizide   -mod SSI  -hypo/hyperglycemia protocol with blood glucose monitoring     HTN   Controlled.  -continue PTA lisinopril     GERD  -continue PTA omeprazole     FEN:  -tolerating PO intake well  -Will monitor electrolytes and replace as needed  -diabetic diet     DVT Prophylaxis: Low Risk and will be ambulatory following brace application  Code Status: DNR / DNI     dc

## 2017-06-23 NOTE — PROGRESS NOTES
[place] DISCHARGE NOTE    Patient discharged to [facility] (memory care unit) at 11:32 AM via wheel chair. Accompanied by other:Geronimo (Kamilah Melton) and staff. Discharge instructions sent with the patient to [facility].  Called and left a message with [facility] for a nurse to call back for report.  Attempted to call again x2, then called again and left a message on the voicemail to call back.  1251 - no call back from [facility].  Opportunity offered to ask questions. Narcotic prescription sent with the staff. All belongings sent with patient.  1340 - nurse called back from transitional care unit and was given report.      Cristina Chong RN

## 2017-06-23 NOTE — PROGRESS NOTES
Care Coordination Transition Communication    Referral Source: CTS    Clinical Data: Patient was hospitalized at [facility] from 6/21/17 to 6/23/17 with diagnosis of right hip pain, dislocation.     Transition to Facility:   Facility Name: [facility]   Contact name and phone number/fax: Cecily CHO,  [phone], fax [phone]    Plan: RN/SW Care Coordinator will await notification from facility staff informing RN/SW Care Coordinator of patient's discharge plans/needs. RN/SW Care Coordinator will review chart and outreach to facility staff every 4 weeks and as needed.     Marcelle Tejeda RN, [facility]  RN Care Coordinator  [facility]  Phone # [phone]  6/23/2017 1:16 PM

## 2017-06-23 NOTE — TELEPHONE ENCOUNTER
Form signed faxed and sent to scanning.  [facility] [place]

## 2017-06-23 NOTE — PROGRESS NOTES
Transition Communication Hand-off for Care Transitions to Next Level of Care Provider    Name: Lulu Carlton  MRN #: [id number]  Primary Care Provider: Todd Villeda  Primary Care MD Name:  (Ronal)  Primary Clinic: [facility] [place] [id number] [place] [id number]  Primary Care Clinic Name:  ([facility])  Reason for Hospitalization:  Hip pain, right [M25.551]  Hip dislocation, right, initial encounter (H) [S73.004A]  Admit Date/Time: 6/21/2017  7:07 PM  Discharge Date: 6/23/17  Payor Source: Payor: BCBS / Plan: BCBS PLATINUM BLUE / Product Type: PPO /     Readmission Assessment Measure (CHARIS) Risk Score/category: average           Reason for Communication Hand-off Referral: Fragility    Discharge Plan: [facility] TCU       Concern for non-adherence with plan of care:   Y/N no  Discharge Needs Assessment:      Already enrolled in Tele-monitoring program and name of program:  no  Follow-up specialty is recommended: No    Follow-up plan:  No future appointments.    Any outstanding tests or procedures:        Referrals     Future Labs/Procedures    Physical Therapy Referral     Comments:    *This therapy referral will be filtered to a centralized scheduling office at [facility] and the patient will receive a call to schedule an appointment at a [facility] location most convenient for them. *     [facility] provides Physical Therapy evaluation and treatment and many specialty services across the [facility] system.  If requesting a specialty program, please choose from the list below.    If you have not heard from the scheduling office within 2 business days, please call [phone] for all locations, with the exception of [facility], please call [phone].  Treatment: Evaluation & Treatment  Special Instructions/Modalities: none  Special Programs: None    Please be aware that coverage of these services is subject to the terms and limitations of your health insurance plan.  Call  "member services at your health plan with any benefit or coverage questions.      **Note to Provider:  If you are referring outside of [facility] for the therapy appointment, please list the name of the location in the \"special instructions\" above, print the referral and give to the patient to schedule the appointment.            Key Recommendations:  [facility] (Phone: [phone]) Fax: [phone]) today. Pt lives long term at [facility], family is on waiting list at [facility]. However; pt may need more assistance following TCU, as last time pt was at [facility] TCU she was on a 1:1 the entire stay, per report from [facility].     Tania Marin MSW, [facility], [facility] [phone]      AVS/Discharge Summary is the source of truth; this is a helpful guide for improved communication of patient story    "

## 2017-06-23 NOTE — PLAN OF CARE
Problem: Pain, Acute (Adult)  Goal: Identify Related Risk Factors and Signs and Symptoms  Related risk factors and signs and symptoms are identified upon initiation of Human Response Clinical Practice Guideline (CPG)   Patient up to the commode with abductor brace on, use of the walker, and assist of one staff - stood well and pivoted very well with minimal reminders.  Complained of some right hip pain when she got up to the commode.  Pain controlled with tylenol and oxycodone.  Up in the chair for breakfast.  Plan for discharge today at 1100 to TCU.  Continues to need reminders and reinforcement to not get up on her own.  Call light in reach and educated on it, but does not use it.  Video monitoring in use.  TABS alarm on patient while in the chair.  Bed alarm on while in the bed.  She is alert and pleasant, but needs frequent reorientation as she forgets.  She is disoriented to time, place (knows she is in a hospital, but not which one.), and situation.

## 2017-06-23 NOTE — DISCHARGE SUMMARY
HISTORY OF PRESENT ILLNESS:  Lulu Carlton is a 89-year-old female with a history of hypertension, dementia, diabetes mellitus type 2 and GERD who had a recent total hip hemiarthroplasty.  She had a hip operated on 05/10/2017.  She had a subsequent hip dislocation on 06/15 that was relocated in the emergency room.  She was found to have right hip pain after being found lying down.  It is unclear if there was a fall.  She was brought to the emergency room and was found to have a recurrent right hip dislocation that was relocated in ER.  She was unable to walk after that and she was admitted to observation status.        The patient was seen by Orthopedics and ultimately put into an abductor brace.  It was felt that the patient would need TCU care again rather than going back to long-term care and this was not available on 05/22/2017, so she was kept in the hospital total 05/23.  At this time, she is going to transfer to a TCU.      The patient does have some chronic anemia.  Her hemoglobin was recently 12.2, was down to 9.6.  Lab studies would suggest anemia of chronic disease.  I suspect that this is probably also affected by the recent surgery and some blood loss.  She does have diabetes, hypertension, GERD; these problems were stable.      She does have significant dementia and she did frequently try to get up out of bed.      ASSESSMENT:   1.  Recurrent right hip load dislocation following right hip hemiarthroplasty on 05/10/2017 by Dr. Greenfield.   2.  Normocytic normochromic anemia.  Possibly anemia of chronic disease.   3.  Diabetes mellitus type 2.   4.  Hypertension.   5.  GERD.   6.  Significant dementia.      PLAN:  The patient is discharged for TCU stay.  The plan would be to ultimately have her go back to long-term care.  She is to follow up with Dr. Riky Greenfield in 1-2 weeks for post-hospital check.  Oxycodone 2.5 mg will be provided for pain control.     Current Discharge Medication List       CONTINUE these medications which have CHANGED    Details   oxyCODONE (ROXICODONE) 5 MG IR tablet Take 0.5 tablets (2.5 mg) by mouth every 3 hours as needed for moderate to severe pain  Qty: 20 tablet, Refills: 0    Associated Diagnoses: Hip dislocation, right, initial encounter (H)         CONTINUE these medications which have NOT CHANGED    Details   aspirin  MG EC tablet Take 1 tablet (325 mg) by mouth daily  Qty: 40 tablet, Refills: 0    Associated Diagnoses: Hip fracture, right, closed, initial encounter (H)      Glucose Blood (BLOOD GLUCOSE TEST STRIPS) STRP by In Vitro route as needed      Acetaminophen (TYLENOL PO) Take 1,300 mg by mouth every morning Do not exceed 4,000 mg of Acetaminophen from all sources in 24 hours       omeprazole (PRILOSEC) 20 MG CR capsule Take 1 capsule (20 mg) by mouth daily  Qty: 30 capsule, Refills: 11    Associated Diagnoses: Nausea      multivitamin (OCUVITE) TABS tablet Take 1 tablet by mouth daily  Qty: 30 each, Refills: 0      lisinopril (PRINIVIL,ZESTRIL) 5 MG tablet Take 1 tablet (5 mg) by mouth daily  Qty: 30 tablet, Refills: 11    Associated Diagnoses: Essential hypertension with goal blood pressure less than 140/90      Cyanocobalamin (B-12) 1000 MCG TBCR Take 1,000 mcg by mouth daily  Qty: 30 tablet, Refills: 11    Associated Diagnoses: Vitamin B 12 deficiency      calcium-vitamin D (CALTRATE) 600-400 MG-UNIT per tablet Take 1 tablet by mouth every evening      GLIPIZIDE XL PO Take 2.5 mg by mouth daily (with breakfast)      senna-docusate (SENOKOT-S;PERICOLACE) 8.6-50 MG per tablet Take 2 tablets by mouth 2 times daily as needed for constipation      CYCLOBENZAPRINE HCL PO Take 5 mg by mouth 3 times daily as needed for muscle spasms      LOPERAMIDE HCL PO Take 2 mg by mouth daily as needed      MECLIZINE HCL PO Take 12.5 mg by mouth 2 times daily as needed for dizziness      triamcinolone (KENALOG) 0.1 % cream Apply topically 2 times daily as needed for  irritation           Unresulted Labs Ordered in the Past 30 Days of this Admission     No orders found from 2017 to 2017.              Greater than 30 minutes spent on this.         OSVALDO GRANT MD             D: 2017 07:34   T: 2017 07:48   MT: JUAN#[id number]      Name:     EL BARRIENTOS   MRN:      [id number]        Account:        [id number]   :      1921           Admit Date:                                       Discharge Date:       Document: [id number]

## 2017-06-25 NOTE — PROGRESS NOTES
[facility]  PRIMARY CARE PROVIDER AND CLINIC:  Todd Villeda [facility] [place] [address]  / [facility] [address]  Chief Complaint   Patient presents with     Hospital F/U       HPI:    Lulu Carlton is a 89 year old  (11/8/1921),admitted to the [facility] from [facility].  Hospital stay 6/21/17 through 6/23/17.  Admitted to this facility for  rehab, medical management and nursing care.  Current issues are:         Hip dislocation, right, sequela  Fall, subsequent encounter  S/P hip hemiarthroplasty  Displaced fracture of right femoral neck, closed, with routine healing, subsequent encounter  Other osteoporosis, unspecified pathological fracture presence  Iron deficiency anemia, unspecified iron deficiency anemia type  Dementia associated with other underlying disease without behavioral disturbance     Patient seen in room lying in bed, had R hip arthorplasty on 5/10, then R hip dislocation on 6/15 and 6/21 that were both reduced in ER, has R hip brace on to prevent dislocation, pain controlled wit oxycodone, moderate bruising on leg with scant edema in lower extremities, pleasantly confused, limited historian, taking ASA 325mg for DVT prophylaxis with unknown end-date, continues with therapies, does attempt to remove leg brace per staff, no acute concerns nor complaints, overall status is weak with limitations, cognitive age-related decline.    CODE STATUS/ADVANCE DIRECTIVES DISCUSSION:   DNR / DNI  Patient's living condition: lives alone    ALLERGIES:Hydrocodone-acetaminophen; Trazodone; and Metformin  PAST MEDICAL HISTORY:  has a past medical history of Anemia, unspecified (hosp. 4/3/07 [place] ); Central nervous system complication (hosp. 4/3/07 [place]); Depressive disorder, not elsewhere classified; Fracture of femoral neck, right (H) (5/10/2017); Myasthenia gravis; Other malaise and fatigue (hosp. 4/3/07 [place] ); Other urinary  incontinence; Thoracic or lumbosacral neuritis or radiculitis, unspecified; and Unspecified essential hypertension.  PAST SURGICAL HISTORY:  has a past surgical history that includes surgical history of -; surgical history of -; and Open reduction internal fixation hip bipolar (Right, 5/10/2017).  FAMILY HISTORY: family history is not on file.  SOCIAL HISTORY:  reports that she has never smoked. She does not have any smokeless tobacco history on file. She reports that she does not drink alcohol or use illicit drugs.    Post Discharge Medication Reconciliation Status: discharge medications reconciled and changed, per note/orders (see AVS).  Current Outpatient Prescriptions   Medication Sig Dispense Refill     oxyCODONE (ROXICODONE) 5 MG IR tablet Take 0.5 tablets (2.5 mg) by mouth every 3 hours as needed for moderate to severe pain 20 tablet 0     senna-docusate (SENOKOT-S;PERICOLACE) 8.6-50 MG per tablet Take 2 tablets by mouth 2 times daily as needed for constipation       aspirin  MG EC tablet Take 1 tablet (325 mg) by mouth daily 40 tablet 0     Glucose Blood (BLOOD GLUCOSE TEST STRIPS) STRP by In Vitro route as needed       Acetaminophen (TYLENOL PO) Take 1,300 mg by mouth every morning Do not exceed 4,000 mg of Acetaminophen from all sources in 24 hours        LOPERAMIDE HCL PO Take 2 mg by mouth daily as needed       omeprazole (PRILOSEC) 20 MG CR capsule Take 1 capsule (20 mg) by mouth daily 30 capsule 11     lisinopril (PRINIVIL,ZESTRIL) 5 MG tablet Take 1 tablet (5 mg) by mouth daily 30 tablet 11     calcium-vitamin D (CALTRATE) 600-400 MG-UNIT per tablet Take 1 tablet by mouth every evening       triamcinolone (KENALOG) 0.1 % cream Apply topically 2 times daily as needed for irritation         ROS:  4 point ROS including Respiratory, CV, GI and , other than that noted in the HPI,  is negative    Exam:  There were no vitals taken for this visit.  GENERAL APPEARANCE:  Alert, in no distress, appears  healthy  ENT:  Mouth and posterior oropharynx normal, moist mucous membranes, normal hearing acuity  RESP:  lungs clear to auscultation , no respiratory distress  CV:  Palpation and auscultation of heart done , regular rate and rhythm, no murmur, rub, or gallop, peripheral edema scant in bilateral lower legs/ankles  ABDOMEN:  no guarding or rebound, bowel sounds normal  M/S:   Gait and station abnormal limited WBAT R leg  SKIN:  Inspection of skin and subcutaneous tissue baseline  NEURO:   Examination of sensation by touch normal  PSYCH:  oriented to self, memory impaired , affect and mood normal    Lab/Diagnostic data:     CBC RESULTS:   Recent Labs   Lab Test  06/22/17   0015  06/15/17   0840   WBC  9.0  4.8   RBC  3.44*  4.36   HGB  9.6*  12.2   HCT  30.6*  38.6   MCV  89  89   MCH  27.9  28.0   MCHC  31.4*  31.6   RDW  13.1  13.1   PLT  261  151       Last Basic Metabolic Panel:  Recent Labs   Lab Test  06/22/17   0015  06/15/17   0840   NA  138  142   POTASSIUM  4.3  4.2   CHLORIDE  102  107   SLIM  8.7  8.6   CO2  30  29   BUN  15  11   CR  0.65  0.64   GLC  193*  165*       Liver Function Studies -   Recent Labs   Lab Test  05/09/17   1300  05/01/17   1015   PROTTOTAL  6.5*  6.7*   ALBUMIN  3.3*  3.5   BILITOTAL  0.5  0.4   ALKPHOS  93  82   AST  18  12   ALT  22  20       TSH   Date Value Ref [facility] Status   06/22/2017 4.45 (H) 0.40 - 4.00 mU/L Final   05/01/2017 1.13 0.40 - 4.00 mU/L Final   ]    Lab Results   Component Value Date    A1C 6.3 05/10/2017    A1C 6.1 03/09/2017       ASSESSMENT/PLAN:  Hip dislocation, right, sequela  Fall, subsequent encounter  S/P hip hemiarthroplasty  Displaced fracture of right femoral neck, closed, with routine healing, subsequent encounter  Other osteoporosis, unspecified pathological fracture presence  -OT/PT working with  -of note, clycobenzaprine, Melody B12, glipizide, meclizine, and MVI recently D/C'd  -has R leg brace on, intermittent attempts to remove by  patient  -transfer assist with WC  -taking ASA 325mg for DVT prophylaxis; to follow up with ortho regarding duration  -F/U appt with Dr. Riky CLEMENT in 1-2 weeks; facility to schedule, will need confirmation on length of ASA 325mg duration  -continues oxycodone 2.5mg Q3h PRN for pain control  -consider D/C of oxy and schedule acetaminophen in near future as the narcotic may contribute to confusion    Iron deficiency anemia, unspecified iron deficiency anemia type  -recent Hgb range 9.6-12.2  -not taking any supplements (josé luis B12 recently D/C'd)  -will recheck Hgb on Wed 6/28 for follow up    Dementia associated with other underlying disease without behavioral disturbance  -limited cognitive function; facility to monitor  -facility to monitory, increased safety with supervision           Electronically signed by:  HANG Mann CNP

## 2017-06-26 ENCOUNTER — NURSING HOME VISIT (OUTPATIENT)
Dept: GERIATRICS | Facility: CLINIC | Age: 82
End: 2017-06-26
Payer: COMMERCIAL

## 2017-06-26 DIAGNOSIS — F02.80 DEMENTIA ASSOCIATED WITH OTHER UNDERLYING DISEASE WITHOUT BEHAVIORAL DISTURBANCE (H): ICD-10-CM

## 2017-06-26 DIAGNOSIS — M81.8 OTHER OSTEOPOROSIS, UNSPECIFIED PATHOLOGICAL FRACTURE PRESENCE: ICD-10-CM

## 2017-06-26 DIAGNOSIS — S72.001D: ICD-10-CM

## 2017-06-26 DIAGNOSIS — D50.9 IRON DEFICIENCY ANEMIA, UNSPECIFIED IRON DEFICIENCY ANEMIA TYPE: Chronic | ICD-10-CM
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
## 2017-06-26 DIAGNOSIS — S73.004S HIP DISLOCATION, RIGHT, SEQUELA: Primary | ICD-10-CM

## 2017-06-26 DIAGNOSIS — Z96.649 S/P HIP HEMIARTHROPLASTY: ICD-10-CM

## 2017-06-26 PROCEDURE — 99310 SBSQ NF CARE HIGH MDM 45: CPT | Performed by: NURSE PRACTITIONER

## 2017-06-27 VITALS
TEMPERATURE: 98.1 F | RESPIRATION RATE: 20 BRPM | WEIGHT: 152.8 LBS | OXYGEN SATURATION: 95 % | DIASTOLIC BLOOD PRESSURE: 79 MMHG | BODY MASS INDEX: 27.07 KG/M2 | SYSTOLIC BLOOD PRESSURE: 147 MMHG | HEART RATE: 95 BPM

## 2017-06-27 NOTE — PROGRESS NOTES
[facility] [facility]  PRIMARY CARE PROVIDER AND CLINIC:  [facility] [id number]  / [facility] 550*  Chief Complaint   Patient presents with     Clinic Care Coordination - Initial       HPI:    Lulu Carlton is a 89 year old  (11/8/1921),admitted to the [facility] from [facility].  Hospital stay 6/21/17  through 6/23/17.  Admitted to this facility for  rehab, medical management and nursing care.  Current issues are:      Hip dislocation, right, sequela  Fall, subsequent encounter  S/P hip hemiarthroplasty  Displaced fracture of right femoral neck, closed, with routine healing, subsequent encounter  Other osteoporosis, unspecified pathological fracture presence  - .R hip arthoplasty on 5/10, then R hip dislocation on 6/15 and 6/21 that were both reduced in ER.  - Started on OT/PT, reports making some progress  - Reports pain is aching,  6/10 when severe, aggravated with movements, better with  Rest and meds.   - Reports does not like the brace, keeps it all the time, wonder if she should take it off.     ----------------------------------------------------------------------------------------------------  CODE STATUS/ADVANCE DIRECTIVES DISCUSSION:   DNR / DNI  Patient's living condition: lives alone    ALLERGIES:Hydrocodone-acetaminophen; Trazodone; and Metformin  PAST MEDICAL HISTORY:  has a past medical history of Anemia, unspecified (hosp. 4/3/07 [place] ); Central nervous system complication (hosp. 4/3/07 [place]); Depressive disorder, not elsewhere classified; Fracture of femoral neck, right (H) (5/10/2017); Myasthenia gravis; Other malaise and fatigue (hosp. 4/3/07 [place] ); Other urinary incontinence; Thoracic or lumbosacral neuritis or radiculitis, unspecified; and Unspecified essential hypertension.  PAST SURGICAL HISTORY:  has a past surgical history that includes surgical history of -; surgical history of -; and  Open reduction internal fixation hip bipolar (Right, 5/10/2017).  FAMILY HISTORY: family history is not on file.  SOCIAL HISTORY:  reports that she has never smoked. She does not have any smokeless tobacco history on file. She reports that she does not drink alcohol or use illicit drugs.    Post Discharge Medication Reconciliation Status: discharge medications reconciled and changed, per note/orders (see AVS).  Current Outpatient Prescriptions   Medication Sig Dispense Refill     oxyCODONE (ROXICODONE) 5 MG IR tablet Take 0.5 tablets (2.5 mg) by mouth every 3 hours as needed for moderate to severe pain 20 tablet 0     senna-docusate (SENOKOT-S;PERICOLACE) 8.6-50 MG per tablet Take 2 tablets by mouth 2 times daily as needed for constipation       aspirin  MG EC tablet Take 1 tablet (325 mg) by mouth daily 40 tablet 0     Glucose Blood (BLOOD GLUCOSE TEST STRIPS) STRP by In Vitro route as needed       Acetaminophen (TYLENOL PO) Take 1,300 mg by mouth every morning Do not exceed 4,000 mg of Acetaminophen from all sources in 24 hours        LOPERAMIDE HCL PO Take 2 mg by mouth daily as needed       omeprazole (PRILOSEC) 20 MG CR capsule Take 1 capsule (20 mg) by mouth daily 30 capsule 11     lisinopril (PRINIVIL,ZESTRIL) 5 MG tablet Take 1 tablet (5 mg) by mouth daily 30 tablet 11     calcium-vitamin D (CALTRATE) 600-400 MG-UNIT per tablet Take 1 tablet by mouth every evening       triamcinolone (KENALOG) 0.1 % cream Apply topically 2 times daily as needed for irritation         ROS:  10 point ROS of systems including Constitutional, Eyes, Respiratory, Cardiovascular, Gastroenterology, Genitourinary, Integumentary, Muscularskeletal, Psychiatric were all negative except for pertinent positives noted in my HPI.    Exam:  /79  Pulse 95  Temp 98.1  F (36.7  C)  Resp 20  Wt 152 lb 12.8 oz (69.3 kg)  SpO2 95%  BMI 27.07 kg/m2  GENERAL APPEARANCE:  in no distress, cooperative  ENT:  Mouth and posterior  oropharynx normal, moist mucous membranes, edentulous  with denture plateboth levels.   EYES:  EOM, conjunctivae, lids, pupils and irises normal  NECK:  No adenopathy,masses or thyromegaly  RESP:  respiratory effort and palpation of chest normal, crackles at the bases.   CV:  Palpation and auscultation of heart done , no edema, S1S2 normal, systolic murmur over upper sternal borders, regualr HR,   ABDOMEN:  normal bowel sounds, soft, nontender, no hepatosplenomegaly or other masses  M/S:   Gait and station abnormal uses a WC for ambualtion. Right hip brace in place. Ms strenght 5/5 in dorsiflexions and hand .   SKIN:  Inspection of skin and subcutaneous tissue baseline, Palpation of skin and subcutaneous tissue baseline  NEURO:   Cranial nerves 2-12 are normal tested and grossly at patient's baseline, no purposeful movement in upper and lower extremities  PSYCH:  oriented to perosn, and day (missed year and month, and the  name of this town). , memory impaired , affect and mood normal, Judgement affected.     Lab/Diagnostic data:   CBC RESULTS:   Recent Labs   Lab Test  06/22/17   0015  06/15/17   0840   WBC  9.0  4.8   RBC  3.44*  4.36   HGB  9.6*  12.2   HCT  30.6*  38.6   MCV  89  89   MCH  27.9  28.0   MCHC  31.4*  31.6   RDW  13.1  13.1   PLT  261  151       Last Basic Metabolic Panel:  Recent Labs   Lab Test  06/22/17   0015  06/15/17   0840   NA  138  142   POTASSIUM  4.3  4.2   CHLORIDE  102  107   SLIM  8.7  8.6   CO2  30  29   BUN  15  11   CR  0.65  0.64   GLC  193*  165*       Liver Function Studies -   Recent Labs   Lab Test  05/09/17   1300  05/01/17   1015   PROTTOTAL  6.5*  6.7*   ALBUMIN  3.3*  3.5   BILITOTAL  0.5  0.4   ALKPHOS  93  82   AST  18  12   ALT  22  20       TSH   Date Value Ref Range Status   06/22/2017 4.45 (H) 0.40 - 4.00 mU/L Final   05/01/2017 1.13 0.40 - 4.00 mU/L Final   ]    Lab Results   Component Value Date    A1C 6.3 05/10/2017    A1C 6.1 03/09/2017        ASSESSMENT/PLAN:  Hip dislocation, right, sequela  Fall, subsequent encounter  S/P hip hemiarthroplasty  Displaced fracture of right femoral neck, closed, with routine healing, subsequent encounter  Other osteoporosis, unspecified pathological fracture presence  - - Physical function improving with OT/PT, continue.  - Analgesia optimal  - Continue DVT Prophylaxis according to Orthopedist's recommendations  - Follow on the surgeon's recommendations  - IS tid for possible atelectasis.     Post operative acute blood loss anemia:  - HH trending up.  - clinically stable.     Dementia associated with other underlying disease without behavioral disturbance  - Continue to anticipate needs. Chronic condition, ongoing decline expected.   -  Continue to provide redirection and reassurance as needed. Maintain safe living situation with goals focused on comfort.  - Placement arrangement by SW.          Orders:  - See above, otherwise, continue the rest of the current POC.       Information reviewed:  Medications, vital signs, orders, nursing notes, problem list, hospital information. Total time spent with patient visit was 35 min including patient visit and review of past records.     Electronically signed by:  Radha Rodríguez MD

## 2017-06-28 ENCOUNTER — TRANSFERRED RECORDS (OUTPATIENT)
Dept: HEALTH INFORMATION MANAGEMENT | Facility: CLINIC | Age: 82
End: 2017-06-28

## 2017-06-28 ENCOUNTER — NURSING HOME VISIT (OUTPATIENT)
Dept: GERIATRICS | Facility: CLINIC | Age: 82
End: 2017-06-28
Payer: COMMERCIAL
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
## 2017-06-28 DIAGNOSIS — D62 ACUTE BLOOD LOSS ANEMIA: ICD-10-CM

## 2017-06-28 DIAGNOSIS — F02.80 DEMENTIA ASSOCIATED WITH OTHER UNDERLYING DISEASE WITHOUT BEHAVIORAL DISTURBANCE (H): ICD-10-CM

## 2017-06-28 DIAGNOSIS — M81.8 OTHER OSTEOPOROSIS, UNSPECIFIED PATHOLOGICAL FRACTURE PRESENCE: ICD-10-CM

## 2017-06-28 DIAGNOSIS — S73.004S HIP DISLOCATION, RIGHT, SEQUELA: Primary | ICD-10-CM

## 2017-06-28 DIAGNOSIS — Z96.649 S/P HIP HEMIARTHROPLASTY: ICD-10-CM

## 2017-06-28 DIAGNOSIS — S72.001D: ICD-10-CM

## 2017-06-28 PROCEDURE — 99305 1ST NF CARE MODERATE MDM 35: CPT | Performed by: FAMILY MEDICINE

## 2017-06-28 PROCEDURE — 99207 ZZC CDG-CORRECTLY CODED, REVIEWED AND AGREE: CPT | Performed by: FAMILY MEDICINE

## 2017-06-30 ENCOUNTER — MEDICAL CORRESPONDENCE (OUTPATIENT)
Dept: HEALTH INFORMATION MANAGEMENT | Facility: CLINIC | Age: 82
End: 2017-06-30

## 2017-07-03 ENCOUNTER — NURSING HOME VISIT (OUTPATIENT)
Dept: GERIATRICS | Facility: CLINIC | Age: 82
End: 2017-07-03
Payer: COMMERCIAL

## 2017-07-03 VITALS
DIASTOLIC BLOOD PRESSURE: 70 MMHG | BODY MASS INDEX: 26.85 KG/M2 | RESPIRATION RATE: 24 BRPM | TEMPERATURE: 98 F | OXYGEN SATURATION: 95 % | SYSTOLIC BLOOD PRESSURE: 161 MMHG | WEIGHT: 151.6 LBS | HEART RATE: 82 BPM

## 2017-07-03 DIAGNOSIS — S73.004S HIP DISLOCATION, RIGHT, SEQUELA: ICD-10-CM

## 2017-07-03 DIAGNOSIS — F02.80 DEMENTIA ASSOCIATED WITH OTHER UNDERLYING DISEASE WITHOUT BEHAVIORAL DISTURBANCE (H): Chronic | ICD-10-CM

## 2017-07-03 DIAGNOSIS — D50.9 IRON DEFICIENCY ANEMIA, UNSPECIFIED IRON DEFICIENCY ANEMIA TYPE: Primary | Chronic | ICD-10-CM

## 2017-07-03 PROCEDURE — 99309 SBSQ NF CARE MODERATE MDM 30: CPT | Performed by: NURSE PRACTITIONER

## 2017-07-03 NOTE — PROGRESS NOTES
"[place] [facility]    Chief Complaint   Patient presents with     Nursing Home Acute       HPI:    Lulu Carlton is a 89 year old  (11/8/1921), who is being seen today for an episodic care visit at [facility].  HPI information obtained from: facility chart records, facility staff, patient report and [facility] chart review.Today's concern is:     Iron deficiency anemia, unspecified iron deficiency anemia type  Hip dislocation, right, sequela  Dementia associated with other underlying disease without behavioral disturbance     Patient seen today in room, pleasantly confused and states \"where am I\", AOx1 to self, continues therapies, wearing brace to prevent hip dislocation, pain controlled, Hgb is trending down from 12.2 on 6/15 to 9.6 on 6/22 then 8.0 on 7/3, no taking Vb12 nor iron at this time, no s/s weakness or anemia, overall status chronic age-related physical and cognitive decline.    ALLERGIES: Hydrocodone-acetaminophen; Trazodone; and Metformin  Past Medical, Surgical, Family and Social History reviewed and updated in [facility].    Current Outpatient Prescriptions   Medication Sig Dispense Refill     oxyCODONE (ROXICODONE) 5 MG IR tablet Take 0.5 tablets (2.5 mg) by mouth every 3 hours as needed for moderate to severe pain 20 tablet 0     senna-docusate (SENOKOT-S;PERICOLACE) 8.6-50 MG per tablet Take 2 tablets by mouth 2 times daily as needed for constipation       aspirin  MG EC tablet Take 1 tablet (325 mg) by mouth daily 40 tablet 0     Glucose Blood (BLOOD GLUCOSE TEST STRIPS) STRP by In Vitro route as needed       Acetaminophen (TYLENOL PO) Take 1,300 mg by mouth every morning Do not exceed 4,000 mg of Acetaminophen from all sources in 24 hours        LOPERAMIDE HCL PO Take 2 mg by mouth daily as needed       omeprazole (PRILOSEC) 20 MG CR capsule Take 1 capsule (20 mg) by mouth daily 30 capsule 11     lisinopril (PRINIVIL,ZESTRIL) 5 MG tablet Take 1 tablet (5 mg) by " mouth daily 30 tablet 11     calcium-vitamin D (CALTRATE) 600-400 MG-UNIT per tablet Take 1 tablet by mouth every evening       triamcinolone (KENALOG) 0.1 % cream Apply topically 2 times daily as needed for irritation       Medications reviewed:  Medications reconciled to facility chart and changes were made to reflect current medications as identified as above med list. Below are the changes that were made:   Medications stopped since last EPIC medication reconciliation:   There are no discontinued medications.    Medications started since last [facility] medication reconciliation:  No orders of the defined types were placed in this encounter.      REVIEW OF SYSTEMS:  4 point ROS including Respiratory, CV, GI and , other than that noted in the HPI,  is negative    Physical Exam:  /70  Pulse 82  Temp 98  F (36.7  C)  Resp 24  Wt 151 lb 9.6 oz (68.8 kg)  SpO2 95%  BMI 26.85 kg/m2  GENERAL APPEARANCE:  in no distress, appears healthy, cooperative  ENT:  Mouth and posterior oropharynx normal, moist mucous membranes  RESP:  lungs clear to auscultation , no respiratory distress  CV:  Palpation and auscultation of heart done , regular rate and rhythm, no murmur, rub, or gallop, peripheral edema scant/soft+ in bilateral lower legs  ABDOMEN:  no guarding or rebound, bowel sounds normal  M/S:   Gait and station abnormal requires walker with assist  SKIN:  Inspection of skin and subcutaneous tissue baseline, misc bruising R mid-upper leg  NEURO:   Examination of sensation by touch normal  PSYCH:  oriented to self, memory impaired , affect and mood normal    Recent Labs:    Admission on 06/21/2017, Discharged on 06/23/2017   Component Date Value Ref Range Status     Sodium 06/22/2017 138  133 - 144 mmol/L Final     Potassium 06/22/2017 4.3  3.4 - 5.3 mmol/L Final     Chloride 06/22/2017 102  94 - 109 mmol/L Final     Carbon Dioxide 06/22/2017 30  20 - 32 mmol/L Final     Anion Gap 06/22/2017 6  3 - 14 mmol/L Final      Glucose 06/22/2017 193* 70 - 99 mg/dL Final     Urea Nitrogen 06/22/2017 15  7 - 30 mg/dL Final     Creatinine 06/22/2017 0.65  0.52 - 1.04 mg/dL Final     GFR Estimate 06/22/2017 85  >60 mL/min/1.7m2 Final    Non  GFR Calc     GFR Estimate If Black 06/22/2017   >60 mL/min/1.7m2 Final                    Value:>90   GFR Calc       Calcium 06/22/2017 8.7  8.5 - 10.1 mg/dL Final     WBC 06/22/2017 9.0  4.0 - 11.0 10e9/L Final     RBC Count 06/22/2017 3.44* 3.8 - 5.2 10e12/L Final     Hemoglobin 06/22/2017 9.6* 11.7 - 15.7 g/dL Final     Hematocrit 06/22/2017 30.6* 35.0 - 47.0 % Final     MCV 06/22/2017 89  78 - 100 fl Final     MCH 06/22/2017 27.9  26.5 - 33.0 pg Final     MCHC 06/22/2017 31.4* 31.5 - 36.5 g/dL Final     RDW 06/22/2017 13.1  10.0 - 15.0 % Final     Platelet Count 06/22/2017 261  150 - 450 10e9/L Final     Diff Method 06/22/2017 Automated Method   Final     % Neutrophils 06/22/2017 72.1  % Final     % Lymphocytes 06/22/2017 17.4  % Final     % Monocytes 06/22/2017 7.2  % Final     % Eosinophils 06/22/2017 2.9  % Final     % Basophils 06/22/2017 0.2  % Final     % Immature Granulocytes 06/22/2017 0.2  % Final     Absolute Neutrophil 06/22/2017 6.5  1.6 - 8.3 10e9/L Final     Absolute Lymphocytes 06/22/2017 1.6  0.8 - 5.3 10e9/L Final     Absolute Monocytes 06/22/2017 0.7  0.0 - 1.3 10e9/L Final     Absolute Eosinophils 06/22/2017 0.3  0.0 - 0.7 10e9/L Final     Absolute Basophils 06/22/2017 0.0  0.0 - 0.2 10e9/L Final     Abs Immature Granulocytes 06/22/2017 0.0  0 - 0.4 10e9/L Final     Specimen Description 06/22/2017 Nares   Final     S Aur Meth Resis PCR 06/22/2017   NEG Final                    Value:Negative  MRSA Negative: SA Negative  MRSA and Staphylococcus aureus target DNA not   detected, presumed negative for MRSA and SA colonization or the number of   bacteria present may be below the limit of detection for the assay. FDA   approved assay performed using  [employer] GeneXpert(R) real-time PCR.       Glucose 06/22/2017 150* 70 - 99 mg/dL Final     Glucose 06/22/2017 202* 70 - 99 mg/dL Final     Ferritin 06/22/2017 223  8 - 252 ng/mL Final     Iron 06/22/2017 36  35 - 180 ug/dL Final     Iron Binding Cap 06/22/2017 231* 240 - 430 ug/dL Final     Iron Saturation Index 06/22/2017 16  15 - 46 % Final     TSH 06/22/2017 4.45* 0.40 - 4.00 mU/L Final     Vitamin B12 06/22/2017 658  193 - 986 pg/mL Final     Folate 06/22/2017 18.4  >5.4 ng/mL Final     T4 Free 06/22/2017 1.24  0.76 - 1.46 ng/dL Final     Glucose 06/22/2017 171* 70 - 99 mg/dL Final     Glucose 06/22/2017 168* 70 - 99 mg/dL Final     Glucose 06/23/2017 181* 70 - 99 mg/dL Final     Glucose 06/23/2017 157* 70 - 99 mg/dL Final         Assessment/Plan:  Iron deficiency anemia, unspecified iron deficiency anemia type  -Hgb on 6/22/17 was 9.6, 6/28 was 8.9, trending down  -currently no s/s anemia  -not taking supplements for anemia at this time  -of note, was taking ferrous gluconate 324mg Qd and B-12 1000mcg back in 2016  -will recheck Hgb on 7/7/17 to follow up    Hip dislocation, right, sequela  -continues ASA 325mg for DVT prophylaxis  -continues PT/OT, R leg brace  -taking oxycodone 2.5mg Q3h for pain, consider weaning in next 1-2 weeks before TCU discharge  -has follow up with Dr. Greenfield at [facility] on 7/11, will need to f/u with continuing ASA       Dementia associated with other underlying disease without behavioral disturbance  -pleasant, no behaviors, accomplishing therapies  -facility to monitor/assist      Electronically signed by  HANG Mann [facility]

## 2017-07-06 ENCOUNTER — TRANSFERRED RECORDS (OUTPATIENT)
Dept: HEALTH INFORMATION MANAGEMENT | Facility: CLINIC | Age: 82
End: 2017-07-06

## 2017-07-06 LAB — HEMOGLOBIN: 11.9 G/DL (ref 12–16)

## 2017-08-01 VITALS
SYSTOLIC BLOOD PRESSURE: 132 MMHG | OXYGEN SATURATION: 95 % | HEART RATE: 86 BPM | TEMPERATURE: 97.6 F | WEIGHT: 148.8 LBS | DIASTOLIC BLOOD PRESSURE: 74 MMHG | BODY MASS INDEX: 26.36 KG/M2 | RESPIRATION RATE: 18 BRPM

## 2017-08-01 NOTE — PROGRESS NOTES
[place] [facility]    Chief Complaint   Patient presents with     intermediate Regulatory       HPI:    Lulu Carlton is a 89 year old  (11/8/1921), who is being seen today for a federally mandated E/M visit at [facility].  HPI information obtained from: facility staff and patient report.     Today's concerns are:  --------------------------------  - Resident seen and examined. Was admitted to TCU for rehab after hip b/l hop dislocations, continued to require assistance with ADLs, transferred to LTC unit at same facility.   - Saw ortho last month.   - Currently participating with therapy.   - Frequently refuses to wear hip braces.   - RN reports wander-guard recently placed due to confusion of location and wandering.     --------------------------------------------------------------------    #  Past Medical, social, family histories, medications, and allergies reviewed and updated  # Medications reviewed: Reviewed in the chart and EHR.    # Case Management:  I have reviewed the care plan and MDS and do agree with the plan. Patient's desire to return to the community is not present.  Information reviewed:  Medications, vital signs, orders, and nursing notes.    ROS:  4 point ROS including Respiratory, CV, GI and , other than that noted in the HPI,  is negative    Exam:  Vitals: /74  Pulse 86  Temp 97.6  F (36.4  C)  Resp 18  Wt 148 lb 12.8 oz (67.5 kg)  SpO2 95%  BMI 26.36 kg/m2  BMI= Body mass index is 26.36 kg/(m^2).  GENERAL APPEARANCE:  in no distress, cooperative  ENT:  edentulous  with denture plateboth levels.   RESP:  respiratory effort and palpation of chest normal, coarse sounds at the bases.   CV:  Palpation and auscultation of heart done , no edema, S1S2 normal, systolic murmur over upper sternal borders, regular HR,   ABDOMEN:  normal bowel sounds, soft, nontender, no hepatosplenomegaly or other masses  M/S:   Gait and station abnormal uses a WC for ambulation.  Right hip brace in place. Ms strength 5/5 in dorsiflexions and hand .   SKIN:  Inspection of skin and subcutaneous tissue baseline, Palpation of skin and subcutaneous tissue baseline  NEURO:   Cranial nerves 2-12 are normal tested and grossly at patient's baseline, no purposeful movement in upper and lower extremities  PSYCH:  oriented to person, memory impaired , affect and mood normal, Judgement affected.        Lab/Diagnostic data:    Transferred Records on 07/06/2017   Component Date Value Ref Range Status     Hemoglobin 07/06/2017 11.9* 12.0 - 16.0 g/dL Final         ASSESSMENT/PLAN  Hip dislocation, right, sequela  - continue wearing brace  -  Physical function improving with PT, continue.  - Analgesia optimal  - Continue DVT Prophylaxis according to Orthopedist's recommendations  - Follow on the surgeon's recommendations      Frail elderly  - Significant  Deficits requiring NH placement. Requiring extensive assistance from nursing. Up for meals only o/w spends the day resting in bed      Dementia associated with other underlying disease without behavioral disturbance  - Continue to anticipate needs. Chronic condition, ongoing decline expected.   -  Continue to provide redirection and reassurance as needed. Maintain safe living situation with goals focused on comfort.      Orders:  - See above, otherwise, continue the rest of the current POC.     Electronically signed by:  Radha Rodríguez MD

## 2017-08-02 ENCOUNTER — NURSING HOME VISIT (OUTPATIENT)
Dept: GERIATRICS | Facility: CLINIC | Age: 82
End: 2017-08-02
Payer: COMMERCIAL

## 2017-08-02 DIAGNOSIS — S73.004S HIP DISLOCATION, RIGHT, SEQUELA: Primary | ICD-10-CM

## 2017-08-02 DIAGNOSIS — R54 FRAIL ELDERLY: ICD-10-CM

## 2017-08-02 DIAGNOSIS — F02.80 DEMENTIA ASSOCIATED WITH OTHER UNDERLYING DISEASE WITHOUT BEHAVIORAL DISTURBANCE (H): ICD-10-CM

## 2017-08-02 PROCEDURE — 99309 SBSQ NF CARE MODERATE MDM 30: CPT | Performed by: FAMILY MEDICINE

## 2017-08-02 PROCEDURE — 99207 ZZC CDG-CORRECTLY CODED, REVIEWED AND AGREE: CPT | Performed by: FAMILY MEDICINE

## 2017-09-01 ENCOUNTER — CARE COORDINATION (OUTPATIENT)
Dept: CARE COORDINATION | Facility: CLINIC | Age: 82
End: 2017-09-01

## 2017-09-01 NOTE — PROGRESS NOTES
Clinic Care Coordination Contact    Situation: Patient chart reviewed by care coordinator.    Background: TCU update/ CTS referral    Assessment: Patient was sent to [facility] TCU after hospitalization 6/21-6/23/17. Patient has continued to decline in health while at the TCU so she now has transitioned to LTC.     Plan/Recommendations: No further care coordination needs at this time as patient is not returning to the community but staying at [facility].    Marcelle Tejeda RN, [facility]  RN Care Coordinator  [facility]  Phone # [phone]  9/1/2017 7:56 AM

## 2017-09-06 ENCOUNTER — DOCUMENTATION ONLY (OUTPATIENT)
Dept: OTHER | Facility: CLINIC | Age: 82
End: 2017-09-06

## 2017-10-11 ENCOUNTER — NURSING HOME VISIT (OUTPATIENT)
Dept: GERIATRICS | Facility: CLINIC | Age: 82
End: 2017-10-11
Payer: COMMERCIAL

## 2017-10-11 VITALS
BODY MASS INDEX: 28.39 KG/M2 | TEMPERATURE: 97 F | HEART RATE: 50 BPM | SYSTOLIC BLOOD PRESSURE: 140 MMHG | DIASTOLIC BLOOD PRESSURE: 54 MMHG | OXYGEN SATURATION: 95 % | WEIGHT: 140.8 LBS | RESPIRATION RATE: 18 BRPM | HEIGHT: 59 IN

## 2017-10-11 DIAGNOSIS — F03.90 DEMENTIA WITHOUT BEHAVIORAL DISTURBANCE, UNSPECIFIED DEMENTIA TYPE: Chronic | ICD-10-CM

## 2017-10-11 DIAGNOSIS — I10 HYPERTENSION GOAL BP (BLOOD PRESSURE) < 140/90: Primary | Chronic | ICD-10-CM

## 2017-10-11 DIAGNOSIS — Z13.31 SCREENING FOR DEPRESSION: ICD-10-CM

## 2017-10-11 DIAGNOSIS — S72.001A DISPLACED FRACTURE OF RIGHT FEMORAL NECK (H): ICD-10-CM

## 2017-10-11 DIAGNOSIS — D50.9 IRON DEFICIENCY ANEMIA, UNSPECIFIED IRON DEFICIENCY ANEMIA TYPE: Chronic | ICD-10-CM

## 2017-10-11 PROCEDURE — 99318 ZZC ANNUAL NURSING FAC ASSESSMNT, STABLE: CPT | Performed by: NURSE PRACTITIONER

## 2017-10-11 NOTE — PROGRESS NOTES
[facility]  Chief Complaint   Patient presents with     Annual Comprehensive Nursing Home       HPI:    Lulu Carlton is a 89 year old  (11/8/1921), who is being seen today for an annual comprehensive visit at [facility].  HPI information obtained from: facility chart records, facility staff, patient report and [facility] chart review.  Today's concerns are:     Hypertension goal BP (blood pressure) < 140/90  Screening for depression  Iron deficiency anemia, unspecified iron deficiency anemia type  Displaced fracture of right femoral neck (H)  Dementia without behavioral disturbance, unspecified dementia type     Patient seen today in room, pleasantly confused, attends facility functions, uses coloring books frequently, intake appropriate, SBP's in the 130-140 range, states wanting to pay for things or tip regarding meals/movies/activities frequently, incontinent, self-transfers from WC to toilet/etc. Appropriately, no s/s anemia, R femur Fx healed and brace D/C'd by ortho, no s/s depression, overall status is stable with age related cognitive and physical decline.      Depression screen done: PHQ-2 Given screen score and clinical assessment patient is stable without any signs of depression and no futher interventions warrented at this time.    Based on JNC-8 goals,  patients age of 89 year old, presence of diabetes or CKD, and goals of care goal BP is  <140/90 mm Hg. patient is stable with current plan of care and routine assessment..      ALLERGIES: Hydrocodone-acetaminophen; Trazodone; and Metformin  PROBLEM LIST:  Patient Active Problem List   Diagnosis     Sensorineural hearing loss, asymmetrical     Urinary incontinence     Osteoporosis     Vitamin D deficiency     Anemia, iron deficiency     Type 2 diabetes mellitus with hyperglycemia, without long-term current use of insulin (H)     Primary osteoarthritis of left knee     Hypertension goal BP (blood pressure) < 140/90      Displaced fracture of right femoral neck (H)     Malignant lymphoma, large cell, diffuse (H)     ACP (advance care planning)     Steatosis of liver     Myasthenia gravis (H)     Dementia     Hip dislocation, right, sequela     Status post total replacement of right hip     PAST MEDICAL HISTORY:  has a past medical history of Anemia, unspecified (hosp. 4/3/07 [place] ); Central nervous system complication (hosp. 4/3/07 [place]); Depressive disorder, not elsewhere classified; Fracture of femoral neck, right (H) (5/10/2017); Myasthenia gravis; Other malaise and fatigue (hosp. 4/3/07 [place] ); Other urinary incontinence; Thoracic or lumbosacral neuritis or radiculitis, unspecified; and Unspecified essential hypertension.  PAST SURGICAL HISTORY:  has a past surgical history that includes surgical history of -; surgical history of -; and Open reduction internal fixation hip bipolar (Right, 5/10/2017).  FAMILY HISTORY: family history is not on file.  SOCIAL HISTORY:  reports that she has never smoked. She does not have any smokeless tobacco history on file. She reports that she does not drink alcohol or use illicit drugs.  IMMUNIZATIONS:  Most Recent Immunizations   Administered Date(s) Administered     Influenza (High Dose) 3 valent vaccine 09/05/2014     Influenza (intradermal) 09/06/2013     Influenza Vaccine, 3 YRS +, IM (QUADRIVALENT W/PRESERVATIVES) 11/03/2016     Pneumococcal (PCV 13) 10/31/2016     Pneumococcal 23 valent 10/21/2011     TDAP Vaccine (Adacel) 09/06/2011     Above immunizations pulled from [facility]. [facility] and facility records also reconciled. Outstanding information sent to  to update [facility].  Future immunizations needed:  yearly influenza per facility protocol  MEDICATIONS:  Current Outpatient Prescriptions   Medication Sig Dispense Refill     ASPIRIN EC PO Take 81 mg by mouth daily       oxyCODONE (ROXICODONE) 5 MG IR tablet Take 0.5 tablets (2.5 mg) by mouth  every 3 hours as needed for moderate to severe pain 20 tablet 0     senna-docusate (SENOKOT-S;PERICOLACE) 8.6-50 MG per tablet Take 2 tablets by mouth 2 times daily as needed for constipation       Acetaminophen (TYLENOL PO) Take 1,300 mg by mouth every morning Do not exceed 4,000 mg of Acetaminophen from all sources in 24 hours        LOPERAMIDE HCL PO Take 2 mg by mouth daily as needed       omeprazole (PRILOSEC) 20 MG CR capsule Take 1 capsule (20 mg) by mouth daily 30 capsule 11     lisinopril (PRINIVIL,ZESTRIL) 5 MG tablet Take 1 tablet (5 mg) by mouth daily 30 tablet 11     calcium-vitamin D (CALTRATE) 600-400 MG-UNIT per tablet Take 1 tablet by mouth every evening       triamcinolone (KENALOG) 0.1 % cream Apply topically 2 times daily as needed for irritation       Medications reviewed:  Medications reconciled to facility chart and changes were made to reflect current medications as identified as above med list. Below are the changes that were made:   Medications stopped since last EPIC medication reconciliation:   Medications Discontinued During This Encounter   Medication Reason     aspirin  MG EC tablet      Glucose Blood (BLOOD GLUCOSE TEST STRIPS) STRP        Medications started since last [facility] medication reconciliation:  Orders Placed This Encounter   Medications     ASPIRIN EC PO     Sig: Take 81 mg by mouth daily       Case Management:  I have reviewed the facility/SNF care plan/MDS which was done today, including the falls risk, nutrition and pain screening. I also reviewed the current immunizations, and preventive care..Future cancer screening is not clinically indicated secondary to age/goals of care Patient's desire to return to the community is present, but is not able due to care needs . Current Level of Care is appropriate.    Advance Directive Discussion:    I reviewed the current advanced directives as reflected in EPIC, the POLST and the facility chart, and verified the congruency of  "orders.  I did not due to cognitive impairment review the advance directives with the resident.     Team Discussion:  I communicated with the appropriate disciplines involved with the Plan of Care:   Nursing:  faciility staff    Patient Goal:  Patient's goal is pain control and comfort.    Information reviewed:  Medications, vital signs, orders, and nursing notes.    ROS:  4 point ROS including Respiratory, CV, GI and , other than that noted in the HPI,  is negative    Exam:  /54  Pulse 50  Temp 97  F (36.1  C)  Resp 18  Ht 4' 10.5\" (1.486 m)  Wt 140 lb 12.8 oz (63.9 kg)  SpO2 95%  BMI 28.93 kg/m2  GENERAL APPEARANCE:  Alert, in no distress  ENT:  Mouth and posterior oropharynx normal, moist mucous membranes, [place]  RESP:  lungs clear to auscultation , no respiratory distress  CV:  Palpation and auscultation of heart done , regular rate and rhythm, no murmur, rub, or gallop, peripheral edema scant+ in bilateral lower legs/ankles  ABDOMEN:  no guarding or rebound, bowel sounds normal  M/S:   Gait and station abnormal requires WC, assist intermittent transfers  SKIN:  Inspection of skin and subcutaneous tissue baseline, Palpation of skin and subcutaneous tissue baseline  NEURO:   Cranial nerves 2-12 are normal tested and grossly at patient's baseline, Examination of sensation by touch normal  PSYCH:  oriented to self, memory impaired , affect and mood normal     Lab/Diagnostic data:    Transferred Records on 07/06/2017   Component Date Value Ref Range Status     Hemoglobin 07/06/2017 11.9* 12.0 - 16.0 g/dL Final         ASSESSMENT/PLAN  Hypertension goal BP (blood pressure) < 140/90  -SBP's have been in the 130-140 range  -denies CP/palpitations  -continue lisinopril 5mg Qd    Screening for depression  -PHQ2 score 0  -no s/s depression; monitor    Iron deficiency anemia, unspecified iron deficiency anemia type  -Hgb on 7/6/17 was 11.9  -previously taking iron/B12 supplements in 2016; no longer " indicated  -follow up Hgb in 3-6 months or earlier if indicated (with A1C/BMP?)    Displaced fracture of right femoral neck (H)  -was on  for DVT, changed to 81mg on 8/30/17  -follow up [facility] 8/17/17 was WNL, no additional orders  -still has oxycodone 2.5mg PRN if needed; continue for now  -continue Ca/VitaD supplement Qpm as scheduled for bone health    Dementia without behavioral disturbance, unspecified dementia type  -currently stable, pleasant, no concerns  -facility to monitor/support  -would consider hospice with any status decline          Electronically signed by:  HANG Mann CNP

## 2017-10-20 ENCOUNTER — RECORDS - HEALTHEAST (OUTPATIENT)
Dept: LAB | Facility: CLINIC | Age: 82
End: 2017-10-20

## 2017-10-23 ENCOUNTER — NURSING HOME VISIT (OUTPATIENT)
Dept: GERIATRICS | Facility: CLINIC | Age: 82
End: 2017-10-23
Payer: COMMERCIAL

## 2017-10-23 ENCOUNTER — TRANSFERRED RECORDS (OUTPATIENT)
Dept: HEALTH INFORMATION MANAGEMENT | Facility: CLINIC | Age: 82
End: 2017-10-23

## 2017-10-23 VITALS
TEMPERATURE: 97.8 F | BODY MASS INDEX: 28.76 KG/M2 | DIASTOLIC BLOOD PRESSURE: 72 MMHG | HEART RATE: 88 BPM | OXYGEN SATURATION: 93 % | SYSTOLIC BLOOD PRESSURE: 160 MMHG | RESPIRATION RATE: 18 BRPM | WEIGHT: 140 LBS

## 2017-10-23 DIAGNOSIS — D50.9 IRON DEFICIENCY ANEMIA, UNSPECIFIED IRON DEFICIENCY ANEMIA TYPE: Primary | Chronic | ICD-10-CM

## 2017-10-23 DIAGNOSIS — E11.65 TYPE 2 DIABETES MELLITUS WITH HYPERGLYCEMIA, WITHOUT LONG-TERM CURRENT USE OF INSULIN (H): Chronic | ICD-10-CM

## 2017-10-23 DIAGNOSIS — M81.0 AGE-RELATED OSTEOPOROSIS WITHOUT CURRENT PATHOLOGICAL FRACTURE: ICD-10-CM

## 2017-10-23 DIAGNOSIS — I10 HYPERTENSION GOAL BP (BLOOD PRESSURE) < 140/90: Chronic | ICD-10-CM

## 2017-10-23 DIAGNOSIS — E55.9 VITAMIN D DEFICIENCY: ICD-10-CM

## 2017-10-23 LAB
ANION GAP SERPL CALCULATED.3IONS-SCNC: 4 MMOL/L (ref 5–18)
BUN SERPL-MCNC: 16 MG/DL (ref 8–28)
CALCIUM SERPL-MCNC: 9 MG/DL (ref 8.5–10.5)
CHLORIDE SERPLBLD-SCNC: 104 MMOL/L (ref 98–107)
CO2 SERPL-SCNC: 31 MMOL/L (ref 22–31)
CREAT SERPL-MCNC: 0.73 MG/DL (ref 0.6–1.1)
GFR SERPL CREATININE-BSD FRML MDRD: >60 ML/MIN/1.73M2
GLUCOSE SERPL-MCNC: 179 MG/DL (ref 70–125)
HBA1C MFR BLD: 8.3 % (ref 4.2–6.1)
HBA1C MFR BLD: 8.3 % (ref 4.2–6.1)
HEMOGLOBIN: 12.5 G/DL (ref 12–16)
POTASSIUM SERPL-SCNC: 3.8 MMOL/L (ref 3.5–5)
SODIUM SERPL-SCNC: 139 MMOL/L (ref 136–145)

## 2017-10-23 PROCEDURE — 99309 SBSQ NF CARE MODERATE MDM 30: CPT | Performed by: NURSE PRACTITIONER

## 2017-10-23 NOTE — PROGRESS NOTES
"[place]    Chief Complaint   Patient presents with     Nursing Home Acute       HPI:    Lulu Carlton is a 89 year old  (11/8/1921), who is being seen today for an episodic care visit at [facility].  HPI information obtained from: facility chart records, facility staff, patient report and [facility] chart review.Today's concern is:     Iron deficiency anemia, unspecified iron deficiency anemia type  Type 2 diabetes mellitus with hyperglycemia, without long-term current use of insulin (H)  Hypertension goal BP (blood pressure) < 140/90  Vitamin D deficiency  Age-related osteoporosis without current pathological fracture     Patient seen today, was contacted by facility RN last week regarding having fever, chills, pain, /62, unable to ascertain if just minor cold symptoms or other potential issue, ordered labs at that time (see below), current /73, HR 80, T 97.8, RR 16, 95%, is confused today but WNL, looking for money in room that \"dissapeared\", per nurse today function is WNL, is attempting to ambulate in room looking in drawers for money, due to dementia is difficult to monitor for safety as requires transfer assist and generally using WC, no s/s weakness nor pain, overall status is chronic cognitive and physical decline.    ALLERGIES: Hydrocodone-acetaminophen; Trazodone; and Metformin  Past Medical, Surgical, Family and Social History reviewed and updated in [facility].    Current Outpatient Prescriptions   Medication Sig Dispense Refill     ASPIRIN EC PO Take 81 mg by mouth daily       oxyCODONE (ROXICODONE) 5 MG IR tablet Take 0.5 tablets (2.5 mg) by mouth every 3 hours as needed for moderate to severe pain 20 tablet 0     senna-docusate (SENOKOT-S;PERICOLACE) 8.6-50 MG per tablet Take 2 tablets by mouth 2 times daily as needed for constipation       Acetaminophen (TYLENOL PO) Take 1,300 mg by mouth every morning Do not exceed 4,000 mg of Acetaminophen from all " sources in 24 hours        LOPERAMIDE HCL PO Take 2 mg by mouth daily as needed       omeprazole (PRILOSEC) 20 MG CR capsule Take 1 capsule (20 mg) by mouth daily 30 capsule 11     lisinopril (PRINIVIL,ZESTRIL) 5 MG tablet Take 1 tablet (5 mg) by mouth daily 30 tablet 11     calcium-vitamin D (CALTRATE) 600-400 MG-UNIT per tablet Take 1 tablet by mouth every evening       triamcinolone (KENALOG) 0.1 % cream Apply topically 2 times daily as needed for irritation       Medications reviewed:  Medications reconciled to facility chart and changes were made to reflect current medications as identified as above med list. Below are the changes that were made:   Medications stopped since last EPIC medication reconciliation:   There are no discontinued medications.    Medications started since last [facility] medication reconciliation:  No orders of the defined types were placed in this encounter.      REVIEW OF SYSTEMS:  4 point ROS including Respiratory, CV, GI and , other than that noted in the HPI,  is negative    Physical Exam:  /72  Pulse 88  Temp 97.8  F (36.6  C)  Resp 18  Wt 140 lb (63.5 kg)  SpO2 93%  BMI 28.76 kg/m2  GENERAL APPEARANCE:  Alert, in no distress  ENT:  Mouth and posterior oropharynx normal, moist mucous membranes  RESP:  lungs clear to auscultation , no respiratory distress  CV:  regular rate and rhythm, no murmur, rub, or gallop, peripheral edema 1+ in bilateral ankles  ABDOMEN:  no guarding or rebound, bowel sounds normal  M/S:   Gait and station abnormal requires transfer assist, using WC  SKIN:  Inspection of skin and subcutaneous tissue baseline  NEURO:   Examination of sensation by touch normal  PSYCH:  oriented to self, insight and judgement impaired, memory impaired     Recent Labs:    Transferred Records on 07/06/2017   Component Date Value Ref Range Status     Hemoglobin 07/06/2017 11.9* 12.0 - 16.0 g/dL Final         Assessment/Plan:  Iron deficiency anemia, unspecified iron  deficiency anemia type  -no overt s/s weakness/anemia  -currently not taking iron supplement not VitaB  -labs on 6/22/17 Hgb 9.6, Fe 36, VitaB 658   -Hgb on 7/6/17 was 11.9  -Labs on 10/23/17 were Hgb 12.5, Fe 73 (range ), VitaB 375 (range 213-816)  -no change in plan  -consider recheck Hgb in 6-12 months or earlier if indicated    Type 2 diabetes mellitus with hyperglycemia, without long-term current use of insulin (H)  -currently not taking any DM medications for BG control  -A1C on 10/23/17 was 8.3; WNL for patient with goal <8.5  -continue POC as ordered    Hypertension goal BP (blood pressure) < 140/90  -denies CP/palpitaitons  -recent SBP's in the 130-140 range, one was 115  -Creat/K on 6/22/17 were 0.65/4.3  -Creat/K on 10/23/17 were 0.73/3.8  -continue lisinopril 5mg Qd  -facility to continue checking BP's as scheduled    Vitamin D deficiency  Age-related osteoporosis without current pathological fracture  -had Fx R femur in May 2017; assist transfer  -taking VitaD/Ca 600/400 tab daily  -VItaD on 10/23/17 was 30 (range 30-80)  -continue supplement as ordered, would consider changing supplement dose to BID if VitaD was <25  -probable f/u VitaD with next labs in 6-12 months      Electronically signed by  HANG Mann CNP

## 2017-12-04 VITALS
HEART RATE: 84 BPM | RESPIRATION RATE: 18 BRPM | SYSTOLIC BLOOD PRESSURE: 123 MMHG | WEIGHT: 138.2 LBS | BODY MASS INDEX: 28.39 KG/M2 | TEMPERATURE: 97.8 F | OXYGEN SATURATION: 92 % | DIASTOLIC BLOOD PRESSURE: 64 MMHG

## 2017-12-04 NOTE — PROGRESS NOTES
"  [facility]    Chief Complaint   Patient presents with     group home Regulatory       HPI:    Lulu Carlton is a 89 year old  (11/8/1921), who is being seen today for a federally mandated E/M visit at [facility].  HPI information obtained from: facility staff and patient report.     Today's concerns are:  - Resident seen and examined.   - Reports sleep, appetite and BM are fine. Reports \" I think I am healthy\"  - RN reports increased distress but not able to redirect, and noted to have repetitive question/anxiety re-money; recently started on zoloft 50 mg but minimal improvement.      ----------------------------------------------------  - Past Medical, social, family histories, medications, and allergies reviewed and updated  - Medications reviewed: in the chart and EHR.   - Case Management:   I have reviewed the care plan and MDS and do agree with the plan. Patient's desire to return to the community is not present.  Information reviewed:  Medications, vital signs, orders, and nursing notes.    ROS:  4 point ROS including Respiratory, CV, GI and , other than that noted in the HPI,  is negative    Exam:  Vitals: /64  Pulse 84  Temp 97.8  F (36.6  C)  Resp 18  Wt 138 lb 3.2 oz (62.7 kg)  SpO2 92%  BMI 28.39 kg/m2  BMI= Body mass index is 28.39 kg/(m^2).  GENERAL APPEARANCE:  in no distress, cooperative  ENT:  edentulous  with denture plateboth levels.   RESP:  respiratory effort and palpation of chest normal, coarse sounds at the bases.   CV:  Palpation and auscultation of heart done , no edema, S1S2 normal, systolic murmur over upper sternal borders, regular HR,   ABDOMEN:  normal bowel sounds, soft, nontender, no hepatosplenomegaly or other masses  M/S:   Gait and station abnormal uses a WC for ambulation. Ms strength 5/5 in dorsiflexions and hand .   SKIN:  Inspection of skin and subcutaneous tissue baseline, Palpation of skin and subcutaneous tissue " baseline  NEURO:   Cranial nerves 2-12 are normal tested and grossly at patient's baseline, no purposeful movement in upper and lower extremities  PSYCH:  AAOx Person/place, and day but not month or year; memory impaired , affect and mood anxious, Judgement affected    Lab/Diagnostic data:    Transferred Records on 10/23/2017   Component Date Value Ref Range Status     Hemoglobin 10/23/2017 12.5  12.0 - 16.0 g/dL Final     Sodium 10/23/2017 139  136 - 145 mmol/L Final     Potassium 10/23/2017 3.8  3.5 - 5.0 mmol/L Final     Chloride 10/23/2017 104  98 - 107 mmol/L Final     Carbon Dioxide 10/23/2017 31  22 - 31 mmol/L Final     Anion Gap 10/23/2017 4* 5 - 18 mmol/L Final     Glucose 10/23/2017 179* 70 - 125 mg/dL Final     Urea Nitrogen 10/23/2017 16  8 - 28 mg/dL Final     Creatinine 10/23/2017 0.73  0.60 - 1.10 mg/dL Final     Calcium 10/23/2017 9.0  8.5 - 10.5 mg/dL Final     GFR Estimate 10/23/2017 >60  >60 mL/min/1.73m2 Final     GFR Estimate If Black 10/23/2017 >60  >60 mL/min/1.73m2 Final     Hemoglobin A1C 10/23/2017 8.3* 4.2 - 6.1 % Final         ASSESSMENT/PLAN  Hypertension goal BP (blood pressure) < 140/90  -   BP Readings from Last 3 Encounters:   12/04/17 123/64   10/23/17 160/72   10/11/17 140/54   - controlled  -  On lisinopril 5 mg  - Keep SBP> 130 mmHg and DBP > 65 mmHg (levels below these increase mortality as shown by standard studies and observations).       Hip dislocation, right, sequela (May 2017)  Age-related osteoporosis without current pathological fracture  Frail elderly  -Vit D/Ca 600/400 tab daily  -Vit D on 10/23/17 was 30  - consider increasing Vit D dose to 800 mg daily.   - Significant  Deficits requiring NH placement. Requiring extensive assistance from nursing. Up for meals only o/w spends the day resting in bed      Late onset AD with behavioral disturbance:  - restlessness, constantly asking about her money, started on Zoloft  On 11/21/17 with minimal improvement.  - Increase  Zoloft to 75 mg, and GNP to follow up in 2 weeks  - Continue to anticipate needs. Chronic condition, ongoing decline expected.   -  Continue to provide redirection and reassurance as needed. Maintain safe living situation with goals focused on comfort.      Orders:  - See above, otherwise, continue the rest of the current POC.       Electronically signed by:  Radha Rodríguez MD

## 2017-12-06 ENCOUNTER — NURSING HOME VISIT (OUTPATIENT)
Dept: GERIATRICS | Facility: CLINIC | Age: 82
End: 2017-12-06
Payer: COMMERCIAL

## 2017-12-06 DIAGNOSIS — G30.1 LATE ONSET ALZHEIMER'S DISEASE WITH BEHAVIORAL DISTURBANCE (H): ICD-10-CM

## 2017-12-06 DIAGNOSIS — M81.0 AGE-RELATED OSTEOPOROSIS WITHOUT CURRENT PATHOLOGICAL FRACTURE: ICD-10-CM

## 2017-12-06 DIAGNOSIS — R54 FRAIL ELDERLY: ICD-10-CM

## 2017-12-06 DIAGNOSIS — S73.004S HIP DISLOCATION, RIGHT, SEQUELA: ICD-10-CM

## 2017-12-06 DIAGNOSIS — I10 HYPERTENSION GOAL BP (BLOOD PRESSURE) < 140/90: Primary | ICD-10-CM

## 2017-12-06 DIAGNOSIS — F02.818 LATE ONSET ALZHEIMER'S DISEASE WITH BEHAVIORAL DISTURBANCE (H): ICD-10-CM

## 2017-12-06 PROCEDURE — 99310 SBSQ NF CARE HIGH MDM 45: CPT | Performed by: FAMILY MEDICINE

## 2018-01-01 ENCOUNTER — RECORDS - HEALTHEAST (OUTPATIENT)
Dept: LAB | Facility: CLINIC | Age: 83
End: 2018-01-01

## 2018-01-01 ENCOUNTER — TELEPHONE (OUTPATIENT)
Dept: GERIATRICS | Facility: CLINIC | Age: 83
End: 2018-01-01

## 2018-01-01 ENCOUNTER — NURSING HOME VISIT (OUTPATIENT)
Dept: GERIATRICS | Facility: CLINIC | Age: 83
End: 2018-01-01
Payer: COMMERCIAL

## 2018-01-01 ENCOUNTER — NURSING HOME VISIT (OUTPATIENT)
Dept: GERIATRICS | Facility: CLINIC | Age: 83
End: 2018-01-01
Payer: MEDICARE

## 2018-01-01 VITALS
BODY MASS INDEX: 29.39 KG/M2 | HEART RATE: 76 BPM | HEIGHT: 59 IN | RESPIRATION RATE: 20 BRPM | DIASTOLIC BLOOD PRESSURE: 84 MMHG | SYSTOLIC BLOOD PRESSURE: 156 MMHG | TEMPERATURE: 97.2 F | OXYGEN SATURATION: 96 % | WEIGHT: 145.8 LBS

## 2018-01-01 VITALS
HEIGHT: 59 IN | DIASTOLIC BLOOD PRESSURE: 74 MMHG | RESPIRATION RATE: 20 BRPM | WEIGHT: 145.5 LBS | TEMPERATURE: 97.8 F | OXYGEN SATURATION: 95 % | HEART RATE: 89 BPM | BODY MASS INDEX: 29.33 KG/M2 | SYSTOLIC BLOOD PRESSURE: 146 MMHG

## 2018-01-01 VITALS
DIASTOLIC BLOOD PRESSURE: 72 MMHG | SYSTOLIC BLOOD PRESSURE: 132 MMHG | HEIGHT: 59 IN | BODY MASS INDEX: 30.2 KG/M2 | TEMPERATURE: 97.7 F | HEART RATE: 83 BPM | OXYGEN SATURATION: 94 % | WEIGHT: 149.8 LBS | RESPIRATION RATE: 20 BRPM

## 2018-01-01 VITALS
RESPIRATION RATE: 18 BRPM | SYSTOLIC BLOOD PRESSURE: 126 MMHG | OXYGEN SATURATION: 96 % | WEIGHT: 145.2 LBS | DIASTOLIC BLOOD PRESSURE: 68 MMHG | TEMPERATURE: 97.5 F | HEART RATE: 66 BPM | BODY MASS INDEX: 29.83 KG/M2

## 2018-01-01 VITALS
DIASTOLIC BLOOD PRESSURE: 64 MMHG | WEIGHT: 145.8 LBS | TEMPERATURE: 98.3 F | OXYGEN SATURATION: 95 % | HEART RATE: 54 BPM | HEIGHT: 59 IN | RESPIRATION RATE: 22 BRPM | BODY MASS INDEX: 29.39 KG/M2 | SYSTOLIC BLOOD PRESSURE: 140 MMHG

## 2018-01-01 VITALS
OXYGEN SATURATION: 95 % | RESPIRATION RATE: 20 BRPM | WEIGHT: 145.5 LBS | BODY MASS INDEX: 29.33 KG/M2 | TEMPERATURE: 97.8 F | DIASTOLIC BLOOD PRESSURE: 74 MMHG | HEART RATE: 89 BPM | SYSTOLIC BLOOD PRESSURE: 146 MMHG | HEIGHT: 59 IN

## 2018-01-01 VITALS
RESPIRATION RATE: 20 BRPM | HEART RATE: 88 BPM | SYSTOLIC BLOOD PRESSURE: 140 MMHG | SYSTOLIC BLOOD PRESSURE: 143 MMHG | HEIGHT: 59 IN | OXYGEN SATURATION: 94 % | BODY MASS INDEX: 29.84 KG/M2 | OXYGEN SATURATION: 95 % | RESPIRATION RATE: 18 BRPM | WEIGHT: 150 LBS | DIASTOLIC BLOOD PRESSURE: 84 MMHG | DIASTOLIC BLOOD PRESSURE: 59 MMHG | TEMPERATURE: 97.5 F | TEMPERATURE: 98.1 F | HEART RATE: 78 BPM | HEIGHT: 59 IN | WEIGHT: 148 LBS | BODY MASS INDEX: 30.24 KG/M2

## 2018-01-01 VITALS
OXYGEN SATURATION: 94 % | SYSTOLIC BLOOD PRESSURE: 146 MMHG | HEIGHT: 59 IN | DIASTOLIC BLOOD PRESSURE: 65 MMHG | TEMPERATURE: 98.8 F | WEIGHT: 151.8 LBS | RESPIRATION RATE: 18 BRPM | BODY MASS INDEX: 30.6 KG/M2

## 2018-01-01 VITALS
SYSTOLIC BLOOD PRESSURE: 148 MMHG | HEART RATE: 76 BPM | WEIGHT: 148.6 LBS | RESPIRATION RATE: 20 BRPM | BODY MASS INDEX: 30.53 KG/M2 | DIASTOLIC BLOOD PRESSURE: 76 MMHG | TEMPERATURE: 97.2 F

## 2018-01-01 DIAGNOSIS — R41.0 CONFUSION: ICD-10-CM

## 2018-01-01 DIAGNOSIS — Z51.5 HOSPICE CARE PATIENT: ICD-10-CM

## 2018-01-01 DIAGNOSIS — N63.0 BREAST MASS: Primary | ICD-10-CM

## 2018-01-01 DIAGNOSIS — Z79.899 MEDICATION MANAGEMENT: ICD-10-CM

## 2018-01-01 DIAGNOSIS — R44.3 HALLUCINATIONS: ICD-10-CM

## 2018-01-01 DIAGNOSIS — I10 BENIGN ESSENTIAL HYPERTENSION: ICD-10-CM

## 2018-01-01 DIAGNOSIS — F02.818 LATE ONSET ALZHEIMER'S DISEASE WITH BEHAVIORAL DISTURBANCE (H): ICD-10-CM

## 2018-01-01 DIAGNOSIS — Z85.72 HISTORY OF B-CELL LYMPHOMA: ICD-10-CM

## 2018-01-01 DIAGNOSIS — F41.9 ANXIETY: ICD-10-CM

## 2018-01-01 DIAGNOSIS — R45.1 RESTLESSNESS AND AGITATION: ICD-10-CM

## 2018-01-01 DIAGNOSIS — R40.4 UNRESPONSIVE EPISODE: Primary | ICD-10-CM

## 2018-01-01 DIAGNOSIS — R06.81 APNEA: ICD-10-CM

## 2018-01-01 DIAGNOSIS — F03.91 DEMENTIA WITH BEHAVIORAL DISTURBANCE, UNSPECIFIED DEMENTIA TYPE: Primary | ICD-10-CM

## 2018-01-01 DIAGNOSIS — R29.6 FALLS FREQUENTLY: ICD-10-CM

## 2018-01-01 DIAGNOSIS — F41.9 ANXIETY: Primary | ICD-10-CM

## 2018-01-01 DIAGNOSIS — C50.011 PAGET DISEASE OF BREAST, RIGHT (H): ICD-10-CM

## 2018-01-01 DIAGNOSIS — G30.1 LATE ONSET ALZHEIMER'S DISEASE WITH BEHAVIORAL DISTURBANCE (H): ICD-10-CM

## 2018-01-01 DIAGNOSIS — N63.0 BREAST MASS: ICD-10-CM

## 2018-01-01 DIAGNOSIS — K21.9 GASTROESOPHAGEAL REFLUX DISEASE WITHOUT ESOPHAGITIS: ICD-10-CM

## 2018-01-01 DIAGNOSIS — M81.0 OSTEOPOROSIS WITHOUT CURRENT PATHOLOGICAL FRACTURE, UNSPECIFIED OSTEOPOROSIS TYPE: ICD-10-CM

## 2018-01-01 DIAGNOSIS — E11.9 TYPE 2 DIABETES MELLITUS WITHOUT COMPLICATION, WITHOUT LONG-TERM CURRENT USE OF INSULIN (H): ICD-10-CM

## 2018-01-01 DIAGNOSIS — F03.91 DEMENTIA WITH BEHAVIORAL DISTURBANCE, UNSPECIFIED DEMENTIA TYPE: Chronic | ICD-10-CM

## 2018-01-01 LAB
ANION GAP SERPL CALCULATED.3IONS-SCNC: 12 MMOL/L (ref 5–18)
BUN SERPL-MCNC: 28 MG/DL (ref 8–28)
CALCIUM SERPL-MCNC: 9.3 MG/DL (ref 8.5–10.5)
CHLORIDE BLD-SCNC: 100 MMOL/L (ref 98–107)
CO2 SERPL-SCNC: 26 MMOL/L (ref 22–31)
CREAT SERPL-MCNC: 1.28 MG/DL (ref 0.6–1.1)
GFR SERPL CREATININE-BSD FRML MDRD: 39 ML/MIN/1.73M2
GLUCOSE BLD-MCNC: 215 MG/DL (ref 70–125)
POTASSIUM BLD-SCNC: 4 MMOL/L (ref 3.5–5)
SODIUM SERPL-SCNC: 138 MMOL/L (ref 136–145)

## 2018-01-01 PROCEDURE — 99318 ZZC ANNUAL NURSING FAC ASSESSMNT, STABLE: CPT | Performed by: NURSE PRACTITIONER

## 2018-01-01 PROCEDURE — 99309 SBSQ NF CARE MODERATE MDM 30: CPT | Performed by: NURSE PRACTITIONER

## 2018-01-01 PROCEDURE — 99308 SBSQ NF CARE LOW MDM 20: CPT | Performed by: NURSE PRACTITIONER

## 2018-01-01 PROCEDURE — 99309 SBSQ NF CARE MODERATE MDM 30: CPT | Performed by: FAMILY MEDICINE

## 2018-01-01 PROCEDURE — 99308 SBSQ NF CARE LOW MDM 20: CPT | Mod: GV | Performed by: NURSE PRACTITIONER

## 2018-01-01 RX ORDER — QUETIAPINE FUMARATE 25 MG/1
25 TABLET, FILM COATED ORAL DAILY
Qty: 60 TABLET | Refills: 11
Start: 2018-01-01 | End: 2018-01-01

## 2018-01-01 RX ORDER — LORAZEPAM 2 MG/ML
CONCENTRATE ORAL
COMMUNITY
Start: 2018-01-01 | End: 2018-01-01

## 2018-01-01 RX ORDER — CALCIUM CARBONATE 500 MG/1
1 TABLET, CHEWABLE ORAL EVERY 4 HOURS PRN
COMMUNITY

## 2018-01-01 RX ORDER — HALOPERIDOL 2 MG/ML
1 SOLUTION ORAL 2 TIMES DAILY
COMMUNITY
End: 2019-01-01

## 2018-01-01 RX ORDER — OMEPRAZOLE 10 MG/1
20 CAPSULE, DELAYED RELEASE ORAL
Qty: 60 CAPSULE | Refills: 11
Start: 2018-01-01 | End: 2018-01-01

## 2018-02-07 ENCOUNTER — NURSING HOME VISIT (OUTPATIENT)
Dept: GERIATRICS | Facility: CLINIC | Age: 83
End: 2018-02-07
Payer: COMMERCIAL

## 2018-02-07 DIAGNOSIS — K21.9 GASTROESOPHAGEAL REFLUX DISEASE WITHOUT ESOPHAGITIS: ICD-10-CM

## 2018-02-07 DIAGNOSIS — F03.90 DEMENTIA WITHOUT BEHAVIORAL DISTURBANCE, UNSPECIFIED DEMENTIA TYPE: Primary | Chronic | ICD-10-CM

## 2018-02-07 DIAGNOSIS — I10 HYPERTENSION GOAL BP (BLOOD PRESSURE) < 140/90: Chronic | ICD-10-CM

## 2018-02-07 DIAGNOSIS — F41.9 ANXIETY: ICD-10-CM

## 2018-02-07 PROCEDURE — 99309 SBSQ NF CARE MODERATE MDM 30: CPT | Performed by: NURSE PRACTITIONER

## 2018-02-07 RX ORDER — SERTRALINE HYDROCHLORIDE 100 MG/1
100 TABLET, FILM COATED ORAL DAILY
Qty: 30 TABLET | Refills: 11
Start: 2018-02-07 | End: 2018-03-26 | Stop reason: ALTCHOICE

## 2018-02-07 NOTE — PROGRESS NOTES
[place] [facility]    Chief Complaint   Patient presents with     jail Regulatory       HPI:    Lulu Carlton is a 89 year old  (11/8/1921), who is being seen today for a federally mandated E/M visit at [facility].  HPI information obtained from: facility chart records, facility staff, patient report and [facility] chart review.     Today's concerns are:     Dementia without behavioral disturbance, unspecified dementia type  Anxiety  Hypertension goal BP (blood pressure) < 140/90  Gastroesophageal reflux disease without esophagitis     Patient seen today sitting in WC, pleasant, asks me how much lunch is, very focused on costs of things and will constantly ask how much something is or complain of somebody taking her money, since addition of sertraline behaviors have been moderately controlled, overall status is stable.     ALLERGIES: Hydrocodone-acetaminophen; Trazodone; and Metformin  PAST MEDICAL HISTORY:  has a past medical history of Anemia, unspecified (hosp. 4/3/07 [place] ); Central nervous system complication (hosp. 4/3/07 [place]); Depressive disorder, not elsewhere classified; Fracture of femoral neck, right (H) (5/10/2017); Myasthenia gravis; Other malaise and fatigue (hosp. 4/3/07 [place] ); Other urinary incontinence; Thoracic or lumbosacral neuritis or radiculitis, unspecified; and Unspecified essential hypertension.  PAST SURGICAL HISTORY:  has a past surgical history that includes surgical history of - ; surgical history of - ; and Open reduction internal fixation hip bipolar (Right, 5/10/2017).  FAMILY HISTORY: family history is not on file.  SOCIAL HISTORY:  reports that she has never smoked. She does not have any smokeless tobacco history on file. She reports that she does not drink alcohol or use illicit drugs.    MEDICATIONS:  Current Outpatient Prescriptions   Medication Sig Dispense Refill     sertraline (ZOLOFT) 100 MG tablet Take 1 tablet (100  mg) by mouth daily 30 tablet 11     [DISCONTINUED] Sertraline HCl (ZOLOFT PO) Take 50 mg by mouth daily       ASPIRIN EC PO Take 81 mg by mouth daily       senna-docusate (SENOKOT-S;PERICOLACE) 8.6-50 MG per tablet Take 2 tablets by mouth 2 times daily as needed for constipation       Acetaminophen (TYLENOL PO) Take 1,300 mg by mouth every morning And 650mg PO every 4hrs PRN.  Do not exceed 4,000 mg of Acetaminophen from all sources in 24 hours       LOPERAMIDE HCL PO Take 2 mg by mouth daily as needed       lisinopril (PRINIVIL,ZESTRIL) 5 MG tablet Take 1 tablet (5 mg) by mouth daily 30 tablet 11     calcium-vitamin D (CALTRATE) 600-400 MG-UNIT per tablet Take 1 tablet by mouth every evening       triamcinolone (KENALOG) 0.1 % cream Apply topically 2 times daily as needed for irritation       Medications reviewed:  Medications reconciled to facility chart and changes were made to reflect current medications as identified as above med list. Below are the changes that were made:   Medications stopped since last EPIC medication reconciliation:   There are no discontinued medications.    Medications started since last [facility] medication reconciliation:  No orders of the defined types were placed in this encounter.      Case Management:  I have reviewed the care plan and MDS and do agree with the plan. Patient's desire to return to the community is present, but is not able due to care needs .  Information reviewed:  Medications, vital signs, orders, and nursing notes.    ROS:  Unobtainable secondary to cognitive impairment or aphasia.    Exam:  Vitals: There were no vitals taken for this visit.  BMI= There is no height or weight on file to calculate BMI.  GENERAL APPEARANCE:  Alert, appears healthy, thin  ENT:  Mouth and posterior oropharynx normal, moist mucous membranes  RESP:  lungs clear to auscultation , no respiratory distress  CV:  peripheral edema scant-1+ in bilateral lower legs, irregular rhythm rate, grade 3/6  murmur  ABDOMEN:  bowel sounds normal  M/S:   Gait and station abnormal requires transfer assist, using WC  SKIN:  Inspection of skin and subcutaneous tissue baseline  NEURO:   Examination of sensation by touch normal  PSYCH:  oriented to self, insight and judgement impaired    Lab/Diagnostic data:     CBC RESULTS:   Recent Labs   Lab Test 10/23/17 07/06/17  06/22/17   0015  06/15/17   0840   WBC   --    --   9.0  4.8   RBC   --    --   3.44*  4.36   HGB  12.5  11.9*  9.6*  12.2   HCT   --    --   30.6*  38.6   MCV   --    --   89  89   MCH   --    --   27.9  28.0   MCHC   --    --   31.4*  31.6   RDW   --    --   13.1  13.1   PLT   --    --   261  151       Last Basic Metabolic Panel:  Recent Labs   Lab Test 10/23/17  06/22/17   0015   NA  139  138   POTASSIUM  3.8  4.3   CHLORIDE  104  102   SLIM  9.0  8.7   CO2  31  30   BUN  16  15   CR  0.73  0.65   GLC  179*  193*       Liver Function Studies -   Recent Labs   Lab Test  05/09/17   1300  05/01/17   1015   PROTTOTAL  6.5*  6.7*   ALBUMIN  3.3*  3.5   BILITOTAL  0.5  0.4   ALKPHOS  93  82   AST  18  12   ALT  22  20       TSH   Date Value Ref Range Status   06/22/2017 4.45 (H) 0.40 - 4.00 mU/L Final   05/01/2017 1.13 0.40 - 4.00 mU/L Final   ]    Lab Results   Component Value Date    A1C 8.3 10/23/2017    A1C 6.3 05/10/2017       ASSESSMENT/PLAN  Dementia without behavioral disturbance, unspecified dementia type  Anxiety  -anxiety regarding money has improved with sertraline 100mg Qd although still present  -would consider increasing sertraline to 150mg max dose in future as indicated  -staff to document concerns/behaviors for review    Hypertension goal BP (blood pressure) < 140/90  -recent SBP's treand from 120-160, average approximately 130  -continue lisinopril 5mg Qd for now  -facility to follow BP's as scheduled, if average is <130 consider D/C amlodipine    Gastroesophageal reflux disease without esophagitis  -no current complaint of gut  disturbance  -continues omeprazole 20mg Qd  -will trial D/C today, if having symptoms will use Calcium Carb or ranitidine instead          Electronically signed by:  HANG Mann CNP

## 2018-02-25 ENCOUNTER — TELEPHONE (OUTPATIENT)
Dept: GERIATRICS | Facility: CLINIC | Age: 83
End: 2018-02-25

## 2018-02-25 NOTE — TELEPHONE ENCOUNTER
Patient fell. Elevated pressures after fall systolic  193  207  203  Takes Lisinopril 5 mg PO daily    PLAN  Give extra Lisinopril 5 mg PO now  Update provider if no improvement in one hour    Electronically signed by HANG Maki, GNP

## 2018-02-25 NOTE — TELEPHONE ENCOUNTER
"Called re: \"rising BPs\"    Facility following BPs per protocol after a fall at 1630. . She endorses a headache. No other pain. No chest pain.     E Kit with clonidine, no hydralazine    Plan:  - give clonidine 0.1 mg x1 dose now  - call back if SBP remains >200 or if symptomatic (chest pain, HA, blurred vision)  - also suggested giving her some acetaminophen as she likely does have some pain after the fall    Ariela Hernandes MD    "

## 2018-03-01 ENCOUNTER — RECORDS - HEALTHEAST (OUTPATIENT)
Dept: LAB | Facility: CLINIC | Age: 83
End: 2018-03-01

## 2018-03-01 LAB
ANION GAP SERPL CALCULATED.3IONS-SCNC: 6 MMOL/L (ref 5–18)
BASOPHILS # BLD AUTO: 0 THOU/UL (ref 0–0.2)
BASOPHILS NFR BLD AUTO: 1 % (ref 0–2)
BUN SERPL-MCNC: 10 MG/DL (ref 8–28)
CALCIUM SERPL-MCNC: 9.1 MG/DL (ref 8.5–10.5)
CHLORIDE BLD-SCNC: 102 MMOL/L (ref 98–107)
CO2 SERPL-SCNC: 32 MMOL/L (ref 22–31)
CREAT SERPL-MCNC: 0.67 MG/DL (ref 0.6–1.1)
EOSINOPHIL # BLD AUTO: 0.2 THOU/UL (ref 0–0.4)
EOSINOPHIL NFR BLD AUTO: 3 % (ref 0–6)
ERYTHROCYTE [DISTWIDTH] IN BLOOD BY AUTOMATED COUNT: 13.1 % (ref 11–14.5)
GFR SERPL CREATININE-BSD FRML MDRD: >60 ML/MIN/1.73M2
GLUCOSE BLD-MCNC: 157 MG/DL (ref 70–125)
HCT VFR BLD AUTO: 40 % (ref 35–47)
HGB BLD-MCNC: 12.3 G/DL (ref 12–16)
LYMPHOCYTES # BLD AUTO: 1.3 THOU/UL (ref 0.8–4.4)
LYMPHOCYTES NFR BLD AUTO: 22 % (ref 20–40)
MAGNESIUM SERPL-MCNC: 1.8 MG/DL (ref 1.8–2.6)
MCH RBC QN AUTO: 27.8 PG (ref 27–34)
MCHC RBC AUTO-ENTMCNC: 30.8 G/DL (ref 32–36)
MCV RBC AUTO: 90 FL (ref 80–100)
MONOCYTES # BLD AUTO: 0.4 THOU/UL (ref 0–0.9)
MONOCYTES NFR BLD AUTO: 7 % (ref 2–10)
NEUTROPHILS # BLD AUTO: 4.1 THOU/UL (ref 2–7.7)
NEUTROPHILS NFR BLD AUTO: 68 % (ref 50–70)
PLATELET # BLD AUTO: 178 THOU/UL (ref 140–440)
PMV BLD AUTO: 11 FL (ref 8.5–12.5)
POTASSIUM BLD-SCNC: 3.6 MMOL/L (ref 3.5–5)
RBC # BLD AUTO: 4.43 MILL/UL (ref 3.8–5.4)
SODIUM SERPL-SCNC: 140 MMOL/L (ref 136–145)
WBC: 6 THOU/UL (ref 4–11)

## 2018-03-05 ENCOUNTER — NURSING HOME VISIT (OUTPATIENT)
Dept: GERIATRICS | Facility: CLINIC | Age: 83
End: 2018-03-05
Payer: COMMERCIAL

## 2018-03-05 VITALS
OXYGEN SATURATION: 95 % | RESPIRATION RATE: 18 BRPM | BODY MASS INDEX: 30.82 KG/M2 | HEART RATE: 74 BPM | WEIGHT: 150 LBS | DIASTOLIC BLOOD PRESSURE: 80 MMHG | SYSTOLIC BLOOD PRESSURE: 160 MMHG | TEMPERATURE: 98 F

## 2018-03-05 DIAGNOSIS — I10 HYPERTENSION GOAL BP (BLOOD PRESSURE) < 140/90: Primary | Chronic | ICD-10-CM

## 2018-03-05 DIAGNOSIS — R19.7 DIARRHEA, UNSPECIFIED TYPE: ICD-10-CM

## 2018-03-05 DIAGNOSIS — K21.9 GASTROESOPHAGEAL REFLUX DISEASE WITHOUT ESOPHAGITIS: ICD-10-CM

## 2018-03-05 DIAGNOSIS — F02.80 DEMENTIA ASSOCIATED WITH OTHER UNDERLYING DISEASE WITHOUT BEHAVIORAL DISTURBANCE (H): Chronic | ICD-10-CM

## 2018-03-05 DIAGNOSIS — R29.6 FALLS FREQUENTLY: ICD-10-CM

## 2018-03-05 PROCEDURE — 99309 SBSQ NF CARE MODERATE MDM 30: CPT | Performed by: NURSE PRACTITIONER

## 2018-03-05 RX ORDER — LOPERAMIDE HYDROCHLORIDE 2 MG/1
2 TABLET ORAL 2 TIMES DAILY PRN
Qty: 20 TABLET | Refills: 11
Start: 2018-03-05 | End: 2019-01-01

## 2018-03-05 NOTE — PROGRESS NOTES
[place] [facility]    Chief Complaint   Patient presents with     Nursing Home Acute       HPI:    Lulu Carlton is a 89 year old  (11/8/1921), who is being seen today for an episodic care visit at [facility].  HPI information obtained from: facility chart records, facility staff and [facility] chart review.Today's concern is:     Hypertension goal BP (blood pressure) < 140/90  Dementia associated with other underlying disease without behavioral disturbance  Falls frequently  Gastroesophageal reflux disease without esophagitis  Diarrhea, unspecified type     Patient seen today after having 3 recent falls, hypotensive episodes, confusion, asks me if I am ready for Baptism, pleasant and smiles, gut disturbances, and intermittent loose stools. Labs checked on 3/1/18 that were WNL, no acute concerns, generally pleasant with progressive age-related changes.    ALLERGIES: Hydrocodone-acetaminophen; Trazodone; and Metformin  Past Medical, Surgical, Family and Social History reviewed and updated in [facility].    Current Outpatient Prescriptions   Medication Sig Dispense Refill     loperamide (IMODIUM A-D) 2 MG tablet Take 1 tablet (2 mg) by mouth 2 times daily as needed 20 tablet 11     omeprazole (PRILOSEC) 20 MG CR capsule Take 1 capsule (20 mg) by mouth daily 90 capsule 1     sertraline (ZOLOFT) 100 MG tablet Take 1 tablet (100 mg) by mouth daily 30 tablet 11     ASPIRIN EC PO Take 81 mg by mouth daily       senna-docusate (SENOKOT-S;PERICOLACE) 8.6-50 MG per tablet Take 2 tablets by mouth 2 times daily as needed for constipation       Acetaminophen (TYLENOL PO) Take 1,300 mg by mouth every morning And 650mg PO every 4hrs PRN.  Do not exceed 4,000 mg of Acetaminophen from all sources in 24 hours       lisinopril (PRINIVIL,ZESTRIL) 5 MG tablet Take 1 tablet (5 mg) by mouth daily 30 tablet 11     calcium-vitamin D (CALTRATE) 600-400 MG-UNIT per tablet Take 1 tablet by mouth every evening        triamcinolone (KENALOG) 0.1 % cream Apply topically 2 times daily as needed for irritation       Medications reviewed:  Medications reconciled to facility chart and changes were made to reflect current medications as identified as above med list. Below are the changes that were made:   Medications stopped since last EPIC medication reconciliation:   There are no discontinued medications.    Medications started since last [facility] medication reconciliation:  No orders of the defined types were placed in this encounter.      REVIEW OF SYSTEMS:  Unobtainable secondary to cognitive impairment.     Physical Exam:  /80  Pulse 74  Temp 98  F (36.7  C)  Resp 18  Wt 150 lb (68 kg)  SpO2 95%  BMI 30.82 kg/m2  GENERAL APPEARANCE:  Alert, in no distress  ENT:  normal hearing acuity  RESP:  no respiratory distress, crackles fine bilateral bases  CV:  peripheral edema scant+ in bilateral lower legs, irregular rhythm rate, grade 3-4/6 murmur  ABDOMEN:  bowel sounds normal  M/S:   Gait and station abnormal requires assist, WC for mobility  SKIN:  Inspection of skin and subcutaneous tissue baseline  NEURO:   Examination of sensation by touch normal  PSYCH:  oriented to self, memory impaired     Recent Labs:     CBC RESULTS:   Recent Labs   Lab Test 10/23/17 07/06/17  06/22/17   0015  06/15/17   0840   WBC   --    --   9.0  4.8   RBC   --    --   3.44*  4.36   HGB  12.5  11.9*  9.6*  12.2   HCT   --    --   30.6*  38.6   MCV   --    --   89  89   MCH   --    --   27.9  28.0   MCHC   --    --   31.4*  31.6   RDW   --    --   13.1  13.1   PLT   --    --   261  151       Last Basic Metabolic Panel:  Recent Labs   Lab Test 10/23/17  06/22/17   0015   NA  139  138   POTASSIUM  3.8  4.3   CHLORIDE  104  102   SLIM  9.0  8.7   CO2  31  30   BUN  16  15   CR  0.73  0.65   GLC  179*  193*       Liver Function Studies -   Recent Labs   Lab Test  05/09/17   1300  05/01/17   1015   PROTTOTAL  6.5*  6.7*   ALBUMIN  3.3*  3.5   BILITOTAL   "0.5  0.4   ALKPHOS  93  82   AST  18  12   ALT  22  20       TSH   Date Value Ref Range Status   06/22/2017 4.45 (H) 0.40 - 4.00 mU/L Final   05/01/2017 1.13 0.40 - 4.00 mU/L Final   ]    Lab Results   Component Value Date    A1C 8.3 10/23/2017    A1C 6.3 05/10/2017         Assessment/Plan:  Hypertension goal BP (blood pressure) < 140/90  -facility has followed orthostatic BP's in effort to evaluate fall risk -HR's generally in the 60-70 range with average 70  -SBP's average 120-180 with average 150  -orthostatic drop of approximately 20 from lie-sit, 10 from sit-stand  -continues lisinopril 5mg Qd  -facility to continue to monitor vitals, report any anomalies    Dementia associated with other underlying disease without behavioral disturbance  Falls frequently  -patient AOx1, does not use call light  -falls 2/25, 2/28, and 2/28.  Found on floor attempting to self transfer  -due to elevated BP's was given extra single-dose lisinopril, clonidine, and/or norvasc that was not effective for fall reduction and may have exacerbated it  -due to orthostatic HTN and falls risk would prefer SBP's in the 150+ range as this appears to be safer for patient  -labs on 3/1/18 were WNL  -patient will continue to fall due to cognitive and physical limitations unless monitored 24/7 and 1:1.  Continue POC as scheduled, staff to monitor.    Gastroesophageal reflux disease without esophagitis  -patient has intermittent gut discomfort, emesis \"a few times\" post-meals  -will re-start omeprazole 20mg Qam in effort to control gut    Diarrhea, unspecified type  -intermittent loose stools  -start imodium 2mg BID PRN        Electronically signed by  Ventura Flores, HANG CNP                  "

## 2018-03-10 ENCOUNTER — TELEPHONE (OUTPATIENT)
Dept: GERIATRICS | Facility: CLINIC | Age: 83
End: 2018-03-10

## 2018-03-10 DIAGNOSIS — I10 BENIGN ESSENTIAL HYPERTENSION: Primary | ICD-10-CM

## 2018-03-10 NOTE — TELEPHONE ENCOUNTER
Call from nurse regarding drop in BP. This am patient had routine BP check: 232/97. She was given clonidine 0.2mg per previous orders. . One hour later /50. Nurse is worried about BP bottoming out. Patient feels better (she vomited earlier this morning).  Plan: check BP in another hour. Call me back if any concerns.   Mariely Varner GNP  Cell: [phone]

## 2018-03-12 ENCOUNTER — NURSING HOME VISIT (OUTPATIENT)
Dept: GERIATRICS | Facility: CLINIC | Age: 83
End: 2018-03-12
Payer: COMMERCIAL

## 2018-03-12 VITALS
TEMPERATURE: 97.9 F | BODY MASS INDEX: 30.28 KG/M2 | SYSTOLIC BLOOD PRESSURE: 160 MMHG | DIASTOLIC BLOOD PRESSURE: 78 MMHG | HEART RATE: 76 BPM | WEIGHT: 147.4 LBS | RESPIRATION RATE: 20 BRPM | OXYGEN SATURATION: 95 %

## 2018-03-12 DIAGNOSIS — D50.9 IRON DEFICIENCY ANEMIA, UNSPECIFIED IRON DEFICIENCY ANEMIA TYPE: Chronic | ICD-10-CM

## 2018-03-12 DIAGNOSIS — I10 HYPERTENSION GOAL BP (BLOOD PRESSURE) < 140/90: Primary | Chronic | ICD-10-CM

## 2018-03-12 DIAGNOSIS — E55.9 VITAMIN D DEFICIENCY: ICD-10-CM

## 2018-03-12 DIAGNOSIS — M81.8 OTHER OSTEOPOROSIS WITHOUT CURRENT PATHOLOGICAL FRACTURE: ICD-10-CM

## 2018-03-12 PROCEDURE — 99309 SBSQ NF CARE MODERATE MDM 30: CPT | Performed by: NURSE PRACTITIONER

## 2018-03-12 NOTE — PROGRESS NOTES
[place] [facility]    Chief Complaint   Patient presents with     Nursing Home Acute       HPI:    Lulu Carlton is a 89 year old  (11/8/1921), who is being seen today for an episodic care visit at [facility].  HPI information obtained from: facility chart records, facility staff, patient report and [facility] chart review.Today's concern is:     Hypertension goal BP (blood pressure) < 140/90  Iron deficiency anemia, unspecified iron deficiency anemia type  Other osteoporosis without current pathological fracture  Vitamin D deficiency     Patient seen today for follow up on hypotension/falls, seems as if when given clonidine PRN for elevated BP's patient has orthostatic hypotension and falls, recent increase in lisinopril from 5 to 10mg, patient is also compulsive and has poor safety awareness, BP's generally in the 120-180 range, asyptomatic, last fall was on 3/10 without injury, meds reviewed today and adjusted as below.    ALLERGIES: Hydrocodone-acetaminophen; Trazodone; and Metformin  Past Medical, Surgical, Family and Social History reviewed and updated in [facility].    Current Outpatient Prescriptions   Medication Sig Dispense Refill     Cyanocobalamin (B-12) 1000 MCG TBCR Take 2,000 mcg by mouth daily 100 tablet 11     loperamide (IMODIUM A-D) 2 MG tablet Take 1 tablet (2 mg) by mouth 2 times daily as needed 20 tablet 11     sertraline (ZOLOFT) 100 MG tablet Take 1 tablet (100 mg) by mouth daily 30 tablet 11     senna-docusate (SENOKOT-S;PERICOLACE) 8.6-50 MG per tablet Take 2 tablets by mouth 2 times daily as needed for constipation       Acetaminophen (TYLENOL PO) Take 1,300 mg by mouth every morning And 650mg PO every 4hrs PRN.  Do not exceed 4,000 mg of Acetaminophen from all sources in 24 hours       lisinopril (PRINIVIL,ZESTRIL) 5 MG tablet Take 1 tablet (5 mg) by mouth daily 30 tablet 11     calcium-vitamin D (CALTRATE) 600-400 MG-UNIT per tablet Take 1 tablet by mouth every  evening       triamcinolone (KENALOG) 0.1 % cream Apply topically 2 times daily as needed for irritation       Medications reviewed:  Medications reconciled to facility chart and changes were made to reflect current medications as identified as above med list. Below are the changes that were made:   Medications stopped since last EPIC medication reconciliation:   There are no discontinued medications.    Medications started since last [facility] medication reconciliation:  No orders of the defined types were placed in this encounter.      REVIEW OF SYSTEMS:  Unobtainable secondary to cognitive impairment.     Physical Exam:  /78  Pulse 76  Temp 97.9  F (36.6  C)  Resp 20  Wt 147 lb 6.4 oz (66.9 kg)  SpO2 95%  BMI 30.28 kg/m2  GENERAL APPEARANCE:  Alert, in no distress, appears healthy  ENT:  Mouth and posterior oropharynx normal, moist mucous membranes  RESP:  lungs clear to auscultation , no respiratory distress  CV:  peripheral edema scant+ in bilateral lower extremities, irregular rhythm and rate, grade 4/6 murmur  ABDOMEN:  bowel sounds normal  M/S:   Gait and station abnormal full ADl assist  SKIN:  Inspection of skin and subcutaneous tissue baseline  NEURO:   Examination of sensation by touch normal  PSYCH:  oriented to self, insight and judgement impaired, memory impaired     Recent Labs:     CBC RESULTS:   Recent Labs   Lab Test 10/23/17 07/06/17  06/22/17   0015  06/15/17   0840   WBC   --    --   9.0  4.8   RBC   --    --   3.44*  4.36   HGB  12.5  11.9*  9.6*  12.2   HCT   --    --   30.6*  38.6   MCV   --    --   89  89   MCH   --    --   27.9  28.0   MCHC   --    --   31.4*  31.6   RDW   --    --   13.1  13.1   PLT   --    --   261  151       Last Basic Metabolic Panel:  Recent Labs   Lab Test 10/23/17  06/22/17   0015   NA  139  138   POTASSIUM  3.8  4.3   CHLORIDE  104  102   SLIM  9.0  8.7   CO2  31  30   BUN  16  15   CR  0.73  0.65   GLC  179*  193*       Liver Function Studies -   Recent Labs    Lab Test  05/09/17   1300  05/01/17   1015   PROTTOTAL  6.5*  6.7*   ALBUMIN  3.3*  3.5   BILITOTAL  0.5  0.4   ALKPHOS  93  82   AST  18  12   ALT  22  20       TSH   Date Value Ref Range Status   06/22/2017 4.45 (H) 0.40 - 4.00 mU/L Final   05/01/2017 1.13 0.40 - 4.00 mU/L Final   ]    Lab Results   Component Value Date    A1C 8.3 10/23/2017    A1C 6.3 05/10/2017         Assessment/Plan:  Hypertension goal BP (blood pressure) < 140/90  -SBP's in the 120-180 range, asyptomatic on higher end  -positive orthostatic BP, average decline 10-20mmHg  -of note, patient is compulsive, poor safety awareness, continues to be falls risk  -PRN clonidine seems to be potentially causative for increasing fall risk  -lisinopril recently increased from 5 to 10mg Qd  -will start BP checks TID   -start hydralazine 10mg TID PRN for SBP >180  -facility to follow BP's, will plan to adjust current regimen accordingly    Iron deficiency anemia, unspecified iron deficiency anemia type  Other osteoporosis without current pathological fracture  -Hgb on 10/23/17 was 12.5  -VitaB on 10/23/17 was 375 (of note, stopped B12 injections at this time)  -start VitaB 2000mcg Qd  -re-check CBC/BMP/VitaB on Wed 3/14/18 to follow up    Vitamin D deficiency  -vitaD 10/23/17 was 30  -result WNL  -continue Ca/VitaD 600/400 supplement as prescribed        Electronically signed by  HANG Mann CNP

## 2018-03-13 ENCOUNTER — RECORDS - HEALTHEAST (OUTPATIENT)
Dept: LAB | Facility: CLINIC | Age: 83
End: 2018-03-13

## 2018-03-14 ENCOUNTER — NURSING HOME VISIT (OUTPATIENT)
Dept: GERIATRICS | Facility: CLINIC | Age: 83
End: 2018-03-14
Payer: COMMERCIAL

## 2018-03-14 ENCOUNTER — TRANSFERRED RECORDS (OUTPATIENT)
Dept: HEALTH INFORMATION MANAGEMENT | Facility: CLINIC | Age: 83
End: 2018-03-14

## 2018-03-14 VITALS
SYSTOLIC BLOOD PRESSURE: 162 MMHG | TEMPERATURE: 98.4 F | DIASTOLIC BLOOD PRESSURE: 84 MMHG | RESPIRATION RATE: 22 BRPM | HEART RATE: 54 BPM | WEIGHT: 149 LBS | BODY MASS INDEX: 30.61 KG/M2 | OXYGEN SATURATION: 92 %

## 2018-03-14 DIAGNOSIS — I10 HYPERTENSION GOAL BP (BLOOD PRESSURE) < 140/90: Primary | Chronic | ICD-10-CM

## 2018-03-14 DIAGNOSIS — D50.9 IRON DEFICIENCY ANEMIA, UNSPECIFIED IRON DEFICIENCY ANEMIA TYPE: Chronic | ICD-10-CM

## 2018-03-14 DIAGNOSIS — I10 ESSENTIAL HYPERTENSION WITH GOAL BLOOD PRESSURE LESS THAN 140/90: ICD-10-CM

## 2018-03-14 DIAGNOSIS — F02.80 DEMENTIA ASSOCIATED WITH OTHER UNDERLYING DISEASE WITHOUT BEHAVIORAL DISTURBANCE (H): Chronic | ICD-10-CM

## 2018-03-14 LAB
ANION GAP SERPL CALCULATED.3IONS-SCNC: 7 MMOL/L (ref 5–18)
ANION GAP SERPL CALCULATED.3IONS-SCNC: 7 MMOL/L (ref 5–18)
BUN SERPL-MCNC: 9 MG/DL (ref 8–28)
BUN SERPL-MCNC: 9 MG/DL (ref 8–28)
CALCIUM SERPL-MCNC: 9 MG/DL (ref 8.5–10.5)
CALCIUM SERPL-MCNC: 9 MG/DL (ref 8.5–10.5)
CHLORIDE BLD-SCNC: 104 MMOL/L (ref 98–107)
CHLORIDE SERPLBLD-SCNC: 104 MMOL/L (ref 98–107)
CO2 SERPL-SCNC: 31 MMOL/L (ref 22–31)
CO2 SERPL-SCNC: 31 MMOL/L (ref 22–31)
CREAT SERPL-MCNC: 0.65 MG/DL (ref 0.6–1.1)
CREAT SERPL-MCNC: 0.65 MG/DL (ref 0.6–1.1)
ERYTHROCYTE [DISTWIDTH] IN BLOOD BY AUTOMATED COUNT: 13 % (ref 11–14.5)
ERYTHROCYTE [DISTWIDTH] IN BLOOD BY AUTOMATED COUNT: 13 % (ref 11–14.5)
GFR SERPL CREATININE-BSD FRML MDRD: >60 ML/MIN/1.73M2
GFR SERPL CREATININE-BSD FRML MDRD: >60 ML/MIN/1.73M2
GLUCOSE BLD-MCNC: 150 MG/DL (ref 70–125)
GLUCOSE SERPL-MCNC: 150 MG/DL (ref 70–125)
HCT VFR BLD AUTO: 37.8 % (ref 35–47)
HCT VFR BLD AUTO: 37.8 % (ref 35–47)
HEMOGLOBIN: 12.1 G/DL (ref 12–16)
HGB BLD-MCNC: 12.1 G/DL (ref 12–16)
MCH RBC QN AUTO: 28.7 PG (ref 27–34)
MCH RBC QN AUTO: 28.7 PG (ref 27–34)
MCHC RBC AUTO-ENTMCNC: 32 G/DL (ref 32–36)
MCHC RBC AUTO-ENTMCNC: 32 G/DL (ref 32–36)
MCV RBC AUTO: 90 FL (ref 80–100)
MCV RBC AUTO: 90 FL (ref 80–100)
PLATELET # BLD AUTO: 182 THOU/UL (ref 140–440)
PLATELET # BLD AUTO: 182 THOU/UL (ref 140–440)
PMV BLD AUTO: 11.9 FL (ref 8.5–12.5)
POTASSIUM BLD-SCNC: 3.6 MMOL/L (ref 3.5–5)
POTASSIUM SERPL-SCNC: 3.6 MMOL/L (ref 3.5–5)
RBC # BLD AUTO: 4.22 MILL/UL (ref 3.8–5.4)
RBC # BLD AUTO: 4.22 MILL/UL (ref 3.8–5.4)
SODIUM SERPL-SCNC: 142 MMOL/L (ref 136–145)
SODIUM SERPL-SCNC: 142 MMOL/L (ref 136–145)
VIT B12 SERPL-MCNC: 431 PG/ML (ref 213–816)
WBC # BLD AUTO: 8.5 THOU/UL (ref 4–11)
WBC: 8.5 THOU/UL (ref 4–11)

## 2018-03-14 PROCEDURE — 99309 SBSQ NF CARE MODERATE MDM 30: CPT | Performed by: NURSE PRACTITIONER

## 2018-03-14 RX ORDER — HYDRALAZINE HYDROCHLORIDE 10 MG/1
10 TABLET, FILM COATED ORAL 3 TIMES DAILY PRN
Qty: 90 TABLET | Refills: 11
Start: 2018-03-14

## 2018-03-14 RX ORDER — LISINOPRIL 5 MG/1
5 TABLET ORAL 2 TIMES DAILY
Qty: 30 TABLET | Refills: 11
Start: 2018-03-14 | End: 2018-03-26

## 2018-03-14 NOTE — PROGRESS NOTES
[place] [facility]    Chief Complaint   Patient presents with     Nursing Home Acute       HPI:    Lulu Carlton is a 89 year old  (11/8/1921), who is being seen today for an episodic care visit at [facility].    HPI information obtained from: facility chart records, facility staff, patient report and [facility] chart review. Today's concern is:     Hypertension goal BP (blood pressure) < 140/90  Essential hypertension with goal blood pressure less than 140/90  Iron deficiency anemia, unspecified iron deficiency anemia type  Dementia associated with other underlying disease without behavioral disturbance     Patient seen today for follow up particularly with hyper/hypotension and falls, is AOx1 to self, pleasant, coloring, laughs during exam, Hx of falls and BP's reviewed today and seems that use of clonidine may cause falls, SBP goal range 140-150, falls happen if SBP <140, headache reported for SBP >180, medications reviewed, labs reviewed, no acute concerns.    REVIEW OF SYSTEMS:  Unobtainable secondary to cognitive impairment.     /84  Pulse 54  Temp 98.4  F (36.9  C)  Resp 22  Wt 149 lb (67.6 kg)  SpO2 92%  BMI 30.61 kg/m2  GENERAL APPEARANCE:  Alert, in no distress      ASSESSMENT/PLAN:  Hypertension goal BP (blood pressure) < 140/90  Essential hypertension with goal blood pressure less than 140/90  -patient has falls with SBP <140, had headache with SBP>180  -change lisinopril from 10mg Qd to 5mg BID to improve coverage, seems as pm SBP's are elevated versus am SBP's  -change hydralazine to 10mg TID PRN for SBP >180  -facility to continue to monitor  -plan is to increase lisinopril from 5 to 10mg BID then change hydralazine to 5mg TID PRN for SBP >180  -of note, CBC and BMP today all results are WNL    Iron deficiency anemia, unspecified iron deficiency anemia type  -Hgb on 3/14/18 was 12.1; WNL  -started on VitaB supplement 2000mcg Qd (previously on IM  injections  -VitaB lab on 10/23/17 was 375, today 3/14 was 431  -continue oral VB until further notice    Dementia associated with other underlying disease without behavioral disturbance  -pleasant, confused, no acute concerns  -progressive, chronic decline  -facility to monitor, report changes in status  -of note, due to compulsivity may continue to attempt self transfers and thereby falls        Electronically signed by:  HANG Mann CNP

## 2018-03-15 ENCOUNTER — RECORDS - HEALTHEAST (OUTPATIENT)
Dept: LAB | Facility: CLINIC | Age: 83
End: 2018-03-15

## 2018-03-15 ENCOUNTER — TRANSFERRED RECORDS (OUTPATIENT)
Dept: HEALTH INFORMATION MANAGEMENT | Facility: CLINIC | Age: 83
End: 2018-03-15

## 2018-03-15 LAB
ALBUMIN UR-MCNC: ABNORMAL MG/DL
APPEARANCE UR: CLEAR
BACTERIA #/AREA URNS HPF: ABNORMAL HPF
BILIRUB UR QL STRIP: NEGATIVE
COLOR UR AUTO: YELLOW
GLUCOSE UR STRIP-MCNC: ABNORMAL MG/DL
HGB UR QL STRIP: NEGATIVE
HYALINE CASTS #/AREA URNS LPF: ABNORMAL LPF
KETONES UR STRIP-MCNC: NEGATIVE MG/DL
LEUKOCYTE ESTERASE UR QL STRIP: NEGATIVE
MUCOUS THREADS #/AREA URNS LPF: ABNORMAL LPF
NITRATE UR QL: NEGATIVE
PH UR STRIP: 5.5 [PH] (ref 4.5–8)
RBC #/AREA URNS AUTO: ABNORMAL HPF
SP GR UR STRIP: 1.01 (ref 1–1.03)
SQUAMOUS #/AREA URNS AUTO: ABNORMAL LPF
UROBILINOGEN UR STRIP-ACNC: ABNORMAL
WBC #/AREA URNS AUTO: ABNORMAL HPF

## 2018-03-24 ENCOUNTER — TRANSFERRED RECORDS (OUTPATIENT)
Dept: HEALTH INFORMATION MANAGEMENT | Facility: CLINIC | Age: 83
End: 2018-03-24

## 2018-03-25 ENCOUNTER — RECORDS - HEALTHEAST (OUTPATIENT)
Dept: LAB | Facility: CLINIC | Age: 83
End: 2018-03-25

## 2018-03-25 LAB
ALBUMIN UR-MCNC: ABNORMAL MG/DL
APPEARANCE UR: ABNORMAL
BACTERIA #/AREA URNS HPF: ABNORMAL HPF
BILIRUB UR QL STRIP: NEGATIVE
COLOR UR AUTO: YELLOW
GLUCOSE UR STRIP-MCNC: NEGATIVE MG/DL
HGB UR QL STRIP: NEGATIVE
KETONES UR STRIP-MCNC: NEGATIVE MG/DL
LEUKOCYTE ESTERASE UR QL STRIP: ABNORMAL
MUCOUS THREADS #/AREA URNS LPF: ABNORMAL LPF
NITRATE UR QL: POSITIVE
PH UR STRIP: 6 [PH] (ref 4.5–8)
RBC #/AREA URNS AUTO: ABNORMAL HPF
SP GR UR STRIP: 1.02 (ref 1–1.03)
SQUAMOUS #/AREA URNS AUTO: ABNORMAL LPF
UROBILINOGEN UR STRIP-ACNC: ABNORMAL
WBC #/AREA URNS AUTO: >100 HPF
WBC CLUMPS #/AREA URNS HPF: PRESENT /[HPF]

## 2018-03-26 ENCOUNTER — NURSING HOME VISIT (OUTPATIENT)
Dept: GERIATRICS | Facility: CLINIC | Age: 83
End: 2018-03-26
Payer: COMMERCIAL

## 2018-03-26 VITALS
HEART RATE: 84 BPM | DIASTOLIC BLOOD PRESSURE: 80 MMHG | SYSTOLIC BLOOD PRESSURE: 166 MMHG | TEMPERATURE: 98.8 F | RESPIRATION RATE: 20 BRPM | WEIGHT: 148.6 LBS | BODY MASS INDEX: 30.53 KG/M2 | OXYGEN SATURATION: 96 %

## 2018-03-26 DIAGNOSIS — R29.6 FALLS FREQUENTLY: Primary | ICD-10-CM

## 2018-03-26 DIAGNOSIS — F02.818 DEMENTIA ASSOCIATED WITH OTHER UNDERLYING DISEASE WITH BEHAVIORAL DISTURBANCE (H): Chronic | ICD-10-CM

## 2018-03-26 DIAGNOSIS — I10 ESSENTIAL HYPERTENSION WITH GOAL BLOOD PRESSURE LESS THAN 140/90: ICD-10-CM

## 2018-03-26 DIAGNOSIS — R30.0 DYSURIA: ICD-10-CM

## 2018-03-26 DIAGNOSIS — I10 HYPERTENSION GOAL BP (BLOOD PRESSURE) < 140/90: Chronic | ICD-10-CM

## 2018-03-26 PROCEDURE — 99309 SBSQ NF CARE MODERATE MDM 30: CPT | Performed by: NURSE PRACTITIONER

## 2018-03-26 RX ORDER — QUETIAPINE FUMARATE 25 MG/1
12.5 TABLET, FILM COATED ORAL AT BEDTIME
Qty: 60 TABLET | Refills: 11
Start: 2018-03-26 | End: 2018-06-06 | Stop reason: DRUGHIGH

## 2018-03-26 RX ORDER — LISINOPRIL 5 MG/1
10 TABLET ORAL 2 TIMES DAILY
Qty: 30 TABLET | Refills: 11
Start: 2018-03-26 | End: 2018-01-01

## 2018-03-26 NOTE — PROGRESS NOTES
[facility] [facility]    Chief Complaint   Patient presents with     Nursing Home Acute       HPI:    Lulu Carlton is a 89 year old  (1921), who is being seen today for an episodic care visit at [facility].  HPI information obtained from: facility chart records, facility staff and [facility] chart review.Today's concern is:     Falls frequently  Hypertension goal BP (blood pressure) < 140/90  Dysuria  Dementia associated with other underlying disease with behavioral disturbance     Patient had another fall on 3/22/18, unwitnessed and found on floor attempting self tranfers, VS were stable, had L leg pain and XR's were taken on 3/24 all results unremarkable/no acute findings, has had recent bouts of hypertension with SBP's in the 140-200 range and lisinopril was adjusted/hydralazine added, also due to increased anxiety and frantic behavior looking for  spouse was started on seroquel with moderate improvement, thinking that there may be UTI due to recent changes but afebrile, UA/UC pending.    ALLERGIES: Hydrocodone-acetaminophen; Trazodone; and Metformin  Past Medical, Surgical, Family and Social History reviewed and updated in [facility].    Current Outpatient Prescriptions   Medication Sig Dispense Refill     Cyanocobalamin (B-12) 1000 MCG TBCR Take 2,000 mcg by mouth daily 100 tablet 11     lisinopril (PRINIVIL/ZESTRIL) 5 MG tablet Take 1 tablet (5 mg) by mouth 2 times daily 30 tablet 11     hydrALAZINE (APRESOLINE) 10 MG tablet Take 1 tablet (10 mg) by mouth 3 times daily as needed For SBP >180 90 tablet 11     Cyanocobalamin (B-12) 1000 MCG TBCR Take 2,000 mcg by mouth daily 100 tablet 11     loperamide (IMODIUM A-D) 2 MG tablet Take 1 tablet (2 mg) by mouth 2 times daily as needed 20 tablet 11     sertraline (ZOLOFT) 100 MG tablet Take 1 tablet (100 mg) by mouth daily 30 tablet 11     senna-docusate (SENOKOT-S;PERICOLACE) 8.6-50 MG per tablet Take 2 tablets by mouth 2 times  daily as needed for constipation       Acetaminophen (TYLENOL PO) Take 1,300 mg by mouth every morning And 650mg PO every 4hrs PRN.  Do not exceed 4,000 mg of Acetaminophen from all sources in 24 hours       calcium-vitamin D (CALTRATE) 600-400 MG-UNIT per tablet Take 1 tablet by mouth every evening       triamcinolone (KENALOG) 0.1 % cream Apply topically 2 times daily as needed for irritation       Medications reviewed:  Medications reconciled to facility chart and changes were made to reflect current medications as identified as above med list. Below are the changes that were made:   Medications stopped since last EPIC medication reconciliation:   There are no discontinued medications.    Medications started since last [facility] medication reconciliation:  No orders of the defined types were placed in this encounter.      REVIEW OF SYSTEMS:  Unobtainable secondary to cognitive impairment.     Physical Exam:  /80  Pulse 84  Temp 98.8  F (37.1  C)  Resp 20  Wt 148 lb 9.6 oz (67.4 kg)  SpO2 96%  BMI 30.53 kg/m2  GENERAL APPEARANCE:  in no distress, appears healthy  ENT:  Mouth and posterior oropharynx normal, moist mucous membranes  RESP:  no respiratory distress, crackles fine bilateral vesicular  CV:  peripheral edema 1+ in bilateral lower legs, irregular rhythm rate, grade 4/6 murmur  ABDOMEN:  bowel sounds normal  M/S:   Gait and station abnormal requires assist transfer, using WC  SKIN:  Inspection of skin and subcutaneous tissue baseline  NEURO:   Examination of sensation by touch normal  PSYCH:  oriented to self, insight and judgement impaired    Recent Labs:     CBC RESULTS:   Recent Labs   Lab Test 03/14/18 10/23/17   06/22/17   0015   WBC  8.5   --    --   9.0   RBC  4.22   --    --   3.44*   HGB  12.1  12.5   < >  9.6*   HCT  37.8   --    --   30.6*   MCV  90   --    --   89   MCH  28.7   --    --   27.9   MCHC  32.0   --    --   31.4*   RDW  13.0   --    --   13.1   PLT  182   --    --   261    <  > = values in this interval not displayed.       Last Basic Metabolic Panel:  Recent Labs   Lab Test 03/14/18 10/23/17   NA  142  139   POTASSIUM  3.6  3.8   CHLORIDE  104  104   SLIM  9.0  9.0   CO2  31  31   BUN  9  16   CR  0.65  0.73   GLC  150*  179*       Liver Function Studies -   Recent Labs   Lab Test  05/09/17   1300  05/01/17   1015   PROTTOTAL  6.5*  6.7*   ALBUMIN  3.3*  3.5   BILITOTAL  0.5  0.4   ALKPHOS  93  82   AST  18  12   ALT  22  20       TSH   Date Value Ref Range Status   06/22/2017 4.45 (H) 0.40 - 4.00 mU/L Final   05/01/2017 1.13 0.40 - 4.00 mU/L Final   ]    Lab Results   Component Value Date    A1C 8.3 10/23/2017    A1C 6.3 05/10/2017         Assessment/Plan:  Falls frequently  -most recent fall on 3/22/18, no injury, no pain present, XR's R leg negative, today pleasant, smiling, no distress  -facility has made adjustments in effort to prevent/reduce fall risk  -labs stable, pending UA/UC, medications reviewed  -no changes in plan today, waiting for UC  -of note, PT evaluated patient today and had mild emesis with diarrhea, again waiting for UC for plan  -due to age-related physical and cognitive decline this patient may continue to fall unless supervised 1:1 24/7    Hypertension goal BP (blood pressure) < 140/90  -SBP's have been in 140-200 range  -recently increased lisinopril to 10mg BID  -also has hydralazine 10mg TID PRN for SBP >180  -of note, positive orthostatic HTN/falls risk, not considering changing BP meds at this time    Dysuria  -UA on 3/15 was negative  -behaviors with intermittent mild fever continue (97.7-100.2)  -UA/UC ordered on 3/24  -pending results will either treat if positive or see below    Dementia associated with other underlying disease with behavioral disturbance  -patient has intermittent agitation, frantic/anxious behavior  -started seroquel 12.5mg Qhs on 3/16; continue  -plan is to increase seroquel to 12.5mg BID if UC is not positive for  UTI        Electronically signed by  HANG Mann CNP

## 2018-03-27 LAB — BACTERIA SPEC CULT: ABNORMAL

## 2018-03-30 ENCOUNTER — TELEPHONE (OUTPATIENT)
Dept: GERIATRICS | Facility: CLINIC | Age: 83
End: 2018-03-30

## 2018-03-30 ENCOUNTER — TRANSFERRED RECORDS (OUTPATIENT)
Dept: HEALTH INFORMATION MANAGEMENT | Facility: CLINIC | Age: 83
End: 2018-03-30

## 2018-03-30 ENCOUNTER — RECORDS - HEALTHEAST (OUTPATIENT)
Dept: LAB | Facility: CLINIC | Age: 83
End: 2018-03-30

## 2018-03-30 LAB
ALBUMIN SERPL-MCNC: 3.4 G/DL (ref 3.5–5)
ALBUMIN SERPL-MCNC: 3.4 G/DL (ref 3.5–5)
ALP SERPL-CCNC: 133 U/L (ref 42–120)
ALP SERPL-CCNC: 133 U/L (ref 45–120)
ALT SERPL W P-5'-P-CCNC: 13 U/L (ref 0–45)
ALT SERPL-CCNC: 13 U/L (ref 0–45)
ANION GAP SERPL CALCULATED.3IONS-SCNC: 11 MMOL/L (ref 5–18)
ANION GAP SERPL CALCULATED.3IONS-SCNC: 11 MMOL/L (ref ?–18)
AST SERPL W P-5'-P-CCNC: 13 U/L (ref 0–40)
AST SERPL-CCNC: 13 U/L (ref 0–40)
BILIRUB DIRECT SERPL-MCNC: 0.1 MG/DL
BILIRUB SERPL-MCNC: 0.3 MG/DL (ref 0–1)
BILIRUB SERPL-MCNC: 0.3 MG/DL (ref 0–1)
BILIRUBIN DIRECT: 0.1 MG/DL
BNP SERPL-MCNC: 161 PG/ML (ref 0–167)
BUN SERPL-MCNC: 11 MG/DL (ref 8–28)
BUN SERPL-MCNC: 11 MG/DL (ref 8–28)
CALCIUM SERPL-MCNC: 8.7 MG/DL (ref 8.5–10.5)
CALCIUM SERPL-MCNC: 8.7 MG/DL (ref 8.5–10.5)
CHLORIDE BLD-SCNC: 103 MMOL/L (ref 98–107)
CHLORIDE SERPLBLD-SCNC: 103 MMOL/L (ref 98–107)
CO2 SERPL-SCNC: 24 MMOL/L (ref 22–31)
CO2 SERPL-SCNC: 24 MMOL/L (ref 22–31)
CREAT SERPL-MCNC: 0.87 MG/DL (ref 0.6–1.1)
CREAT SERPL-MCNC: 0.87 MG/DL (ref 0.6–1.1)
ERYTHROCYTE [DISTWIDTH] IN BLOOD BY AUTOMATED COUNT: 12.8 % (ref 11–14.5)
ERYTHROCYTE [DISTWIDTH] IN BLOOD BY AUTOMATED COUNT: 12.8 % (ref 11–14.5)
GFR SERPL CREATININE-BSD FRML MDRD: 60 ML/MIN/1.73M2
GFR SERPL CREATININE-BSD FRML MDRD: 60 ML/MIN/1.73M2
GLUCOSE BLD-MCNC: 195 MG/DL (ref 70–125)
GLUCOSE SERPL-MCNC: 195 MG/DL (ref 70–125)
HBA1C MFR BLD: 6.8 % (ref 4.2–6.1)
HCT VFR BLD AUTO: 40.7 % (ref 35–47)
HCT VFR BLD AUTO: 40.7 % (ref 35–47)
HEMOGLOBIN: 12.8 G/DL (ref 12–16)
HGB BLD-MCNC: 12.8 G/DL (ref 12–16)
MCH RBC QN AUTO: 28.2 PG (ref 27–34)
MCH RBC QN AUTO: 28.2 PG (ref 27–34)
MCHC RBC AUTO-ENTMCNC: 31.4 G/DL (ref 32–36)
MCHC RBC AUTO-ENTMCNC: 31.4 G/DL (ref 32–36)
MCV RBC AUTO: 90 FL (ref 80–100)
MCV RBC AUTO: 90 FL (ref 80–100)
PLATELET # BLD AUTO: 226 THOU/UL (ref 140–440)
PLATELET # BLD AUTO: 226 THOU/UL (ref 140–440)
PMV BLD AUTO: 11.6 FL (ref 8.5–12.5)
POTASSIUM BLD-SCNC: 4.1 MMOL/L (ref 3.5–5)
POTASSIUM SERPL-SCNC: 4.1 MMOL/L (ref 3.5–5)
PROT SERPL-MCNC: 6.7 G/DL (ref 6–8)
PROT SERPL-MCNC: 6.7 G/DL (ref 6–8)
RBC # BLD AUTO: 4.54 MILL/UL (ref 3.8–5.4)
RBC # BLD AUTO: 4.54 MILL/UL (ref 3.8–5.4)
SODIUM SERPL-SCNC: 138 MMOL/L (ref 136–145)
SODIUM SERPL-SCNC: 138 MMOL/L (ref 136–145)
TSH SERPL DL<=0.005 MIU/L-ACNC: 2.21 UIU/ML (ref 0.3–5)
TSH SERPL-ACNC: 2.21 UIU/ML (ref 0.3–6)
WBC # BLD AUTO: 9 THOU/UL (ref 4–11)
WBC: 9 THOU/UL (ref 4–11)

## 2018-03-30 NOTE — TELEPHONE ENCOUNTER
Facility called today, 89yo f patient having numerous falls, appears ill, currently being treated for UTI, DNR status, thought process is that patient has numerous comorbidities that are causing certain level of imbalance, plan is to rule out acute issue first versus sending to hospital, family has been made aware, then probable referral to hospice if continues to decline.    Stat today:  CXR, AXR: cough, abdominal pain  CBCdp, BMP, BNP, A1C, TSH, LFT  Tramadol 25mg Q4h PRN    Mariah is RN on-call and aware of plan.

## 2018-04-01 LAB — HBA1C MFR BLD: 6.8 % (ref 4.2–6.1)

## 2018-04-02 ENCOUNTER — NURSING HOME VISIT (OUTPATIENT)
Dept: GERIATRICS | Facility: CLINIC | Age: 83
End: 2018-04-02
Payer: COMMERCIAL

## 2018-04-02 VITALS
TEMPERATURE: 98.5 F | RESPIRATION RATE: 16 BRPM | HEART RATE: 100 BPM | DIASTOLIC BLOOD PRESSURE: 78 MMHG | OXYGEN SATURATION: 98 % | SYSTOLIC BLOOD PRESSURE: 156 MMHG

## 2018-04-02 VITALS
RESPIRATION RATE: 16 BRPM | TEMPERATURE: 98.5 F | HEART RATE: 100 BPM | OXYGEN SATURATION: 98 % | SYSTOLIC BLOOD PRESSURE: 156 MMHG | DIASTOLIC BLOOD PRESSURE: 78 MMHG

## 2018-04-02 DIAGNOSIS — F41.9 ANXIETY: ICD-10-CM

## 2018-04-02 DIAGNOSIS — R29.6 FALLS FREQUENTLY: ICD-10-CM

## 2018-04-02 DIAGNOSIS — K76.0 STEATOSIS OF LIVER: Primary | ICD-10-CM

## 2018-04-02 DIAGNOSIS — R11.0 NAUSEA: ICD-10-CM

## 2018-04-02 DIAGNOSIS — R19.7 DIARRHEA, UNSPECIFIED TYPE: ICD-10-CM

## 2018-04-02 PROCEDURE — 99309 SBSQ NF CARE MODERATE MDM 30: CPT | Performed by: NURSE PRACTITIONER

## 2018-04-02 NOTE — PROGRESS NOTES
[place] [facility]    Chief Complaint   Patient presents with     Nursing Home Acute       HPI:    Lulu Carlton is a 89 year old  (11/8/1921), who is being seen today for an episodic care visit at [facility].  HPI information obtained from: facility chart records, facility staff, patient report and [facility] chart review.Today's concern is:     Steatosis of liver  Anxiety  Falls frequently  Diarrhea, unspecified type  Nausea     Patient seen today for follow up, over past 2 weeks has had falls, ill appearance, increased confusion, was treated for UTI, today is siting in WC, pleasant, humorous, no distress, no acute concerns, did XR's and labs to identify potential insult causing falls and confusion and all results returned WNL, does have intermittent nausea with emesis, was started on omeprazole and culturelle, considering age-related changes versus acute issue at this time.    ALLERGIES: Hydrocodone-acetaminophen; Trazodone; and Metformin  Past Medical, Surgical, Family and Social History reviewed and updated in [facility].    Current Outpatient Prescriptions   Medication Sig Dispense Refill     OMEPRAZOLE PO Take 20 mg by mouth every morning       ACIDOPHILUS LACTOBACILLUS PO Give 1 tablet by mouth two times a day  for abx use for 21 Days       TRAMADOL HCL PO Take 25 mg by mouth every 4 hours as needed for moderate to severe pain       lisinopril (PRINIVIL/ZESTRIL) 5 MG tablet Take 2 tablets (10 mg) by mouth 2 times daily 30 tablet 11     QUEtiapine (SEROQUEL) 25 MG tablet Take 0.5 tablets (12.5 mg) by mouth At Bedtime 60 tablet 11     hydrALAZINE (APRESOLINE) 10 MG tablet Take 1 tablet (10 mg) by mouth 3 times daily as needed For SBP >180 90 tablet 11     Cyanocobalamin (B-12) 1000 MCG TBCR Take 2,000 mcg by mouth daily 100 tablet 11     loperamide (IMODIUM A-D) 2 MG tablet Take 1 tablet (2 mg) by mouth 2 times daily as needed 20 tablet 11     senna-docusate (SENOKOT-S;PERICOLACE)  8.6-50 MG per tablet Take 2 tablets by mouth 2 times daily as needed for constipation       Acetaminophen (TYLENOL PO) Take 1,000 mg by mouth 2 times daily And 1000mg daily PRN       calcium-vitamin D (CALTRATE) 600-400 MG-UNIT per tablet Take 1 tablet by mouth every evening       triamcinolone (KENALOG) 0.1 % cream Apply topically 2 times daily as needed for irritation       Medications reviewed:  Medications reconciled to facility chart and changes were made to reflect current medications as identified as above med list. Below are the changes that were made:   Medications stopped since last EPIC medication reconciliation:   Medications Discontinued During This Encounter   Medication Reason     Cyanocobalamin (B-12) 1000 MCG TBCR        Medications started since last [facility] medication reconciliation:  Orders Placed This Encounter   Medications     OMEPRAZOLE PO     Sig: Take 20 mg by mouth every morning     ACIDOPHILUS LACTOBACILLUS PO     Sig: Give 1 tablet by mouth two times a day  for abx use for 21 Days     TRAMADOL HCL PO     Sig: Take 25 mg by mouth every 4 hours as needed for moderate to severe pain       REVIEW OF SYSTEMS:  4 point ROS including Respiratory, CV, GI and , other than that noted in the HPI,  is negative    Physical Exam:  /78  Pulse 100  Temp 98.5  F (36.9  C)  Resp 16  SpO2 98%  GENERAL APPEARANCE:  Alert, in no distress, appears healthy  ENT:  Mouth and posterior oropharynx normal, moist mucous membranes  RESP:  lungs clear to auscultation   CV:  peripheral edema scant+ in bilateral lower legs, irregular rhythm and rate, grade 3-4/6 murmur  ABDOMEN:  bowel sounds normal  M/S:   Gait and station abnormal requires assist, falls risk  SKIN:  Inspection of skin and subcutaneous tissue baseline  NEURO:   Examination of sensation by touch normal  PSYCH:  oriented to self, insight and judgement impaired    Recent Labs:     CBC RESULTS:   Recent Labs   Lab Test 03/30/18 03/14/18   WBC  9.0   8.5   RBC  4.54  4.22   HGB  12.8  12.1   HCT  40.7  37.8   MCV  90  90   MCH  28.2  28.7   MCHC  31.4*  32.0   RDW  12.8  13.0   PLT  226  182       Last Basic Metabolic Panel:  Recent Labs   Lab Test 03/30/18 03/14/18   NA  138  142   POTASSIUM  4.1  3.6   CHLORIDE  103  104   SLIM  8.7  9.0   CO2  24  31   BUN  11  9   CR  0.87  0.65   GLC  195*  150*       Liver Function Studies -   Recent Labs   Lab Test 03/30/18 05/09/17   1300   PROTTOTAL  6.7  6.5*   ALBUMIN  3.4*  3.3*   BILITOTAL  0.3  0.5   ALKPHOS  133*  93   AST  13  18   ALT  13  22       TSH   Date Value Ref Range Status   03/30/2018 2.21 0.30 - 6.00 uIU/mL Final   06/22/2017 4.45 (H) 0.40 - 4.00 mU/L Final   ]    Lab Results   Component Value Date    A1C 6.8 03/30/2018    A1C 8.3 10/23/2017         Assessment/Plan:  Steatosis of liver  Anxiety  Falls frequently  Diarrhea, unspecified type  Nausea  -patient today pleasant, no acute concerns  -CXR, AXR, labs on 3/30 all WNL for patient  -considering progressing age-related cognitive and physical decline at this junture   -compulsive, confused, is falls risk  -started omeprazole and culturelle for intermittent emesis  -will consider addition of zofran in future if indicated  -facility to follow status and VS as scheduled, report any anomalies ASAP  -considering hospice referral if status continues to change/decline      Electronically signed by  HANG Mann CNP

## 2018-04-04 ENCOUNTER — NURSING HOME VISIT (OUTPATIENT)
Dept: GERIATRICS | Facility: CLINIC | Age: 83
End: 2018-04-04
Payer: COMMERCIAL

## 2018-04-04 DIAGNOSIS — R63.4 UNINTENTIONAL WEIGHT LOSS: ICD-10-CM

## 2018-04-04 DIAGNOSIS — F03.92 DEMENTIA, SENILE WITH DELUSIONS, WITH BEHAVIORAL DISTURBANCE (H): ICD-10-CM

## 2018-04-04 DIAGNOSIS — C50.011 PAGET DISEASE OF BREAST, RIGHT (H): ICD-10-CM

## 2018-04-04 DIAGNOSIS — Z85.72 HISTORY OF B-CELL LYMPHOMA: ICD-10-CM

## 2018-04-04 DIAGNOSIS — R29.6 RECURRENT FALLS: ICD-10-CM

## 2018-04-04 DIAGNOSIS — I10 BENIGN ESSENTIAL HYPERTENSION: ICD-10-CM

## 2018-04-04 DIAGNOSIS — F03.918 DEMENTIA, SENILE WITH DELUSIONS, WITH BEHAVIORAL DISTURBANCE (H): ICD-10-CM

## 2018-04-04 DIAGNOSIS — N63.0 BREAST MASS: Primary | ICD-10-CM

## 2018-04-04 PROCEDURE — 99310 SBSQ NF CARE HIGH MDM 45: CPT | Performed by: FAMILY MEDICINE

## 2018-04-04 NOTE — LETTER
4/4/2018        RE: Lulu Carlton  [address]  [facility] [address]          [place] [facility]    Chief Complaint   Patient presents with     assisted Regulatory       HPI:    Lulu Carlton is a 89 year old  (11/8/1921), who is being seen today for a federally mandated E/M visit at [facility].  HPI information obtained from: facility staff, patient report and [facility] chart review.     Today's concerns are:  -- Resident seen and examined.  Denies headache, dizziness, chest.  Nurse reports elevated blood pressures, medication adjusted and now better.   -Nurse manager reports 5 falls in 1 month, extensive workup came back negative.  Recently finished Bactrim for UTI.  Most of the fall secondary to self transfer.  Resident reports that she has been having diarrhea and she had to go to the bathroom, nurse reports started on Imodium and now less fall.   -This measured reports weight loss 11 pounds in 1 week, evaluated by [facility] but did not meet the criteria.  Resident reports good appetite.  -Nurse manager reports patient has confusion at night where she answers others rooms, wandering, agitation, getting angry, consent says for her family member, her money,  scar, hard to redirect, started on Seroquel scheduled, with some improvement.  ----------------------------------------------------------------------------------------  - - Past Medical, social, family histories, medications, and allergies reviewed and updated  - Medications reviewed: in the chart and EHR.   - Case Management:   I have reviewed the care plan and MDS and do agree with the plan. Patient's desire to return to the community is not present.  Information reviewed:  Medications, vital signs, orders, and nursing notes.    MEDICATIONS:  Current Outpatient Prescriptions   Medication Sig Dispense Refill     OMEPRAZOLE PO Take 20 mg by mouth every morning       ACIDOPHILUS LACTOBACILLUS PO Give 1  tablet by mouth two times a day  for abx use for 21 Days       TRAMADOL HCL PO Take 25 mg by mouth every 4 hours as needed for moderate to severe pain       lisinopril (PRINIVIL/ZESTRIL) 5 MG tablet Take 2 tablets (10 mg) by mouth 2 times daily 30 tablet 11     QUEtiapine (SEROQUEL) 25 MG tablet Take 0.5 tablets (12.5 mg) by mouth At Bedtime 60 tablet 11     hydrALAZINE (APRESOLINE) 10 MG tablet Take 1 tablet (10 mg) by mouth 3 times daily as needed For SBP >180 90 tablet 11     Cyanocobalamin (B-12) 1000 MCG TBCR Take 2,000 mcg by mouth daily 100 tablet 11     loperamide (IMODIUM A-D) 2 MG tablet Take 1 tablet (2 mg) by mouth 2 times daily as needed 20 tablet 11     senna-docusate (SENOKOT-S;PERICOLACE) 8.6-50 MG per tablet Take 2 tablets by mouth 2 times daily as needed for constipation       Acetaminophen (TYLENOL PO) Take 1,000 mg by mouth 2 times daily And 1000mg daily PRN       calcium-vitamin D (CALTRATE) 600-400 MG-UNIT per tablet Take 1 tablet by mouth every evening       triamcinolone (KENALOG) 0.1 % cream Apply topically 2 times daily as needed for irritation       ROS:  4 point ROS including Respiratory, CV, GI and , other than that noted in the HPI,  is negative    Exam:  Vitals: /78  Pulse 100  Temp 98.5  F (36.9  C)  Resp 16  SpO2 98%  BMI= There is no height or weight on file to calculate BMI.  GENERAL APPEARANCE:  in no distress, cooperative  ENT:  edentulous  with denture plateboth levels.   RESP:  respiratory effort and palpation of chest normal, coarse sounds at the bases.   CV:  Palpation and auscultation of heart done , no edema, S1S2 normal, systolic murmur over upper sternal borders, regular HR,   BREAST: Right: retracted nipple, with dry yellowish dry secretion, palpable mass at 3 o'clock in sitting position, firm, ill defined boundaries, mobile. Left breast: no mass felt but fibrocystic changes.   Lymph Node: axially LN were checked and no LN enlargement appreciated.   ABDOMEN:   normal bowel sounds, soft, nontender, no hepatosplenomegaly or other masses  M/S:   Gait and station abnormal uses a WC for ambulation.   SKIN:  Inspection of skin and subcutaneous tissue baseline, Palpation of skin and subcutaneous tissue baseline  NEURO:   no NFD appreciated on observation  PSYCH:  affect and mood anxious, Judgement affected.    Lab/Diagnostic data:   CBC RESULTS:   Recent Labs   Lab Test 03/30/18 03/14/18   WBC  9.0  8.5   RBC  4.54  4.22   HGB  12.8  12.1   HCT  40.7  37.8   MCV  90  90   MCH  28.2  28.7   MCHC  31.4*  32.0   RDW  12.8  13.0   PLT  226  182       Last Basic Metabolic Panel:  Recent Labs   Lab Test 03/30/18 03/14/18   NA  138  142   POTASSIUM  4.1  3.6   CHLORIDE  103  104   SLIM  8.7  9.0   CO2  24  31   BUN  11  9   CR  0.87  0.65   GLC  195*  150*       Liver Function Studies -   Recent Labs   Lab Test 03/30/18 05/09/17   1300   PROTTOTAL  6.7  6.5*   ALBUMIN  3.4*  3.3*   BILITOTAL  0.3  0.5   ALKPHOS  133*  93   AST  13  18   ALT  13  22     TSH   Date Value Ref Range Status   03/30/2018 2.21 0.30 - 6.00 uIU/mL Final   06/22/2017 4.45 (H) 0.40 - 4.00 mU/L Final     Lab Results   Component Value Date    A1C 6.8 03/30/2018    A1C 8.3 10/23/2017       ASSESSMENT/PLAN  Breast mass  Paget disease of breast, right (H)  History of B-cell lymphoma  Unintentional weight loss  - seems advanced PDB given palpable mass. Given advanced age, we recommend comfort care, and hospice evaluation. Discussed with Nurse manager and , family contacted, in agreement, Hospice service contacted, will evaluate today.     Recurrent falls  - with no major injury, self transferred, extensive work up negative. Possible 2/2 to diarrhea with Resident's attempt to go to the bathroom. No fall for the last few days.     Dementia, senile with delusions, with behavioral disturbance (H)  - continue Seroquel for now. Titrate accordingly.     Benign essential hypertension  - with some reading around  200 mmHg, meds adjusted, now seems w/i acceptable range for this frail elderly. Asymptomatic.     Hip dislocation, right, sequela (May 2017)weight lo  Age-related osteoporosis without current pathological fracture  Frail elderly  - Significant  Deficits requiring NH placement. Requiring extensive assistance from nursing. Up for meals only o/w spends the day resting in bed    Orders:  - See above, otherwise, continue the rest of the current POC.     Total time spent with patient visit at the skilled nursing facility was 45 minutes including patient visit, review of past records and discussing with , nurse manager. . Greater than 50% of total time spent with counseling and coordinating care due to above comorbidities.     Electronically signed by:  Radha Rodríguez MD        Sincerely,        Radha Rodríguez MD

## 2018-04-13 ENCOUNTER — MEDICAL CORRESPONDENCE (OUTPATIENT)
Dept: HEALTH INFORMATION MANAGEMENT | Facility: CLINIC | Age: 83
End: 2018-04-13

## 2018-06-06 ENCOUNTER — NURSING HOME VISIT (OUTPATIENT)
Dept: GERIATRICS | Facility: CLINIC | Age: 83
End: 2018-06-06
Payer: COMMERCIAL

## 2018-06-06 VITALS
BODY MASS INDEX: 28.55 KG/M2 | HEIGHT: 59 IN | DIASTOLIC BLOOD PRESSURE: 68 MMHG | SYSTOLIC BLOOD PRESSURE: 146 MMHG | HEART RATE: 82 BPM | OXYGEN SATURATION: 98 % | WEIGHT: 141.6 LBS | TEMPERATURE: 98.6 F | RESPIRATION RATE: 17 BRPM

## 2018-06-06 DIAGNOSIS — G30.1 LATE ONSET ALZHEIMER'S DISEASE WITH BEHAVIORAL DISTURBANCE (H): ICD-10-CM

## 2018-06-06 DIAGNOSIS — F02.818 LATE ONSET ALZHEIMER'S DISEASE WITH BEHAVIORAL DISTURBANCE (H): ICD-10-CM

## 2018-06-06 DIAGNOSIS — Z51.5 HOSPICE CARE PATIENT: ICD-10-CM

## 2018-06-06 DIAGNOSIS — F22 PARANOIA (H): Primary | ICD-10-CM

## 2018-06-06 DIAGNOSIS — D50.8 OTHER IRON DEFICIENCY ANEMIA: Chronic | ICD-10-CM

## 2018-06-06 DIAGNOSIS — E11.65 TYPE 2 DIABETES MELLITUS WITH HYPERGLYCEMIA, WITHOUT LONG-TERM CURRENT USE OF INSULIN (H): Chronic | ICD-10-CM

## 2018-06-06 DIAGNOSIS — K76.0 STEATOSIS OF LIVER: ICD-10-CM

## 2018-06-06 PROCEDURE — 99309 SBSQ NF CARE MODERATE MDM 30: CPT | Performed by: NURSE PRACTITIONER

## 2018-06-06 RX ORDER — BISACODYL 10 MG
10 SUPPOSITORY, RECTAL RECTAL
COMMUNITY

## 2018-06-06 RX ORDER — NYSTATIN 100000 [USP'U]/G
POWDER TOPICAL 2 TIMES DAILY PRN
COMMUNITY

## 2018-06-06 RX ORDER — MORPHINE SULFATE 20 MG/5ML
0.25 SOLUTION ORAL DAILY
COMMUNITY

## 2018-06-06 RX ORDER — ACETAMINOPHEN 650 MG/1
650 SUPPOSITORY RECTAL EVERY 4 HOURS PRN
COMMUNITY

## 2018-06-06 NOTE — PROGRESS NOTES
[facility]    Chief Complaint   Patient presents with     [facility] Regulatory       [place] Medical Record Number:  [id number]    HPI:    Lulu Carlton is a 89 year old  (11/8/1921), who is being seen today for a federally mandated E/M visit at [facility].  HPI information obtained from: facility chart records, facility staff, patient report and [facility] chart review. Today's concerns are:     Paranoia (H)  Late onset Alzheimer's disease with behavioral disturbance  Other iron deficiency anemia  Steatosis of liver  Type 2 diabetes mellitus with hyperglycemia, without long-term current use of insulin (H)  Hospice care patient     Patient seen today in room, sitting in WC, pleasantly confused, staff report recent elopement attempt on 6/1, generally independent with ADL's although is WC bound, decreased anxiety/agitation with constant requests of where money is and what things cost since starting seroquel, no pain, overall status is stable albeit chronic age-related decline.    ALLERGIES: Hydrocodone-acetaminophen; Trazodone; and Metformin  PAST MEDICAL HISTORY:  has a past medical history of Anemia, unspecified (hosp. 4/3/07 [place] ); Central nervous system complication (hosp. 4/3/07 [place]); Depressive disorder, not elsewhere classified; Fracture of femoral neck, right (H) (5/10/2017); Myasthenia gravis; Other malaise and fatigue (hosp. 4/3/07 [place] ); Other urinary incontinence; Thoracic or lumbosacral neuritis or radiculitis, unspecified; and Unspecified essential hypertension.  PAST SURGICAL HISTORY:  has a past surgical history that includes surgical history of - ; surgical history of - ; and Open reduction internal fixation hip bipolar (Right, 5/10/2017).  FAMILY HISTORY: family history is not on file.  SOCIAL HISTORY:  reports that she has never smoked. She does not have any smokeless tobacco history on file. She reports that she does not drink alcohol or  use illicit drugs.    MEDICATIONS:  Current Outpatient Prescriptions   Medication Sig Dispense Refill     Acetaminophen (TYLENOL PO) Take 1,000 mg by mouth 2 times daily And 1000mg daily PRN       acetaminophen (TYLENOL) 650 MG Suppository Place 650 mg rectally every 4 hours as needed for fever       bisacodyl (DULCOLAX) 10 MG Suppository Place 10 mg rectally daily as needed for constipation       calcium-vitamin D (CALTRATE) 600-400 MG-UNIT per tablet Take 1 tablet by mouth every evening       Cyanocobalamin (B-12) 1000 MCG TBCR Take 2,000 mcg by mouth daily 100 tablet 11     hydrALAZINE (APRESOLINE) 10 MG tablet Take 1 tablet (10 mg) by mouth 3 times daily as needed For SBP >180 90 tablet 11     Hyoscyamine Sulfate (HYOSCYAMINE PO) Place 0.125 mg under the tongue every 4 hours as needed       lisinopril (PRINIVIL/ZESTRIL) 5 MG tablet Take 2 tablets (10 mg) by mouth 2 times daily 30 tablet 11     loperamide (IMODIUM A-D) 2 MG tablet Take 1 tablet (2 mg) by mouth 2 times daily as needed 20 tablet 11     morphine sulfate 20 MG/5ML SOLN Take 5 mg by mouth every hour as needed (Staff to contact hospice if administering 3 or more PRN morphine doses in a 3 hour period)       nystatin (MYCOSTATIN) 588466 UNIT/GM POWD Apply topically 2 times daily as needed       OMEPRAZOLE PO Take 20 mg by mouth every morning       QUEtiapine Fumarate (SEROQUEL PO) Take 25 mg by mouth daily       senna-docusate (SENOKOT-S;PERICOLACE) 8.6-50 MG per tablet Take 2 tablets by mouth daily as needed for constipation        TRAMADOL HCL PO Take 25 mg by mouth every 4 hours as needed for moderate to severe pain       Medications reviewed:  Medications reconciled to facility chart and changes were made to reflect current medications as identified as above med list. Below are the changes that were made:   Medications stopped since last EPIC medication reconciliation:   Medications Discontinued During This Encounter   Medication Reason      "QUEtiapine (SEROQUEL) 25 MG tablet Dose adjustment     triamcinolone (KENALOG) 0.1 % cream Medication Reconciliation Clean Up       Medications started since last EPIC medication reconciliation:  Orders Placed This Encounter   Medications     nystatin (MYCOSTATIN) 653849 UNIT/GM POWD     Sig: Apply topically 2 times daily as needed     bisacodyl (DULCOLAX) 10 MG Suppository     Sig: Place 10 mg rectally daily as needed for constipation     acetaminophen (TYLENOL) 650 MG Suppository     Sig: Place 650 mg rectally every 4 hours as needed for fever     morphine sulfate 20 MG/5ML SOLN     Sig: Take 5 mg by mouth every hour as needed (Staff to contact hospice if administering 3 or more PRN morphine doses in a 3 hour period)     Hyoscyamine Sulfate (HYOSCYAMINE PO)     Sig: Place 0.125 mg under the tongue every 4 hours as needed     QUEtiapine Fumarate (SEROQUEL PO)     Sig: Take 25 mg by mouth daily       Case Management:  I have reviewed the care plan and MDS and do agree with the plan. Patient's desire to return to the community is present, but is not able due to care needs .  Information reviewed:  Medications, vital signs, orders, and nursing notes.    ROS:  Unobtainable secondary to cognitive impairment.     Exam:  Vitals: /68  Pulse 82  Temp 98.6  F (37  C)  Resp 17  Ht 4' 10.5\" (1.486 m)  Wt 141 lb 9.6 oz (64.2 kg)  SpO2 98%  BMI 29.09 kg/m2  BMI= Body mass index is 29.09 kg/(m^2).  GENERAL APPEARANCE:  Alert, in no distress, appears healthy  ENT:  Mouth and posterior oropharynx normal, moist mucous membranes  RESP:  lungs clear to auscultation   CV:  regular rate and rhythm, no murmur, rub, or gallop, peripheral edema scant+ in lower legs  ABDOMEN:  bowel sounds normal  M/S:   Gait and station abnormal requires xfer assist/WC  SKIN:  Inspection of skin and subcutaneous tissue baseline  NEURO:   Examination of sensation by touch normal  PSYCH:  oriented to self, insight and judgement " impaired    Lab/Diagnostic data:     CBC RESULTS:   Recent Labs   Lab Test 03/30/18 03/14/18   WBC  9.0  8.5   RBC  4.54  4.22   HGB  12.8  12.1   HCT  40.7  37.8   MCV  90  90   MCH  28.2  28.7   MCHC  31.4*  32.0   RDW  12.8  13.0   PLT  226  182       Last Basic Metabolic Panel:  Recent Labs   Lab Test 03/30/18 03/14/18   NA  138  142   POTASSIUM  4.1  3.6   CHLORIDE  103  104   SLIM  8.7  9.0   CO2  24  31   BUN  11  9   CR  0.87  0.65   GLC  195*  150*       Liver Function Studies -   Recent Labs   Lab Test 03/30/18 05/09/17   1300   PROTTOTAL  6.7  6.5*   ALBUMIN  3.4*  3.3*   BILITOTAL  0.3  0.5   ALKPHOS  133*  93   AST  13  18   ALT  13  22       TSH   Date Value Ref Range Status   03/30/2018 2.21 0.30 - 6.00 uIU/mL Final   06/22/2017 4.45 (H) 0.40 - 4.00 mU/L Final       Lab Results   Component Value Date    A1C 6.8 03/30/2018    A1C 8.3 10/23/2017       ASSESSMENT/PLAN  Paranoia (H)  Late onset Alzheimer's disease with behavioral disturbance  -recent elopement on 6/1, found in front entryway, fairly quick with legs pushing WC  -since increasing seroquel on 4/30 from 12.5 to 25mg Qd has marked decrease in agitation and anxiety, particularly over money matters  -staff placing wander guards on both patient and WC  -stable, facility to monitor    Other iron deficiency anemia  Steatosis of liver  Type 2 diabetes mellitus with hyperglycemia, without long-term current use of insulin (H)  -labs as above, from 3/30/18, all WNL for patient, no acute concerns  -no change in plan    Hospice care patient  -followed by [facility] hospice starting 4/4/18; appreciate their support  -medications reviewed as appropriate, not using some PRN's  -consider DC of hydralazine, vitaD, VitaB, omeprazole, tramadol in future if not using      Electronically signed by:  Ventura Flores, HANG CNP

## 2018-06-25 NOTE — LETTER
"    6/25/2018        RE: Lulu Carlton  [address]  [facility] [address]        [facility]    Chief Complaint   Patient presents with     half-way Acute       [place] Medical Record Number:  [id number]    HPI:    Lulu Carlton is a 89 year old  (11/8/1921), who is being seen today for an episodic care visit at [facility].  HPI information obtained from: facility chart records, facility staff, patient report and [facility] chart review.Today's concern is:     Anxiety  Late onset Alzheimer's disease with behavioral disturbance  Gastroesophageal reflux disease without esophagitis  Hospice care patient     Patient continues to have certain level of anxiety over how things are being paid for, asked RN this am 5x regarding, patient moderately disturbed by thoughts, overall pleasant, very difficult to redirect during these \"money inquiry\" periods, also has frequent gut upset and has intermittent mild emesis after breakfast, overall status stable with no acute concerns.    ALLERGIES: Hydrocodone-acetaminophen; Trazodone; and Metformin  Past Medical, Surgical, Family and Social History reviewed and updated in [facility].    Current Outpatient Prescriptions   Medication Sig Dispense Refill     Acetaminophen (TYLENOL PO) Take 1,000 mg by mouth 2 times daily And 1000mg daily PRN       acetaminophen (TYLENOL) 650 MG Suppository Place 650 mg rectally every 4 hours as needed for fever       bisacodyl (DULCOLAX) 10 MG Suppository Place 10 mg rectally daily as needed for constipation       calcium-vitamin D (CALTRATE) 600-400 MG-UNIT per tablet Take 1 tablet by mouth every evening       Cyanocobalamin (B-12) 1000 MCG TBCR Take 2,000 mcg by mouth daily 100 tablet 11     hydrALAZINE (APRESOLINE) 10 MG tablet Take 1 tablet (10 mg) by mouth 3 times daily as needed For SBP >180 90 tablet 11     Hyoscyamine Sulfate (HYOSCYAMINE PO) Place 0.125 mg under the tongue every 4 hours as needed       " lisinopril (PRINIVIL/ZESTRIL) 5 MG tablet Take 2 tablets (10 mg) by mouth 2 times daily 30 tablet 11     loperamide (IMODIUM A-D) 2 MG tablet Take 1 tablet (2 mg) by mouth 2 times daily as needed 20 tablet 11     morphine sulfate 20 MG/5ML SOLN Take 5 mg by mouth every hour as needed (Staff to contact hospice if administering 3 or more PRN morphine doses in a 3 hour period)       nystatin (MYCOSTATIN) 392364 UNIT/GM POWD Apply topically 2 times daily as needed       omeprazole (PRILOSEC) 10 MG CR capsule Take 2 capsules (20 mg) by mouth 2 times daily 60 capsule 11     QUEtiapine (SEROQUEL) 25 MG tablet Take 1 tablet (25 mg) by mouth daily And 12.5mg Qhs 60 tablet 11     senna-docusate (SENOKOT-S;PERICOLACE) 8.6-50 MG per tablet Take 2 tablets by mouth daily as needed for constipation        TRAMADOL HCL PO Take 25 mg by mouth every 4 hours as needed for moderate to severe pain       [DISCONTINUED] QUEtiapine Fumarate (SEROQUEL PO) Take 25 mg by mouth daily       Medications reviewed:  Medications reconciled to facility chart and changes were made to reflect current medications as identified as above med list. Below are the changes that were made:   Medications stopped since last EPIC medication reconciliation:   There are no discontinued medications.    Medications started since last [facility] medication reconciliation:  No orders of the defined types were placed in this encounter.      REVIEW OF SYSTEMS:  Limited secondary to cognitive impairment but today pt reports how much is breakfast today?    Physical Exam:  /76  Pulse 76  Temp 97.2  F (36.2  C)  Resp 20  Wt 148 lb 9.6 oz (67.4 kg)  BMI 30.53 kg/m2  GENERAL APPEARANCE:  Alert, in no distress  ENT:  Mouth and posterior oropharynx normal, moist mucous membranes  RESP:  lungs clear to auscultation   CV:  regular rate and rhythm, no murmur, rub, or gallop, no edema  ABDOMEN:  bowel sounds normal  M/S:   Gait and station abnormal requires  assistance/WC  SKIN:  Inspection of skin and subcutaneous tissue baseline  NEURO:   Examination of sensation by touch normal  PSYCH:  oriented to self, insight and judgement impaired    Recent Labs:     CBC RESULTS:   Recent Labs   Lab Test 03/30/18 03/14/18   WBC  9.0  8.5   RBC  4.54  4.22   HGB  12.8  12.1   HCT  40.7  37.8   MCV  90  90   MCH  28.2  28.7   MCHC  31.4*  32.0   RDW  12.8  13.0   PLT  226  182       Last Basic Metabolic Panel:  Recent Labs   Lab Test 03/30/18 03/14/18   NA  138  142   POTASSIUM  4.1  3.6   CHLORIDE  103  104   SLIM  8.7  9.0   CO2  24  31   BUN  11  9   CR  0.87  0.65   GLC  195*  150*       Liver Function Studies -   Recent Labs   Lab Test 03/30/18 05/09/17   1300   PROTTOTAL  6.7  6.5*   ALBUMIN  3.4*  3.3*   BILITOTAL  0.3  0.5   ALKPHOS  133*  93   AST  13  18   ALT  13  22       TSH   Date Value Ref Range Status   03/30/2018 2.21 0.30 - 6.00 uIU/mL Final   06/22/2017 4.45 (H) 0.40 - 4.00 mU/L Final   ]    Lab Results   Component Value Date    A1C 6.8 03/30/2018    A1C 8.3 10/23/2017         Assessment/Plan:  Anxiety  Late onset Alzheimer's disease with behavioral disturbance  -recently prescribed seroquel, seemed to have improved constant money inquiries/though disturbances  -increase seroquel for 25mg at noon (current) and add 12.5mg Qhs  -if status does not improve may change to 25mg BID    Gastroesophageal reflux disease without esophagitis  -patient having intermittent am emesis after breakfast  -no known etiology, patient does not appear to have any concerns  -will increase omeprazole from 20mg Qd to 20mg BID  -facility/hospice to follow, if having continued gut disturbances may need anti-emetic scheduled before breakfast    Hospice care patient  -followed by [facility] team; appreciate their support  -meds reviewed today as appropriate  -plan change as above  -hospice nurse to follow 1-2x/wk    Electronically signed by  HANG Mann  CNP                      Sincerely,        HANG Mann CNP

## 2018-06-25 NOTE — PROGRESS NOTES
"[place] [facility]    Chief Complaint   Patient presents with     halfway Acute       [place] Medical Record Number:  [id number]    HPI:    Lulu Carlton is a 89 year old  (11/8/1921), who is being seen today for an episodic care visit at [facility].  HPI information obtained from: facility chart records, facility staff, patient report and [facility] chart review.Today's concern is:     Anxiety  Late onset Alzheimer's disease with behavioral disturbance  Gastroesophageal reflux disease without esophagitis  Hospice care patient     Patient continues to have certain level of anxiety over how things are being paid for, asked RN this am 5x regarding, patient moderately disturbed by thoughts, overall pleasant, very difficult to redirect during these \"money inquiry\" periods, also has frequent gut upset and has intermittent mild emesis after breakfast, overall status stable with no acute concerns.    ALLERGIES: Hydrocodone-acetaminophen; Trazodone; and Metformin  Past Medical, Surgical, Family and Social History reviewed and updated in [facility].    Current Outpatient Prescriptions   Medication Sig Dispense Refill     Acetaminophen (TYLENOL PO) Take 1,000 mg by mouth 2 times daily And 1000mg daily PRN       acetaminophen (TYLENOL) 650 MG Suppository Place 650 mg rectally every 4 hours as needed for fever       bisacodyl (DULCOLAX) 10 MG Suppository Place 10 mg rectally daily as needed for constipation       calcium-vitamin D (CALTRATE) 600-400 MG-UNIT per tablet Take 1 tablet by mouth every evening       Cyanocobalamin (B-12) 1000 MCG TBCR Take 2,000 mcg by mouth daily 100 tablet 11     hydrALAZINE (APRESOLINE) 10 MG tablet Take 1 tablet (10 mg) by mouth 3 times daily as needed For SBP >180 90 tablet 11     Hyoscyamine Sulfate (HYOSCYAMINE PO) Place 0.125 mg under the tongue every 4 hours as needed       lisinopril (PRINIVIL/ZESTRIL) 5 MG tablet Take 2 tablets (10 mg) by mouth 2 times daily " 30 tablet 11     loperamide (IMODIUM A-D) 2 MG tablet Take 1 tablet (2 mg) by mouth 2 times daily as needed 20 tablet 11     morphine sulfate 20 MG/5ML SOLN Take 5 mg by mouth every hour as needed (Staff to contact hospice if administering 3 or more PRN morphine doses in a 3 hour period)       nystatin (MYCOSTATIN) 435124 UNIT/GM POWD Apply topically 2 times daily as needed       omeprazole (PRILOSEC) 10 MG CR capsule Take 2 capsules (20 mg) by mouth 2 times daily 60 capsule 11     QUEtiapine (SEROQUEL) 25 MG tablet Take 1 tablet (25 mg) by mouth daily And 12.5mg Qhs 60 tablet 11     senna-docusate (SENOKOT-S;PERICOLACE) 8.6-50 MG per tablet Take 2 tablets by mouth daily as needed for constipation        TRAMADOL HCL PO Take 25 mg by mouth every 4 hours as needed for moderate to severe pain       [DISCONTINUED] QUEtiapine Fumarate (SEROQUEL PO) Take 25 mg by mouth daily       Medications reviewed:  Medications reconciled to facility chart and changes were made to reflect current medications as identified as above med list. Below are the changes that were made:   Medications stopped since last EPIC medication reconciliation:   There are no discontinued medications.    Medications started since last [facility] medication reconciliation:  No orders of the defined types were placed in this encounter.      REVIEW OF SYSTEMS:  Limited secondary to cognitive impairment but today pt reports how much is breakfast today?    Physical Exam:  /76  Pulse 76  Temp 97.2  F (36.2  C)  Resp 20  Wt 148 lb 9.6 oz (67.4 kg)  BMI 30.53 kg/m2  GENERAL APPEARANCE:  Alert, in no distress  ENT:  Mouth and posterior oropharynx normal, moist mucous membranes  RESP:  lungs clear to auscultation   CV:  regular rate and rhythm, no murmur, rub, or gallop, no edema  ABDOMEN:  bowel sounds normal  M/S:   Gait and station abnormal requires assistance/WC  SKIN:  Inspection of skin and subcutaneous tissue baseline  NEURO:   Examination of  sensation by touch normal  PSYCH:  oriented to self, insight and judgement impaired    Recent Labs:     CBC RESULTS:   Recent Labs   Lab Test 03/30/18 03/14/18   WBC  9.0  8.5   RBC  4.54  4.22   HGB  12.8  12.1   HCT  40.7  37.8   MCV  90  90   MCH  28.2  28.7   MCHC  31.4*  32.0   RDW  12.8  13.0   PLT  226  182       Last Basic Metabolic Panel:  Recent Labs   Lab Test 03/30/18 03/14/18   NA  138  142   POTASSIUM  4.1  3.6   CHLORIDE  103  104   SLIM  8.7  9.0   CO2  24  31   BUN  11  9   CR  0.87  0.65   GLC  195*  150*       Liver Function Studies -   Recent Labs   Lab Test 03/30/18 05/09/17   1300   PROTTOTAL  6.7  6.5*   ALBUMIN  3.4*  3.3*   BILITOTAL  0.3  0.5   ALKPHOS  133*  93   AST  13  18   ALT  13  22       TSH   Date Value Ref Range Status   03/30/2018 2.21 0.30 - 6.00 uIU/mL Final   06/22/2017 4.45 (H) 0.40 - 4.00 mU/L Final   ]    Lab Results   Component Value Date    A1C 6.8 03/30/2018    A1C 8.3 10/23/2017         Assessment/Plan:  Anxiety  Late onset Alzheimer's disease with behavioral disturbance  -recently prescribed seroquel, seemed to have improved constant money inquiries/though disturbances  -increase seroquel for 25mg at noon (current) and add 12.5mg Qhs  -if status does not improve may change to 25mg BID    Gastroesophageal reflux disease without esophagitis  -patient having intermittent am emesis after breakfast  -no known etiology, patient does not appear to have any concerns  -will increase omeprazole from 20mg Qd to 20mg BID  -facility/hospice to follow, if having continued gut disturbances may need anti-emetic scheduled before breakfast    Hospice care patient  -followed by [facility]; appreciate their support  -meds reviewed today as appropriate  -plan change as above  -hospice nurse to follow 1-2x/wk    Electronically signed by  HANG Mann CNP

## 2018-07-13 NOTE — TELEPHONE ENCOUNTER
[place] [facility] TELEPHONE ENCOUNTER    Chief Complaint   Patient presents with     unresponsive       [place] TARIQ [place] is a 89 year old  (11/8/1921),Nurse called today to report: patient slumped in her chair and staff unable to arouse her.  Blood pressure 88/50.  .  She is cold and clammy.  After laying her down, she has agonal breathing with 10-20 second apnea,  Advanced directive DNR/DNI comfort care do not hospitalize    ASSESSMENT/PLAN  Actively dying    Call family    Provide comfort    HANG Bruce CNP

## 2018-07-16 NOTE — LETTER
"    7/16/2018        RE: Lulu Carlton  [facility]  [address] [place] [id number]        [facility]    Chief Complaint   Patient presents with     intermediate Acute       [place] Medical Record Number:  [id number]    HPI:    Lulu Carlton is a 89 year old  (11/8/1921), who is being seen today for an episodic care visit at [facility].  HPI information obtained from: facility chart records, facility staff, patient report and [facility] chart review.Today's concern is:     Unresponsive episode  Confusion  Apnea  Dementia with behavioral disturbance, unspecified dementia type  Hospice care patient     Patient had recent episode of unresponsiveness on 7/7 with unresponsiveness, weakness, staff reported facial droop, then was talking \"word salad\", apnea 30 seconds, BP 88/58, increased incontinence, initial though was potential TIA, since then has improved and today is pleasant, had meal in community dining area, smiling, reports feeling well, neuro and musculo intact, overall no acute concerns today.    ALLERGIES: Hydrocodone-acetaminophen; Trazodone; and Metformin  Past Medical, Surgical, Family and Social History reviewed and updated in [facility].    Current Outpatient Prescriptions   Medication Sig Dispense Refill     Acetaminophen (TYLENOL PO) Take 1,000 mg by mouth 2 times daily And 1000mg daily PRN       acetaminophen (TYLENOL) 650 MG Suppository Place 650 mg rectally every 4 hours as needed for fever       bisacodyl (DULCOLAX) 10 MG Suppository Place 10 mg rectally daily as needed for constipation       calcium-vitamin D (CALTRATE) 600-400 MG-UNIT per tablet Take 1 tablet by mouth every evening       Cyanocobalamin (B-12) 1000 MCG TBCR Take 2,000 mcg by mouth daily 100 tablet 11     hydrALAZINE (APRESOLINE) 10 MG tablet Take 1 tablet (10 mg) by mouth 3 times daily as needed For SBP >180 90 tablet 11     Hyoscyamine Sulfate (HYOSCYAMINE PO) Place 0.125 " "mg under the tongue every 4 hours as needed       lisinopril (PRINIVIL/ZESTRIL) 5 MG tablet Take 2 tablets (10 mg) by mouth 2 times daily 30 tablet 11     loperamide (IMODIUM A-D) 2 MG tablet Take 1 tablet (2 mg) by mouth 2 times daily as needed 20 tablet 11     morphine sulfate 20 MG/5ML SOLN Take 5 mg by mouth every hour as needed (Staff to contact hospice if administering 3 or more PRN morphine doses in a 3 hour period)       nystatin (MYCOSTATIN) 784809 UNIT/GM POWD Apply topically 2 times daily as needed       omeprazole (PRILOSEC) 10 MG CR capsule Take 2 capsules (20 mg) by mouth 2 times daily 60 capsule 11     QUEtiapine (SEROQUEL) 25 MG tablet Take 1 tablet (25 mg) by mouth daily And 12.5mg Qhs 60 tablet 11     senna-docusate (SENOKOT-S;PERICOLACE) 8.6-50 MG per tablet Take 2 tablets by mouth daily as needed for constipation        TRAMADOL HCL PO Take 25 mg by mouth every 4 hours as needed for moderate to severe pain       Medications reviewed:  Medications reconciled to facility chart and changes were made to reflect current medications as identified as above med list. Below are the changes that were made:   Medications stopped since last EPIC medication reconciliation:   There are no discontinued medications.    Medications started since last [facility] medication reconciliation:  No orders of the defined types were placed in this encounter.      REVIEW OF SYSTEMS:  Limited secondary to cognitive impairment but today pt reports \"I am fine\"    Physical Exam:  /84  Pulse 76  Temp 97.2  F (36.2  C)  Resp 20  Ht 4' 10.5\" (1.486 m)  Wt 145 lb 12.8 oz (66.1 kg)  SpO2 96%  BMI 29.95 kg/m2  GENERAL APPEARANCE:  Alert, in no distress, appears healthy  ENT:  Mouth and posterior oropharynx normal, moist mucous membranes  RESP:  lungs clear to auscultation   CV:  regular rate and rhythm, no murmur, rub, or gallop, peripheral edema 1+ in bilateral lower legs L>R  ABDOMEN:  bowel sounds normal  M/S:   Gait and " station abnormal needs assist transfer, independent most ADL's with reminders  SKIN:  Inspection of skin and subcutaneous tissue baseline  NEURO:   Examination of sensation by touch normal  PSYCH:  oriented to self, insight and judgement impaired, memory impaired     Recent Labs:     CBC RESULTS:   Recent Labs   Lab Test 03/30/18 03/14/18   WBC  9.0  8.5   RBC  4.54  4.22   HGB  12.8  12.1   HCT  40.7  37.8   MCV  90  90   MCH  28.2  28.7   MCHC  31.4*  32.0   RDW  12.8  13.0   PLT  226  182       Last Basic Metabolic Panel:  Recent Labs   Lab Test 03/30/18 03/14/18   NA  138  142   POTASSIUM  4.1  3.6   CHLORIDE  103  104   SLIM  8.7  9.0   CO2  24  31   BUN  11  9   CR  0.87  0.65   GLC  195*  150*       Liver Function Studies -   Recent Labs   Lab Test 03/30/18 05/09/17   1300   PROTTOTAL  6.7  6.5*   ALBUMIN  3.4*  3.3*   BILITOTAL  0.3  0.5   ALKPHOS  133*  93   AST  13  18   ALT  13  22       TSH   Date Value Ref Range Status   03/30/2018 2.21 0.30 - 6.00 uIU/mL Final   06/22/2017 4.45 (H) 0.40 - 4.00 mU/L Final   ]    Lab Results   Component Value Date    A1C 6.8 03/30/2018    A1C 8.3 10/23/2017         Assessment/Plan:  Unresponsive episode  Confusion  Apnea  Dementia with behavioral disturbance, unspecified dementia type  Hospice care patient  -followed by [facility] Hospice team; appreciate their support  -considering patient had TIA at this juncture  -unresponsive episode resolved in <24 hours  -currently no s/s apnea, VS stable  -facility and hospice to continue to follow, report any anomalies to PCP for review      Electronically signed by  HANG Mann CNP                      Sincerely,        HANG Mann CNP

## 2018-07-16 NOTE — PROGRESS NOTES
"[facility]    Chief Complaint   Patient presents with     jail Acute       [place] Medical Record Number:  [id number]    HPI:    Lulu Carlton is a 89 year old  (11/8/1921), who is being seen today for an episodic care visit at [facility].  HPI information obtained from: facility chart records, facility staff, patient report and [facility] chart review.Today's concern is:     Unresponsive episode  Confusion  Apnea  Dementia with behavioral disturbance, unspecified dementia type  Hospice care patient     Patient had recent episode of unresponsiveness on 7/7 with unresponsiveness, weakness, staff reported facial droop, then was talking \"word salad\", apnea 30 seconds, BP 88/58, increased incontinence, initial though was potential TIA, since then has improved and today is pleasant, had meal in community dining area, smiling, reports feeling well, neuro and musculo intact, overall no acute concerns today.    ALLERGIES: Hydrocodone-acetaminophen; Trazodone; and Metformin  Past Medical, Surgical, Family and Social History reviewed and updated in [facility].    Current Outpatient Prescriptions   Medication Sig Dispense Refill     Acetaminophen (TYLENOL PO) Take 1,000 mg by mouth 2 times daily And 1000mg daily PRN       acetaminophen (TYLENOL) 650 MG Suppository Place 650 mg rectally every 4 hours as needed for fever       bisacodyl (DULCOLAX) 10 MG Suppository Place 10 mg rectally daily as needed for constipation       calcium-vitamin D (CALTRATE) 600-400 MG-UNIT per tablet Take 1 tablet by mouth every evening       Cyanocobalamin (B-12) 1000 MCG TBCR Take 2,000 mcg by mouth daily 100 tablet 11     hydrALAZINE (APRESOLINE) 10 MG tablet Take 1 tablet (10 mg) by mouth 3 times daily as needed For SBP >180 90 tablet 11     Hyoscyamine Sulfate (HYOSCYAMINE PO) Place 0.125 mg under the tongue every 4 hours as needed       lisinopril (PRINIVIL/ZESTRIL) 5 MG tablet Take 2 tablets (10 mg) by " "mouth 2 times daily 30 tablet 11     loperamide (IMODIUM A-D) 2 MG tablet Take 1 tablet (2 mg) by mouth 2 times daily as needed 20 tablet 11     morphine sulfate 20 MG/5ML SOLN Take 5 mg by mouth every hour as needed (Staff to contact hospice if administering 3 or more PRN morphine doses in a 3 hour period)       nystatin (MYCOSTATIN) 258360 UNIT/GM POWD Apply topically 2 times daily as needed       omeprazole (PRILOSEC) 10 MG CR capsule Take 2 capsules (20 mg) by mouth 2 times daily 60 capsule 11     QUEtiapine (SEROQUEL) 25 MG tablet Take 1 tablet (25 mg) by mouth daily And 12.5mg Qhs 60 tablet 11     senna-docusate (SENOKOT-S;PERICOLACE) 8.6-50 MG per tablet Take 2 tablets by mouth daily as needed for constipation        TRAMADOL HCL PO Take 25 mg by mouth every 4 hours as needed for moderate to severe pain       Medications reviewed:  Medications reconciled to facility chart and changes were made to reflect current medications as identified as above med list. Below are the changes that were made:   Medications stopped since last EPIC medication reconciliation:   There are no discontinued medications.    Medications started since last [facility] medication reconciliation:  No orders of the defined types were placed in this encounter.      REVIEW OF SYSTEMS:  Limited secondary to cognitive impairment but today pt reports \"I am fine\"    Physical Exam:  /84  Pulse 76  Temp 97.2  F (36.2  C)  Resp 20  Ht 4' 10.5\" (1.486 m)  Wt 145 lb 12.8 oz (66.1 kg)  SpO2 96%  BMI 29.95 kg/m2  GENERAL APPEARANCE:  Alert, in no distress, appears healthy  ENT:  Mouth and posterior oropharynx normal, moist mucous membranes  RESP:  lungs clear to auscultation   CV:  regular rate and rhythm, no murmur, rub, or gallop, peripheral edema 1+ in bilateral lower legs L>R  ABDOMEN:  bowel sounds normal  M/S:   Gait and station abnormal needs assist transfer, independent most ADL's with reminders  SKIN:  Inspection of skin and " subcutaneous tissue baseline  NEURO:   Examination of sensation by touch normal  PSYCH:  oriented to self, insight and judgement impaired, memory impaired     Recent Labs:     CBC RESULTS:   Recent Labs   Lab Test 03/30/18 03/14/18   WBC  9.0  8.5   RBC  4.54  4.22   HGB  12.8  12.1   HCT  40.7  37.8   MCV  90  90   MCH  28.2  28.7   MCHC  31.4*  32.0   RDW  12.8  13.0   PLT  226  182       Last Basic Metabolic Panel:  Recent Labs   Lab Test 03/30/18 03/14/18   NA  138  142   POTASSIUM  4.1  3.6   CHLORIDE  103  104   SLIM  8.7  9.0   CO2  24  31   BUN  11  9   CR  0.87  0.65   GLC  195*  150*       Liver Function Studies -   Recent Labs   Lab Test 03/30/18 05/09/17   1300   PROTTOTAL  6.7  6.5*   ALBUMIN  3.4*  3.3*   BILITOTAL  0.3  0.5   ALKPHOS  133*  93   AST  13  18   ALT  13  22       TSH   Date Value Ref Range Status   03/30/2018 2.21 0.30 - 6.00 uIU/mL Final   06/22/2017 4.45 (H) 0.40 - 4.00 mU/L Final   ]    Lab Results   Component Value Date    A1C 6.8 03/30/2018    A1C 8.3 10/23/2017         Assessment/Plan:  Unresponsive episode  Confusion  Apnea  Dementia with behavioral disturbance, unspecified dementia type  Hospice care patient  [facility]; appreciate their support  -considering patient had TIA at this juncture  -unresponsive episode resolved in <24 hours  -currently no s/s apnea, VS stable  -facility and hospice to continue to follow, report any anomalies to PCP for review      Electronically signed by  HANG Mann CNP

## 2018-07-24 NOTE — LETTER
7/24/2018        RE: Lulu Carlton  [facility]  [address] [place] [id number]        [facility]    Chief Complaint   Patient presents with     Nursing Home Acute       HPI:    Lulu Carlton is a 89 year old  (11/8/1921), who is being seen today for an episodic care visit at [facility].    HPI information obtained from: facility chart records, facility staff, patient report and Hospice RN, Sherron. Today's concern is:  Dementia with behavioral disturbance, unspecified dementia type  Anxiety  Hospice care patient  This document hospice patient has been experiencing increased agitation, restlessness, anxiety, visual hallucinations since episode on July 13.  Suspect July 13 episode was some form of TIA or CVA which she has somewhat recovered from however experiencing distressing psychological psychotic symptoms.  She is made multiple attempts to leave the unit, is very confused.  Has used the as needed Ativan ×3, one time was effective.  She was started on Seroquel 25 mg at noon 12.5 mg at at bedtime which has improved her symptoms.  However over the past week her symptoms have worsened again.      REVIEW OF SYSTEMS:  Unobtainable secondary to cognitive impairment.     /68  Pulse 66  Temp 97.5  F (36.4  C)  Resp 18  Wt 145 lb 3.2 oz (65.9 kg)  SpO2 96%  BMI 29.83 kg/m2  GENERAL APPEARANCE:  Alert, in no distress  Patient's alert and pleasant during interview.  Sitting in wheelchair.  However after interview was wheeling incessantly up-and-down the halls seeking ways that she could go home, verbalizing anxiety and fear regarding being here.  Picking at things on the floor and in the air that were not present.  Lung sounds clear  Heart rate regular  Oriented to self only    ASSESSMENT/PLAN:  Dementia with behavioral disturbance, unspecified dementia type  Anxiety  Hospice care patient  We will continue to have the as needed Ativan available ×14  more days.  Will add an additional dose of Seroquel in the morning to hopefully help manage the increased in behaviors and hallucinations.  Conferenced with hospice RN who concurs with this plan.      Orders:  1.  Add seroquel 12.5mg po every AM in addition to current BID seroquel regime.  Dx:  Dementia with psychosis  2.  Continue current PRN ativan x14 days.  Stop date 8-6-18.  Notify NP of usage by 8/1/18.    Electronically signed by:  HANG Graham CNP      Sincerely,        HANG Graham CNP

## 2018-07-24 NOTE — PROGRESS NOTES
[facility]    Chief Complaint   Patient presents with     Nursing Home Acute       HPI:    Lulu Carlton is a 89 year old  (11/8/1921), who is being seen today for an episodic care visit at [facility].    HPI information obtained from: facility chart records, facility staff, patient report and Hospice RN, Sherron. Today's concern is:  Dementia with behavioral disturbance, unspecified dementia type  Anxiety  Hospice care patient  This document hospice patient has been experiencing increased agitation, restlessness, anxiety, visual hallucinations since episode on July 13.  Suspect July 13 episode was some form of TIA or CVA which she has somewhat recovered from however experiencing distressing psychological psychotic symptoms.  She is made multiple attempts to leave the unit, is very confused.  Has used the as needed Ativan ×3, one time was effective.  She was started on Seroquel 25 mg at noon 12.5 mg at at bedtime which has improved her symptoms.  However over the past week her symptoms have worsened again.      REVIEW OF SYSTEMS:  Unobtainable secondary to cognitive impairment.     /68  Pulse 66  Temp 97.5  F (36.4  C)  Resp 18  Wt 145 lb 3.2 oz (65.9 kg)  SpO2 96%  BMI 29.83 kg/m2  GENERAL APPEARANCE:  Alert, in no distress  Patient's alert and pleasant during interview.  Sitting in wheelchair.  However after interview was wheeling incessantly up-and-down the halls seeking ways that she could go home, verbalizing anxiety and fear regarding being here.  Picking at things on the floor and in the air that were not present.  Lung sounds clear  Heart rate regular  Oriented to self only    ASSESSMENT/PLAN:  Dementia with behavioral disturbance, unspecified dementia type  Anxiety  Hospice care patient  We will continue to have the as needed Ativan available ×14 more days.  Will add an additional dose of Seroquel in the morning to hopefully help manage the increased in  behaviors and hallucinations.  Conferenced with hospice RN who concurs with this plan.      Orders:  1.  Add seroquel 12.5mg po every AM in addition to current BID seroquel regime.  Dx:  Dementia with psychosis  2.  Continue current PRN ativan x14 days.  Stop date 8-6-18.  Notify NP of usage by 8/1/18.    Electronically signed by:  HANG Graham CNP

## 2018-07-30 NOTE — PROGRESS NOTES
"  [facility]    Chief Complaint   Patient presents with     [facility]       [place] Medical Record Number:  [id number]    HPI:    Lulu Carlton is a 89 year old  (11/8/1921), who is being seen today for a federally mandated E/M visit at [facility].  HPI information obtained from:facility staff, patient report and [facility] chart review    Today's concerns are:  -  - Resident seen and examined.  Patient continued to decline hence enrolled in hospice  - Denies having extremity weakness, self-propelling wheelchair  - Reports sleep, appetite and BM are fine.   - RN reports that the resident continued to be agitated and have restlessness,    distress and anger, exit seeking , Seroquel does increased heaviness and some episode of July 13.  Concern for stroke versus TIA.  Had a fall on July 27 injury also had one in April.  - \" GNP has no concern  --------------------------------  - - Past Medical, social, family histories, medications, and allergies reviewed and updated  - Medications reviewed: in the chart and EHR.   - Case Management:   I have reviewed the care plan and MDS and do agree with the plan. Patient's desire to return to the community is not present.  Information reviewed:  Medications, vital signs, orders, and nursing notes.    MEDICATIONS:  Current Outpatient Prescriptions   Medication Sig Dispense Refill     Acetaminophen (TYLENOL PO) Take 1,000 mg by mouth 2 times daily And 1000mg daily PRN       acetaminophen (TYLENOL) 650 MG Suppository Place 650 mg rectally every 4 hours as needed for fever       bisacodyl (DULCOLAX) 10 MG Suppository Place 10 mg rectally daily as needed for constipation       calcium-vitamin D (CALTRATE) 600-400 MG-UNIT per tablet Take 1 tablet by mouth every evening       Cyanocobalamin (B-12) 1000 MCG TBCR Take 2,000 mcg by mouth daily 100 tablet 11     hydrALAZINE (APRESOLINE) 10 MG tablet Take 1 tablet (10 mg) by mouth 3 times " "daily as needed For SBP >180 90 tablet 11     Hyoscyamine Sulfate (HYOSCYAMINE PO) Place 0.125 mg under the tongue every 4 hours as needed       lisinopril (PRINIVIL/ZESTRIL) 5 MG tablet Take 2 tablets (10 mg) by mouth 2 times daily 30 tablet 11     loperamide (IMODIUM A-D) 2 MG tablet Take 1 tablet (2 mg) by mouth 2 times daily as needed 20 tablet 11     LORazepam (LORAZEPAM INTENSOL) 2 MG/ML (HIGH CONC) solution Give 0.25 ml sublingually every 2  hours as needed for restlessness,  terminal agitation until 07/26/2018       magnesium hydroxide (MILK OF MAGNESIA) 400 MG/5ML suspension Take 30 mLs by mouth daily as needed for constipation or heartburn       morphine sulfate 20 MG/5ML SOLN Take 5 mg by mouth every hour as needed (Staff to contact hospice if administering 3 or more PRN morphine doses in a 3 hour period)       nystatin (MYCOSTATIN) 055315 UNIT/GM POWD Apply topically 2 times daily as needed       omeprazole (PRILOSEC) 10 MG CR capsule Take 2 capsules (20 mg) by mouth 2 times daily 60 capsule 11     senna-docusate (SENOKOT-S;PERICOLACE) 8.6-50 MG per tablet Take 1 tablet by mouth daily as needed for constipation        [DISCONTINUED] QUEtiapine Fumarate (SEROQUEL PO) Take 12.5 mg by mouth every morning       QUEtiapine Fumarate (SEROQUEL PO) Take 25 mg by mouth 3 times daily         ROS:  4 point ROS including Respiratory, CV, GI and , other than that noted in the HPI,  is negative    Exam:  Vitals: /74  Pulse 89  Temp 97.8  F (36.6  C)  Resp 20  Ht 4' 10.5\" (1.486 m)  Wt 145 lb 8 oz (66 kg)  SpO2 95%  BMI 29.89 kg/m2  BMI= Body mass index is 29.89 kg/(m^2).  GENERAL APPEARANCE:  in no distress, cooperative  RESP:  respiratory effort and palpation of chest normal, coarse sounds at the bases, crackles lower 1/3. .   CV:  Palpation and auscultation of heart done , no edema, S1S2 normal, systolic murmur over upper sternal borders, regular HR,   ABDOMEN:  normal bowel sounds, soft, nontender, " no hepatosplenomegaly or other masses  M/S:   Gait and station abnormal uses a WC for ambulation.   SKIN:  Inspection of skin and subcutaneous tissue baseline, Palpation of skin and subcutaneous tissue baseline  NEURO:   no NFD appreciated on observation  PSYCH:  affect and mood anxious, Judgement affected    Lab/Diagnostic data: Reviewed in the chart and EHR.      ASSESSMENT/PLAN  Breast mass  Paget disease of breast, right (H)  History of B-cell lymphoma  Unintentional weight loss  Hospice  - seems advanced PDB given palpable mass. Enrolled in hospice  - Symptoms managed by  GNP and Hospice Team.     Recurrent falls  - with no major injury, self transferred     Dementia, senile with delusions, with behavioral disturbance (H)  - on meds.       Benign essential hypertension  - controlled.     Hip dislocation, right, sequela (May 2017)  Age-related osteoporosis without current pathological fracture  Frail elderly  - Significant  Deficits requiring NH placement. Requiring extensive assistance from nursing. Up for meals only o/w spends the day resting in bed  - self propel on WC.     Orders:  - See above, otherwise, continue the rest of the current POC.     Electronically signed by:  Radha Rodríguez MD     Portions of this note were produced using [employer]. Although every attempt at real-time proof reading has been made, occasional grammar/syntax errors may have been missed.

## 2018-07-31 NOTE — PROGRESS NOTES
[facility]    Chief Complaint   Patient presents with     Nursing Home Acute       HPI:    Lulu Carlton is a 89 year old  (11/8/1921), who is being seen today for an episodic care visit at [facility].    HPI information obtained from: facility chart records, facility staff and patient report. Today's concern is:  Dementia with behavioral disturbance, unspecified dementia type  Anxiety, Hospice care patient  Patient experiencing increased distress, agitation, hallucinations..  As needed Ativan 1-3 times per day has been somewhat effective but for only short periods of time.  Required one-on-one supervision 1 evening.  Currently on Seroquel 12.5mg QAM, 25mg at noon, 12.5mg QHS. Morning dose was added 7/25  Patient experienced fall 7/27 r/t toileting self and slipping in loose BM on floor.   Today patient complains of vague pain left lower quadrant abdomen.  Her meal intake varies from %, no previously reported emesis or upset stomach   BM record shows bowel movements every 1-3 days.  Over the past week some have been loose.  Soft formed bowel movement yesterday.  She has PRN morphine but has not used since 7/13.       Benign essential hypertension  Nursing staff expressed some concern that blood pressure was up intermittently.  Review of vital signs indicates a couple of instances where it was up in the 160s-170s.  Has not required the as needed hydralazine for over 180.  Patient denies any headaches, dizziness, lightheadedness, chest pain, shortness of breath.  BPs:  07/30/2018 06:57 146/74 mmHg (Sitting l/arm)  07/29/2018 06:57 172/88 mmHg (Sitting r/arm)  07/29/2018 05:52 155/89 mmHg  07/28/2018 10:40 122/56 mmHg (Sitting r/arm)  07/27/2018 08:46 144/78 mmHg (Sitting l/arm)  07/26/2018 10:20 160/72 mmHg (Sitting l/arm)  07/25/2018 07:20 136/62 mmHg (Sitting l/arm)  07/24/2018 13:34 140/54 mmHg  07/24/2018 07:31 126/68 mmHg (Sitting l/arm)  07/23/2018 07:56 139/57 mmHg  "(Sitting l/arm)  07/22/2018 13:38 129/59 mmHg (Sitting l/arm)      REVIEW OF SYSTEMS:  Limited secondary to cognitive impairment but today pt reports only concern is intermittent LLQ abdominal pain    /74  Pulse 89  Temp 97.8  F (36.6  C)  Resp 20  Ht 4' 10.5\" (1.486 m)  Wt 145 lb 8 oz (66 kg)  SpO2 95%  BMI 29.89 kg/m2  GENERAL APPEARANCE:  Alert, in no distress, sitting in wheelchair.  Confused as to where she is.  She is sitting in her room.  RESP:  respiratory effort and palpation of chest normal, auscultation of lungs clear , no respiratory distress  CV:  Palpation and auscultation of heart done , rate and rhythm regular, no murmur, no peripheral edema  ABDOMEN:  normal bowel sounds, soft, nontender, no guarding  M/S:   W/c bound  SKIN: minor bruising on arms, dissipating  PSYCH: Oriented to self only.  Insight and judgement, memory impaired , affect and mood anxious     ASSESSMENT/PLAN:  Dementia with behavioral disturbance, unspecified dementia type  Anxiety  Hospice Care  No signs of infection.  Discussed with nursing staff.  Will increase Seroquel for more steady mood stabilization.  Goal is comfort and less distress.  Commode placed  on toilet to try and track when she is having BMs   Due to loose stools, will decrease senna  Benign essential hypertension  Query whether elevated blood pressures are related to anxiety.  Elevated blood pressures are rare and not above threshold.  We will not make any changes at this time.    Orders:  1.  Increase Seroquel to 25mg PO TID.  dementia with psychosis.  2. Decrease Senna-S to 1 tab daily with 1 tab daily PRN    Electronically signed by:  HANG Graham CNP  "

## 2018-07-31 NOTE — LETTER
7/31/2018        RE: Lulu Carlton  [facility]  [address] [place] [id number]        [facility]    Chief Complaint   Patient presents with     Nursing Home Acute       HPI:    Lulu Carlton is a 89 year old  (11/8/1921), who is being seen today for an episodic care visit at [facility].    HPI information obtained from: facility chart records, facility staff and patient report. Today's concern is:  Dementia with behavioral disturbance, unspecified dementia type  Anxiety, Hospice care patient  Patient experiencing increased distress.  As needed Ativan 1-3 times per day has been somewhat effective but for only short periods of time.  Required one-on-one supervision 1 evening.  Currently on Seroquel 12.5mg QAM, 25mg at noon, 12.5mg QHS. Morning dose was added 7/25  Patient experienced fall 7/27 r/t toileting self and slipping in loose BM on floor.   Today patient complains of vague pain left lower quadrant abdomen.  Her meal intake varies from %, no previously reported emesis or upset stomach   BM record shows bowel movements every 1-3 days.  Over the past week some have been loose.  Soft formed bowel movement yesterday.  She has PRN morphine but has not used since 7/13.       Benign essential hypertension  Nursing staff expressed some concern that blood pressure was up intermittently.  Review of vital signs indicates a couple of instances where it was up in the 160s-170s.  Has not required the as needed hydralazine for over 180.  Patient denies any headaches, dizziness, lightheadedness, chest pain, shortness of breath.  BPs:  07/30/2018 06:57 146/74 mmHg (Sitting l/arm)  07/29/2018 06:57 172/88 mmHg (Sitting r/arm)  07/29/2018 05:52 155/89 mmHg  07/28/2018 10:40 122/56 mmHg (Sitting r/arm)  07/27/2018 08:46 144/78 mmHg (Sitting l/arm)  07/26/2018 10:20 160/72 mmHg (Sitting l/arm)  07/25/2018 07:20 136/62 mmHg (Sitting l/arm)  07/24/2018 13:34  "140/54 mmHg  07/24/2018 07:31 126/68 mmHg (Sitting l/arm)  07/23/2018 07:56 139/57 mmHg (Sitting l/arm)  07/22/2018 13:38 129/59 mmHg (Sitting l/arm)      REVIEW OF SYSTEMS:  Limited secondary to cognitive impairment but today pt reports only concern is intermittent LLQ abdominal pain    /74  Pulse 89  Temp 97.8  F (36.6  C)  Resp 20  Ht 4' 10.5\" (1.486 m)  Wt 145 lb 8 oz (66 kg)  SpO2 95%  BMI 29.89 kg/m2  GENERAL APPEARANCE:  Alert, in no distress, sitting in wheelchair.  Confused as to where she is.  She is sitting in her room.  RESP:  respiratory effort and palpation of chest normal, auscultation of lungs clear , no respiratory distress  CV:  Palpation and auscultation of heart done , rate and rhythm regular, no murmur, no peripheral edema  ABDOMEN:  normal bowel sounds, soft, nontender, no guarding  M/S:   W/c bound  SKIN: minor bruising on arms, dissipating  PSYCH: Oriented to self only.  Insight and judgement, memory impaired , affect and mood anxious     ASSESSMENT/PLAN:  Dementia with behavioral disturbance, unspecified dementia type  Anxiety  Hospice Care  Discussed with nursing staff.  Will increase Seroquel for more steady mood stabilization.  Goal is comfort and less distress.  Commode placed  on toilet to try and track when she is having BMs   Benign essential hypertension  Query whether elevated blood pressures are related to anxiety.  Elevated blood pressures are rare and not above threshold.  We will not make any changes at this time.    Orders:  1.  Increase Seroquel to 25mg PO TID.  dementia with psychosis.    Electronically signed by:  HANG Graham CNP      Sincerely,        HANG Graham CNP    "

## 2018-08-01 NOTE — LETTER
"    8/1/2018        RE: Lulu Carlton  [facility]  [address] [place] [id number]          [facility]    Chief Complaint   Patient presents with     senior living Regulatory       [facility] Record Number:  [id number]    HPI:    Lulu Carlton is a 89 year old  (11/8/1921), who is being seen today for a federally mandated E/M visit at [facility].  HPI information obtained from:facility staff, patient report and [facility] chart review    Today's concerns are:  -  - Resident seen and examined.  Patient continued to decline hence enrolled in hospice  - Denies having extremity weakness, self-propelling wheelchair  - Reports sleep, appetite and BM are fine.   - RN reports that the resident continued to be agitated and have restlessness,    distress and anger, exit seeking , Seroquel does increased heaviness and some episode of July 13.  Concern for stroke versus TIA.  Had a fall on July 27 injury also had one in April.  - \" GNP has no concern  --------------------------------  - - Past Medical, social, family histories, medications, and allergies reviewed and updated  - Medications reviewed: in the chart and EHR.   - Case Management:   I have reviewed the care plan and MDS and do agree with the plan. Patient's desire to return to the community is not present.  Information reviewed:  Medications, vital signs, orders, and nursing notes.    MEDICATIONS:  Current Outpatient Prescriptions   Medication Sig Dispense Refill     Acetaminophen (TYLENOL PO) Take 1,000 mg by mouth 2 times daily And 1000mg daily PRN       acetaminophen (TYLENOL) 650 MG Suppository Place 650 mg rectally every 4 hours as needed for fever       bisacodyl (DULCOLAX) 10 MG Suppository Place 10 mg rectally daily as needed for constipation       calcium-vitamin D (CALTRATE) 600-400 MG-UNIT per tablet Take 1 tablet by mouth every evening       Cyanocobalamin (B-12) 1000 MCG TBCR Take " "2,000 mcg by mouth daily 100 tablet 11     hydrALAZINE (APRESOLINE) 10 MG tablet Take 1 tablet (10 mg) by mouth 3 times daily as needed For SBP >180 90 tablet 11     Hyoscyamine Sulfate (HYOSCYAMINE PO) Place 0.125 mg under the tongue every 4 hours as needed       lisinopril (PRINIVIL/ZESTRIL) 5 MG tablet Take 2 tablets (10 mg) by mouth 2 times daily 30 tablet 11     loperamide (IMODIUM A-D) 2 MG tablet Take 1 tablet (2 mg) by mouth 2 times daily as needed 20 tablet 11     LORazepam (LORAZEPAM INTENSOL) 2 MG/ML (HIGH CONC) solution Give 0.25 ml sublingually every 2  hours as needed for restlessness,  terminal agitation until 07/26/2018       magnesium hydroxide (MILK OF MAGNESIA) 400 MG/5ML suspension Take 30 mLs by mouth daily as needed for constipation or heartburn       morphine sulfate 20 MG/5ML SOLN Take 5 mg by mouth every hour as needed (Staff to contact hospice if administering 3 or more PRN morphine doses in a 3 hour period)       nystatin (MYCOSTATIN) 932103 UNIT/GM POWD Apply topically 2 times daily as needed       omeprazole (PRILOSEC) 10 MG CR capsule Take 2 capsules (20 mg) by mouth 2 times daily 60 capsule 11     senna-docusate (SENOKOT-S;PERICOLACE) 8.6-50 MG per tablet Take 1 tablet by mouth daily as needed for constipation        [DISCONTINUED] QUEtiapine Fumarate (SEROQUEL PO) Take 12.5 mg by mouth every morning       QUEtiapine Fumarate (SEROQUEL PO) Take 25 mg by mouth 3 times daily         ROS:  4 point ROS including Respiratory, CV, GI and , other than that noted in the HPI,  is negative    Exam:  Vitals: /74  Pulse 89  Temp 97.8  F (36.6  C)  Resp 20  Ht 4' 10.5\" (1.486 m)  Wt 145 lb 8 oz (66 kg)  SpO2 95%  BMI 29.89 kg/m2  BMI= Body mass index is 29.89 kg/(m^2).  GENERAL APPEARANCE:  in no distress, cooperative  RESP:  respiratory effort and palpation of chest normal, coarse sounds at the bases, crackles lower 1/3. .   CV:  Palpation and auscultation of heart done , no edema, " S1S2 normal, systolic murmur over upper sternal borders, regular HR,   ABDOMEN:  normal bowel sounds, soft, nontender, no hepatosplenomegaly or other masses  M/S:   Gait and station abnormal uses a WC for ambulation.   SKIN:  Inspection of skin and subcutaneous tissue baseline, Palpation of skin and subcutaneous tissue baseline  NEURO:   no NFD appreciated on observation  PSYCH:  affect and mood anxious, Judgement affected    Lab/Diagnostic data: Reviewed in the chart and EHR.      ASSESSMENT/PLAN  Breast mass  Paget disease of breast, right (H)  History of B-cell lymphoma  Unintentional weight loss  Hospice  - seems advanced PDB given palpable mass. Enrolled in hospice  - Symptoms managed by  GNP and Hospice Team.     Recurrent falls  - with no major injury, self transferred     Dementia, senile with delusions, with behavioral disturbance (H)  - on meds.       Benign essential hypertension  - controlled.     Hip dislocation, right, sequela (May 2017)  Age-related osteoporosis without current pathological fracture  Frail elderly  - Significant  Deficits requiring NH placement. Requiring extensive assistance from nursing. Up for meals only o/w spends the day resting in bed  - self propel on WC.     Orders:  - See above, otherwise, continue the rest of the current POC.     Electronically signed by:  Radha Rodríguez MD     Portions of this note were produced using [employer]. Although every attempt at real-time proof reading has been made, occasional grammar/syntax errors may have been missed.          Sincerely,        Radha Rodríguez MD

## 2018-08-06 NOTE — PROGRESS NOTES
"[facility]    Chief Complaint   Patient presents with     Nursing Home Acute       HPI:    Lulu Carlton is a 89 year old  (11/8/1921), who is being seen today for an episodic care visit at [facility].    HPI information obtained from: facility chart records, facility staff and patient report. Today's concern is:  Dementia with behavioral disturbance, unspecified dementia type  Anxiety  Increase in Seroquel has led to patient being more calm, however, continues to intermittently experience anxiety/restlessness that is not able to be managed with diversion, or other non-pharmaocological interventions.  Required 2 Ativan doses in past 7 days      REVIEW OF SYSTEMS:  Unobtainable secondary to cognitive impairment.     /64  Pulse 54  Temp 98.3  F (36.8  C)  Resp 22  Ht 4' 10.5\" (1.486 m)  Wt 145 lb 12.8 oz (66.1 kg)  SpO2 95%  BMI 29.95 kg/m2  GENERAL APPEARANCE:  Alert, in no distress  Alert, pleasant, in w/c, talking about things that are not present.    Lung sounds clear  HRR  Oriented to self only    ASSESSMENT/PLAN:  Dementia with behavioral disturbance, unspecified dementia type  Anxiety  Hospice Patient  Will renew Ativan for availability for future needs and declining condition  Continuation of PRN order for non-antipsychotic psychotropic medication,  Ativan, is appropriate due to sporadic anxiety and is being reordered today with an end date of 10-31-18.  Nsg to update [facility] provider of frequency of use 7 days prior to end date.       Orders:  1.  Continue ativan 2mg/ml, 0.25ml subcutaneous every 2hrs PRN for restlessness, agitation, if nonpharmalogical interventions unsuccessful through 10/31/18--notify NP of frequency of use 7 days prior to end date.        Electronically signed by:  HANG Graham CNP  "

## 2018-08-06 NOTE — LETTER
"    8/6/2018        RE: Lulu Carlton  [facility]  [address] [place] [id number]        [facility]    Chief Complaint   Patient presents with     Nursing Home Acute       HPI:    Lulu Carlton is a 89 year old  (11/8/1921), who is being seen today for an episodic care visit at [facility].    HPI information obtained from: facility chart records, facility staff and patient report. Today's concern is:  Dementia with behavioral disturbance, unspecified dementia type  Anxiety  Increase in Seroquel has led to patient being more calm, however, continues to intermittently experience anxiety/restlessness that is not able to be managed with diversion, or other non-pharmaocological interventions.  Required 2 Ativan doses in past 7 days      REVIEW OF SYSTEMS:  Unobtainable secondary to cognitive impairment.     /64  Pulse 54  Temp 98.3  F (36.8  C)  Resp 22  Ht 4' 10.5\" (1.486 m)  Wt 145 lb 12.8 oz (66.1 kg)  SpO2 95%  BMI 29.95 kg/m2  GENERAL APPEARANCE:  Alert, in no distress  Alert, pleasant, in w/c, talking about things that are not present.    Lung sounds clear  HRR  Oriented to self only    ASSESSMENT/PLAN:  Dementia with behavioral disturbance, unspecified dementia type  Anxiety  Hospice Patient  Will renew Ativan for availability for future needs and declining condition  Continuation of PRN order for non-antipsychotic psychotropic medication,  Ativan, is appropriate due to sporadic anxiety and is being reordered today with an end date of 10-31-18.  Nsg to update [facility] provider of frequency of use 7 days prior to end date.       Orders:  1.  Continue ativan 2mg/ml, 0.25ml subcutaneous every 2hrs PRN for restlessness, agitation, if nonpharmalogical interventions unsuccessful through 10/31/18--notify NP of frequency of use 7 days prior to end date.        Electronically signed by:  HANG Graham CNP      Sincerely,        Kimi PEREZ " HANG Ag CNP

## 2018-10-08 NOTE — PROGRESS NOTES
[facility]    Chief Complaint   Patient presents with     care home Regulatory       [facility] Medical Record Number:  [id number]  Place of Service where encounter took place:  [facility] [[id number]]    HPI:    Lulu Carlton is a 89 year old  (11/8/1921), who is being seen today for a federally mandated E/M visit.  HPI information obtained from: facility chart records, facility staff and patient report. Today's concerns are:    Patient does not verbalize any concerns.  However she is a poor historian and not oriented.  Staff expressing concerns of increased distress, exit seeking, hallucinations.  She is seeing little children, calling for the children, this is distressing to her.  Ativan is intermittently effective however at times it appears to have the reverse effect.  Seroquel is working well.  At bedtime dose recently increased and this has been helpful for nighttime issues however patient continues to have daytime issues.  Sleep, appetite, intake, and output without   Concern  No further strokelike episodes or falls  Hospice is following    ALLERGIES: Hydrocodone-acetaminophen; Trazodone; and Metformin  PAST MEDICAL HISTORY:  has a past medical history of Anemia, unspecified (hosp. 4/3/07 [place] ); Central nervous system complication (hosp. 4/3/07 [place]); Depressive disorder, not elsewhere classified; Fracture of femoral neck, right (H) (5/10/2017); Myasthenia gravis; Other malaise and fatigue (hosp. 4/3/07 [place] ); Other urinary incontinence; Thoracic or lumbosacral neuritis or radiculitis, unspecified; and Unspecified essential hypertension.  PAST SURGICAL HISTORY:  has a past surgical history that includes surgical history of - ; surgical history of - ; and Open reduction internal fixation hip bipolar (Right, 5/10/2017).  FAMILY HISTORY: family history is not on file.  SOCIAL HISTORY:  reports that she has never smoked. She does not have any smokeless  tobacco history on file. She reports that she does not drink alcohol or use illicit drugs.    MEDICATIONS:  Current Outpatient Prescriptions   Medication Sig Dispense Refill     Acetaminophen (TYLENOL PO) Take 1,000 mg by mouth 2 times daily And 1000mg daily PRN       acetaminophen (TYLENOL) 650 MG Suppository Place 650 mg rectally every 4 hours as needed for fever       bisacodyl (DULCOLAX) 10 MG Suppository Place 10 mg rectally daily as needed for constipation       calcium carbonate (TUMS) 500 MG chewable tablet Take 1 chew tab by mouth every 4 hours as needed for heartburn       calcium-vitamin D (CALTRATE) 600-400 MG-UNIT per tablet Take 1 tablet by mouth every evening       Cyanocobalamin (B-12) 1000 MCG TBCR Take 2,000 mcg by mouth daily 100 tablet 11     hydrALAZINE (APRESOLINE) 10 MG tablet Take 1 tablet (10 mg) by mouth 3 times daily as needed For SBP >180 90 tablet 11     Hyoscyamine Sulfate (HYOSCYAMINE PO) Place 0.125 mg under the tongue every 4 hours as needed       lisinopril (PRINIVIL/ZESTRIL) 5 MG tablet Take 2 tablets (10 mg) by mouth 2 times daily 30 tablet 11     loperamide (IMODIUM A-D) 2 MG tablet Take 1 tablet (2 mg) by mouth 2 times daily as needed 20 tablet 11     LORAZEPAM PO Take 0.25 mg by mouth every 2 hours as needed for anxiety       magnesium hydroxide (MILK OF MAGNESIA) 400 MG/5ML suspension Take 30 mLs by mouth daily as needed for constipation or heartburn       morphine sulfate 20 MG/5ML SOLN Take 5 mg by mouth every hour as needed (Staff to contact hospice if administering 3 or more PRN morphine doses in a 3 hour period)       nystatin (MYCOSTATIN) 638395 UNIT/GM POWD Apply topically 2 times daily as needed       omeprazole (PRILOSEC) 10 MG CR capsule Take 2 capsules (20 mg) by mouth 2 times daily 60 capsule 11     QUEtiapine Fumarate (SEROQUEL PO) Take 25 mg by mouth 2 times daily       QUEtiapine Fumarate (SEROQUEL PO) Take 25 mg by mouth every morning AND 50MG at Noon and 2000   "     senna-docusate (SENOKOT-S;PERICOLACE) 8.6-50 MG per tablet Take 1 tablet by mouth daily as needed for constipation        Medications reviewed:  Medications reconciled to facility chart and changes were made to reflect current medications as identified as above med list. Below are the changes that were made:   Medications stopped since last EPIC medication reconciliation:   There are no discontinued medications.    Medications started since last [facility] medication reconciliation:  Orders Placed This Encounter   Medications     LORAZEPAM PO     Sig: Take 0.25 mg by mouth every 2 hours as needed for anxiety     calcium carbonate (TUMS) 500 MG chewable tablet     Sig: Take 1 chew tab by mouth every 4 hours as needed for heartburn       Case Management:  I have reviewed the care plan and MDS and do agree with the plan. Patient's desire to return to the community is not assessible due to cognitive impairment.  Information reviewed:  Medications, vital signs, orders, and nursing notes.    ROS:  Unobtainable secondary to cognitive impairment.     Exam:  Vitals: /72  Pulse 83  Temp 97.7  F (36.5  C)  Resp 20  Ht 4' 10.5\" (1.486 m)  Wt 149 lb 12.8 oz (67.9 kg)  SpO2 94%  BMI 30.78 kg/m2  BMI= Body mass index is 30.78 kg/(m^2).  GENERAL APPEARANCE:  Alert, Confused.  Some distress regarding who I am  ENT:  Mouth and posterior oropharynx normal, moist mucous membranes  RESP:  lungs clear to auscultation , no respiratory distress  CV:  Palpation and auscultation of heart done , regular rate and rhythm, no murmur, rub, or gallop, no edema  ABDOMEN:  normal bowel sounds, soft, nontender, no hepatosplenomegaly or other masses  M/S:   Gait and station abnormal Uses wheelchair for mobilization  SKIN:  Right breast nipple inverted  NEURO:   No focal deficits noted  PSYCH:  insight and judgement impaired, Affect and mood anxious    Lab/Diagnostic data:     CBC RESULTS:   Recent Labs   Lab Test 03/30/18 03/14/18   WBC  " 9.0  8.5   RBC  4.54  4.22   HGB  12.8  12.1   HCT  40.7  37.8   MCV  90  90   MCH  28.2  28.7   MCHC  31.4*  32.0   RDW  12.8  13.0   PLT  226  182       Last Basic Metabolic Panel:  Recent Labs   Lab Test 03/30/18 03/14/18   NA  138  142   POTASSIUM  4.1  3.6   CHLORIDE  103  104   SLIM  8.7  9.0   CO2  24  31   BUN  11  9   CR  0.87  0.65   GLC  195*  150*       Liver Function Studies -   Recent Labs   Lab Test 03/30/18 05/09/17   1300   PROTTOTAL  6.7  6.5*   ALBUMIN  3.4*  3.3*   BILITOTAL  0.3  0.5   ALKPHOS  133*  93   AST  13  18   ALT  13  22       TSH   Date Value Ref Range Status   03/30/2018 2.21 0.30 - 6.00 uIU/mL Final   06/22/2017 4.45 (H) 0.40 - 4.00 mU/L Final   ]    Lab Results   Component Value Date    A1C 6.8 03/30/2018    A1C 8.3 10/23/2017       ASSESSMENT/PLAN  Dementia with behavioral disturbance, unspecified dementia type  Anxiety  Increased distress, hallucinations, delusions that are distressing.  We will add additional 2 doses of Seroquel per day as needed for staff to use at discretion.  Continue to monitor.  Noted initial PRN orders for non antipsychotic psychotropic medications   are limited to 14 days. Start new psychotropic medication PRN Seroquel x 14 days for hallucinations, delusions, distress. Nsg to update [facility] provider of effect in 10-14 days.       Benign essential hypertension  Blood pressures within acceptable range.  Continue with lisinopril 5 mg daily, hydralazine 10 mg as needed for systolic blood pressure greater than 170  Blood pressures:  10/09/2018 12:23 140/70 mmHg  10/09/2018 07:55 130/76 mmHg (Sitting r/arm)  10/08/2018 07:48 132/72 mmHg (Sitting l/arm)  10/07/2018 07:12 132/68 mmHg (Sitting l/arm)  10/06/2018 08:27 148/60 mmHg (Sitting l/arm)  10/05/2018 10:08 120/78 mmHg    Type 2 diabetes mellitus without complication, without long-term current use of insulin (H)  Has not had blood sugar checked in a long time.  Most recent A1c 6.8.  On hospice.  No longer  monitoring.  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  Appreciate hospice care and case management symptom management    Orders:  1.  In addition to current seroquel regime, add 25mg BID PRN for increase distress, hallucinations, delusions x14 days    Total time spent with patient visit at the skilled nursing facility was 25 min including patient visit, review of past records and Collaboration with nursing staff and hospice. Greater than 50% of total time spent with counseling and coordinating care due to Change in mental health status    Electronically signed by:  HANG Graham CNP

## 2018-10-09 NOTE — LETTER
10/9/2018        RE: Lulu Wade [facility]  [address] [place] [id number]          [facility]    Chief Complaint   Patient presents with     halfway Regulatory       [facility] Medical Record Number:  [id number]  Place of Service where encounter took place[facility] [[id number]]    HPI:    Lulu Carlton is a 89 year old  (11/8/1921), who is being seen today for a federally mandated E/M visit.  HPI information obtained from: facility chart records, facility staff and patient report. Today's concerns are:    Patient does not verbalize any concerns.  However she is a poor historian and not oriented.  Staff expressing concerns of increased distress, exit seeking, hallucinations.  She is seeing little children, calling for the children, this is distressing to her.  Ativan is intermittently effective however at times it appears to have the reverse effect.  Seroquel is working well.  At bedtime dose recently increased and this has been helpful for nighttime issues however patient continues to have daytime issues.  Sleep, appetite, intake, and output without   Concern  No further strokelike episodes or falls  Hospice is following    ALLERGIES: Hydrocodone-acetaminophen; Trazodone; and Metformin  PAST MEDICAL HISTORY:  has a past medical history of Anemia, unspecified (hosp. 4/3/07 [place] ); Central nervous system complication (hosp. 4/3/07 [place]); Depressive disorder, not elsewhere classified; Fracture of femoral neck, right (H) (5/10/2017); Myasthenia gravis; Other malaise and fatigue (hosp. 4/3/07 [place] ); Other urinary incontinence; Thoracic or lumbosacral neuritis or radiculitis, unspecified; and Unspecified essential hypertension.  PAST SURGICAL HISTORY:  has a past surgical history that includes surgical history of - ; surgical history of - ; and Open reduction internal fixation hip bipolar (Right, 5/10/2017).  FAMILY HISTORY:  family history is not on file.  SOCIAL HISTORY:  reports that she has never smoked. She does not have any smokeless tobacco history on file. She reports that she does not drink alcohol or use illicit drugs.    MEDICATIONS:  Current Outpatient Prescriptions   Medication Sig Dispense Refill     Acetaminophen (TYLENOL PO) Take 1,000 mg by mouth 2 times daily And 1000mg daily PRN       acetaminophen (TYLENOL) 650 MG Suppository Place 650 mg rectally every 4 hours as needed for fever       bisacodyl (DULCOLAX) 10 MG Suppository Place 10 mg rectally daily as needed for constipation       calcium carbonate (TUMS) 500 MG chewable tablet Take 1 chew tab by mouth every 4 hours as needed for heartburn       calcium-vitamin D (CALTRATE) 600-400 MG-UNIT per tablet Take 1 tablet by mouth every evening       Cyanocobalamin (B-12) 1000 MCG TBCR Take 2,000 mcg by mouth daily 100 tablet 11     hydrALAZINE (APRESOLINE) 10 MG tablet Take 1 tablet (10 mg) by mouth 3 times daily as needed For SBP >180 90 tablet 11     Hyoscyamine Sulfate (HYOSCYAMINE PO) Place 0.125 mg under the tongue every 4 hours as needed       lisinopril (PRINIVIL/ZESTRIL) 5 MG tablet Take 2 tablets (10 mg) by mouth 2 times daily 30 tablet 11     loperamide (IMODIUM A-D) 2 MG tablet Take 1 tablet (2 mg) by mouth 2 times daily as needed 20 tablet 11     LORAZEPAM PO Take 0.25 mg by mouth every 2 hours as needed for anxiety       magnesium hydroxide (MILK OF MAGNESIA) 400 MG/5ML suspension Take 30 mLs by mouth daily as needed for constipation or heartburn       morphine sulfate 20 MG/5ML SOLN Take 5 mg by mouth every hour as needed (Staff to contact hospice if administering 3 or more PRN morphine doses in a 3 hour period)       nystatin (MYCOSTATIN) 746608 UNIT/GM POWD Apply topically 2 times daily as needed       omeprazole (PRILOSEC) 10 MG CR capsule Take 2 capsules (20 mg) by mouth 2 times daily 60 capsule 11     QUEtiapine Fumarate (SEROQUEL PO) Take 25 mg by  "mouth 2 times daily       QUEtiapine Fumarate (SEROQUEL PO) Take 25 mg by mouth every morning AND 50MG at Noon and 2000       senna-docusate (SENOKOT-S;PERICOLACE) 8.6-50 MG per tablet Take 1 tablet by mouth daily as needed for constipation        Medications reviewed:  Medications reconciled to facility chart and changes were made to reflect current medications as identified as above med list. Below are the changes that were made:   Medications stopped since last EPIC medication reconciliation:   There are no discontinued medications.    Medications started since last [facility] medication reconciliation:  Orders Placed This Encounter   Medications     LORAZEPAM PO     Sig: Take 0.25 mg by mouth every 2 hours as needed for anxiety     calcium carbonate (TUMS) 500 MG chewable tablet     Sig: Take 1 chew tab by mouth every 4 hours as needed for heartburn       Case Management:  I have reviewed the care plan and MDS and do agree with the plan. Patient's desire to return to the community is not assessible due to cognitive impairment.  Information reviewed:  Medications, vital signs, orders, and nursing notes.    ROS:  Unobtainable secondary to cognitive impairment.     Exam:  Vitals: /72  Pulse 83  Temp 97.7  F (36.5  C)  Resp 20  Ht 4' 10.5\" (1.486 m)  Wt 149 lb 12.8 oz (67.9 kg)  SpO2 94%  BMI 30.78 kg/m2  BMI= Body mass index is 30.78 kg/(m^2).  GENERAL APPEARANCE:  Alert, Confused.  Some distress regarding who I am  ENT:  Mouth and posterior oropharynx normal, moist mucous membranes  RESP:  lungs clear to auscultation , no respiratory distress  CV:  Palpation and auscultation of heart done , regular rate and rhythm, no murmur, rub, or gallop, no edema  ABDOMEN:  normal bowel sounds, soft, nontender, no hepatosplenomegaly or other masses  M/S:   Gait and station abnormal Uses wheelchair for mobilization  SKIN:  Right breast nipple inverted  NEURO:   No focal deficits noted  PSYCH:  insight and judgement " impaired, Affect and mood anxious    Lab/Diagnostic data:     CBC RESULTS:   Recent Labs   Lab Test 03/30/18 03/14/18   WBC  9.0  8.5   RBC  4.54  4.22   HGB  12.8  12.1   HCT  40.7  37.8   MCV  90  90   MCH  28.2  28.7   MCHC  31.4*  32.0   RDW  12.8  13.0   PLT  226  182       Last Basic Metabolic Panel:  Recent Labs   Lab Test 03/30/18 03/14/18   NA  138  142   POTASSIUM  4.1  3.6   CHLORIDE  103  104   SLIM  8.7  9.0   CO2  24  31   BUN  11  9   CR  0.87  0.65   GLC  195*  150*       Liver Function Studies -   Recent Labs   Lab Test 03/30/18 05/09/17   1300   PROTTOTAL  6.7  6.5*   ALBUMIN  3.4*  3.3*   BILITOTAL  0.3  0.5   ALKPHOS  133*  93   AST  13  18   ALT  13  22       TSH   Date Value Ref Range Status   03/30/2018 2.21 0.30 - 6.00 uIU/mL Final   06/22/2017 4.45 (H) 0.40 - 4.00 mU/L Final   ]    Lab Results   Component Value Date    A1C 6.8 03/30/2018    A1C 8.3 10/23/2017       ASSESSMENT/PLAN  Dementia with behavioral disturbance, unspecified dementia type  Anxiety  Increased distress, hallucinations, delusions that are distressing.  We will add additional 2 doses of Seroquel per day as needed for staff to use at discretion.  Continue to monitor.  Noted initial PRN orders for non antipsychotic psychotropic medications   are limited to 14 days. Start new psychotropic medication PRN Seroquel x 14 days for hallucinations, delusions, distress. Nsg to update [facility] provider of effect in 10-14 days.       Benign essential hypertension  Blood pressures within acceptable range.  Continue with lisinopril 5 mg daily, hydralazine 10 mg as needed for systolic blood pressure greater than 170  Blood pressures:  10/09/2018 12:23 140/70 mmHg  10/09/2018 07:55 130/76 mmHg (Sitting r/arm)  10/08/2018 07:48 132/72 mmHg (Sitting l/arm)  10/07/2018 07:12 132/68 mmHg (Sitting l/arm)  10/06/2018 08:27 148/60 mmHg (Sitting l/arm)  10/05/2018 10:08 120/78 mmHg    Type 2 diabetes mellitus without complication, without long-term  current use of insulin (H)  Has not had blood sugar checked in a long time.  Most recent A1c 6.8.  On hospice.  No longer monitoring.  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  Appreciate hospice care and case management symptom management    Orders:  1.  In addition to current seroquel regime, add 25mg BID PRN for increase distress, hallucinations, delusions x14 days    Total time spent with patient visit at the skilled nursing facility was 25 min including patient visit, review of past records and Collaboration with nursing staff and hospice. Greater than 50% of total time spent with counseling and coordinating care due to Change in mental health status    Electronically signed by:  HANG Graham CNP        Sincerely,        HANG Graham CNP

## 2018-10-22 NOTE — PROGRESS NOTES
"[facility]    Chief Complaint   Patient presents with     group home Acute       [facility] Medical Record Number:  [id number]  Place of Service where encounter took place:  [facility] ([facility]) [[id number]]    HPI:    Lulu Carlton is a 89 year old  (11/8/1921), who is being seen today for an episodic care visit.  HPI information obtained from: facility chart records, facility staff and patient report. Today's concern is:    Visit for review of psychotropic and antipsychotic medications review.  Staff report Patient has episodes of restlessness and agitation,going into other resident's rooms, unable to be consoled. patient had very difficult couple of days last weekend where neither Seroquel nor Ativan helped manage symptoms.  Hospice team considered starting Haldol.  However, over time, symptoms subsided and managed ok with scheduled and PRN Seroquel and Ativan.      REVIEW OF SYSTEMS:  Unobtainable secondary to cognitive impairment.     /84  Pulse 78  Temp 98.1  F (36.7  C)  Resp 20  Ht 4' 10.5\" (1.486 m)  Wt 150 lb (68 kg)  SpO2 94%  BMI 30.82 kg/m2  GENERAL APPEARANCE:  Alert, confused, restless, looking for unnecessary things.  RESP:  respiratory effort and palpation of chest normal, auscultation of lungs clear , no respiratory distress  CV:  rate and rhythm regular,   ABDOMEN:  normal bowel sounds, soft, nontender  M/S:   W/c bound  SKIN:  No concerning lesions  PSYCH:  insight and judgement, memory impaired , affect and mood anxious      ASSESSMENT/PLAN:  Dementia with behavioral disturbance, unspecified dementia type  Hallucinations  Restlessness and agitation  Medication management  Patient distress generally managed ok with Seroquel and/or Ativan. Will continue. Noted PRN orders for antipsychotic medications are limited to 14 days. Face to Face encounter done today. Evaluation of Seroquel medication indicates it is appropriate and necessary to continue this medication " due to restlessness, agitation, safety for 14  days. Nsg to update [facility] provider before day 14 if greater than 3 uses in the last 5 days.   Continuation of PRN order for non-antipsychotic psychotropic medication,  Ativan, is appropriate due to sporadic anxiety and is being reordered today with an end date of 12/31/18.  Nsg to update [facility] provider of frequency of use 7 days prior to end date.       Orders:  1.  Continue seroquel 25mg BID PRN for increased distress, psychosis, hallucinations, delusions x 14 days  2.  Continue ativan 2mg/ml 0.25ml every 2hrs PRN for restlessness, agitation, if nonpharmacological interventions unsuccessful through 12/131/18.  Notify NP of frequency of use 7 days prior to end date.      Electronically signed by:  HANG Graham CNP

## 2018-10-23 NOTE — LETTER
"    10/23/2018        RE: Lulu Wade [facility]  [address] [place] [id number]        [facility]    Chief Complaint   Patient presents with     custodial Acute       [facility] Medical Record Number:  [id number]  Place of Service where encounter took place:  [facility] [place] ([facility]) [[id number]]    HPI:    Lulu Carlton is a 89 year old  (11/8/1921), who is being seen today for an episodic care visit.  HPI information obtained from: facility chart records, facility staff and patient report. Today's concern is:    Visit for review of psychotropic and antipsychotic medications review.  Staff report Patient has episodes of restlessness and agitation,going into other resident's rooms, unable to be consoled. patient had very difficult couple of days last weekend where neither Seroquel nor Ativan helped manage symptoms.  Hospice team considered starting Haldol.  However, over time, symptoms subsided and managed ok with scheduled and PRN Seroquel and Ativan.      REVIEW OF SYSTEMS:  Unobtainable secondary to cognitive impairment.     /84  Pulse 78  Temp 98.1  F (36.7  C)  Resp 20  Ht 4' 10.5\" (1.486 m)  Wt 150 lb (68 kg)  SpO2 94%  BMI 30.82 kg/m2  GENERAL APPEARANCE:  Alert, confused, restless, looking for unnecessary things.  RESP:  respiratory effort and palpation of chest normal, auscultation of lungs clear , no respiratory distress  CV:  rate and rhythm regular,   ABDOMEN:  normal bowel sounds, soft, nontender  M/S:   W/c bound  SKIN:  No concerning lesions  PSYCH:  insight and judgement, memory impaired , affect and mood anxious      ASSESSMENT/PLAN:  Dementia with behavioral disturbance, unspecified dementia type  Hallucinations  Restlessness and agitation  Medication management  Patient distress generally managed ok with Seroquel and/or Ativan. Will continue. Noted PRN orders for antipsychotic medications are limited to 14 days. Face to Face " encounter done today. Evaluation of Seroquel medication indicates it is appropriate and necessary to continue this medication due to restlessness, agitation, safety for 14  days. Nsg to update [facility] provider before day 14 if greater than 3 uses in the last 5 days.   Continuation of PRN order for non-antipsychotic psychotropic medication,  Ativan, is appropriate due to sporadic anxiety and is being reordered today with an end date of 12/31/18.  Nsg to update [facility] provider of frequency of use 7 days prior to end date.       Orders:  1.  Continue seroquel 25mg BID PRN for increased distress, psychosis, hallucinations, delusions x 14 days  2.  Continue ativan 2mg/ml 0.25ml every 2hrs PRN for restlessness, agitation, if nonpharmacological interventions unsuccessful through 12/131/18.  Notify NP of frequency of use 7 days prior to end date.      Electronically signed by:  HANG Graham CNP      Sincerely,        HANG Graham CNP

## 2018-11-06 NOTE — LETTER
11/6/2018        RE: Lulu Wade [facility]  [address] [place] [id number]        [place] [facility]  Chief Complaint   Patient presents with     Annual Comprehensive Nursing Home       [place] Medical Record Number:  [id number]  [facility] of [facility] where encounter took [facility] [[id number]]      HPI:    Lulu Carlton is a 89 year old  (11/8/1921), who is being seen today for an annual comprehensive visit.  HPI information obtained from: facility chart records, facility staff and patient report.  Today's concerns are:  Patient continues to have hallucinations and distress over trying to find missing little children.  Ativan did not seem to be working.  PRN Seroquel helping somewhat.  Hospice team added Haldol 0.5 mg twice daily.  Patient spends much of day wandering in wheelchair around facility looking for the children.  At times has gotten out of her wheelchair and laid down on the floor thinking she was in bed difficult to assess if falls or controlled.  No further strokelike episodes  Hospice is following        ALLERGIES: Hydrocodone-acetaminophen; Trazodone; and Metformin  PROBLEM LIST:  Patient Active Problem List   Diagnosis     Sensorineural hearing loss, asymmetrical     Urinary incontinence     Osteoporosis     Vitamin D deficiency     Anemia, iron deficiency     Diabetes mellitus, type II (H)     Primary osteoarthritis of left knee     Hypertension goal BP (blood pressure) < 140/90     Displaced fracture of right femoral neck (H)     Malignant lymphoma, large cell, diffuse (H)     ACP (advance care planning)     Steatosis of liver     Myasthenia gravis (H)     Dementia     Hip dislocation, right, sequela     Status post total replacement of right hip     Anxiety     Gastroesophageal reflux disease without esophagitis     Falls frequently     Diarrhea, unspecified type     Hospice care patient     Late onset Alzheimer's disease with  behavioral disturbance     Displacement of intervertebral disc     PAST MEDICAL HISTORY:  has a past medical history of Anemia, unspecified (hosp. 4/3/07 [place] ); Central nervous system complication (hosp. 4/3/07 [place]); Depressive disorder, not elsewhere classified; Fracture of femoral neck, right (H) (5/10/2017); Myasthenia gravis; Other malaise and fatigue (hosp. 4/3/07 [place] ); Other urinary incontinence; Thoracic or lumbosacral neuritis or radiculitis, unspecified; and Unspecified essential hypertension.  PAST SURGICAL HISTORY:  has a past surgical history that includes surgical history of - ; surgical history of - ; and Open reduction internal fixation hip bipolar (Right, 5/10/2017).  FAMILY HISTORY: family history is not on file.  SOCIAL HISTORY:  reports that she has never smoked. She does not have any smokeless tobacco history on file. She reports that she does not drink alcohol or use illicit drugs.  IMMUNIZATIONS:  Most Recent Immunizations   Administered Date(s) Administered     DTaP, Unspecified 09/06/2011     Influenza (High Dose) 3 valent vaccine 09/05/2014     Influenza (IIV3) PF 09/29/2010     Influenza (intradermal) 09/06/2013     Influenza Vaccine, 3 YRS +, IM (QUADRIVALENT W/PRESERVATIVES) 11/03/2016     Mantoux Tuberculin Skin Test 03/23/2009     Pneumo Conj 13-V (2010&after) 10/31/2016     Pneumococcal 23 valent 10/21/2011     TDAP Vaccine (Adacel) 09/06/2011     Above immunizations pulled from [facility]. [facility] and facility records also reconciled.   Future immunizations are not needed at this point as all recommended immunizations are up to date.   MEDICATIONS:  Current Outpatient Prescriptions   Medication Sig Dispense Refill     Acetaminophen (TYLENOL PO) Take 1,000 mg by mouth 2 times daily And 1000mg daily PRN       acetaminophen (TYLENOL) 650 MG Suppository Place 650 mg rectally every 4 hours as needed for fever       bisacodyl (DULCOLAX) 10 MG Suppository Place 10 mg  rectally daily as needed for constipation       calcium carbonate (TUMS) 500 MG chewable tablet Take 1 chew tab by mouth every 4 hours as needed for heartburn       haloperidol (HALDOL) 2 MG/ML (HIGH CONC) solution Take 0.5 mg by mouth 2 times daily       hydrALAZINE (APRESOLINE) 10 MG tablet Take 1 tablet (10 mg) by mouth 3 times daily as needed For SBP >180 90 tablet 11     Hyoscyamine Sulfate (HYOSCYAMINE PO) Place 0.125 mg under the tongue every 4 hours as needed       loperamide (IMODIUM A-D) 2 MG tablet Take 1 tablet (2 mg) by mouth 2 times daily as needed 20 tablet 11     magnesium hydroxide (MILK OF MAGNESIA) 400 MG/5ML suspension Take 30 mLs by mouth daily as needed for constipation or heartburn       morphine sulfate 20 MG/5ML SOLN Take 5 mg by mouth every hour as needed (Staff to contact hospice if administering 3 or more PRN morphine doses in a 3 hour period)       nystatin (MYCOSTATIN) 668382 UNIT/GM POWD Apply topically 2 times daily as needed       QUEtiapine Fumarate (SEROQUEL PO) Take 25 mg by mouth every morning AND 50MG at Noon and 2000       senna-docusate (SENOKOT-S;PERICOLACE) 8.6-50 MG per tablet Take 1 tablet by mouth daily as needed for constipation        Medications reviewed:  Medications reconciled to facility chart and changes were made to reflect current medications as identified as above med list. Below are the changes that were made:   Medications stopped since last EPIC medication reconciliation:   Medications Discontinued During This Encounter   Medication Reason     lisinopril (PRINIVIL/ZESTRIL) 5 MG tablet      LORAZEPAM PO      omeprazole (PRILOSEC) 10 MG CR capsule      calcium-vitamin D (CALTRATE) 600-400 MG-UNIT per tablet        Medications started since last [facility] medication reconciliation:  Orders Placed This Encounter   Medications     haloperidol (HALDOL) 2 MG/ML (HIGH CONC) solution     Sig: Take 0.5 mg by mouth 2 times daily       Case Management:  I have reviewed the  "facility/SNF care plan/MDS which was done October 12, including the falls risk, nutrition and pain screening. I also reviewed the current immunizations, and preventive care..Future cancer screening is not clinically indicated secondary to age/goals of care Patient's desire to return to the community is not assessible due to cognitive impairment. Current Level of Care is appropriate.      Advance Directive Discussion:    I reviewed the current advanced directives as reflected in EPIC, the POLST and the facility chart, and verified the congruency of orders DNR. I contacted the first party son and left a message regarding the plan of Care.  I did not due to cognitive impairment review the advance directives with the resident.     Team Discussion:  I communicated with the appropriate disciplines involved with the Plan of Care:   Nursing  ,    and Hospice    Patient Goal:  Patient's goal is unobtainable secondary to cognitive impairment.    Information reviewed:  Medications, vital signs, orders, and nursing notes.    ROS:  Limited secondary to cognitive impairment but today pt reports no concerns    Exam:  /65  Temp 98.8  F (37.1  C)  Resp 18  Ht 4' 10.5\" (1.486 m)  Wt 151 lb 12.8 oz (68.9 kg)  SpO2 94%  BMI 31.19 kg/m2    GENERAL APPEARANCE:  in no distress, Confused, redirectable  ENT:  Mouth and posterior oropharynx normal, moist mucous membranes, [place]  RESP:  lungs clear to auscultation , no respiratory distress  CV:  S1-S2 normal, no edema noted, slight systolic murmur over upper sternal borders, heart rate regular  ABDOMEN:  no guarding or rebound, bowel sounds normal  M/S:   Self propels in wheelchair  SKIN:  No concerning lesions  NEURO:   No focal deficit observed  PSYCH:  Oriented to self only, affect and mood anxious, judgment poor     Lab/Diagnostic data:      CBC RESULTS:   Recent Labs   Lab Test 03/30/18 03/14/18   WBC  9.0  8.5   RBC  4.54  4.22   HGB  12.8  12.1   HCT  40.7  37.8 "   MCV  90  90   MCH  28.2  28.7   MCHC  31.4*  32.0   RDW  12.8  13.0   PLT  226  182       Last Basic Metabolic Panel:  Recent Labs   Lab Test 03/30/18 03/14/18   NA  138  142   POTASSIUM  4.1  3.6   CHLORIDE  103  104   SLIM  8.7  9.0   CO2  24  31   BUN  11  9   CR  0.87  0.65   GLC  195*  150*       Liver Function Studies -   Recent Labs   Lab Test 03/30/18 05/09/17   1300   PROTTOTAL  6.7  6.5*   ALBUMIN  3.4*  3.3*   BILITOTAL  0.3  0.5   ALKPHOS  133*  93   AST  13  18   ALT  13  22       TSH   Date Value Ref Range Status   03/30/2018 2.21 0.30 - 6.00 uIU/mL Final   06/22/2017 4.45 (H) 0.40 - 4.00 mU/L Final   ]    Lab Results   Component Value Date    A1C 6.8 03/30/2018    A1C 8.3 10/23/2017         ASSESSMENT/PLAN  Dementia with behavioral disturbance, unspecified dementia type  Hallucinations  Restlessness and agitation  Haldol recently started.  We will continue to monitor for efficacy.  Due to spikes in restlessness, hallucinations will continue as needed Seroquel for another 2 weeks and reassess.  Noted PRN orders for antipsychotic medications are limited to 14 days. Face to Face encounter done today. Evaluation of Seroquel medication indicates non-pharmacological interventions of redirection are not effective,, without this medication the patient's safety is in jeopardy due to wandering, distress, and it is appropriate and necessary to continue this medication due to hallucinations, distress for 14  days. Nsg to update [facility] provider before day 14 if greater than 3 uses in the last 5 days.       Breast mass  Paget disease of breast, right (H)  History of B-cell lymphoma  Hospice care patient  Question metastatic activity of brain given increased hallucinations.  Appreciate extra services from hospice    Falls frequently  Falls preventions in place staff monitoring closely    Benign essential hypertension  Osteoporosis without current pathological fracture, unspecified osteoporosis type  To minimize pill  burden, hospice has discontinued Tylenol, calcium, omeprazole, B12, lisinopril.  Blood pressure stable    Orders:  1.  Continue seroquel 25mg BID PRN for psychosis, distress, hallucinations, delusions x2 wks      Electronically signed by:  HANG Graham CNP          Sincerely,        HANG Graham CNP

## 2018-11-06 NOTE — PROGRESS NOTES
[facility]  Chief Complaint   Patient presents with     Annual Comprehensive Nursing Home       [place] Medical Record Number:  [id number]  [facility] of Service where encounter took place:  [facility] [[id number]]      HPI:    Lulu Carlton is a 89 year old  (11/8/1921), who is being seen today for an annual comprehensive visit.  HPI information obtained from: facility chart records, facility staff and patient report.  Today's concerns are:  Patient continues to have hallucinations and distress over trying to find missing little children.  Ativan did not seem to be working.  PRN Seroquel helping somewhat.  Hospice team added Haldol 0.5 mg twice daily.  Patient spends much of day wandering in wheelchair around facility looking for the children.  At times has gotten out of her wheelchair and laid down on the floor thinking she was in bed difficult to assess if falls or controlled.  No further strokelike episodes  Hospice is following        ALLERGIES: Hydrocodone-acetaminophen; Trazodone; and Metformin  PROBLEM LIST:  Patient Active Problem List   Diagnosis     Sensorineural hearing loss, asymmetrical     Urinary incontinence     Osteoporosis     Vitamin D deficiency     Anemia, iron deficiency     Diabetes mellitus, type II (H)     Primary osteoarthritis of left knee     Hypertension goal BP (blood pressure) < 140/90     Displaced fracture of right femoral neck (H)     Malignant lymphoma, large cell, diffuse (H)     ACP (advance care planning)     Steatosis of liver     Myasthenia gravis (H)     Dementia     Hip dislocation, right, sequela     Status post total replacement of right hip     Anxiety     Gastroesophageal reflux disease without esophagitis     Falls frequently     Diarrhea, unspecified type     Hospice care patient     Late onset Alzheimer's disease with behavioral disturbance     Displacement of intervertebral disc     PAST MEDICAL HISTORY:  has a past medical history of  Anemia, unspecified (hosp. 4/3/07 [place] ); Central nervous system complication (hosp. 4/3/07 [place]); Depressive disorder, not elsewhere classified; Fracture of femoral neck, right (H) (5/10/2017); Myasthenia gravis; Other malaise and fatigue (hosp. 4/3/07 [place] ); Other urinary incontinence; Thoracic or lumbosacral neuritis or radiculitis, unspecified; and Unspecified essential hypertension.  PAST SURGICAL HISTORY:  has a past surgical history that includes surgical history of - ; surgical history of - ; and Open reduction internal fixation hip bipolar (Right, 5/10/2017).  FAMILY HISTORY: family history is not on file.  SOCIAL HISTORY:  reports that she has never smoked. She does not have any smokeless tobacco history on file. She reports that she does not drink alcohol or use illicit drugs.  IMMUNIZATIONS:  Most Recent Immunizations   Administered Date(s) Administered     DTaP, Unspecified 09/06/2011     Influenza (High Dose) 3 valent vaccine 09/05/2014     Influenza (IIV3) PF 09/29/2010     Influenza (intradermal) 09/06/2013     Influenza Vaccine, 3 YRS +, IM (QUADRIVALENT W/PRESERVATIVES) 11/03/2016     Mantoux Tuberculin Skin Test 03/23/2009     Pneumo Conj 13-V (2010&after) 10/31/2016     Pneumococcal 23 valent 10/21/2011     TDAP Vaccine (Adacel) 09/06/2011     Above immunizations pulled from [facility]. [facility] and facility records also reconciled.   Future immunizations are not needed at this point as all recommended immunizations are up to date.   MEDICATIONS:  Current Outpatient Prescriptions   Medication Sig Dispense Refill     Acetaminophen (TYLENOL PO) Take 1,000 mg by mouth 2 times daily And 1000mg daily PRN       acetaminophen (TYLENOL) 650 MG Suppository Place 650 mg rectally every 4 hours as needed for fever       bisacodyl (DULCOLAX) 10 MG Suppository Place 10 mg rectally daily as needed for constipation       calcium carbonate (TUMS) 500 MG chewable tablet Take 1 chew tab by mouth  every 4 hours as needed for heartburn       haloperidol (HALDOL) 2 MG/ML (HIGH CONC) solution Take 0.5 mg by mouth 2 times daily       hydrALAZINE (APRESOLINE) 10 MG tablet Take 1 tablet (10 mg) by mouth 3 times daily as needed For SBP >180 90 tablet 11     Hyoscyamine Sulfate (HYOSCYAMINE PO) Place 0.125 mg under the tongue every 4 hours as needed       loperamide (IMODIUM A-D) 2 MG tablet Take 1 tablet (2 mg) by mouth 2 times daily as needed 20 tablet 11     magnesium hydroxide (MILK OF MAGNESIA) 400 MG/5ML suspension Take 30 mLs by mouth daily as needed for constipation or heartburn       morphine sulfate 20 MG/5ML SOLN Take 5 mg by mouth every hour as needed (Staff to contact hospice if administering 3 or more PRN morphine doses in a 3 hour period)       nystatin (MYCOSTATIN) 206267 UNIT/GM POWD Apply topically 2 times daily as needed       QUEtiapine Fumarate (SEROQUEL PO) Take 25 mg by mouth every morning AND 50MG at Noon and 2000       senna-docusate (SENOKOT-S;PERICOLACE) 8.6-50 MG per tablet Take 1 tablet by mouth daily as needed for constipation        Medications reviewed:  Medications reconciled to facility chart and changes were made to reflect current medications as identified as above med list. Below are the changes that were made:   Medications stopped since last EPIC medication reconciliation:   Medications Discontinued During This Encounter   Medication Reason     lisinopril (PRINIVIL/ZESTRIL) 5 MG tablet      LORAZEPAM PO      omeprazole (PRILOSEC) 10 MG CR capsule      calcium-vitamin D (CALTRATE) 600-400 MG-UNIT per tablet        Medications started since last [facility] medication reconciliation:  Orders Placed This Encounter   Medications     haloperidol (HALDOL) 2 MG/ML (HIGH CONC) solution     Sig: Take 0.5 mg by mouth 2 times daily       Case Management:  I have reviewed the facility/SNF care plan/MDS which was done October 12, including the falls risk, nutrition and pain screening. I also  "reviewed the current immunizations, and preventive care..Future cancer screening is not clinically indicated secondary to age/goals of care Patient's desire to return to the community is not assessible due to cognitive impairment. Current Level of Care is appropriate.      Advance Directive Discussion:    I reviewed the current advanced directives as reflected in EPIC, the POLST and the facility chart, and verified the congruency of orders DNR. I contacted the first party son and left a message regarding the plan of Care.  I did not due to cognitive impairment review the advance directives with the resident.     Team Discussion:  I communicated with the appropriate disciplines involved with the Plan of Care:   Nursing  ,    and Hospice    Patient Goal:  Patient's goal is unobtainable secondary to cognitive impairment.    Information reviewed:  Medications, vital signs, orders, and nursing notes.    ROS:  Limited secondary to cognitive impairment but today pt reports no concerns    Exam:  /65  Temp 98.8  F (37.1  C)  Resp 18  Ht 4' 10.5\" (1.486 m)  Wt 151 lb 12.8 oz (68.9 kg)  SpO2 94%  BMI 31.19 kg/m2    GENERAL APPEARANCE:  in no distress, Confused, redirectable  ENT:  Mouth and posterior oropharynx normal, moist mucous membranes, [place]  RESP:  lungs clear to auscultation , no respiratory distress  CV:  S1-S2 normal, no edema noted, slight systolic murmur over upper sternal borders, heart rate regular  ABDOMEN:  no guarding or rebound, bowel sounds normal  M/S:   Self propels in wheelchair  SKIN:  No concerning lesions  NEURO:   No focal deficit observed  PSYCH:  Oriented to self only, affect and mood anxious, judgment poor     Lab/Diagnostic data:      CBC RESULTS:   Recent Labs   Lab Test 03/30/18 03/14/18   WBC  9.0  8.5   RBC  4.54  4.22   HGB  12.8  12.1   HCT  40.7  37.8   MCV  90  90   MCH  28.2  28.7   MCHC  31.4*  32.0   RDW  12.8  13.0   PLT  226  182       Last Basic Metabolic " Panel:  Recent Labs   Lab Test 03/30/18 03/14/18   NA  138  142   POTASSIUM  4.1  3.6   CHLORIDE  103  104   SLIM  8.7  9.0   CO2  24  31   BUN  11  9   CR  0.87  0.65   GLC  195*  150*       Liver Function Studies -   Recent Labs   Lab Test 03/30/18 05/09/17   1300   PROTTOTAL  6.7  6.5*   ALBUMIN  3.4*  3.3*   BILITOTAL  0.3  0.5   ALKPHOS  133*  93   AST  13  18   ALT  13  22       TSH   Date Value Ref Range Status   03/30/2018 2.21 0.30 - 6.00 uIU/mL Final   06/22/2017 4.45 (H) 0.40 - 4.00 mU/L Final   ]    Lab Results   Component Value Date    A1C 6.8 03/30/2018    A1C 8.3 10/23/2017         ASSESSMENT/PLAN  Dementia with behavioral disturbance, unspecified dementia type  Hallucinations  Restlessness and agitation  Haldol recently started.  We will continue to monitor for efficacy.  Due to spikes in restlessness, hallucinations will continue as needed Seroquel for another 2 weeks and reassess.  Noted PRN orders for antipsychotic medications are limited to 14 days. Face to Face encounter done today. Evaluation of Seroquel medication indicates non-pharmacological interventions of redirection are not effective,, without this medication the patient's safety is in jeopardy due to wandering, distress, and it is appropriate and necessary to continue this medication due to hallucinations, distress for 14  days. Nsg to update [facility] provider before day 14 if greater than 3 uses in the last 5 days.       Breast mass  Paget disease of breast, right (H)  History of B-cell lymphoma  Hospice care patient  Question metastatic activity of brain given increased hallucinations.  Appreciate extra services from hospice    Falls frequently  Falls preventions in place staff monitoring closely    Benign essential hypertension  Osteoporosis without current pathological fracture, unspecified osteoporosis type  To minimize pill burden, hospice has discontinued Tylenol, calcium, omeprazole, B12, lisinopril.  Blood pressure  stable    Orders:  1.  Continue seroquel 25mg BID PRN for psychosis, distress, hallucinations, delusions x2 wks      Electronically signed by:  HANG Graham CNP

## 2018-12-04 NOTE — PROGRESS NOTES
"[place] [facility]  Chief Complaint   Patient presents with     [facility] Regulatory   [place] Medical Record Number:  [id number]  LOCATION: [facility] FACILITY.   HPI:    Lulu Carlton is a 89 year old  (11/8/1921), who is being seen today for a federally mandated E/M visit at [facility].  HPI information obtained from:facility staff, patient report and [facility] chart review    Today's concerns are:  - Resident seen and examined.  Pleasantly confused.   - RN reports that the resident behavior starting to increase slightly again since addition of Haldol, still manageable at this time.   Had episode of confusion, difficult to redirect, severe restlessness, disrobing, entering  others resident's rooms, treated with addition of Haldol which has been effective.ativan was ineffective hence stopped. Requires preston lifting prn.   - \" GNP has no concern  --------------------------------  - - Past Medical, social, family histories, medications, and allergies reviewed and updated  - Medications reviewed: in the chart and EHR.   - Case Management:   I have reviewed the care plan and MDS and do agree with the plan. Patient's desire to return to the community is not present.  Information reviewed:  Medications, vital signs, orders, and nursing notes.    MEDICATIONS:  Current Outpatient Medications   Medication Sig Dispense Refill     Acetaminophen (TYLENOL PO) Take 1,000 mg by mouth 2 times daily And 1000mg daily PRN       acetaminophen (TYLENOL) 650 MG Suppository Place 650 mg rectally every 4 hours as needed for fever       bisacodyl (DULCOLAX) 10 MG Suppository Place 10 mg rectally daily as needed for constipation       calcium carbonate (TUMS) 500 MG chewable tablet Take 1 chew tab by mouth every 4 hours as needed for heartburn       haloperidol (HALDOL) 2 MG/ML (HIGH CONC) solution Take 0.5 mg by mouth 2 times daily       hydrALAZINE (APRESOLINE) 10 MG tablet Take 1 tablet (10 " "mg) by mouth 3 times daily as needed For SBP >180 90 tablet 11     Hyoscyamine Sulfate (HYOSCYAMINE PO) Place 0.125 mg under the tongue every 4 hours as needed       loperamide (IMODIUM A-D) 2 MG tablet Take 1 tablet (2 mg) by mouth 2 times daily as needed 20 tablet 11     magnesium hydroxide (MILK OF MAGNESIA) 400 MG/5ML suspension Take 30 mLs by mouth daily as needed for constipation or heartburn       morphine sulfate 20 MG/5ML SOLN Take 5 mg by mouth every hour as needed (Staff to contact hospice if administering 3 or more PRN morphine doses in a 3 hour period)       nystatin (MYCOSTATIN) 629456 UNIT/GM POWD Apply topically 2 times daily as needed       QUEtiapine Fumarate (SEROQUEL PO) Take 25 mg by mouth every morning AND 50MG at Noon and 2000       senna-docusate (SENOKOT-S;PERICOLACE) 8.6-50 MG per tablet Take 1 tablet by mouth daily as needed for constipation        QUEtiapine Fumarate (SEROQUEL PO) Take 25 mg by mouth 2 times daily         ROS: unobtainable due to cognitive impairment.   Exam:  Vitals: /59   Pulse 88   Temp 97.5  F (36.4  C)   Resp 18   Ht 1.486 m (4' 10.5\")   Wt 67.1 kg (148 lb)   SpO2 95%   BMI 30.41 kg/m    BMI= Body mass index is 30.41 kg/m .  GENERAL APPEARANCE:  in no distress, cooperative  RESP:  respiratory effort and palpation of chest normal, coarse sounds at the bases, crackles lower 1/3. .   CV:  Palpation and auscultation of heart done , no edema, S1S2 normal, systolic murmur over upper sternal borders, regular HR,   ABDOMEN:  normal bowel sounds, soft, nontender, no hepatosplenomegaly or other masses  M/S:   Gait and station abnormal uses a WC for ambulation.   SKIN:  Inspection of skin and subcutaneous tissue baseline, Palpation of skin and subcutaneous tissue baseline  NEURO:   no NFD appreciated on observation  PSYCH: non Siniscal speech, pleasantly confused.     Lab/Diagnostic data:   Reviewed in the chart and EHR.      ASSESSMENT/PLAN  Breast mass  Paget " disease of breast, right (H)  History of B-cell lymphoma  Unintentional weight loss  Hospice  - seems advanced PDB given palpable mass. Enrolled in hospice  - Symptoms managed by [facility] and Hospice Team.     Dementia, senile with delusions, with behavioral disturbance (H)  - Continue to anticipate needs. Chronic condition, ongoing decline expected.   -  Continue to provide redirection and reassurance as needed. Maintain safe living situation with goals focused on comfort.     Benign essential hypertension: controlled.     Hip dislocation, right, sequela (May 2017)  Age-related osteoporosis without current pathological fracture  Frail elderly  - Significant  Deficits requiring NH placement. Requiring extensive assistance from nursing. Up for meals only o/w spends the day resting in bed  - self propel on WC.     Orders:  - See above, otherwise, continue the rest of the current POC.     Electronically signed by:  Radha Rodríguez MD

## 2018-12-05 NOTE — LETTER
"    12/5/2018        RE: Lulu Carlton  [facility]  [address] [place] [id number]        [place] [facility]  Chief Complaint   Patient presents with     [facility] Regulatory   [place] Medical Record Number:  [id number]  LOCATION: [facility] FACILITY.   HPI:    Lulu Carlton is a 89 year old  (11/8/1921), who is being seen today for a federally mandated E/M visit at [facility].  HPI information obtained from:facility staff, patient report and [facility] chart review    Today's concerns are:  - Resident seen and examined.  Pleasantly confused.   - RN reports that the resident behavior starting to increase slightly again since addition of Haldol, still manageable at this time.   Had episode of confusion, difficult to redirect, severe restlessness, disrobing, entering  others resident's rooms, treated with addition of Haldol which has been effective.ativan was ineffective hence stopped. Requires preston lifting prn.   - \" GNP has no concern  --------------------------------  - - Past Medical, social, family histories, medications, and allergies reviewed and updated  - Medications reviewed: in the chart and EHR.   - Case Management:   I have reviewed the care plan and MDS and do agree with the plan. Patient's desire to return to the community is not present.  Information reviewed:  Medications, vital signs, orders, and nursing notes.    MEDICATIONS:  Current Outpatient Medications   Medication Sig Dispense Refill     Acetaminophen (TYLENOL PO) Take 1,000 mg by mouth 2 times daily And 1000mg daily PRN       acetaminophen (TYLENOL) 650 MG Suppository Place 650 mg rectally every 4 hours as needed for fever       bisacodyl (DULCOLAX) 10 MG Suppository Place 10 mg rectally daily as needed for constipation       calcium carbonate (TUMS) 500 MG chewable tablet Take 1 chew tab by mouth every 4 hours as needed for heartburn       haloperidol (HALDOL) 2 MG/ML " "(HIGH CONC) solution Take 0.5 mg by mouth 2 times daily       hydrALAZINE (APRESOLINE) 10 MG tablet Take 1 tablet (10 mg) by mouth 3 times daily as needed For SBP >180 90 tablet 11     Hyoscyamine Sulfate (HYOSCYAMINE PO) Place 0.125 mg under the tongue every 4 hours as needed       loperamide (IMODIUM A-D) 2 MG tablet Take 1 tablet (2 mg) by mouth 2 times daily as needed 20 tablet 11     magnesium hydroxide (MILK OF MAGNESIA) 400 MG/5ML suspension Take 30 mLs by mouth daily as needed for constipation or heartburn       morphine sulfate 20 MG/5ML SOLN Take 5 mg by mouth every hour as needed (Staff to contact hospice if administering 3 or more PRN morphine doses in a 3 hour period)       nystatin (MYCOSTATIN) 703138 UNIT/GM POWD Apply topically 2 times daily as needed       QUEtiapine Fumarate (SEROQUEL PO) Take 25 mg by mouth every morning AND 50MG at Noon and 2000       senna-docusate (SENOKOT-S;PERICOLACE) 8.6-50 MG per tablet Take 1 tablet by mouth daily as needed for constipation        QUEtiapine Fumarate (SEROQUEL PO) Take 25 mg by mouth 2 times daily         ROS: unobtainable due to cognitive impairment.   Exam:  Vitals: /59   Pulse 88   Temp 97.5  F (36.4  C)   Resp 18   Ht 1.486 m (4' 10.5\")   Wt 67.1 kg (148 lb)   SpO2 95%   BMI 30.41 kg/m     BMI= Body mass index is 30.41 kg/m .  GENERAL APPEARANCE:  in no distress, cooperative  RESP:  respiratory effort and palpation of chest normal, coarse sounds at the bases, crackles lower 1/3. .   CV:  Palpation and auscultation of heart done , no edema, S1S2 normal, systolic murmur over upper sternal borders, regular HR,   ABDOMEN:  normal bowel sounds, soft, nontender, no hepatosplenomegaly or other masses  M/S:   Gait and station abnormal uses a WC for ambulation.   SKIN:  Inspection of skin and subcutaneous tissue baseline, Palpation of skin and subcutaneous tissue baseline  NEURO:   no NFD appreciated on observation  PSYCH: non Siniscal speech, " pleasantly confused.     Lab/Diagnostic data:   Reviewed in the chart and EHR.      ASSESSMENT/PLAN  Breast mass  Paget disease of breast, right (H)  History of B-cell lymphoma  Unintentional weight loss  Hospice  - seems advanced PDB given palpable mass. Enrolled in hospice  - Symptoms managed by [facility] and Hospice Team.     Dementia, senile with delusions, with behavioral disturbance (H)  - Continue to anticipate needs. Chronic condition, ongoing decline expected.   -  Continue to provide redirection and reassurance as needed. Maintain safe living situation with goals focused on comfort.     Benign essential hypertension: controlled.     Hip dislocation, right, sequela (May 2017)  Age-related osteoporosis without current pathological fracture  Frail elderly  - Significant  Deficits requiring NH placement. Requiring extensive assistance from nursing. Up for meals only o/w spends the day resting in bed  - self propel on WC.     Orders:  - See above, otherwise, continue the rest of the current POC.     Electronically signed by:  Radha Rodríguez MD         Sincerely,        Radha Rodríguez MD

## 2019-01-01 ENCOUNTER — NURSING HOME VISIT (OUTPATIENT)
Dept: GERIATRICS | Facility: CLINIC | Age: 84
End: 2019-01-01
Payer: MEDICARE

## 2019-01-01 ENCOUNTER — NURSING HOME VISIT (OUTPATIENT)
Dept: GERIATRICS | Facility: CLINIC | Age: 84
End: 2019-01-01
Payer: COMMERCIAL

## 2019-01-01 VITALS
RESPIRATION RATE: 18 BRPM | OXYGEN SATURATION: 94 % | HEART RATE: 85 BPM | WEIGHT: 147 LBS | HEIGHT: 59 IN | SYSTOLIC BLOOD PRESSURE: 140 MMHG | TEMPERATURE: 97.7 F | BODY MASS INDEX: 29.64 KG/M2 | DIASTOLIC BLOOD PRESSURE: 74 MMHG

## 2019-01-01 VITALS
BODY MASS INDEX: 29.43 KG/M2 | HEIGHT: 59 IN | DIASTOLIC BLOOD PRESSURE: 74 MMHG | HEART RATE: 80 BPM | TEMPERATURE: 98.2 F | SYSTOLIC BLOOD PRESSURE: 136 MMHG | RESPIRATION RATE: 18 BRPM | OXYGEN SATURATION: 93 % | WEIGHT: 146 LBS

## 2019-01-01 VITALS
BODY MASS INDEX: 26.57 KG/M2 | RESPIRATION RATE: 16 BRPM | HEIGHT: 59 IN | DIASTOLIC BLOOD PRESSURE: 67 MMHG | HEART RATE: 70 BPM | TEMPERATURE: 97.8 F | WEIGHT: 131.8 LBS | SYSTOLIC BLOOD PRESSURE: 130 MMHG | OXYGEN SATURATION: 95 %

## 2019-01-01 VITALS
BODY MASS INDEX: 29.91 KG/M2 | WEIGHT: 145.6 LBS | DIASTOLIC BLOOD PRESSURE: 72 MMHG | SYSTOLIC BLOOD PRESSURE: 136 MMHG | OXYGEN SATURATION: 95 % | HEART RATE: 78 BPM | RESPIRATION RATE: 16 BRPM | TEMPERATURE: 98.3 F

## 2019-01-01 DIAGNOSIS — K59.01 SLOW TRANSIT CONSTIPATION: ICD-10-CM

## 2019-01-01 DIAGNOSIS — M81.0 OSTEOPOROSIS WITHOUT CURRENT PATHOLOGICAL FRACTURE, UNSPECIFIED OSTEOPOROSIS TYPE: ICD-10-CM

## 2019-01-01 DIAGNOSIS — G30.1 LATE ONSET ALZHEIMER'S DISEASE WITH BEHAVIORAL DISTURBANCE (H): ICD-10-CM

## 2019-01-01 DIAGNOSIS — F03.91 DEMENTIA WITH BEHAVIORAL DISTURBANCE, UNSPECIFIED DEMENTIA TYPE: ICD-10-CM

## 2019-01-01 DIAGNOSIS — R29.6 FALLS FREQUENTLY: ICD-10-CM

## 2019-01-01 DIAGNOSIS — C50.011 PAGET DISEASE OF BREAST, RIGHT (H): ICD-10-CM

## 2019-01-01 DIAGNOSIS — N63.0 BREAST MASS: ICD-10-CM

## 2019-01-01 DIAGNOSIS — C50.011 PAGET DISEASE OF BREAST, RIGHT (H): Primary | ICD-10-CM

## 2019-01-01 DIAGNOSIS — Z51.5 HOSPICE CARE PATIENT: ICD-10-CM

## 2019-01-01 DIAGNOSIS — I10 BENIGN ESSENTIAL HYPERTENSION: ICD-10-CM

## 2019-01-01 DIAGNOSIS — F03.91 DEMENTIA WITH BEHAVIORAL DISTURBANCE, UNSPECIFIED DEMENTIA TYPE: Primary | ICD-10-CM

## 2019-01-01 DIAGNOSIS — Z85.72 HISTORY OF B-CELL LYMPHOMA: ICD-10-CM

## 2019-01-01 DIAGNOSIS — F02.818 LATE ONSET ALZHEIMER'S DISEASE WITH BEHAVIORAL DISTURBANCE (H): ICD-10-CM

## 2019-01-01 DIAGNOSIS — R45.1 RESTLESSNESS AND AGITATION: ICD-10-CM

## 2019-01-01 DIAGNOSIS — R44.3 HALLUCINATIONS: ICD-10-CM

## 2019-01-01 DIAGNOSIS — G24.01 TARDIVE DYSKINESIA: ICD-10-CM

## 2019-01-01 DIAGNOSIS — R45.1 RESTLESSNESS AND AGITATION: Primary | ICD-10-CM

## 2019-01-01 DIAGNOSIS — G25.3 MYOCLONUS: ICD-10-CM

## 2019-01-01 PROCEDURE — 99309 SBSQ NF CARE MODERATE MDM 30: CPT | Mod: GW | Performed by: NURSE PRACTITIONER

## 2019-01-01 PROCEDURE — 99309 SBSQ NF CARE MODERATE MDM 30: CPT | Mod: GW | Performed by: FAMILY MEDICINE

## 2019-01-01 RX ORDER — QUETIAPINE FUMARATE 25 MG/1
TABLET, FILM COATED ORAL
COMMUNITY

## 2019-01-01 RX ORDER — LORAZEPAM 2 MG/ML
0.5 CONCENTRATE ORAL
COMMUNITY

## 2019-01-01 ASSESSMENT — MIFFLIN-ST. JEOR
SCORE: 949.48
SCORE: 880.53
SCORE: 944.94

## 2019-01-01 ASSESSMENT — PAIN SCALES - GENERAL: PAINLEVEL: NO PAIN (0)

## 2019-02-11 NOTE — PROGRESS NOTES
[place] [facility]    Chief Complaint   Patient presents with     correction Regulatory       [place] Medical Record Number:  [id number]  [id number]    HPI:    Lulu Carlton is a 89 year old  (11/8/1921), who is being seen today for a federally mandated E/M visit.  HPI information obtained from: facility chart records, facility staff, patient report and [facility] chart review. Today's concerns are:    - Resident seen and examined.  Pleasantly confused. no concerns. Talks about things that are irrelevant when questioned about concerns.  - RN reports that the resident behavior starting to increase slightly again even with addition of Haldol, still manageable but escalating.  Increased restlessness however, not distressing.  Distress seems to be improving somewhat.  More falls--without injury.       ALLERGIES: Hydrocodone-acetaminophen; Trazodone; and Metformin   Problem list:  Diagnosis     Sensorineural hearing loss, asymmetrical     Urinary incontinence     Osteoporosis     Vitamin D deficiency     Anemia, iron deficiency     Diabetes mellitus, type II (H)     Primary osteoarthritis of left knee     Hypertension goal BP (blood pressure) < 140/90     Displaced fracture of right femoral neck (H)     Malignant lymphoma, large cell, diffuse (H)     ACP (advance care planning)     Steatosis of liver     Myasthenia gravis (H)     Dementia     Hip dislocation, right, sequela     Status post total replacement of right hip     Anxiety     Gastroesophageal reflux disease without esophagitis     Falls frequently     Diarrhea, unspecified type     Hospice care patient     Late onset Alzheimer's disease with behavioral disturbance     Displacement of intervertebral disc     PAST MEDICAL HISTORY:  has a past medical history of Anemia, unspecified (hosp. 4/3/07 [facility] ), Central nervous system complication (hosp. 4/3/07 [facility]),  Depressive disorder, not elsewhere classified, Fracture of femoral neck, right (H) (5/10/2017), Myasthenia gravis, Other malaise and fatigue (hosp. 4/3/07 [facility] ), Other urinary incontinence, Thoracic or lumbosacral neuritis or radiculitis, unspecified, and Unspecified essential hypertension.  PAST SURGICAL HISTORY:  has a past surgical history that includes surgical history of - ; surgical history of - ; and Open reduction internal fixation hip bipolar (Right, 5/10/2017).  FAMILY HISTORY: family history is not on file.  SOCIAL HISTORY:  reports that  has never smoked. She does not have any smokeless tobacco history on file. She reports that she does not drink alcohol or use drugs.    MEDICATIONS:  Current Outpatient Medications   Medication Sig Dispense Refill     Acetaminophen (TYLENOL PO) Take 1,000 mg by mouth 2 times daily And 1000mg daily PRN       acetaminophen (TYLENOL) 650 MG Suppository Place 650 mg rectally every 4 hours as needed for fever       bisacodyl (DULCOLAX) 10 MG Suppository Place 10 mg rectally daily as needed for constipation       calcium carbonate (TUMS) 500 MG chewable tablet Take 1 chew tab by mouth every 4 hours as needed for heartburn       haloperidol (HALDOL) 2 MG/ML (HIGH CONC) solution Take 0.5 mg by mouth 2 times daily       hydrALAZINE (APRESOLINE) 10 MG tablet Take 1 tablet (10 mg) by mouth 3 times daily as needed For SBP >180 90 tablet 11     Hyoscyamine Sulfate (HYOSCYAMINE PO) Place 0.125 mg under the tongue every 4 hours as needed       loperamide (IMODIUM A-D) 2 MG tablet Take 1 tablet (2 mg) by mouth 2 times daily as needed 20 tablet 11     magnesium hydroxide (MILK OF MAGNESIA) 400 MG/5ML suspension Take 30 mLs by mouth daily as needed for constipation or heartburn       morphine sulfate 20 MG/5ML SOLN Take 5 mg by mouth every hour as needed (Staff to contact hospice if administering 3 or more PRN morphine doses in a 3 hour period)       nystatin (MYCOSTATIN) 411594  UNIT/GM POWD Apply topically 2 times daily as needed       QUEtiapine Fumarate (SEROQUEL PO) Take 25 mg by mouth every morning AND 50MG at Noon and 2000       senna-docusate (SENOKOT-S;PERICOLACE) 8.6-50 MG per tablet Take 1 tablet by mouth daily as needed for constipation        QUEtiapine Fumarate (SEROQUEL PO) Take 25 mg by mouth 2 times daily       Medications reviewed:  Medications reconciled to facility chart and changes were made to reflect current medications as identified as above med list. Below are the changes that were made:   Medications stopped since last EPIC medication reconciliation:   There are no discontinued medications.    Medications started since last [facility] medication reconciliation:  No orders of the defined types were placed in this encounter.    Case Management:  I have reviewed the care plan and MDS and do agree with the plan. Patient's desire to return to the community is not assessible due to cognitive impairment.  Information reviewed:  Medications, vital signs, orders, and nursing notes.    ROS:  Limited secondary to cognitive impairment but today pt reports no concerns    Exam:  Vitals: /72   Pulse 78   Temp 98.3  F (36.8  C)   Resp 16   Wt 66 kg (145 lb 9.6 oz)   SpO2 95%   BMI 29.91 kg/m    BMI= Body mass index is 29.91 kg/m .  GENERAL APPEARANCE:  Alert, in no distress  ENT:  Mouth and posterior oropharynx normal, moist mucous membranes, hearing acuity [place]  RESP:  respiratory effort and palpation of chest normal, no respiratory distress, Lung sounds clear  CV:  Palpation and auscultation of heart done , rate and rhythm regular, no murmur, no rub or gallop, Edema minimal  ABDOMEN:  normal bowel sounds, soft, nontender  M/S:   W/C bound  SKIN:  No concerning lesions  NEURO: NFD  PSYCH:  nonsensical speech, pleasantly confused    Lab/Diagnostic data:   CBC RESULTS:   Recent Labs   Lab Test 03/30/18 03/14/18   WBC 9.0 8.5   RBC 4.54 4.22   HGB 12.8 12.1   HCT 40.7 37.8   MCV  "90 90   MCH 28.2 28.7   MCHC 31.4* 32.0   RDW 12.8 13.0    182       Last Basic Metabolic Panel:  Recent Labs   Lab Test 03/30/18 03/14/18    142   POTASSIUM 4.1 3.6   CHLORIDE 103 104   SLIM 8.7 9.0   CO2 24 31   BUN 11 9   CR 0.87 0.65   * 150*       Liver Function Studies -   Recent Labs   Lab Test 03/30/18 05/09/17  1300   PROTTOTAL 6.7 6.5*   ALBUMIN 3.4* 3.3*   BILITOTAL 0.3 0.5   ALKPHOS 133* 93   AST 13 18   ALT 13 22       TSH   Date Value Ref Range Status   03/30/2018 2.21 0.30 - 6.00 uIU/mL Final   06/22/2017 4.45 (H) 0.40 - 4.00 mU/L Final   ]    Lab Results   Component Value Date    A1C 6.8 03/30/2018    A1C 8.3 10/23/2017       ASSESSMENT/PLAN  Dementia with behavioral disturbance, unspecified dementia type  Hallucinations  Restlessness and agitation  Continues on Haldol.    Seroquel scheduled  Consult with Hospice re:  Adjusting Haldol    pt goes through cycles of high energy and restlessness and then days of sleepiness.  Sleep log started today.  Will follow for 1 week--sleep, distress, hallucinations, etc--and reevaluate     Breast mass  Paget disease of breast, right (H)  History of B-cell lymphoma  Hospice care patient  Question metastatic activity of brain given increased hallucinations and restlessness  Appreciate extra services from hospice     Falls frequently  Falls preventions in place staff monitoring closely.  Patient continues to be found on the floor in various places in the facility. In bathroom, \"up to go for a walk\", resting on floor. No injuries.      Benign essential hypertension  Osteoporosis without current pathological fracture, unspecified osteoporosis type  To minimize pill burden, hospice has discontinued meds including lisinopril.  Blood pressure stable      Orders:  NNO    Total time spent with patient visit at the skilled nursing facility was 25 min including patient visit and review of past records. Greater than 50% of total time spent with counseling " and coordinating care due to comprehensive assessment, collaboration with hospice    Electronically signed by:  HANG Graham CNP

## 2019-02-12 NOTE — LETTER
2/12/2019        RE: Lulu Wade [facility]  [address] [place] [id number]          [place] [facility]    Chief Complaint   Patient presents with     FCI Regulatory       [place] Medical Record Number:  [id number]  Place of Service where encounter took place:  [facility] ([facility]) [[id number]]    HPI:    Lulu Carlton is a 89 year old  (11/8/1921), who is being seen today for a federally mandated E/M visit.  HPI information obtained from: facility chart records, facility staff, patient report and [facility] chart review. Today's concerns are:    - Resident seen and examined.  Pleasantly confused.  no concerns. Talks about things that are irrelevant when questioned about concerns.  - RN reports that the resident behavior starting to increase slightly again even with addition of Haldol, still manageable but escalating.  Increased restlessness however, not distressing.  Distress seems to be improving somewhat.  More falls--without injury.       ALLERGIES: Hydrocodone-acetaminophen; Trazodone; and Metformin   Problem list:  Diagnosis     Sensorineural hearing loss, asymmetrical     Urinary incontinence     Osteoporosis     Vitamin D deficiency     Anemia, iron deficiency     Diabetes mellitus, type II (H)     Primary osteoarthritis of left knee     Hypertension goal BP (blood pressure) < 140/90     Displaced fracture of right femoral neck (H)     Malignant lymphoma, large cell, diffuse (H)     ACP (advance care planning)     Steatosis of liver     Myasthenia gravis (H)     Dementia     Hip dislocation, right, sequela     Status post total replacement of right hip     Anxiety     Gastroesophageal reflux disease without esophagitis     Falls frequently     Diarrhea, unspecified type     Hospice care patient     Late onset Alzheimer's disease with behavioral disturbance     Displacement of intervertebral disc     PAST MEDICAL HISTORY:  has a past medical  history of Anemia, unspecified (hosp. 4/3/07 [place] ), Central nervous system complication (hosp. 4/3/07 [place]), Depressive disorder, not elsewhere classified, Fracture of femoral neck, right (H) (5/10/2017), Myasthenia gravis, Other malaise and fatigue (hosp. 4/3/07 [place] ), Other urinary incontinence, Thoracic or lumbosacral neuritis or radiculitis, unspecified, and Unspecified essential hypertension.  PAST SURGICAL HISTORY:  has a past surgical history that includes surgical history of - ; surgical history of - ; and Open reduction internal fixation hip bipolar (Right, 5/10/2017).  FAMILY HISTORY: family history is not on file.  SOCIAL HISTORY:  reports that  has never smoked. She does not have any smokeless tobacco history on file. She reports that she does not drink alcohol or use drugs.    MEDICATIONS:  Current Outpatient Medications   Medication Sig Dispense Refill     Acetaminophen (TYLENOL PO) Take 1,000 mg by mouth 2 times daily And 1000mg daily PRN       acetaminophen (TYLENOL) 650 MG Suppository Place 650 mg rectally every 4 hours as needed for fever       bisacodyl (DULCOLAX) 10 MG Suppository Place 10 mg rectally daily as needed for constipation       calcium carbonate (TUMS) 500 MG chewable tablet Take 1 chew tab by mouth every 4 hours as needed for heartburn       haloperidol (HALDOL) 2 MG/ML (HIGH CONC) solution Take 0.5 mg by mouth 2 times daily       hydrALAZINE (APRESOLINE) 10 MG tablet Take 1 tablet (10 mg) by mouth 3 times daily as needed For SBP >180 90 tablet 11     Hyoscyamine Sulfate (HYOSCYAMINE PO) Place 0.125 mg under the tongue every 4 hours as needed       loperamide (IMODIUM A-D) 2 MG tablet Take 1 tablet (2 mg) by mouth 2 times daily as needed 20 tablet 11     magnesium hydroxide (MILK OF MAGNESIA) 400 MG/5ML suspension Take 30 mLs by mouth daily as needed for constipation or heartburn       morphine sulfate 20 MG/5ML SOLN Take 5 mg by mouth every hour as needed (Staff to  contact hospice if administering 3 or more PRN morphine doses in a 3 hour period)       nystatin (MYCOSTATIN) 947766 UNIT/GM POWD Apply topically 2 times daily as needed       QUEtiapine Fumarate (SEROQUEL PO) Take 25 mg by mouth every morning AND 50MG at Noon and 2000       senna-docusate (SENOKOT-S;PERICOLACE) 8.6-50 MG per tablet Take 1 tablet by mouth daily as needed for constipation        QUEtiapine Fumarate (SEROQUEL PO) Take 25 mg by mouth 2 times daily       Medications reviewed:  Medications reconciled to facility chart and changes were made to reflect current medications as identified as above med list. Below are the changes that were made:   Medications stopped since last EPIC medication reconciliation:   There are no discontinued medications.    Medications started since last [facility] medication reconciliation:  No orders of the defined types were placed in this encounter.    Case Management:  I have reviewed the care plan and MDS and do agree with the plan. Patient's desire to return to the community is not assessible due to cognitive impairment.  Information reviewed:  Medications, vital signs, orders, and nursing notes.    ROS:  Limited secondary to cognitive impairment but today pt reports no concerns    Exam:  Vitals: /72   Pulse 78   Temp 98.3  F (36.8  C)   Resp 16   Wt 66 kg (145 lb 9.6 oz)   SpO2 95%   BMI 29.91 kg/m     BMI= Body mass index is 29.91 kg/m .  GENERAL APPEARANCE:  Alert, in no distress  ENT:  Mouth and posterior oropharynx normal, moist mucous membranes, hearing acuity [place]  RESP:  respiratory effort and palpation of chest normal, no respiratory distress, Lung sounds clear  CV:  Palpation and auscultation of heart done , rate and rhythm regular, no murmur, no rub or gallop, Edema minimal  ABDOMEN:  normal bowel sounds, soft, nontender  M/S:   W/C bound  SKIN:  No concerning lesions  NEURO: NFD  PSYCH:  nonsensical speech, pleasantly confused    Lab/Diagnostic data:   CBC  "RESULTS:   Recent Labs   Lab Test 03/30/18 03/14/18   WBC 9.0 8.5   RBC 4.54 4.22   HGB 12.8 12.1   HCT 40.7 37.8   MCV 90 90   MCH 28.2 28.7   MCHC 31.4* 32.0   RDW 12.8 13.0    182       Last Basic Metabolic Panel:  Recent Labs   Lab Test 03/30/18 03/14/18    142   POTASSIUM 4.1 3.6   CHLORIDE 103 104   SLIM 8.7 9.0   CO2 24 31   BUN 11 9   CR 0.87 0.65   * 150*       Liver Function Studies -   Recent Labs   Lab Test 03/30/18 05/09/17  1300   PROTTOTAL 6.7 6.5*   ALBUMIN 3.4* 3.3*   BILITOTAL 0.3 0.5   ALKPHOS 133* 93   AST 13 18   ALT 13 22       TSH   Date Value Ref Range Status   03/30/2018 2.21 0.30 - 6.00 uIU/mL Final   06/22/2017 4.45 (H) 0.40 - 4.00 mU/L Final   ]    Lab Results   Component Value Date    A1C 6.8 03/30/2018    A1C 8.3 10/23/2017       ASSESSMENT/PLAN  Dementia with behavioral disturbance, unspecified dementia type  Hallucinations  Restlessness and agitation  Continues on Haldol.     Seroquel scheduled  Consult with Hospice re:  Adjusting Haldol    pt goes through cycles of high energy and restlessness and then days of sleepiness.  Sleep log started today.  Will follow for 1 week--sleep, distress, hallucinations, etc--and reevaluate     Breast mass  Paget disease of breast, right (H)  History of B-cell lymphoma  Hospice care patient  Question metastatic activity of brain given increased hallucinations and restlessness  Appreciate extra services from hospice     Falls frequently  Falls preventions in place staff monitoring closely.  Patient continues to be found on the floor in various places in the facility. In bathroom, \"up to go for a walk\", resting on floor. No injuries.      Benign essential hypertension  Osteoporosis without current pathological fracture, unspecified osteoporosis type  To minimize pill burden, hospice has discontinued meds including lisinopril.  Blood pressure stable      Orders:  NNO    Total time spent with patient visit at the skilled nursing " facility was 25 min including patient visit and review of past records. Greater than 50% of total time spent with counseling and coordinating care due to comprehensive assessment, collaboration with hospice    Electronically signed by:  HANG Graham CNP        Sincerely,        HANG Graham CNP

## 2019-04-01 NOTE — PROGRESS NOTES
[place] [facility]  Chief Complaint   Patient presents with     jail Regulatory     [place] Medical Record Number:  [id number]  [id number]      HPI:    Lulu Carlton  is 89 year old (11/8/1921), who is being seen today for a federally mandated E/M visit.  HPI information obtained from: facility staff, patient report and [facility] chart review.     Today's concerns are:  -  - Resident seen and examined.   - Reports  feeling pretty well.   - Denies pain, CP or SOB.   - Reports sleep, appetite and BM are fine.   - RN reports increased confusion, wandering, restlessness. Had a fall on March 31, sent to Ed. No injury.   - GNP reports no concern  --------------------------------  - - Past Medical, social, family histories, medications, and allergies reviewed and updated  - Medications reviewed: in the chart and EHR.   - Case Management:   I have reviewed the care plan and MDS and do agree with the plan. Patient's desire to return to the community is not present.  Information reviewed:  Medications, vital signs, orders, and nursing notes.    MEDICATIONS:  Current Outpatient Medications   Medication Sig Dispense Refill     Acetaminophen (TYLENOL PO) Take 1,000 mg by mouth 2 times daily And 1000mg daily PRN       acetaminophen (TYLENOL) 650 MG Suppository Place 650 mg rectally every 4 hours as needed for fever       bisacodyl (DULCOLAX) 10 MG Suppository Place 10 mg rectally daily as needed for constipation       calcium carbonate (TUMS) 500 MG chewable tablet Take 1 chew tab by mouth every 4 hours as needed for heartburn       dimethicone 1 % external cream Apply topically 2 times daily Apply to Gail area topically as needed for redness       haloperidol (HALDOL) 2 MG/ML (HIGH CONC) solution Take 1 mg by mouth 2 times daily        hydrALAZINE (APRESOLINE) 10 MG tablet Take 1 tablet (10 mg) by mouth 3 times daily as needed For  "SBP >180 90 tablet 11     Hyoscyamine Sulfate (HYOSCYAMINE PO) Place 0.125 mg under the tongue every 4 hours as needed       morphine sulfate 20 MG/5ML SOLN Take 5 mg by mouth every hour as needed (Staff to contact hospice if administering 3 or more PRN morphine doses in a 3 hour period)       nystatin (MYCOSTATIN) 724203 UNIT/GM POWD Apply topically 2 times daily as needed       QUEtiapine (SEROQUEL) 25 MG tablet Give 25 mg by mouth one time a day and 50 mg by mouth 2 times a day       senna-docusate (SENOKOT-S;PERICOLACE) 8.6-50 MG per tablet Take by mouth daily as needed for constipation Give 1-2  Tablets daily and 1 tab PRN       ROS:  Limited secondary to cognitive impairment but today pt reports- see HPI    Vitals:  /74   Pulse 85   Temp 97.7  F (36.5  C)   Resp 18   Ht 1.486 m (4' 10.5\")   Wt 66.7 kg (147 lb)   SpO2 94%   BMI 30.20 kg/m    Body mass index is 30.2 kg/m .     Exam:  GENERAL APPEARANCE:  in no distress, cooperative  RESP:  good air entry b/l, no wheezing .   CV:   S1S2 normal, systolic murmur over upper sternal borders, regular HR,   ABDOMEN:  normal bowel sounds, soft, nontender, no palpable masses  M/S:   no joint deformity noted on observation.    SKIN:  Inspection of skin and subcutaneous tissue baseline, Palpation of skin and subcutaneous tissue baseline  NEURO:   no NFD appreciated on observation  PSYCH: pleasant, AAOx Person, city, day and month but not year. Speech clear, affected memory.     Lab/Diagnostic data:   Recent labs in [facility] reviewed by me today.     Breast mass  Paget disease of breast, right (H)  History of B-cell lymphoma  Unintentional weight loss  Hospice  - at baseline.   - Symptoms managed by  GNP and Hospice Team.     Dementia, senile with delusions, with behavioral disturbance (H)  - Continue to anticipate needs. Chronic condition, ongoing decline expected.   -  Continue to provide redirection and reassurance as needed. Maintain safe living situation " with goals focused on comfort.       Benign essential hypertension: controlled.     Frequent Falls  Hx of Hip dislocation, right, sequela (May 2017)  Age-related osteoporosis without current pathological fracture  Frail elderly  - Significant  Deficits requiring NH placement. Requiring extensive assistance from nursing. Up for meals only o/w spends the day resting in bed  - self propel on WC.     Electronically signed by:  Radha Rdoríguez MD

## 2019-04-03 NOTE — LETTER
4/3/2019        RE: Lulu Wade [facility]  [address] [place] [id number]        [place] [facility]  Chief Complaint   Patient presents with     alf Regulatory     [place] Medical Record Number:  [id number]  Place of Service where encounter took place:  [facility] [place] ([facility]) [[id number]]      HPI:    Lulu Carlton  is 89 year old (11/8/1921), who is being seen today for a federally mandated E/M visit.  HPI information obtained from: facility staff, patient report and [facility] chart review.     Today's concerns are:  -  - Resident seen and examined.   - Reports  feeling pretty well.   - Denies pain, CP or SOB.   - Reports sleep, appetite and BM are fine.   - RN reports increased confusion, wandering, restlessness. Had a fall on March 31, sent to Ed. No injury.   - GNP reports no concern  --------------------------------  - - Past Medical, social, family histories, medications, and allergies reviewed and updated  - Medications reviewed: in the chart and EHR.   - Case Management:   I have reviewed the care plan and MDS and do agree with the plan. Patient's desire to return to the community is not present.  Information reviewed:  Medications, vital signs, orders, and nursing notes.    MEDICATIONS:  Current Outpatient Medications   Medication Sig Dispense Refill     Acetaminophen (TYLENOL PO) Take 1,000 mg by mouth 2 times daily And 1000mg daily PRN       acetaminophen (TYLENOL) 650 MG Suppository Place 650 mg rectally every 4 hours as needed for fever       bisacodyl (DULCOLAX) 10 MG Suppository Place 10 mg rectally daily as needed for constipation       calcium carbonate (TUMS) 500 MG chewable tablet Take 1 chew tab by mouth every 4 hours as needed for heartburn       dimethicone 1 % external cream Apply topically 2 times daily Apply to Gail area topically as needed for redness       haloperidol (HALDOL) 2 MG/ML (HIGH CONC) solution Take 1 mg by mouth 2  "times daily        hydrALAZINE (APRESOLINE) 10 MG tablet Take 1 tablet (10 mg) by mouth 3 times daily as needed For SBP >180 90 tablet 11     Hyoscyamine Sulfate (HYOSCYAMINE PO) Place 0.125 mg under the tongue every 4 hours as needed       morphine sulfate 20 MG/5ML SOLN Take 5 mg by mouth every hour as needed (Staff to contact hospice if administering 3 or more PRN morphine doses in a 3 hour period)       nystatin (MYCOSTATIN) 572515 UNIT/GM POWD Apply topically 2 times daily as needed       QUEtiapine (SEROQUEL) 25 MG tablet Give 25 mg by mouth one time a day and 50 mg by mouth 2 times a day       senna-docusate (SENOKOT-S;PERICOLACE) 8.6-50 MG per tablet Take by mouth daily as needed for constipation Give 1-2  Tablets daily and 1 tab PRN       ROS:  Limited secondary to cognitive impairment but today pt reports- see HPI    Vitals:  /74   Pulse 85   Temp 97.7  F (36.5  C)   Resp 18   Ht 1.486 m (4' 10.5\")   Wt 66.7 kg (147 lb)   SpO2 94%   BMI 30.20 kg/m     Body mass index is 30.2 kg/m .     Exam:  GENERAL APPEARANCE:  in no distress, cooperative  RESP:  good air entry b/l, no wheezing .   CV:   S1S2 normal, systolic murmur over upper sternal borders, regular HR,   ABDOMEN:  normal bowel sounds, soft, nontender, no palpable masses  M/S:   no joint deformity noted on observation.    SKIN:  Inspection of skin and subcutaneous tissue baseline, Palpation of skin and subcutaneous tissue baseline  NEURO:   no NFD appreciated on observation  PSYCH:  pleasant, AAOx Person, city, day and month but not year. Speech clear, affected memory.     Lab/Diagnostic data:   Recent labs in [facility] reviewed by me today.     Breast mass  Paget disease of breast, right (H)  History of B-cell lymphoma  Unintentional weight loss  Hospice  - at baseline.   - Symptoms managed by  GNP and Hospice Team.     Dementia, senile with delusions, with behavioral disturbance (H)  - Continue to anticipate needs. Chronic condition, " ongoing decline expected.   -  Continue to provide redirection and reassurance as needed. Maintain safe living situation with goals focused on comfort.       Benign essential hypertension: controlled.     Frequent Falls  Hx of Hip dislocation, right, sequela (May 2017)  Age-related osteoporosis without current pathological fracture  Frail elderly  - Significant  Deficits requiring NH placement. Requiring extensive assistance from nursing. Up for meals only o/w spends the day resting in bed  - self propel on WC.     Electronically signed by:  Radha Rodríguez MD        Sincerely,        Radha Rodríguez MD

## 2019-04-12 NOTE — LETTER
4/12/2019        RE: Lulu Wade [facility]  [address] [place] [id number]        [place] [facility]  [place] Medical Record Number:  [id number]  Place of Service where encounter took place:  [facility] [place] ([facility]) [[id number]]  Chief Complaint   Patient presents with     RECHECK       HPI:    Lulu Carlton  is a 89 year old (11/8/1921), who is being seen today for an episodic care visit.  HPI information obtained from: facility chart records, facility staff and patient report. Today's concern is:    Staff and hospice nurse noting patient to have myoclonic jerking motions intermittently, increasing this afternoon.  Also some potential tardive dyskinesia type lip smacking.  This is been happening increasingly over the past 5 or so days, but with increasing frequency.    Past Medical and Surgical History reviewed in Epic today.    MEDICATIONS:  Current Outpatient Medications   Medication Sig Dispense Refill     Acetaminophen (TYLENOL PO) Take 1,000 mg by mouth as needed for mild pain or fever        acetaminophen (TYLENOL) 650 MG Suppository Place 650 mg rectally every 4 hours as needed for fever       bisacodyl (DULCOLAX) 10 MG Suppository Place 10 mg rectally daily as needed for constipation       calcium carbonate (TUMS) 500 MG chewable tablet Take 1 chew tab by mouth every 4 hours as needed for heartburn       dimethicone 1 % external cream Apply topically 2 times daily Apply to Gail area topically as needed for redness       haloperidol (HALDOL) 2 MG/ML (HIGH CONC) solution Take 1 mg by mouth 2 times daily And 0.5 mg two times a day until 4/14/2019       hydrALAZINE (APRESOLINE) 10 MG tablet Take 1 tablet (10 mg) by mouth 3 times daily as needed For SBP >180 90 tablet 11     Hyoscyamine Sulfate (HYOSCYAMINE PO) Place 0.125 mg under the tongue every 4 hours as needed       morphine sulfate 20 MG/5ML SOLN Take 0.25 mLs by mouth daily And 0.25 ml every 1 hour PRN.  "5mg = 0.25ml Staff to contact hospice  if administering 3 or more PRN morphine doses in a 3  hour period Observe for adverse symptoms       nystatin (MYCOSTATIN) [id number] UNIT/GM POWD Apply topically 2 times daily as needed       QUEtiapine (SEROQUEL) 25 MG tablet Take 50 mg by mouth 3 times daily        senna-docusate (SENOKOT-S;PERICOLACE) 8.6-50 MG per tablet Give 1-2  Tablets daily and 1 tab PRN           REVIEW OF SYSTEMS:  Unobtainable secondary to cognitive impairment.     Objective:  /74   Pulse 80   Temp 98.2  F (36.8  C)   Resp 18   Ht 1.486 m (4' 10.5\")   Wt 66.2 kg (146 lb)   SpO2 93%   BMI 29.99 kg/m     Exam:  GENERAL APPEARANCE:  somnolent  RESP:  lungs clear to auscultation , no respiratory distress  CV:  regular rate and rhythm, no murmur, rub, or gallop  M/S:   Myoclonic jerking of these and head intermittently  PSYCH:  Pleasantly confused to barely responsive    Labs:       ASSESSMENT/PLAN:  Myoclonus  Paget disease of breast, right (H)  Dementia with behavioral disturbance, unspecified dementia type  Tardive dyskinesia  Myoclonic type jerking behaviors with increasing frequency.  And decrease level of consciousness this afternoon.  Care conference with nurse manager, hospice case manager, clinical pharmacist regarding potential for this being medication related versus disease progression/metastases versus dementia progression.  Since she is on 2 antipsychotics, will start with medication adjustment.  Since Haldol is the more likely culprit of TD, will GDR over the weekend and then discontinue Haldol on Monday, April 15 and increase morning Seroquel from 25 mg to 50 mg.  Continue to monitor and provide for comfort.  Appreciate hospice involvement.      transcribed by : Corina Ames  Orders:  1.  Decrease Haldol to 0.5 mg twice daily through April 14 then discontinue Haldol  2. Increase Seroquel to 50 mg TID    Total time spent with patient visit at the skilled nursing " facility was 25 min including patient visit, review of past records and Care conference with hospice case manager and nurse manager. Greater than 50% of total time spent with counseling and coordinating care due to New symptoms  Electronically signed by:  HANG Graham CNP             Sincerely,        HANG Graham CNP

## 2019-04-12 NOTE — PROGRESS NOTES
[place] [facility]  [place] [facility] Record Number:  [id number]  Place of Service where encounter took place:  [facility] (S) [[id number]]  Chief Complaint   Patient presents with     RECHECK       HPI:    Lulu Carlton  is a 89 year old (11/8/1921), who is being seen today for an episodic care visit.  HPI information obtained from: facility chart records, facility staff and patient report. Today's concern is:    Staff and hospice nurse noting patient to have myoclonic jerking motions intermittently, increasing this afternoon.  Also some potential tardive dyskinesia type lip smacking.  This is been happening increasingly over the past 5 or so days, but with increasing frequency.    Past Medical and Surgical History reviewed in Epic today.    MEDICATIONS:  Current Outpatient Medications   Medication Sig Dispense Refill     Acetaminophen (TYLENOL PO) Take 1,000 mg by mouth as needed for mild pain or fever        acetaminophen (TYLENOL) 650 MG Suppository Place 650 mg rectally every 4 hours as needed for fever       bisacodyl (DULCOLAX) 10 MG Suppository Place 10 mg rectally daily as needed for constipation       calcium carbonate (TUMS) 500 MG chewable tablet Take 1 chew tab by mouth every 4 hours as needed for heartburn       dimethicone 1 % external cream Apply topically 2 times daily Apply to Gail area topically as needed for redness       haloperidol (HALDOL) 2 MG/ML (HIGH CONC) solution Take 1 mg by mouth 2 times daily And 0.5 mg two times a day until 4/14/2019       hydrALAZINE (APRESOLINE) 10 MG tablet Take 1 tablet (10 mg) by mouth 3 times daily as needed For SBP >180 90 tablet 11     Hyoscyamine Sulfate (HYOSCYAMINE PO) Place 0.125 mg under the tongue every 4 hours as needed       morphine sulfate 20 MG/5ML SOLN Take 0.25 mLs by mouth daily And 0.25 ml every 1 hour PRN. 5mg = 0.25ml Staff to contact hospice  if administering 3 or more PRN morphine doses in a 3  hour period Observe for  "adverse symptoms       nystatin (MYCOSTATIN) 165884 UNIT/GM POWD Apply topically 2 times daily as needed       QUEtiapine (SEROQUEL) 25 MG tablet Take 50 mg by mouth 3 times daily        senna-docusate (SENOKOT-S;PERICOLACE) 8.6-50 MG per tablet Give 1-2  Tablets daily and 1 tab PRN           REVIEW OF SYSTEMS:  Unobtainable secondary to cognitive impairment.     Objective:  /74   Pulse 80   Temp 98.2  F (36.8  C)   Resp 18   Ht 1.486 m (4' 10.5\")   Wt 66.2 kg (146 lb)   SpO2 93%   BMI 29.99 kg/m    Exam:  GENERAL APPEARANCE:  somnolent  RESP:  lungs clear to auscultation , no respiratory distress  CV:  regular rate and rhythm, no murmur, rub, or gallop  M/S:   Myoclonic jerking of these and head intermittently  PSYCH:  Pleasantly confused to barely responsive    Labs:       ASSESSMENT/PLAN:  Myoclonus  Paget disease of breast, right (H)  Dementia with behavioral disturbance, unspecified dementia type  Tardive dyskinesia  Myoclonic type jerking behaviors with increasing frequency.  And decrease level of consciousness this afternoon.  Care conference with nurse manager, hospice case manager, clinical pharmacist regarding potential for this being medication related versus disease progression/metastases versus dementia progression.  Since she is on 2 antipsychotics, will start with medication adjustment.  Since Haldol is the more likely culprit of TD, will GDR over the weekend and then discontinue Haldol on Monday, April 15 and increase morning Seroquel from 25 mg to 50 mg.  Continue to monitor and provide for comfort.  Appreciate hospice involvement.      transcribed by : Corina Ames  Orders:  1.  Decrease Haldol to 0.5 mg twice daily through April 14 then discontinue Haldol  2. Increase Seroquel to 50 mg TID    Total time spent with patient visit at the skilled nursing facility was 25 min including patient visit, review of past records and Care conference with hospice case manager and " nurse manager. Greater than 50% of total time spent with counseling and coordinating care due to New symptoms  Electronically signed by:  HANG Graham CNP

## 2019-06-03 NOTE — PROGRESS NOTES
[facility]  Chief Complaint   Patient presents with     USP Regulatory     [place] Medical Record Number:  [id number]  Place of Service where encounter took place:  [facility] ([facility]) [[id number]]    HPI:    Lulu Carlton  is 89 year old (11/8/1921), who is being seen today for a federally mandated E/M visit.  HPI information obtained from: facility chart records, facility staff, patient report and [facility] chart review. Today's concerns are:     Late onset Alzheimer's disease with behavioral disturbance  Restlessness and agitation  Hallucinations  Slow transit constipation  Paget disease of breast, right (H)  History of B-cell lymphoma  Osteoporosis without current pathological fracture, unspecified osteoporosis type  Falls frequently  Benign essential hypertension  Hospice care patient     Resident seen and examined.  Pleasantly confused.  Unable to give history.  Per staff, patient has been having regular falls, despite numerous interventions in place.  Hospice team changed Haldol to Seroquel secondary to jerking movements.  The jerking has subsided.  Patient is increasingly confused, tired, sleeping more, eating less.  Bowels requiring suppositories.  Requesting regularly scheduled    ALLERGIES:Hydrocodone-acetaminophen; Trazodone; and Metformin  PAST MEDICAL HISTORY:   has a past medical history of Anemia, unspecified (hosp. 4/3/07 [place] ), Central nervous system complication (hosp. 4/3/07 [place]), Depressive disorder, not elsewhere classified, Fracture of femoral neck, right (H) (5/10/2017), Myasthenia gravis, Other malaise and fatigue (hosp. 4/3/07 [place] ), Other urinary incontinence, Thoracic or lumbosacral neuritis or radiculitis, unspecified, and Unspecified essential hypertension.  PAST SURGICAL HISTORY:   has a past surgical history that includes surgical history of - ; surgical history of - ; and Open reduction internal fixation hip bipolar (Right,  5/10/2017).  FAMILY HISTORY: family history is not on file.  SOCIAL HISTORY:  reports that she has never smoked. She does not have any smokeless tobacco history on file. She reports that she does not drink alcohol or use drugs.    MEDICATIONS:  Current Outpatient Medications   Medication Sig Dispense Refill     Acetaminophen (TYLENOL PO) Take 1,000 mg by mouth as needed for mild pain or fever        acetaminophen (TYLENOL) 650 MG Suppository Place 650 mg rectally every 4 hours as needed for fever       bisacodyl (DULCOLAX) 10 MG Suppository Place 10 mg rectally Every 4 days       calcium carbonate (TUMS) 500 MG chewable tablet Take 1 chew tab by mouth every 4 hours as needed for heartburn       dimethicone 1 % external cream Apply topically 2 times daily Apply to Gail area topically as needed for redness       hydrALAZINE (APRESOLINE) 10 MG tablet Take 1 tablet (10 mg) by mouth 3 times daily as needed For SBP >180 90 tablet 11     Hyoscyamine Sulfate (HYOSCYAMINE PO) Place 0.125 mg under the tongue every 4 hours as needed       LORazepam (ATIVAN) 2 MG/ML (HIGH CONC) solution Take 0.5 mg by mouth every 2 hours as needed for anxiety       morphine sulfate 20 MG/5ML SOLN Take 0.25 mLs by mouth daily And 0.25 ml every 1 hour PRN. 5mg = 0.25ml Staff to contact hospice  if administering 3 or more PRN morphine doses in a 3  hour period Observe for adverse symptoms       nystatin (MYCOSTATIN) 920259 UNIT/GM POWD Apply topically 2 times daily as needed       QUEtiapine (SEROQUEL) 25 MG tablet Take 50 mg by mouth 2 times day and 62.5 mg by mouth one time a day       senna-docusate (SENOKOT-S;PERICOLACE) 8.6-50 MG per tablet Give 1-2  Tablets daily and 1 tab PRN         Case Management:  I have reviewed the care plan and MDS and do agree with the plan. Patient's desire to return to the community is not assessible due to cognitive impairment. Information reviewed:  Medications, vital signs, orders, and nursing  "notes.    ROS:  Unobtainable secondary to cognitive impairment.     Vitals:  /67   Pulse 70   Temp 97.8  F (36.6  C)   Resp 16   Ht 1.486 m (4' 10.5\")   Wt 59.8 kg (131 lb 12.8 oz)   SpO2 95%   BMI 27.08 kg/m    Body mass index is 27.08 kg/m .  Exam:  GENERAL APPEARANCE:  in no distress, somnolent, Slouching in wheelchair  RESP:  lungs clear to auscultation , no respiratory distress  CV:  S1, S2.  Systolic murmur over upper sternal borders.  Rate and rhythm regular  ABDOMEN:  Soft, nontender, bowel sounds normal  M/S:   Wheelchair.  Gifty for transfers  SKIN:  Inspection of skin and subcutaneous tissue baseline  PSYCH:  Nonsensical speech, pleasantly confused      ASSESSMENT/PLAN  Late onset Alzheimer's disease with behavioral disturbance  Restlessness and agitation  Hallucinations  Disease progressing.  Continue with current medications of Ativan as needed, morphine as needed, and Seroquel 62.5 mg daily and 50 mg twice daily    Slow transit constipation  Review of bowel movements indicates patient's is requiring suppositories despite being on senna S2 tabs twice daily and extra 1 as needed.  We will schedule suppositories for ease of assessment of nursing staff and bowel habits of patient    Paget disease of breast, right (H)  History of B-cell lymphoma  Advancing.  Enrolled in hospice.  Asymptomatic    Osteoporosis without current pathological fracture, unspecified osteoporosis type  Falls frequently  Despite numerous interventions and close supervision, patient continues to fall, however no injuries.  Continue to monitor and provide for safe environment    Benign essential hypertension  Blood pressures within acceptable range.  Continues to have hydralazine available for blood pressures greater than 180.  However this is not been an issue  Blood pressures:  06/04/2019 10:42 110/80 mmHg (Sitting l/arm)  06/04/2019 10:41 120/80 mmHg (Lying l/arm)  05/29/2019 11:18 130/67 mmHg (Sitting " l/arm)  05/28/2019 13:49 124/60 mmHg (Sitting r/arm)  05/28/2019 13:44 110/62 mmHg    Hospice care patient  Appreciate support of hospice team in managing patient's symptoms particularly mental health  Condition is declining, significant deficits requiring nursing home placement and close supervision.  Requires extensive assistance from staff.      transcribed by : Corina Ames  Orders:  1. Schedule Bisacodyl - 1 suppository every 4 days      Electronically signed by:  HANG Graham CNP

## 2019-06-04 PROBLEM — F22 PARANOIA (H): Status: ACTIVE | Noted: 2019-01-01

## 2019-06-04 NOTE — LETTER
6/4/2019        RE: [facility]  [address] [place] [id number]        [place] [facility]  Chief Complaint   Patient presents with     [facility] Regulatory     [place] [facility] Record Number:  [id number]  Place of Service where encounter took place:  [facility] ([facility]) [[id number]]    HPI:    Lulu Carlton  is 89 year old (11/8/1921), who is being seen today for a federally mandated E/M visit.  HPI information obtained from: facility chart records, facility staff, patient report and [facility] chart review. Today's concerns are:     Late onset Alzheimer's disease with behavioral disturbance  Restlessness and agitation  Hallucinations  Slow transit constipation  Paget disease of breast, right (H)  History of B-cell lymphoma  Osteoporosis without current pathological fracture, unspecified osteoporosis type  Falls frequently  Benign essential hypertension  Hospice care patient     Resident seen and examined.  Pleasantly confused.  Unable to give history.  Per staff, patient has been having regular falls, despite numerous interventions in place.  Hospice team changed Haldol to Seroquel secondary to jerking movements.  The jerking has subsided.  Patient is increasingly confused, tired, sleeping more, eating less.  Bowels requiring suppositories.  Requesting regularly scheduled    ALLERGIES:Hydrocodone-acetaminophen; Trazodone; and Metformin  PAST MEDICAL HISTORY:   has a past medical history of Anemia, unspecified (hosp. 4/3/07 [place] ), Central nervous system complication (hosp. 4/3/07 [place]), Depressive disorder, not elsewhere classified, Fracture of femoral neck, right (H) (5/10/2017), Myasthenia gravis, Other malaise and fatigue (hosp. 4/3/07 [place] ), Other urinary incontinence, Thoracic or lumbosacral neuritis or radiculitis, unspecified, and Unspecified essential hypertension.  PAST SURGICAL HISTORY:   has a past surgical history that  includes surgical history of - ; surgical history of - ; and Open reduction internal fixation hip bipolar (Right, 5/10/2017).  FAMILY HISTORY: family history is not on file.  SOCIAL HISTORY:  reports that she has never smoked. She does not have any smokeless tobacco history on file. She reports that she does not drink alcohol or use drugs.    MEDICATIONS:  Current Outpatient Medications   Medication Sig Dispense Refill     Acetaminophen (TYLENOL PO) Take 1,000 mg by mouth as needed for mild pain or fever        acetaminophen (TYLENOL) 650 MG Suppository Place 650 mg rectally every 4 hours as needed for fever       bisacodyl (DULCOLAX) 10 MG Suppository Place 10 mg rectally Every 4 days       calcium carbonate (TUMS) 500 MG chewable tablet Take 1 chew tab by mouth every 4 hours as needed for heartburn       dimethicone 1 % external cream Apply topically 2 times daily Apply to Gail area topically as needed for redness       hydrALAZINE (APRESOLINE) 10 MG tablet Take 1 tablet (10 mg) by mouth 3 times daily as needed For SBP >180 90 tablet 11     Hyoscyamine Sulfate (HYOSCYAMINE PO) Place 0.125 mg under the tongue every 4 hours as needed       LORazepam (ATIVAN) 2 MG/ML (HIGH CONC) solution Take 0.5 mg by mouth every 2 hours as needed for anxiety       morphine sulfate 20 MG/5ML SOLN Take 0.25 mLs by mouth daily And 0.25 ml every 1 hour PRN. 5mg = 0.25ml Staff to contact hospice  if administering 3 or more PRN morphine doses in a 3  hour period Observe for adverse symptoms       nystatin (MYCOSTATIN) 812860 UNIT/GM POWD Apply topically 2 times daily as needed       QUEtiapine (SEROQUEL) 25 MG tablet Take 50 mg by mouth 2 times day and 62.5 mg by mouth one time a day       senna-docusate (SENOKOT-S;PERICOLACE) 8.6-50 MG per tablet Give 1-2  Tablets daily and 1 tab PRN         Case Management:  I have reviewed the care plan and MDS and do agree with the plan. Patient's desire to return to the community is not assessible  "due to cognitive impairment. Information reviewed:  Medications, vital signs, orders, and nursing notes.    ROS:  Unobtainable secondary to cognitive impairment.     Vitals:  /67   Pulse 70   Temp 97.8  F (36.6  C)   Resp 16   Ht 1.486 m (4' 10.5\")   Wt 59.8 kg (131 lb 12.8 oz)   SpO2 95%   BMI 27.08 kg/m     Body mass index is 27.08 kg/m .  Exam:  GENERAL APPEARANCE:  in no distress, somnolent, Slouching in wheelchair  RESP:  lungs clear to auscultation , no respiratory distress  CV:  S1, S2.  Systolic murmur over upper sternal borders.  Rate and rhythm regular  ABDOMEN:  Soft, nontender, bowel sounds normal  M/S:   Wheelchair.  Gifty for transfers  SKIN:  Inspection of skin and subcutaneous tissue baseline  PSYCH:  Nonsensical speech, pleasantly confused      ASSESSMENT/PLAN  Late onset Alzheimer's disease with behavioral disturbance  Restlessness and agitation  Hallucinations  Disease progressing.  Continue with current medications of Ativan as needed, morphine as needed, and Seroquel 62.5 mg daily and 50 mg twice daily    Slow transit constipation  Review of bowel movements indicates patient's is requiring suppositories despite being on senna S2 tabs twice daily and extra 1 as needed.  We will schedule suppositories for ease of assessment of nursing staff and bowel habits of patient    Paget disease of breast, right (H)  History of B-cell lymphoma  Advancing.  Enrolled in hospice.  Asymptomatic    Osteoporosis without current pathological fracture, unspecified osteoporosis type  Falls frequently  Despite numerous interventions and close supervision, patient continues to fall, however no injuries.  Continue to monitor and provide for safe environment    Benign essential hypertension  Blood pressures within acceptable range.  Continues to have hydralazine available for blood pressures greater than 180.  However this is not been an issue  Blood pressures:  06/04/2019 10:42 110/80 mmHg (Sitting " l/arm)  06/04/2019 10:41 120/80 mmHg (Lying l/arm)  05/29/2019 11:18 130/67 mmHg (Sitting l/arm)  05/28/2019 13:49 124/60 mmHg (Sitting r/arm)  05/28/2019 13:44 110/62 mmHg    Hospice care patient  Appreciate support of hospice team in managing patient's symptoms particularly mental health  Condition is declining, significant deficits requiring nursing home placement and close supervision.  Requires extensive assistance from staff.      transcribed by : Corina Ames  Orders:  1. Schedule Bisacodyl - 1 suppository every 4 days      Electronically signed by:  HANG Graham CNP          Sincerely,        HANG Graham CNP

## 2019-08-05 NOTE — CONSULTS
Consult Note - Wound   Kristel Chung 79 y o  female MRN: [id number]  Unit/Bed#: ICU 02 Encounter: [id number]      History and Present Illness: Patient is seen for wound care consult today   The patient is seen while on a ventilator and not responding to verbal questions at this time   Patient is dependent for care needs at this time   Patient is a 79year old female with septic shock s/p hernia repair , now s/p ex lap with with SBR for iatrogenic jejunal injury   Patient is on low air loss mattress , pak in place and dependent for turn and reposition with wedges   Vac therapy was taken down as per the notes today of the abdominal area   Assessment Findings:   1  Sacral and bilateral heels dry and intact - as noted by RN Leo Williamson   2  Right and left abdominal area - scattered irregular partial thickness wounds related to tape / adhesive - areas are dry and 1 area noted to have small serous drainage under the left breast area   Discussed the plan of care with the primary RN   Skin care plans:  1-Hydraguard to bilateral sacrum, buttock and heels BID and PRN  2-Elevate heels to offload pressure  3-Ehob cushion in chair when out of bed  4-Moisturize skin daily with skin nourishing cream   5-Turn/reposition q2h or when medically stable for pressure re-distribution on skin  6  Right and left abdominal partial thickness wounds - cleanse with soap and water then apply 3 M no sting to the dry areas and apply maxorb robe to the left upper skin tear daily   Vitals: Blood pressure 103/54, pulse 98, temperature 100 4 °F (38 °C), temperature source Rectal, resp  rate 17, height 5' 4" (1 626 m), weight 74 9 kg (165 lb 2 oz), SpO2 98 %  ,Body mass index is 28 34 kg/m²  Wound 08/05/19 Skin tear Abdomen Left;Upper (Active)   Wound Description Clean;Fragile;Pink 8/5/2019  3:25 PM   Emely-wound Assessment Clean;Dry; Intact;Edema 8/5/2019  3:25 PM   Wound Length (cm) 8 cm 8/5/2019  3:25 PM [facility] Consultation    Consultation - [facility]  Level of consult: Consult, follow and place orders    Lulu Carlton,  1921, MRN [id number]     Admitting Dx: Hip pain, right [M25.551]  Hip dislocation, right, initial encounter (H) [S73.004A]     PCP: Todd Villeda, [phone]     Code status:  Prior     Extended Emergency Contact Information  Primary Emergency Contact: Beningo Shane  Address: [address] [place] [address]  Home Phone: [phone]  Mobile Phone: [phone]  Relation: Son  Secondary Emergency Contact: Keya Kong   [facility]  Home Phone: [phone]  Mobile Phone: [phone]  Relation: Daughter     Assessment:    R hip dislocation s/p R hip hemiarthroplasty on 5/10 by Dr. Greenfield. She was discharged to a TCU and then returned to her primary residence at [facility]. She was in with a hip dislocation on 6/15 and again on , both successfully reduced in the ED.     Plan:  Bedrest until abductor brace is on  Abductor brace ordered, may WBAT in brace. Should have brace on at all times, except for hygiene and skin checks.   Follow up with Dr. Greenfield in 1-2 weeks   May be discharged to an appropriate setting when medically stable.     Principal Problem:    Hip dislocation, right, sequela  Active Problems:    Anemia, iron deficiency    Type 2 diabetes mellitus with hyperglycemia, without long-term current use of insulin (H)    Hypertension goal BP (blood pressure) < 140/90    Displaced fracture of right femoral neck (H)    Dementia    Status post total replacement of right hip       Chief Complaint  R hip pain      HPI  We have been requested by Dr. Hernandes to evaluate Lulu Carlton who is a 89 year old year old female for R hip dislocation s/p R hip hemiarthroplasty on 5/10 by Dr. Greenfield. She was discharged to a TCU and then returned to her primary residence at [facility]. She was in with a hip  Wound Width (cm) 14 cm 8/5/2019  3:25 PM   Wound Depth (cm) 0 1 8/5/2019  3:25 PM   Calculated Wound Area (cm^2) 112 cm^2 8/5/2019  3:25 PM   Calculated Wound Volume (cm^3) 11 2 cm^3 8/5/2019  3:25 PM   Drainage Amount Scant 8/5/2019  3:25 PM   Drainage Description Serous 8/5/2019  3:25 PM   Non-staged Wound Description Partial thickness 8/5/2019  3:25 PM   Treatments Cleansed 8/5/2019  3:25 PM   Dressing Calcium Alginate; Other (Comment) 8/5/2019  3:25 PM   Patient Tolerance Tolerated well 8/5/2019  3:25 PM       Wound 08/05/19 Skin tear Abdomen Medial;Right;Upper (Active)   Wound Description Clean;Dry;Fragile 8/5/2019  3:25 PM   Emely-wound Assessment Clean;Dry; Intact 8/5/2019  3:25 PM   Wound Length (cm) 8 cm 8/5/2019  3:25 PM   Wound Width (cm) 7 cm 8/5/2019  3:25 PM   Wound Depth (cm) 0 1 8/5/2019  3:25 PM   Calculated Wound Area (cm^2) 56 cm^2 8/5/2019  3:25 PM   Calculated Wound Volume (cm^3) 5 6 cm^3 8/5/2019  3:25 PM   Drainage Amount None 8/5/2019  3:25 PM   Non-staged Wound Description Partial thickness 8/5/2019  3:25 PM   Treatments Cleansed 8/5/2019  3:25 PM   Dressing Other (Comment) 8/5/2019  3:25 PM   Patient Tolerance Tolerated well 8/5/2019  3:25 PM       Wound care will follow weekly call with questions or concerns at [phone] or Wayne Wharton RN BSN Pelon & Red dislocation on 6/15 and again on 6/21, both successfully reduced in the ED. She is a poor historian and the history is primarily obtained from her daughter. Her daughter stated that she fell upon the occurrence of the first hip dislocation. Upon this occurrence it is unclear the MOA of the dislocation. Per the ED notes it states nursing staff found the patient complaining of R hip pain while laying down.      History is obtained from the electronic health record and patient's daughter     Past Medical History  Past Medical History:   Diagnosis Date     Anemia, unspecified hosp. 4/3/07 [place]      Central nervous system complication hosp. 4/3/07 [place]     Depressive disorder, not elsewhere classified      Fracture of femoral neck, right (H) 5/10/2017     Myasthenia gravis      Other malaise and fatigue hosp. 4/3/07 [place]     intermittent episodes of slurred speech, headaches, some confusion w/general weakness.      Other urinary incontinence      Thoracic or lumbosacral neuritis or radiculitis, unspecified     lumbar radiculopathy involving right leg.      Unspecified essential hypertension        Surgical History  Past Surgical History:   Procedure Laterality Date     OPEN REDUCTION INTERNAL FIXATION HIP BIPOLAR Right 5/10/2017    Procedure: OPEN REDUCTION INTERNAL FIXATION HIP BIPOLAR;  Open reduction internal fixation right hip bipolar gregg arthroplasty.;  Surgeon: Riky Greenfield MD;  Location: [facility] OR     SURGICAL HISTORY OF -       thymus removal      SURGICAL HISTORY OF -       cholecystectomy        Social History  Social History     Social History     Marital status:      Spouse name: N/A     Number of children: N/A     Years of education: N/A     Occupational History     Not on file.     Social History Main Topics     Smoking status: Never Smoker     Smokeless tobacco: Not on file     Alcohol use No     Drug use: No     Sexual activity: Not on file     Other Topics Concern     Not on  file     Social History Narrative       Family History  No family history on file.     Allergies:  Hydrocodone-acetaminophen; Trazodone; and Metformin      Current Medications:  Current Facility-Administered Medications   Medication     acetaminophen (TYLENOL) tablet 650 mg     ondansetron (ZOFRAN-ODT) ODT tab 4 mg    Or     ondansetron (ZOFRAN) injection 4 mg     glucose 40 % gel 15-30 g    Or     dextrose 50 % injection 25-50 mL    Or     glucagon injection 1 mg     insulin aspart (NovoLOG) inj (RAPID ACTING)     insulin aspart (NovoLOG) inj (RAPID ACTING)     aspirin EC EC tablet 325 mg     cyclobenzaprine (FLEXERIL) tablet 5 mg     lisinopril (PRINIVIL/ZESTRIL) tablet 5 mg     omeprazole (priLOSEC) CR capsule 20 mg     senna-docusate (SENOKOT-S;PERICOLACE) 8.6-50 MG per tablet 2 tablet     naloxone (NARCAN) injection 0.1-0.4 mg     [START ON 6/23/2017] acetaminophen (TYLENOL) tablet 650 mg     oxyCODONE (ROXICODONE) IR half-tab 2.5-5 mg       Review of Systems:  The Review of Systems is negative other than noted in the HPI    Physical Exam:  Temp:  [97.2  F (36.2  C)-98.6  F (37  C)] 97.8  F (36.6  C)  Pulse:  [87-88] 87  Heart Rate:  [61-98] 74  Resp:  [0-27] 18  BP: (109-183)/(56-89) 122/67  SpO2:  [92 %-100 %] 95 %    GEN: no apparent distress, non-labored breating.   R hip: well healed incision, ecchymosis noted on anterior midshaft thigh and lateral calf. Non-tender to palpation over greater trochanter, tenderness over ecchymotic areas. Gentle hip flexion tolerated well. Able to perform a straight leg rasie.      Pertinent Labs  Lab Results: personally reviewed.  Lab Results   Component Value Date    WBC 9.0 06/22/2017    HGB 9.6 (L) 06/22/2017    HCT 30.6 (L) 06/22/2017    MCV 89 06/22/2017     06/22/2017     No results for input(s): INR in the last 168 hours.    Pertinent Radiology  Radiology Results: images and radiology report reviewed  Recent Results (from the past 24 hour(s))   XR Pelvis w Hip  Right 1 View    Narrative    PELVIS AND HIP RIGHT ONE VIEW  6/21/2017 8:24 PM      HISTORY: Pain, history of prosthetic dislocation.    COMPARISON: 6/15/2017      Impression    IMPRESSION: Superior dislocation of the right hip arthroplasty. No  fracture.    SABRA VILLARREAL MD   XR Pelvis Port 1/2 Views    Narrative    PELVIS PORTABLE ONE - TWO VIEW  6/21/2017 9:41 PM      HISTORY: Post reduction right hip dislocation.     COMPARISON: 6/21/2017      Impression    IMPRESSION: The right hip arthroplasty has been successfully reduced  into the acetabulum. No fracture.    SABRA VILLARREAL MD       Attestation:  I have reviewed today's vital signs, notes, medications, labs and imaging.  Amount of time performed on this consult: 30 minutes.  I have discussed the case with Dr. Greenfield remotely.      Sandie Wilkinson
